# Patient Record
Sex: MALE | Race: BLACK OR AFRICAN AMERICAN | NOT HISPANIC OR LATINO | URBAN - METROPOLITAN AREA
[De-identification: names, ages, dates, MRNs, and addresses within clinical notes are randomized per-mention and may not be internally consistent; named-entity substitution may affect disease eponyms.]

---

## 2023-03-28 ENCOUNTER — INPATIENT (INPATIENT)
Facility: HOSPITAL | Age: 78
LOS: 9 days | Discharge: HOME | End: 2023-04-07
Attending: HOSPITALIST | Admitting: HOSPITALIST
Payer: MEDICAID

## 2023-03-28 VITALS
WEIGHT: 169.98 LBS | OXYGEN SATURATION: 94 % | TEMPERATURE: 98 F | RESPIRATION RATE: 16 BRPM | SYSTOLIC BLOOD PRESSURE: 170 MMHG | HEART RATE: 110 BPM | DIASTOLIC BLOOD PRESSURE: 118 MMHG

## 2023-03-28 DIAGNOSIS — N17.9 ACUTE KIDNEY FAILURE, UNSPECIFIED: ICD-10-CM

## 2023-03-28 DIAGNOSIS — R31.0 GROSS HEMATURIA: ICD-10-CM

## 2023-03-28 DIAGNOSIS — R77.8 OTHER SPECIFIED ABNORMALITIES OF PLASMA PROTEINS: ICD-10-CM

## 2023-03-28 DIAGNOSIS — R79.89 OTHER SPECIFIED ABNORMAL FINDINGS OF BLOOD CHEMISTRY: ICD-10-CM

## 2023-03-28 DIAGNOSIS — R33.9 RETENTION OF URINE, UNSPECIFIED: ICD-10-CM

## 2023-03-28 DIAGNOSIS — E87.70 FLUID OVERLOAD, UNSPECIFIED: ICD-10-CM

## 2023-03-28 DIAGNOSIS — I10 ESSENTIAL (PRIMARY) HYPERTENSION: ICD-10-CM

## 2023-03-28 LAB
BLD GP AB SCN SERPL QL: SIGNIFICANT CHANGE UP
CK MB BLD-MCNC: 2.7 % — SIGNIFICANT CHANGE UP (ref 0–3.5)
CK MB CFR SERPL CALC: 3.1 NG/ML — SIGNIFICANT CHANGE UP (ref 0.5–3.6)
CK SERPL-CCNC: 114 U/L — SIGNIFICANT CHANGE UP (ref 26–308)
TROPONIN I, HIGH SENSITIVITY RESULT: 181.6 NG/L — HIGH

## 2023-03-28 PROCEDURE — 93010 ELECTROCARDIOGRAM REPORT: CPT

## 2023-03-28 PROCEDURE — 99285 EMERGENCY DEPT VISIT HI MDM: CPT

## 2023-03-28 PROCEDURE — 99222 1ST HOSP IP/OBS MODERATE 55: CPT

## 2023-03-28 PROCEDURE — 99233 SBSQ HOSP IP/OBS HIGH 50: CPT

## 2023-03-28 PROCEDURE — 71045 X-RAY EXAM CHEST 1 VIEW: CPT | Mod: 26

## 2023-03-28 PROCEDURE — 74176 CT ABD & PELVIS W/O CONTRAST: CPT | Mod: 26

## 2023-03-28 RX ORDER — METOPROLOL TARTRATE 50 MG
50 TABLET ORAL DAILY
Refills: 0 | Status: DISCONTINUED | OUTPATIENT
Start: 2023-03-28 | End: 2023-04-07

## 2023-03-28 RX ORDER — ACETAMINOPHEN 500 MG
650 TABLET ORAL EVERY 6 HOURS
Refills: 0 | Status: DISCONTINUED | OUTPATIENT
Start: 2023-03-28 | End: 2023-04-07

## 2023-03-28 RX ORDER — AMLODIPINE BESYLATE 2.5 MG/1
5 TABLET ORAL DAILY
Refills: 0 | Status: DISCONTINUED | OUTPATIENT
Start: 2023-03-28 | End: 2023-04-05

## 2023-03-28 RX ORDER — METOPROLOL TARTRATE 50 MG
1 TABLET ORAL
Refills: 0 | DISCHARGE

## 2023-03-28 RX ORDER — TAMSULOSIN HYDROCHLORIDE 0.4 MG/1
0.4 CAPSULE ORAL AT BEDTIME
Refills: 0 | Status: DISCONTINUED | OUTPATIENT
Start: 2023-03-28 | End: 2023-04-07

## 2023-03-28 RX ORDER — FUROSEMIDE 40 MG
40 TABLET ORAL DAILY
Refills: 0 | Status: DISCONTINUED | OUTPATIENT
Start: 2023-03-29 | End: 2023-04-02

## 2023-03-28 RX ORDER — SENNA PLUS 8.6 MG/1
2 TABLET ORAL AT BEDTIME
Refills: 0 | Status: DISCONTINUED | OUTPATIENT
Start: 2023-03-28 | End: 2023-04-07

## 2023-03-28 RX ORDER — POLYETHYLENE GLYCOL 3350 17 G/17G
17 POWDER, FOR SOLUTION ORAL DAILY
Refills: 0 | Status: DISCONTINUED | OUTPATIENT
Start: 2023-03-28 | End: 2023-04-07

## 2023-03-28 RX ORDER — FUROSEMIDE 40 MG
40 TABLET ORAL ONCE
Refills: 0 | Status: COMPLETED | OUTPATIENT
Start: 2023-03-28 | End: 2023-03-28

## 2023-03-28 RX ORDER — ASPIRIN/CALCIUM CARB/MAGNESIUM 324 MG
324 TABLET ORAL ONCE
Refills: 0 | Status: COMPLETED | OUTPATIENT
Start: 2023-03-28 | End: 2023-03-28

## 2023-03-28 RX ORDER — LANOLIN ALCOHOL/MO/W.PET/CERES
3 CREAM (GRAM) TOPICAL AT BEDTIME
Refills: 0 | Status: DISCONTINUED | OUTPATIENT
Start: 2023-03-28 | End: 2023-04-07

## 2023-03-28 RX ADMIN — Medication 40 MILLIGRAM(S): at 17:45

## 2023-03-28 RX ADMIN — TAMSULOSIN HYDROCHLORIDE 0.4 MILLIGRAM(S): 0.4 CAPSULE ORAL at 23:22

## 2023-03-28 RX ADMIN — SENNA PLUS 2 TABLET(S): 8.6 TABLET ORAL at 23:22

## 2023-03-28 RX ADMIN — Medication 50 MILLIGRAM(S): at 17:51

## 2023-03-28 RX ADMIN — Medication 324 MILLIGRAM(S): at 17:51

## 2023-03-28 NOTE — PATIENT PROFILE ADULT - FUNCTIONAL ASSESSMENT - BASIC MOBILITY 6.
2-calculated by average/Not able to assess (calculate score using [facility] averaging method)

## 2023-03-28 NOTE — H&P ADULT - ASSESSMENT
77 years old male with h/o HTN present to ED with complain difficulty urination, increase lower extremity edema, SOB and unable to sleep at night. Patient reported urinary retention for one week, only very small amount of urine come out each time, associated with abdominal discomfort. He also noted increase lower extremity swelling for 1 wee. Reported SOB, PND at night as well. No chest pain. No h/o CAD. Denied seeing any blood in urine  Tachycardic in ED, slightly hypoxic, improved with NC. No leukocytosis, plt 143, K 4.3, Cr 1.4, AST/ALT 86/129, hsTnT 185.3, proBNP 9018, UA with large blood. Flu/COVID negative. CXR image reviewed, no focal consolidation. S/P Yan placement in ED with drainage of > 1.6L urine.     Admitted with gross hematuria, urinary retention, elevated troponin. fluid overload

## 2023-03-28 NOTE — H&P ADULT - NSHPPHYSICALEXAM_GEN_ALL_CORE
CONSTITUTIONAL: alert and cooperative, no acute distress  EYES: PERRL, no scleral icterus  ENT: Mucosa moist, tongue normal  NECK: Neck supple, trachea midline, non-tender  CARDIAC: Normal S1 and S2. Regular rate and rhythms. Pedal edema +++. Peripheral pulses intact  LUNGS: Equal air entry both lungs. No rales, rhonchi, wheezing. Normal respiratory effort.   ABDOMEN: Soft, nondistended, nontender. No guarding or rebound tenderness. No hepatomegaly or splenomegaly. Bowel sound normal. No hernia noted. Yan draining bloody urine  MUSCULOSKELETAL: Normocephalic, atraumatic. No significant deformity or joint abnormality  NEUROLOGICAL: No gross motor or sensory deficits  PSYCHIATRIC: A&O x 3, appropriate mood and affect CONSTITUTIONAL: alert and cooperative, no acute distress  EYES: left eye cataract,  ENT: Mucosa moist, tongue normal  NECK: Neck supple, trachea midline, non-tender  CARDIAC: Normal S1 and S2. Regular rate and rhythms. Pedal edema +++. Peripheral pulses intact  LUNGS: Equal air entry both lungs. No rales, rhonchi, wheezing. Appear to have increase respiratory effort.   ABDOMEN: Soft, nondistended, nontender. No guarding or rebound tenderness. No hepatomegaly or splenomegaly. Bowel sound normal. No hernia noted. Yan draining bloody urine  MUSCULOSKELETAL: Normocephalic, atraumatic. No significant deformity or joint abnormality  NEUROLOGICAL: No gross motor or sensory deficits  PSYCHIATRIC: A&O x 3, appropriate mood and affect

## 2023-03-28 NOTE — PATIENT PROFILE ADULT - FALL HARM RISK - HARM RISK INTERVENTIONS

## 2023-03-28 NOTE — H&P ADULT - PROBLEM SELECTOR PLAN 2
progressively worsening of urinary retention for 1 week, never happened before per patient   Yan with > 1.6L of gross hematuria  CT abd/pelvis  Start flomax 0.4mg hs  Urology consulted

## 2023-03-28 NOTE — CONSULT NOTE ADULT - NS ATTEND AMEND GEN_ALL_CORE FT
Pt draining well with light pink tinged urine via an 18 FR joseph  Uncircumcised and reduced  B/l hydroceles  Ext swelling  Cr 1.4  Urine dirty  F/u PSA that ED ordered: will likely be high due to retention, infection etc.  F/u Cx  F/u CT: patient on way down [address]

## 2023-03-28 NOTE — H&P ADULT - PROBLEM SELECTOR PLAN 1
present with urinary retention s/p Yan placement with > 1.6L of gross hematuria  Will get CT abd/pelvis to evaluate for prostate/bladder lesion  Monitor H/H, check type/Screen  Urology consulted

## 2023-03-28 NOTE — ED PROVIDER NOTE - CLINICAL SUMMARY MEDICAL DECISION MAKING FREE TEXT BOX
Urinary retention, clinically with likely pulm edema - no active resp distress, edema in b/l LE.  Mildly hypoxic but comfortable appearing.  Plan for labs, urine, joseph, admit.

## 2023-03-28 NOTE — H&P ADULT - NSHPADDITIONALINFOADULT_GEN_ALL_CORE
Unable to reconcile home med to be continue through " med reconciliation" due to issue with EMR  Trend LFT, check CK. If worsened LFT with normal CK, then will pursue further work up Unable to reconcile home med to be continue through " med reconciliation" due to issue with EMR

## 2023-03-28 NOTE — H&P ADULT - PROBLEM SELECTOR PLAN 5
Mild MARVIN, likely related to urinary retention  Currently on IV lasix for volume overload  Closely monitor renal function as risk of post obstruction diuresis and on IV diuresis for volume overloadl  Nephrology consulted

## 2023-03-28 NOTE — H&P ADULT - PROBLEM SELECTOR PLAN 3
hsTnT 185.3  No active chest pain  EKG dose not appear to have any acute ST-T changes suggestive of ACS  No h/o CAD  will give one dose of aspirin 325mg. Hold off standing order for now given gross hematuria and pending clinical status  Serial trop/Ck/CKMB  Telemetry monitoring, ECHO, lipid panel, A1c  on BB

## 2023-03-28 NOTE — ED PROVIDER NOTE - PHYSICAL EXAMINATION
General appearance: Nontoxic appearing, conversant, afebrile    Eyes: anicteric sclerae, ANSLEY, EOMI   HENT: Atraumatic; oropharynx clear, MMM and no ulcerations, no pharyngeal erythema or exudate   Neck: Trachea midline; Full range of motion, supple   Pulm: CTA bl, normal respiratory effort and no intercostal retractions, normal work of breathing   CV: RRR, No murmurs, rubs, or gallops   Abdomen: Soft, non-tender, non-distended; no guarding or rebound   Extremities: 3+ LE peripheral edema, no gross deformities, FROM x4   Skin: Dry, normal temperature, turgor and texture; no rash   Psych: Appropriate affect, cooperative

## 2023-03-28 NOTE — H&P ADULT - PROBLEM SELECTOR PLAN 4
present with SOB  proBNP 9018  Significant bilateral LE edema  Clinically very much volume overloaded  Received IV lasix 40mg in ED  Continue with IV lasix 40mg daily. Monitor renal function and post obstruction diuresis.   ECHO, monitor I/O, monitor serum electrolytes  LE doppler to evaluate for DVT

## 2023-03-28 NOTE — PATIENT PROFILE ADULT - FALL HARM RISK - FACTORS
LE Swelling./Other Infliximab Counseling:  I discussed with the patient the risks of infliximab including but not limited to myelosuppression, immunosuppression, autoimmune hepatitis, demyelinating diseases, lymphoma, and serious infections.  The patient understands that monitoring is required including a PPD at baseline and must alert us or the primary physician if symptoms of infection or other concerning signs are noted.

## 2023-03-28 NOTE — ED PROVIDER NOTE - OBJECTIVE STATEMENT
76yo male with pmh htn presenting with decreased urination, b/l LE edema, orthopnea.  Ongoing for past week.  No cp, fever.  When he tries to urinate, unable to fully void.

## 2023-03-28 NOTE — H&P ADULT - HISTORY OF PRESENT ILLNESS
77 years old male with h/o HTN present to ED with complain difficulty urination, increase lower extremity edema, SOB and unable to sleep at night. Patient reported urinary retention for one week, only very small amount of urine come out each time, associated with abdominal discomfort. He also noted increase lower extremity swelling for 1 wee. Reported SOB, PND at night as well. No chest pain. No h/o CAD. Denied seeing any blood in urine  Tachycardic in ED, slightly hypoxic, improved with NC. No leukocytosis, plt 143, K 4.3, Cr 1.4, AST/ALT 86/129, hsTnT 185.3, proBNP 9018, UA with large blood. Flu/COVID negative. CXR image reviewed, no focal consolidation. S/P Yan placement in ED with drainage of > 1.6L urine.     SH: no toxic habits  FH: father-HTN

## 2023-03-29 LAB
A1C WITH ESTIMATED AVERAGE GLUCOSE RESULT: 6.5 % — HIGH (ref 4–5.6)
ALBUMIN SERPL ELPH-MCNC: 2.6 G/DL — LOW (ref 3.3–5)
ALP SERPL-CCNC: 164 U/L — HIGH (ref 40–120)
ALT FLD-CCNC: 105 U/L — HIGH (ref 12–78)
ANION GAP SERPL CALC-SCNC: 6 MMOL/L — SIGNIFICANT CHANGE UP (ref 5–17)
AST SERPL-CCNC: 59 U/L — HIGH (ref 15–37)
BILIRUB SERPL-MCNC: 1.7 MG/DL — HIGH (ref 0.2–1.2)
BUN SERPL-MCNC: 23 MG/DL — SIGNIFICANT CHANGE UP (ref 7–23)
CALCIUM SERPL-MCNC: 8.4 MG/DL — LOW (ref 8.5–10.1)
CHLORIDE SERPL-SCNC: 104 MMOL/L — SIGNIFICANT CHANGE UP (ref 96–108)
CHOLEST SERPL-MCNC: 106 MG/DL — SIGNIFICANT CHANGE UP
CK MB BLD-MCNC: 4.3 % — HIGH (ref 0–3.5)
CK MB CFR SERPL CALC: 2.6 NG/ML — SIGNIFICANT CHANGE UP (ref 0.5–3.6)
CK SERPL-CCNC: 60 U/L — SIGNIFICANT CHANGE UP (ref 26–308)
CO2 SERPL-SCNC: 30 MMOL/L — SIGNIFICANT CHANGE UP (ref 22–31)
CREAT SERPL-MCNC: 1.28 MG/DL — SIGNIFICANT CHANGE UP (ref 0.5–1.3)
EGFR: 58 ML/MIN/1.73M2 — LOW
ESTIMATED AVERAGE GLUCOSE: 140 MG/DL — HIGH (ref 68–114)
GLUCOSE SERPL-MCNC: 97 MG/DL — SIGNIFICANT CHANGE UP (ref 70–99)
HCT VFR BLD CALC: 48.7 % — SIGNIFICANT CHANGE UP (ref 39–50)
HCV AB S/CO SERPL IA: 0.15 S/CO — SIGNIFICANT CHANGE UP (ref 0–0.99)
HCV AB SERPL-IMP: SIGNIFICANT CHANGE UP
HDLC SERPL-MCNC: 26 MG/DL — LOW
HGB BLD-MCNC: 15.1 G/DL — SIGNIFICANT CHANGE UP (ref 13–17)
LIPID PNL WITH DIRECT LDL SERPL: 61 MG/DL — SIGNIFICANT CHANGE UP
MAGNESIUM SERPL-MCNC: 1.6 MG/DL — SIGNIFICANT CHANGE UP (ref 1.6–2.6)
MCHC RBC-ENTMCNC: 25.2 PG — LOW (ref 27–34)
MCHC RBC-ENTMCNC: 31 G/DL — LOW (ref 32–36)
MCV RBC AUTO: 81.2 FL — SIGNIFICANT CHANGE UP (ref 80–100)
NON HDL CHOLESTEROL: 81 MG/DL — SIGNIFICANT CHANGE UP
NRBC # BLD: 0 /100 WBCS — SIGNIFICANT CHANGE UP (ref 0–0)
PHOSPHATE SERPL-MCNC: 3.2 MG/DL — SIGNIFICANT CHANGE UP (ref 2.5–4.5)
PLATELET # BLD AUTO: 160 K/UL — SIGNIFICANT CHANGE UP (ref 150–400)
POTASSIUM SERPL-MCNC: 3.8 MMOL/L — SIGNIFICANT CHANGE UP (ref 3.5–5.3)
POTASSIUM SERPL-SCNC: 3.8 MMOL/L — SIGNIFICANT CHANGE UP (ref 3.5–5.3)
PROT SERPL-MCNC: 6.8 GM/DL — SIGNIFICANT CHANGE UP (ref 6–8.3)
PSA FLD-MCNC: 90.8 NG/ML — HIGH (ref 0–4)
RBC # BLD: 6 M/UL — HIGH (ref 4.2–5.8)
RBC # FLD: 17 % — HIGH (ref 10.3–14.5)
SODIUM SERPL-SCNC: 140 MMOL/L — SIGNIFICANT CHANGE UP (ref 135–145)
TRIGL SERPL-MCNC: 97 MG/DL — SIGNIFICANT CHANGE UP
TROPONIN I, HIGH SENSITIVITY RESULT: 157.3 NG/L — HIGH
WBC # BLD: 9.95 K/UL — SIGNIFICANT CHANGE UP (ref 3.8–10.5)
WBC # FLD AUTO: 9.95 K/UL — SIGNIFICANT CHANGE UP (ref 3.8–10.5)

## 2023-03-29 PROCEDURE — 99232 SBSQ HOSP IP/OBS MODERATE 35: CPT

## 2023-03-29 PROCEDURE — 93970 EXTREMITY STUDY: CPT | Mod: 26

## 2023-03-29 PROCEDURE — 99233 SBSQ HOSP IP/OBS HIGH 50: CPT

## 2023-03-29 PROCEDURE — 93306 TTE W/DOPPLER COMPLETE: CPT | Mod: 26

## 2023-03-29 RX ORDER — ENOXAPARIN SODIUM 100 MG/ML
70 INJECTION SUBCUTANEOUS EVERY 12 HOURS
Refills: 0 | Status: DISCONTINUED | OUTPATIENT
Start: 2023-03-29 | End: 2023-04-03

## 2023-03-29 RX ADMIN — TAMSULOSIN HYDROCHLORIDE 0.4 MILLIGRAM(S): 0.4 CAPSULE ORAL at 22:11

## 2023-03-29 RX ADMIN — SENNA PLUS 2 TABLET(S): 8.6 TABLET ORAL at 22:11

## 2023-03-29 RX ADMIN — ENOXAPARIN SODIUM 70 MILLIGRAM(S): 100 INJECTION SUBCUTANEOUS at 17:22

## 2023-03-29 RX ADMIN — Medication 3 MILLIGRAM(S): at 22:10

## 2023-03-29 RX ADMIN — Medication 50 MILLIGRAM(S): at 05:48

## 2023-03-29 RX ADMIN — Medication 40 MILLIGRAM(S): at 05:48

## 2023-03-29 NOTE — PHYSICAL THERAPY INITIAL EVALUATION ADULT - PERTINENT HX OF CURRENT PROBLEM, REHAB EVAL
Troponin- 157.3, however as per Dr. Graves notes "#Elevated troponin.   hsTnT 185.3 -> 157  No active chest pain  EKG dose not appear to have any acute ST-T changes suggestive of ACS  No h/o CAD"    Hence PT eval was done at this time. [facility]  [id number]- Jodh was used No

## 2023-03-29 NOTE — PROGRESS NOTE ADULT - SUBJECTIVE AND OBJECTIVE BOX
Viji Graves M.D.    Patient is a 77y old  Male who presents with a chief complaint of urinary retention, gross hematuria, elevated troponin, MARVIN (28 Mar 2023 19:20)      SUBJECTIVE / OVERNIGHT EVENTS: DVT + for R peroneal DVT.     Patient denies chest pain, SOB, abd pain, N/V, fever, chills, dysuria or any other complaints. All remainder ROS negative.     MEDICATIONS  (STANDING):  amLODIPine   Tablet 5 milliGRAM(s) Oral daily  enoxaparin Injectable 70 milliGRAM(s) SubCutaneous every 12 hours  furosemide   Injectable 40 milliGRAM(s) IV Push daily  metoprolol succinate ER 50 milliGRAM(s) Oral daily  senna 2 Tablet(s) Oral at bedtime  tamsulosin 0.4 milliGRAM(s) Oral at bedtime    MEDICATIONS  (PRN):  acetaminophen     Tablet .. 650 milliGRAM(s) Oral every 6 hours PRN Mild Pain (1 - 3), Moderate Pain (4 - 6)  melatonin 3 milliGRAM(s) Oral at bedtime PRN Insomnia  polyethylene glycol 3350 17 Gram(s) Oral daily PRN Constipation      I&O's Summary    28 Mar 2023 07:01  -  29 Mar 2023 07:00  --------------------------------------------------------  IN: 0 mL / OUT: 1000 mL / NET: -1000 mL        PHYSICAL EXAM:  Vital Signs Last 24 Hrs  T(C): 37.3 (29 Mar 2023 04:45), Max: 37.3 (29 Mar 2023 04:45)  T(F): 99.2 (29 Mar 2023 04:45), Max: 99.2 (29 Mar 2023 04:45)  HR: 94 (29 Mar 2023 04:45) (91 - 94)  BP: 120/79 (29 Mar 2023 04:45) (114/47 - 154/93)  BP(mean): --  RR: 18 (29 Mar 2023 04:45) (17 - 19)  SpO2: 94% (29 Mar 2023 04:45) (94% - 97%)    Parameters below as of 29 Mar 2023 04:45  Patient On (Oxygen Delivery Method): nasal cannula      CONSTITUTIONAL: NAD, well-groomed  ENMT: Moist oral mucosa, no pharyngeal injection or exudates; normal dentition  RESPIRATORY: Normal respiratory effort; lungs are clear to auscultation bilaterally  CARDIOVASCULAR: Regular rate and rhythm, 2+ LE edema  ABDOMEN: Nontender to palpation, normoactive bowel sounds,  PSYCH: A+O x3; affect appropriate  NEUROLOGY: CN 2-12 are intact and symmetric; no gross sensory deficits;   SKIN: No rashes; no palpable lesions    LABS:                        15.1   9.95  )-----------( 160      ( 29 Mar 2023 06:13 )             48.7     -    140  |  104  |  23  ----------------------------<  97  3.8   |  30  |  1.28    Ca    8.4<L>      29 Mar 2023 06:13  Phos  3.2       Mg     1.6         TPro  6.8  /  Alb  2.6<L>  /  TBili  1.7<H>  /  DBili  x   /  AST  59<H>  /  ALT  105<H>  /  AlkPhos  164<H>      PT/INR - ( 28 Mar 2023 14:33 )   PT: 15.8 sec;   INR: 1.32 ratio         PTT - ( 28 Mar 2023 14:33 )  PTT:33.1 sec  CARDIAC MARKERS ( 29 Mar 2023 06:13 )  x     / x     / 60 U/L / x     / 2.6 ng/mL  CARDIAC MARKERS ( 28 Mar 2023 17:40 )  x     / x     / 114 U/L / x     / 3.1 ng/mL      Urinalysis Basic - ( 28 Mar 2023 14:33 )    Color: Red / Appearance: very cloudy / S.015 / pH: x  Gluc: x / Ketone: Negative  / Bili: Negative / Urobili: Negative mg/dL   Blood: x / Protein: 500 mg/dL / Nitrite: Negative   Leuk Esterase: Moderate / RBC: >50 /HPF / WBC 3-5   Sq Epi: x / Non Sq Epi: x / Bacteria: Few        CAPILLARY BLOOD GLUCOSE          RADIOLOGY & ADDITIONAL TESTS:  Results Reviewed:   Imaging Personally Reviewed:  Electrocardiogram Personally Reviewed:

## 2023-03-29 NOTE — PHYSICAL THERAPY INITIAL EVALUATION ADULT - ADDITIONAL COMMENTS
AS per pt thru , pt lives in a house with 7 steps to enter with b/l HR's going down, no other steps. He was independent in all functional mobility using no AD, PTA

## 2023-03-29 NOTE — PROGRESS NOTE ADULT - SUBJECTIVE AND OBJECTIVE BOX
[facility]  [place] [id number]    Patient seen and examined at bedside with Dr. Noel.  used throughout interview.  Patient reports he feels "okay".   Reports history of difficulty urinating, poor stream, reports increased frequency at night.  Joseph in place, draining dark yellow urine with occasional sedimentation, no clots.  Denies abdominal pain, nausea, vomiting CP, SOB.     MEDICATIONS  (STANDING):  amLODIPine   Tablet 5 milliGRAM(s) Oral daily  enoxaparin Injectable 70 milliGRAM(s) SubCutaneous every 12 hours  furosemide   Injectable 40 milliGRAM(s) IV Push daily  metoprolol succinate ER 50 milliGRAM(s) Oral daily  senna 2 Tablet(s) Oral at bedtime  tamsulosin 0.4 milliGRAM(s) Oral at bedtime    MEDICATIONS  (PRN):  acetaminophen     Tablet .. 650 milliGRAM(s) Oral every 6 hours PRN Mild Pain (1 - 3), Moderate Pain (4 - 6)  melatonin 3 milliGRAM(s) Oral at bedtime PRN Insomnia  polyethylene glycol 3350 17 Gram(s) Oral daily PRN Constipation      Vital Signs Last 24 Hrs  T(C): 37.2 (29 Mar 2023 16:18), Max: 37.3 (29 Mar 2023 04:45)  T(F): 99 (29 Mar 2023 16:18), Max: 99.2 (29 Mar 2023 04:45)  HR: 104 (29 Mar 2023 16:18) (91 - 104)  BP: 110/76 (29 Mar 2023 16:18) (110/76 - 154/93)  BP(mean): --  RR: 18 (29 Mar 2023 16:18) (17 - 19)  SpO2: 92% (29 Mar 2023 16:18) (92% - 97%)    Parameters below as of 29 Mar 2023 16:18  Patient On (Oxygen Delivery Method): nasal cannula      PHYSICAL EXAM:  General: NAD, resting comfortably in bed  Neuro:  Alert & oriented x 3  HEENT: NCAT, EOMI, conjunctiva clear  Lung:  respirations nonlabored, good inspiratory effort  Abdomen: soft, nontender, nondistended  : joseph draining dark yellow urine with occasional sedimentation. Bladder nontender, nondistended   Extremities: no pedal edema or calf tenderness noted     I&O's Detail    28 Mar 2023 07:01  -  29 Mar 2023 07:00  --------------------------------------------------------  IN:  Total IN: 0 mL    OUT:    Indwelling Catheter - Urethral (mL): 1000 mL  Total OUT: 1000 mL    Total NET: -1000 mL      29 Mar 2023 07:01  -  29 Mar 2023 18:11  --------------------------------------------------------  IN:  Total IN: 0 mL    OUT:    Indwelling Catheter - Urethral (mL): 1000 mL  Total OUT: 1000 mL    Total NET: -1000 mL      LABS:                        15.1   9.95  )-----------( 160      ( 29 Mar 2023 06:13 )             48.7         140  |  104  |  23  ----------------------------<  97  3.8   |  30  |  1.28    Ca    8.4<L>      29 Mar 2023 06:13  Phos  3.2       Mg     1.6         TPro  6.8  /  Alb  2.6<L>  /  TBili  1.7<H>  /  DBili  x   /  AST  59<H>  /  ALT  105<H>  /  AlkPhos  164<H>      PT/INR - ( 28 Mar 2023 14:33 )   PT: 15.8 sec;   INR: 1.32 ratio         PTT - ( 28 Mar 2023 14:33 )  PTT:33.1 sec  Urinalysis Basic - ( 28 Mar 2023 14:33 )    Color: Red / Appearance: very cloudy / S.015 / pH: x  Gluc: x / Ketone: Negative  / Bili: Negative / Urobili: Negative mg/dL   Blood: x / Protein: 500 mg/dL / Nitrite: Negative   Leuk Esterase: Moderate / RBC: >50 /HPF / WBC 3-5   Sq Epi: x / Non Sq Epi: x / Bacteria: Few

## 2023-03-29 NOTE — PROGRESS NOTE ADULT - ASSESSMENT
77 years old male with h/o HTN present to ED with complain difficulty urination, increase lower extremity edema, SOB and unable to sleep at night. Patient reported urinary retention for one week, only very small amount of urine come out each time, associated with abdominal discomfort. He also noted increase lower extremity swelling for 1 wee. Reported SOB, PND at night as well. No chest pain. No h/o CAD. Denied seeing any blood in urine  Tachycardic in ED, slightly hypoxic, improved with NC. No leukocytosis, plt 143, K 4.3, Cr 1.4, AST/ALT 86/129, hsTnT 185.3, proBNP 9018, UA with large blood. Flu/COVID negative. CXR image reviewed, no focal consolidation. S/P Yan placement in ED with drainage of > 1.6L urine.     Admitted with gross hematuria, urinary retention, elevated troponin. fluid overload      #Hematuria  #Urinary retention  present with urinary retention s/p Yan placement with > 1.6L of gross hematuria, now with just orange urine  CT abd/pelvis showed BPH and bladder wall thickening, no hydro  H&H stable  Urology consulted, ted recs  progressively worsening of urinary retention for 1 week, never happened before per patient   Yan with > 1.6L of gross hematuria  Start flomax 0.4mg hs      #Elevated troponin.   hsTnT 185.3 -> 157  No active chest pain  EKG dose not appear to have any acute ST-T changes suggestive of ACS  No h/o CAD  Hold off ASA  on BB.    #Fluid overload.   present with SOB  proBNP 9018  Significant bilateral LE edema  Clinically very much volume overloaded  Received IV lasix 40mg in ED  Continue with IV lasix 40mg daily. Monitor renal function and post obstruction diuresis.   ECHO, monitor I/O, monitor serum electrolytes    #R peroneal DVT  noted on DVT studies  denies h/o clotting disorder, no recent travel  Given possible concern for malignancy with hematuria, start Lovenox BID, and monitor Hg - transition to NOAC on dc    #MARVIN (acute kidney injury)  Mild MARVIN, likely related to urinary retention  Currently on IV lasix for volume overload  Closely monitor renal function as risk of post obstruction diuresis and on IV diuresis for volume overload  Nephrology consulted, ted recs    #Benign essential HTN  On amlodipine 5mg and metoprolol ER 50mg daily per daughter.    #Elevated LFTs  AST/ALT 86/129, total bili 2 - stable   daily CMP    DVT ppx: Lovenox  Dispo: pending urology workup, eventually TOV and TTE

## 2023-03-29 NOTE — PHYSICAL THERAPY INITIAL EVALUATION ADULT - TRANSFER TRAINING, PT EVAL
Pt will be able to perform sit to stand, stand pivot transfer using RW  independently in 2 to 3 weeks

## 2023-03-29 NOTE — PROGRESS NOTE ADULT - ASSESSMENT
78yo M with a h/o constipation, admitted with CHF exacerbation in fluid overload, elevated troponins, and hematuria. Joseph now draining dark yellow urine, without clots.     - maintain joseph  - recommend TOV when able, if patient retains may discharge with joseph and f/u in office  - follow up with Dr. Noel in office upon d/c  - f/u urine culture  - continue flomax   - continue care per primary medical team  - discussed and seen with Dr. Noel

## 2023-03-30 LAB
ALBUMIN SERPL ELPH-MCNC: 2.6 G/DL — LOW (ref 3.3–5)
ALP SERPL-CCNC: 159 U/L — HIGH (ref 40–120)
ALT FLD-CCNC: 80 U/L — HIGH (ref 12–78)
ANION GAP SERPL CALC-SCNC: 4 MMOL/L — LOW (ref 5–17)
AST SERPL-CCNC: 42 U/L — HIGH (ref 15–37)
BILIRUB SERPL-MCNC: 1.3 MG/DL — HIGH (ref 0.2–1.2)
BUN SERPL-MCNC: 30 MG/DL — HIGH (ref 7–23)
CALCIUM SERPL-MCNC: 8.8 MG/DL — SIGNIFICANT CHANGE UP (ref 8.5–10.1)
CHLORIDE SERPL-SCNC: 105 MMOL/L — SIGNIFICANT CHANGE UP (ref 96–108)
CO2 SERPL-SCNC: 30 MMOL/L — SIGNIFICANT CHANGE UP (ref 22–31)
CREAT SERPL-MCNC: 1.38 MG/DL — HIGH (ref 0.5–1.3)
EGFR: 53 ML/MIN/1.73M2 — LOW
GLUCOSE SERPL-MCNC: 111 MG/DL — HIGH (ref 70–99)
HCT VFR BLD CALC: 48.6 % — SIGNIFICANT CHANGE UP (ref 39–50)
HGB BLD-MCNC: 15 G/DL — SIGNIFICANT CHANGE UP (ref 13–17)
MCHC RBC-ENTMCNC: 25.3 PG — LOW (ref 27–34)
MCHC RBC-ENTMCNC: 30.9 G/DL — LOW (ref 32–36)
MCV RBC AUTO: 82.1 FL — SIGNIFICANT CHANGE UP (ref 80–100)
NRBC # BLD: 0 /100 WBCS — SIGNIFICANT CHANGE UP (ref 0–0)
PLATELET # BLD AUTO: 174 K/UL — SIGNIFICANT CHANGE UP (ref 150–400)
POTASSIUM SERPL-MCNC: 3.7 MMOL/L — SIGNIFICANT CHANGE UP (ref 3.5–5.3)
POTASSIUM SERPL-SCNC: 3.7 MMOL/L — SIGNIFICANT CHANGE UP (ref 3.5–5.3)
PROT SERPL-MCNC: 6.8 GM/DL — SIGNIFICANT CHANGE UP (ref 6–8.3)
RBC # BLD: 5.92 M/UL — HIGH (ref 4.2–5.8)
RBC # FLD: 15.9 % — HIGH (ref 10.3–14.5)
SODIUM SERPL-SCNC: 139 MMOL/L — SIGNIFICANT CHANGE UP (ref 135–145)
WBC # BLD: 7.97 K/UL — SIGNIFICANT CHANGE UP (ref 3.8–10.5)
WBC # FLD AUTO: 7.97 K/UL — SIGNIFICANT CHANGE UP (ref 3.8–10.5)

## 2023-03-30 PROCEDURE — 99223 1ST HOSP IP/OBS HIGH 75: CPT

## 2023-03-30 PROCEDURE — 99233 SBSQ HOSP IP/OBS HIGH 50: CPT

## 2023-03-30 RX ORDER — FINASTERIDE 5 MG/1
5 TABLET, FILM COATED ORAL DAILY
Refills: 0 | Status: DISCONTINUED | OUTPATIENT
Start: 2023-03-30 | End: 2023-04-07

## 2023-03-30 RX ORDER — ASPIRIN/CALCIUM CARB/MAGNESIUM 324 MG
81 TABLET ORAL DAILY
Refills: 0 | Status: DISCONTINUED | OUTPATIENT
Start: 2023-03-30 | End: 2023-04-07

## 2023-03-30 RX ORDER — REGADENOSON 0.08 MG/ML
0.4 INJECTION, SOLUTION INTRAVENOUS ONCE
Refills: 0 | Status: DISCONTINUED | OUTPATIENT
Start: 2023-03-30 | End: 2023-04-02

## 2023-03-30 RX ORDER — ACETYLCYSTEINE 200 MG/ML
1200 VIAL (ML) MISCELLANEOUS EVERY 12 HOURS
Refills: 0 | Status: COMPLETED | OUTPATIENT
Start: 2023-03-30 | End: 2023-03-31

## 2023-03-30 RX ADMIN — Medication 1200 MILLIGRAM(S): at 11:08

## 2023-03-30 RX ADMIN — Medication 81 MILLIGRAM(S): at 13:36

## 2023-03-30 RX ADMIN — Medication 40 MILLIGRAM(S): at 06:05

## 2023-03-30 RX ADMIN — SENNA PLUS 2 TABLET(S): 8.6 TABLET ORAL at 21:42

## 2023-03-30 RX ADMIN — TAMSULOSIN HYDROCHLORIDE 0.4 MILLIGRAM(S): 0.4 CAPSULE ORAL at 21:42

## 2023-03-30 RX ADMIN — ENOXAPARIN SODIUM 70 MILLIGRAM(S): 100 INJECTION SUBCUTANEOUS at 17:02

## 2023-03-30 RX ADMIN — Medication 50 MILLIGRAM(S): at 06:05

## 2023-03-30 RX ADMIN — ENOXAPARIN SODIUM 70 MILLIGRAM(S): 100 INJECTION SUBCUTANEOUS at 06:06

## 2023-03-30 RX ADMIN — FINASTERIDE 5 MILLIGRAM(S): 5 TABLET, FILM COATED ORAL at 15:59

## 2023-03-30 RX ADMIN — Medication 3 MILLIGRAM(S): at 21:44

## 2023-03-30 RX ADMIN — Medication 1200 MILLIGRAM(S): at 22:07

## 2023-03-30 RX ADMIN — AMLODIPINE BESYLATE 5 MILLIGRAM(S): 2.5 TABLET ORAL at 06:05

## 2023-03-30 NOTE — PROGRESS NOTE ADULT - SUBJECTIVE AND OBJECTIVE BOX
[facility]  [place] [id number]   Patient seen and examined at bedside with Dr. Busch.  Patient states he does not have history of prostate issues, does not recall having PSA test performed.  Report he was experiencing difficulty with urination and increased nightime frequency prior to admission.   States that he is feeling "well". Denies any complaints currently.  Dr. Busch performed ALEJO at bedside.    MEDICATIONS  (STANDING):  acetylcysteine  Oral Solution 1200 milliGRAM(s) Oral every 12 hours  amLODIPine   Tablet 5 milliGRAM(s) Oral daily  aspirin  chewable 81 milliGRAM(s) Oral daily  enoxaparin Injectable 70 milliGRAM(s) SubCutaneous every 12 hours  finasteride 5 milliGRAM(s) Oral daily  furosemide   Injectable 40 milliGRAM(s) IV Push daily  metoprolol succinate ER 50 milliGRAM(s) Oral daily  senna 2 Tablet(s) Oral at bedtime  tamsulosin 0.4 milliGRAM(s) Oral at bedtime    MEDICATIONS  (PRN):  acetaminophen     Tablet .. 650 milliGRAM(s) Oral every 6 hours PRN Mild Pain (1 - 3), Moderate Pain (4 - 6)  melatonin 3 milliGRAM(s) Oral at bedtime PRN Insomnia  polyethylene glycol 3350 17 Gram(s) Oral daily PRN Constipation      Vital Signs Last 24 Hrs  T(C): 36.9 (30 Mar 2023 10:51), Max: 37.2 (29 Mar 2023 16:18)  T(F): 98.4 (30 Mar 2023 10:51), Max: 99 (29 Mar 2023 16:18)  HR: 91 (30 Mar 2023 10:51) (91 - 104)  BP: 113/75 (30 Mar 2023 10:51) (101/63 - 113/75)  BP(mean): --  RR: 17 (30 Mar 2023 10:51) (17 - 18)  SpO2: 97% (30 Mar 2023 10:51) (90% - 97%)    Parameters below as of 30 Mar 2023 04:55  Patient On (Oxygen Delivery Method): nasal cannula      PHYSICAL EXAM:  General: NAD, resting comfortably in bed, nontoxic appearing  Neuro:  Alert & oriented x 3  HEENT: NCAT, EOMI, conjunctiva clear  Lung: respirations nonlabored, good inspiratory effort  Abdomen: soft, nontender to palpation  ; joseph draining yellow urine without visible sedimentation, bladder nondistended, nontender to palpation  Extremities: + edematous lower extremities bilaterally extending upward to knee.    I&O's Detail    29 Mar 2023 07:01  -  30 Mar 2023 07:00  --------------------------------------------------------  IN:    Oral Fluid: 360 mL  Total IN: 360 mL    OUT:    Indwelling Catheter - Urethral (mL): 1400 mL    Voided (mL): 300 mL  Total OUT: 1700 mL    Total NET: -1340 mL      LABS:                        15.0   7.97  )-----------( 174      ( 30 Mar 2023 07:00 )             48.6     03-30    139  |  105  |  30<H>  ----------------------------<  111<H>  3.7   |  30  |  1.38<H>    Ca    8.8      30 Mar 2023 07:00  Phos  3.2     03-29  Mg     1.6     03-29    TPro  6.8  /  Alb  2.6<L>  /  TBili  1.3<H>  /  DBili  x   /  AST  42<H>  /  ALT  80<H>  /  AlkPhos  159<H>  03-30    PT/INR - ( 28 Mar 2023 14:33 )   PT: 15.8 sec;   INR: 1.32 ratio         PTT - ( 28 Mar 2023 14:33 )  PTT:33.1 sec  Urinalysis Basic - ( 28 Mar 2023 14:33 )    Color: Red / Appearance: very cloudy / S.015 / pH: x  Gluc: x / Ketone: Negative  / Bili: Negative / Urobili: Negative mg/dL   Blood: x / Protein: 500 mg/dL / Nitrite: Negative   Leuk Esterase: Moderate / RBC: >50 /HPF / WBC 3-5   Sq Epi: x / Non Sq Epi: x / Bacteria: Few

## 2023-03-30 NOTE — CONSULT NOTE ADULT - SUBJECTIVE AND OBJECTIVE BOX
CARDIOLOGY CONSULT NOTE    Patient is a 77y Male with a known history of :  Gross hematuria [R31.0]    Urinary retention [R33.9]    Elevated troponin [R77.8]    Fluid overload [E87.70]    MARVIN (acute kidney injury) [N17.9]    Benign essential HTN [I10]    Elevated LFTs [R79.89]      HPI:  77 year old male with h/o HTN; presented to the ED with complaints of difficulty urination, increased lower extremity edema, SOB and unable to sleep at night. Patient reported urinary retention for one week, only very small amount of urine come out each time, associated with abdominal discomfort. He also noted increase lower extremity swelling for 1 week.   No chest pain. No h/o CAD.  S/P Yan placement in ED with drainage of > 1.6L urine.   CT abd/pelvis showed BPH and bladder wall thickening, no hydro  proBNP 9018, trop 180 -> 150;  significant bilateral LE edema  Echo with EF 40-45%, RV dysfunction and severe pulm HTN  EKG with no acute ST-T changes suggestive of ACS      REVIEW OF SYSTEMS:  CONSTITUTIONAL: No fever, weight loss, or fatigue  EYES: No eye pain, visual disturbances, or discharge  ENMT:  No difficulty hearing, tinnitus, vertigo; No sinus or throat pain  NECK: No pain or stiffness  BREASTS: No pain, masses, or nipple discharge  RESPIRATORY: No cough, wheezing, chills or hemoptysis; No shortness of breath  CARDIOVASCULAR: No chest pain, palpitations, dizziness, or leg swelling  GASTROINTESTINAL: No abdominal or epigastric pain. No nausea, vomiting, or hematemesis; No diarrhea or constipation. No melena or hematochezia.  GENITOURINARY: No dysuria, frequency, hematuria, or incontinence  NEUROLOGICAL: No headaches, memory loss, loss of strength, numbness, or tremors  SKIN: No itching, burning, rashes, or lesions   LYMPH NODES: No enlarged glands  ENDOCRINE: No heat or cold intolerance; No hair loss  MUSCULOSKELETAL: No joint pain or swelling; No muscle, back, or extremity pain  PSYCHIATRIC: No depression, anxiety, mood swings, or difficulty sleeping  HEME/LYMPH: No easy bruising, or bleeding gums  ALLERGY AND IMMUNOLOGIC: No hives or eczema    MEDICATIONS  (STANDING):  acetylcysteine  Oral Solution 1200 milliGRAM(s) Oral every 12 hours  amLODIPine   Tablet 5 milliGRAM(s) Oral daily  aspirin  chewable 81 milliGRAM(s) Oral daily  enoxaparin Injectable 70 milliGRAM(s) SubCutaneous every 12 hours  finasteride 5 milliGRAM(s) Oral daily  furosemide   Injectable 40 milliGRAM(s) IV Push daily  metoprolol succinate ER 50 milliGRAM(s) Oral daily  senna 2 Tablet(s) Oral at bedtime  tamsulosin 0.4 milliGRAM(s) Oral at bedtime    MEDICATIONS  (PRN):  acetaminophen     Tablet .. 650 milliGRAM(s) Oral every 6 hours PRN Mild Pain (1 - 3), Moderate Pain (4 - 6)  melatonin 3 milliGRAM(s) Oral at bedtime PRN Insomnia  polyethylene glycol 3350 17 Gram(s) Oral daily PRN Constipation      ALLERGIES: No Known Allergies      FAMILY HISTORY:      PHYSICAL EXAMINATION:  -----------------------------  T(C): 36.9 (03-30-23 @ 10:51), Max: 37.2 (03-29-23 @ 16:18)  HR: 91 (03-30-23 @ 10:51) (91 - 104)  BP: 113/75 (03-30-23 @ 10:51) (101/63 - 113/75)  RR: 17 (03-30-23 @ 10:51) (17 - 18)  SpO2: 97% (03-30-23 @ 10:51) (90% - 97%)    Constitutional: well developed, normal appearance, well groomed, well nourished, no deformities and no acute distress.   Eyes: the conjunctiva exhibited no abnormalities and the eyelids demonstrated no xanthelasmas.   HEENT: normal oral mucosa, no oral pallor and no oral cyanosis.   Neck: normal jugular venous A waves present, normal jugular venous V waves present and no jugular venous sandoval A waves.   Pulmonary: no respiratory distress, normal respiratory rhythm and effort, no accessory muscle use and lungs were clear to auscultation bilaterally.   Cardiovascular: heart rate and rhythm were normal, normal S1 and S2 and no murmur, gallop, rub, heave or thrill are present.   Abdomen: soft, non-tender, no hepato-splenomegaly and no abdominal mass palpated.   Musculoskeletal: the gait could not be assessed..   Extremities: no clubbing of the fingernails, no localized cyanosis, no petechial hemorrhages and no ischemic changes.   Skin: normal skin color and pigmentation, no rash, no venous stasis, no skin lesions, no skin ulcer and no xanthoma was observed.   Psychiatric: oriented to person, place, and time, the affect was normal, the mood was normal and not feeling anxious.     LABS:   --------  03-30    139  |  105  |  30<H>  ----------------------------<  111<H>  3.7   |  30  |  1.38<H>    Ca    8.8      30 Mar 2023 07:00  Phos  3.2     03-29  Mg     1.6     03-29    TPro  6.8  /  Alb  2.6<L>  /  TBili  1.3<H>  /  DBili  x   /  AST  42<H>  /  ALT  80<H>  /  AlkPhos  159<H>  03-30                         15.0   7.97  )-----------( 174      ( 30 Mar 2023 07:00 )             48.6           106 mg/dL, --, 26 mg/dL<L>, 97 mg/dL        RADIOLOGY:  -----------------    < from: TTE Echo Complete w/o Contrast w/ Doppler (03.29.23 @ 11:07) >  Summary:   1. Left ventricular ejection fraction, by visual estimation, is 40 to   45%.   2. Technically adequate study.   3. Moderately decreased global left ventricular systolic function.   4. Basal and mid inferior wall, basal and mid inferolateral wall, and   mid inferoseptal segment are abnormal as described above.   5. Normal left ventricular internal cavity size.   6. Right ventricular pressure overload.   7. Spectral Doppler shows impaired relaxation pattern of left   ventricular myocardial filling (Grade I diastolic dysfunction).   8. There is moderate concentric left ventricular hypertrophy.   9. Moderately enlarged right ventricle.  10. Moderately reduced RV systolic function.  11. Moderately enlarged right atrium.  12. Normal left atrial size.  13. Small pericardial effusion.  14. Mild mitral valve regurgitation.  15. Structurally normal mitral valve, with normal leaflet excursion.  16. Moderate tricuspid regurgitation.   17. Mild pulmonic valve regurgitation.  18. Estimated pulmonary artery systolic pressure is 76.3 mmHg assuming a   right atrial pressure of 10 mmHg, which is consistent with severe   pulmonary hypertension.    [id number] Jose Baltazar MD FACC, FASE, FACP  Electronically signed on 3/29/2023 at 5:46:53 PM    < end of copied text >  
HPI:  76yo M with a h/o constipation presents to ED with BLE swelling and abdominal pain. Patient is a poor historian but states his child brought him to the ED after complaining of pain for a few days. He states his pain radiates up from his legs "to his umbilical cord" and has been keeping him awake at night. Patient also states that he has not been able to void more than a few drops and is constipated. He is unable to confirm how long he has been experiencing issues with urination or how long it has been since his last BM. Joseph catheter was placed with about 1L of blood tinged urine in joseph bag. He denies fever, chills, N/V/D, CP, SOB.     PAST MEDICAL & SURGICAL HISTORY:  poor historian, unable to obtain    Review of Systems:  contained within HPI    MEDICATIONS  (STANDING):  amLODIPine   Tablet 5 milliGRAM(s) Oral daily  aspirin  chewable 324 milliGRAM(s) Oral once  furosemide   Injectable 40 milliGRAM(s) IV Push Once  metoprolol succinate ER 50 milliGRAM(s) Oral daily  tamsulosin 0.4 milliGRAM(s) Oral at bedtime    MEDICATIONS  (PRN):  acetaminophen     Tablet .. 650 milliGRAM(s) Oral every 6 hours PRN Mild Pain (1 - 3), Moderate Pain (4 - 6)  melatonin 3 milliGRAM(s) Oral at bedtime PRN Insomnia      Allergies    No Known Allergies    Intolerances    SOCIAL HISTORY          Smoking: Yes [ ]  No [X]   ______pk yrs          ETOH  Yes [ ]  No [X]  Social [ ]          DRUGS:  Yes [ ]  No [X]  if so what______________    FAMILY HISTORY:  Unknown    Vital Signs Last 24 Hrs  T(C): --  T(F): --  HR: --  BP: --  BP(mean): --  RR: --  SpO2: --    Physical Exam:  General:  Appears stated age, NAD  Eyes: KINGA  HENT: WNL, no JVD  Chest: clear breath sounds  Cardiovascular: tachycardic, S1S2  Abdomen: soft, non tender, distended. No rebound, no guarding  : mild suprapubic tenderness, non distended. Joseph catheter with over 1L of fruit punch colored urine.   Extremities: edematous BLE  Skin: No rash  Musculoskeletal: normal strength  Neuro/Psych: AAO x2      LABS:                        15.2   8.51  )-----------( 143      ( 28 Mar 2023 14:33 )             48.3         141  |  108  |  26<H>  ----------------------------<  85  4.3   |  31  |  1.40<H>    Ca    9.5      28 Mar 2023 14:33  Phos  3.0       Mg     1.9         TPro  7.3  /  Alb  2.9<L>  /  TBili  2.0<H>  /  DBili  x   /  AST  86<H>  /  ALT  129<H>  /  AlkPhos  172<H>      PT/INR - ( 28 Mar 2023 14:33 )   PT: 15.8 sec;   INR: 1.32 ratio         PTT - ( 28 Mar 2023 14:33 )  PTT:33.1 sec  Urinalysis Basic - ( 28 Mar 2023 14:33 )    Color: Red / Appearance: very cloudy / S.015 / pH: x  Gluc: x / Ketone: Negative  / Bili: Negative / Urobili: Negative mg/dL   Blood: x / Protein: 500 mg/dL / Nitrite: Negative   Leuk Esterase: Moderate / RBC: >50 /HPF / WBC 3-5   Sq Epi: x / Non Sq Epi: x / Bacteria: Few    RADIOLOGY & ADDITIONAL STUDIES:    A/P  76yo M with a h/o constipation presents to ED with BLE swelling and abdominal pain, found to have hematuria   H/H stable    - maintain joseph, monitor output  - f/u CT A/P for possible causes of hematuria  - f/u PSA  - continue care per primary team  - will d/w Dr. Hill
[facility], [facility]  NEPHROLOGY AND HYPERTENSION  [address]  [address]  [place] [address]  [phone]    MD FABIANA WHITT MD YELENA ROSENBERG, MD BINNY KOSHY, MD CHRISTOPHER CAPUTO, MD EDWARD BOVER, MD      Information from chart:  "Patient is a 77y old  Male who presents with a chief complaint of urinary retention, gross hematuria, elevated troponin, MARVIN (28 Mar 2023 16:33)    HPI:  77 years old male with h/o HTN present to ED with complain difficulty urination, increase lower extremity edema, SOB and unable to sleep at night. Patient reported urinary retention for one week, only very small amount of urine come out each time, associated with abdominal discomfort. He also noted increase lower extremity swelling for 1 wee. Reported SOB, PND at night as well. No chest pain. No h/o CAD. Denied seeing any blood in urine  Tachycardic in ED, slightly hypoxic, improved with NC. No leukocytosis, plt 143, K 4.3, Cr 1.4, AST/ALT 86/129, hsTnT 185.3, proBNP 9018, UA with large blood. Flu/COVID negative. CXR image reviewed, no focal consolidation. S/P Yan placement in ED with drainage of > 1.6L urine.     SH: no toxic habits  FH: father-HTN (28 Mar 2023 16:33)   "    PAST MEDICAL & SURGICAL HISTORY:    FAMILY HISTORY:    Allergies    No Known Allergies    Intolerances      Home Medications:  amLODIPine 5 mg oral tablet: 1 tab(s) orally once a day (28 Mar 2023 16:26)  metoprolol succinate 50 mg oral tablet, extended release: 1 tab(s) orally once a day (28 Mar 2023 16:26)    MEDICATIONS  (STANDING):  amLODIPine   Tablet 5 milliGRAM(s) Oral daily  metoprolol succinate ER 50 milliGRAM(s) Oral daily  senna 2 Tablet(s) Oral at bedtime  tamsulosin 0.4 milliGRAM(s) Oral at bedtime    MEDICATIONS  (PRN):  acetaminophen     Tablet .. 650 milliGRAM(s) Oral every 6 hours PRN Mild Pain (1 - 3), Moderate Pain (4 - 6)  melatonin 3 milliGRAM(s) Oral at bedtime PRN Insomnia  polyethylene glycol 3350 17 Gram(s) Oral daily PRN Constipation    Vital Signs Last 24 Hrs  T(C): 36.6 (28 Mar 2023 22:29), Max: 36.8 (28 Mar 2023 20:56)  T(F): 97.9 (28 Mar 2023 22:29), Max: 98.3 (28 Mar 2023 20:56)  HR: 92 (28 Mar 2023 22:29) (92 - 92)  BP: 154/93 (28 Mar 2023 22:29) (136/92 - 154/93)  BP(mean): --  RR: 17 (28 Mar 2023 22:29) (17 - 19)  SpO2: 97% (28 Mar 2023 22:29) (94% - 97%)    Parameters below as of 28 Mar 2023 22:29  Patient On (Oxygen Delivery Method): nasal cannula  O2 Flow (L/min): 2      Daily     Daily     CAPILLARY BLOOD GLUCOSE        PHYSICAL EXAM:      T(C): 36.6 (23 @ 22:29), Max: 36.8 (23 @ 20:56)  HR: 92 (23 @ 22:29) (92 - 92)  BP: 154/93 (23 @ 22:29) (136/92 - 154/93)  RR: 17 (23 @ 22:29) (17 - 19)  SpO2: 97% (23 @ 22:29) (94% - 97%)  Wt(kg): --  Lungs clear  Heart S1S2  Abd soft NT ND  Extremities:   tr edema                  141  |  108  |  26<H>  ----------------------------<  85  4.3   |  31  |  1.40<H>    Ca    9.5      28 Mar 2023 14:33  Phos  3.0       Mg     1.9         TPro  7.3  /  Alb  2.9<L>  /  TBili  2.0<H>  /  DBili  x   /  AST  86<H>  /  ALT  129<H>  /  AlkPhos  172<H>                            15.2   8.51  )-----------( 143      ( 28 Mar 2023 14:33 )             48.3     Creatinine Trend: 1.40<--  Urinalysis Basic - ( 28 Mar 2023 14:33 )    Color: Red / Appearance: very cloudy / S.015 / pH: x  Gluc: x / Ketone: Negative  / Bili: Negative / Urobili: Negative mg/dL   Blood: x / Protein: 500 mg/dL / Nitrite: Negative   Leuk Esterase: Moderate / RBC: >50 /HPF / WBC 3-5   Sq Epi: x / Non Sq Epi: x / Bacteria: Few        Trend Bun/Cr  23 @ 14:33  BUN/CR -  26<H> / 1.40<H>      Assessment   MARVIN post renal azotemia    Plan  Yan decompression  Urologic eval  Will follow     Ry Dawson MD

## 2023-03-30 NOTE — PROGRESS NOTE ADULT - ASSESSMENT
76yo M with a h/o constipation, admitted with CHF exacerbation in fluid overload, elevated troponins, urinary retention and hematuria. Joseph draining yellow urine, appears clearer than yesterday. Urine culture NGTD, PSA very elevated at 90. Per Dr. Busch, likely prostate cancer as prostate is hard to palpation. Per medicine note, plan for TOV today.     - TOV today, if patient retains may discharge with joseph and follow up in office  - f/u in office with urology to discuss scheduling prostate biopsy  - patient appears fluid overload, recommend adjusting lasix   -  trend creatinine   - continue flomax  - discussed and seen with Dr. Busch

## 2023-03-30 NOTE — CONSULT NOTE ADULT - REASON FOR ADMISSION
urinary retention, gross hematuria, elevated troponin, MARVIN
urinary retention, gross hematuria, elevated troponin, MARVIN

## 2023-03-30 NOTE — PROGRESS NOTE ADULT - ASSESSMENT
77 years old male with h/o HTN present to ED with complain difficulty urination, increase lower extremity edema, SOB and unable to sleep at night. Patient reported urinary retention for one week, only very small amount of urine come out each time, associated with abdominal discomfort. He also noted increase lower extremity swelling for 1 wee. Reported SOB, PND at night as well. No chest pain. No h/o CAD. Denied seeing any blood in urine. Tachycardic in ED, slightly hypoxic, improved with NC. No leukocytosis, plt 143, K 4.3, Cr 1.4, AST/ALT 86/129, hsTnT 185.3, proBNP 9018, UA with large blood. Flu/COVID negative. CXR image reviewed, no focal consolidation. S/P Yan placement in ED with drainage of > 1.6L urine. Admitted with gross hematuria, urinary retention, elevated troponin. fluid overload    #Hematuria  #Urinary retention  present with urinary retention s/p Yan placement with > 1.6L of gross hematuria, now with just orange urine  CT abd/pelvis showed BPH and bladder wall thickening, no hydro  H&H stable  Urology consulted, ted recs  progressively worsening of urinary retention for 1 week, never happened before per patient   Yan with > 1.6L of gross hematuria  Start Flomax 0.4mg hs, add Finasteride  plan for TOV 3/30  Will need outpatient urology followup    #CHF-combined type  proBNP 9018, significant bilateral LE edema  Echo with EF 40-45%, RV dysfunction and severe pulm HTN  Cards consulted, ted recs  CTA ordered to r/o PE  Already Lovenox as below     #Elevated troponin   hsTnT 185.3 -> 157  No active chest pain  EKG dose not appear to have any acute ST-T changes suggestive of ACS  ASA, BB    #R peroneal DVT  noted on DVT studies  denies h/o clotting disorder, no recent travel  Given possible concern for malignancy with hematuria, start Lovenox BID, and monitor Hg - transition to NOAC on dc  Will need outpatient age appropriate screenings   CTA as above    #MARVIN, likely related to urinary retention  Currently on IV lasix for volume overload  Closely monitor renal function as risk of post obstruction diuresis and on IV diuresis for volume overload  Nephrology consulted, ted recs    #Benign essential HTN  On amlodipine 5mg and metoprolol ER 50mg daily per daughter.    #Elevated LFTs  AST/ALT 86/129, total bili 2 - stable   daily CMP    DVT ppx: Lovenox  Dispo: pending cards eval, CTA   77 years old male with h/o HTN present to ED with complain difficulty urination, increase lower extremity edema, SOB and unable to sleep at night. Patient reported urinary retention for one week, only very small amount of urine come out each time, associated with abdominal discomfort. He also noted increase lower extremity swelling for 1 wee. Reported SOB, PND at night as well. No chest pain. No h/o CAD. Denied seeing any blood in urine. Tachycardic in ED, slightly hypoxic, improved with NC. No leukocytosis, plt 143, K 4.3, Cr 1.4, AST/ALT 86/129, hsTnT 185.3, proBNP 9018, UA with large blood. Flu/COVID negative. CXR image reviewed, no focal consolidation. S/P Yan placement in ED with drainage of > 1.6L urine. Admitted with gross hematuria, urinary retention, elevated troponin. fluid overload    #Hematuria  #Urinary retention  present with urinary retention s/p Yan placement with > 1.6L of gross hematuria, now with just orange urine  CT abd/pelvis showed BPH and bladder wall thickening, no hydro  H&H stable  Urology consulted, ted recs  progressively worsening of urinary retention for 1 week, never happened before per patient   Yan with > 1.6L of gross hematuria  Start Flomax 0.4mg hs, add Finasteride  plan for TOV 3/30  PSA 90, urology recs ted - need outpatient urology followup    #CHF-combined type  proBNP 9018, significant bilateral LE edema  Echo with EF 40-45%, RV dysfunction and severe pulm HTN  Cards consulted, ted recs  CTA ordered to r/o PE  Already Lovenox as below     #Elevated troponin   hsTnT 185.3 -> 157  No active chest pain  EKG dose not appear to have any acute ST-T changes suggestive of ACS  ASA, BB    #R peroneal DVT  noted on DVT studies  denies h/o clotting disorder, no recent travel  Given possible concern for malignancy with hematuria, start Lovenox BID, and monitor Hg - transition to NOAC on dc  Will need outpatient age appropriate screenings   CTA as above    #MARVIN, likely related to urinary retention  Currently on IV lasix for volume overload  Closely monitor renal function as risk of post obstruction diuresis and on IV diuresis for volume overload    #Benign essential HTN  On amlodipine 5mg and metoprolol ER 50mg daily per daughter.    #Elevated LFTs  AST/ALT 86/129, total bili 2 - stable   daily CMP    DVT ppx: Lovenox  Dispo: pending cards eval, CTA

## 2023-03-30 NOTE — CONSULT NOTE ADULT - ASSESSMENT
77 year old male with h/o HTN; presented to the ED with complaints of difficulty urination, increased lower extremity edema, SOB and unable to sleep at night. Patient reported urinary retention for one week, only very small amount of urine come out each time, associated with abdominal discomfort. He also noted increase lower extremity swelling for 1 week.   No chest pain. No h/o CAD.  S/P Yan placement in ED with drainage of > 1.6L urine.   CT abd/pelvis showed BPH and bladder wall thickening, no hydro  proBNP 9018, trop 180 -> 150;  significant bilateral LE edema  Echo with EF 40-45%, RV dysfunction and severe pulm HTN  EKG with no acute ST-T changes suggestive of ACS  creat 1.4    New cardiomyopathy; will need ischemic eval.  Would normally perform R/LHC, but with MARVIN and urinary retention, will start with stress test in AM.  If stress test normal, will optimize meds and f/u as outpt for cardiac MRI to r/o infiltrative disease.  Would also f/u with Dr. Anderson as outpt for pulm HTN  Cont asa and metoprolol  Add statin  Volume status improved... would decrease lasix to daily dosing with rising creat.  Monitor and replete lytes  77 year old male with h/o HTN; presented to the ED with complaints of difficulty urination, increased lower extremity edema, SOB and unable to sleep at night. Patient reported urinary retention for one week, only very small amount of urine come out each time, associated with abdominal discomfort. He also noted increase lower extremity swelling for 1 week.   No chest pain. No h/o CAD.  S/P Yan placement in ED with drainage of > 1.6L urine.   CT abd/pelvis showed BPH and bladder wall thickening, no hydro  proBNP 9018, trop 180 -> 150;  significant bilateral LE edema  Echo with EF 40-45%, RV dysfunction and severe pulm HTN  EKG with no acute ST-T changes suggestive of ACS  creat 1.4    New cardiomyopathy; will need ischemic eval.  Would normally perform R/LHC, but with MARVIN and urinary retention, will start with stress test in AM.  If stress test normal, will optimize meds and f/u as outpt for cardiac MRI to r/o infiltrative disease.  Would also f/u with Dr. Anderson as outpt for pulm HTN  Cont asa and metoprolol  Add statin  Volume status improved... would decrease lasix to daily dosing with rising creat.  Monitor and replete lytes   PSA 90... ?underlying prostate malignancy

## 2023-03-30 NOTE — PROGRESS NOTE ADULT - SUBJECTIVE AND OBJECTIVE BOX
Viji Graves M.D.    Patient is a 77y old  Male who presents with a chief complaint of urinary retention, gross hematuria, elevated troponin, MARVIN (29 Mar 2023 18:10)      SUBJECTIVE / OVERNIGHT EVENTS: no event overnight. Reports never had colonoscopy before.     Patient denies chest pain, abd pain, N/V, fever, chills, dysuria or any other complaints. All remainder ROS negative.     MEDICATIONS  (STANDING):  acetylcysteine  Oral Solution 1200 milliGRAM(s) Oral every 12 hours  amLODIPine   Tablet 5 milliGRAM(s) Oral daily  enoxaparin Injectable 70 milliGRAM(s) SubCutaneous every 12 hours  furosemide   Injectable 40 milliGRAM(s) IV Push daily  metoprolol succinate ER 50 milliGRAM(s) Oral daily  senna 2 Tablet(s) Oral at bedtime  tamsulosin 0.4 milliGRAM(s) Oral at bedtime    MEDICATIONS  (PRN):  acetaminophen     Tablet .. 650 milliGRAM(s) Oral every 6 hours PRN Mild Pain (1 - 3), Moderate Pain (4 - 6)  melatonin 3 milliGRAM(s) Oral at bedtime PRN Insomnia  polyethylene glycol 3350 17 Gram(s) Oral daily PRN Constipation      I&O's Summary    29 Mar 2023 07:01  -  30 Mar 2023 07:00  --------------------------------------------------------  IN: 360 mL / OUT: 1700 mL / NET: -1340 mL        PHYSICAL EXAM:  Vital Signs Last 24 Hrs  T(C): 36.9 (30 Mar 2023 10:51), Max: 37.2 (29 Mar 2023 16:18)  T(F): 98.4 (30 Mar 2023 10:51), Max: 99 (29 Mar 2023 16:18)  HR: 91 (30 Mar 2023 10:51) (91 - 104)  BP: 113/75 (30 Mar 2023 10:51) (101/63 - 113/75)  BP(mean): --  RR: 17 (30 Mar 2023 10:51) (17 - 18)  SpO2: 97% (30 Mar 2023 10:51) (90% - 97%)    Parameters below as of 30 Mar 2023 04:55  Patient On (Oxygen Delivery Method): nasal cannula    CONSTITUTIONAL: NAD, well-groomed  ENMT: Moist oral mucosa, no pharyngeal injection or exudates; normal dentition  RESPIRATORY: Normal respiratory effort; lungs are clear to auscultation bilaterally  CARDIOVASCULAR: Regular rate and rhythm, 2+ LE edema  ABDOMEN: Nontender to palpation, normoactive bowel sounds,  PSYCH: A+O x3; affect appropriate  NEUROLOGY: CN 2-12 are intact and symmetric; no gross sensory deficits;   SKIN: No rashes; no palpable lesions    LABS:                        15.0   7.97  )-----------( 174      ( 30 Mar 2023 07:00 )             48.6     03-30    139  |  105  |  30<H>  ----------------------------<  111<H>  3.7   |  30  |  1.38<H>    Ca    8.8      30 Mar 2023 07:00  Phos  3.2     03-29  Mg     1.6     29    TPro  6.8  /  Alb  2.6<L>  /  TBili  1.3<H>  /  DBili  x   /  AST  42<H>  /  ALT  80<H>  /  AlkPhos  159<H>  0330    PT/INR - ( 28 Mar 2023 14:33 )   PT: 15.8 sec;   INR: 1.32 ratio         PTT - ( 28 Mar 2023 14:33 )  PTT:33.1 sec  CARDIAC MARKERS ( 29 Mar 2023 06:13 )  x     / x     / 60 U/L / x     / 2.6 ng/mL  CARDIAC MARKERS ( 28 Mar 2023 17:40 )  x     / x     / 114 U/L / x     / 3.1 ng/mL      Urinalysis Basic - ( 28 Mar 2023 14:33 )    Color: Red / Appearance: very cloudy / S.015 / pH: x  Gluc: x / Ketone: Negative  / Bili: Negative / Urobili: Negative mg/dL   Blood: x / Protein: 500 mg/dL / Nitrite: Negative   Leuk Esterase: Moderate / RBC: >50 /HPF / WBC 3-5   Sq Epi: x / Non Sq Epi: x / Bacteria: Few        Culture - Urine (collected 28 Mar 2023 14:33)  Source: .Urine  Final Report (29 Mar 2023 15:13):    No growth      CAPILLARY BLOOD GLUCOSE          RADIOLOGY & ADDITIONAL TESTS:  Results Reviewed:   Imaging Personally Reviewed:  Electrocardiogram Personally Reviewed:

## 2023-03-31 LAB
ANION GAP SERPL CALC-SCNC: 7 MMOL/L — SIGNIFICANT CHANGE UP (ref 5–17)
BUN SERPL-MCNC: 26 MG/DL — HIGH (ref 7–23)
CALCIUM SERPL-MCNC: 8.2 MG/DL — LOW (ref 8.5–10.1)
CANCER AG19-9 SERPL-ACNC: <2 U/ML — SIGNIFICANT CHANGE UP
CEA SERPL-MCNC: 3.2 NG/ML — SIGNIFICANT CHANGE UP (ref 0–3.8)
CHLORIDE SERPL-SCNC: 103 MMOL/L — SIGNIFICANT CHANGE UP (ref 96–108)
CO2 SERPL-SCNC: 28 MMOL/L — SIGNIFICANT CHANGE UP (ref 22–31)
CREAT SERPL-MCNC: 1.24 MG/DL — SIGNIFICANT CHANGE UP (ref 0.5–1.3)
EGFR: 60 ML/MIN/1.73M2 — SIGNIFICANT CHANGE UP
GLUCOSE SERPL-MCNC: 102 MG/DL — HIGH (ref 70–99)
HCT VFR BLD CALC: 47.6 % — SIGNIFICANT CHANGE UP (ref 39–50)
HGB BLD-MCNC: 14.9 G/DL — SIGNIFICANT CHANGE UP (ref 13–17)
MAGNESIUM SERPL-MCNC: 1.8 MG/DL — SIGNIFICANT CHANGE UP (ref 1.6–2.6)
MCHC RBC-ENTMCNC: 25.7 PG — LOW (ref 27–34)
MCHC RBC-ENTMCNC: 31.3 G/DL — LOW (ref 32–36)
MCV RBC AUTO: 82.1 FL — SIGNIFICANT CHANGE UP (ref 80–100)
NRBC # BLD: 0 /100 WBCS — SIGNIFICANT CHANGE UP (ref 0–0)
PHOSPHATE SERPL-MCNC: 2.1 MG/DL — LOW (ref 2.5–4.5)
PLATELET # BLD AUTO: 203 K/UL — SIGNIFICANT CHANGE UP (ref 150–400)
POTASSIUM SERPL-MCNC: 3.2 MMOL/L — LOW (ref 3.5–5.3)
POTASSIUM SERPL-SCNC: 3.2 MMOL/L — LOW (ref 3.5–5.3)
RBC # BLD: 5.8 M/UL — SIGNIFICANT CHANGE UP (ref 4.2–5.8)
RBC # FLD: 16.6 % — HIGH (ref 10.3–14.5)
SODIUM SERPL-SCNC: 138 MMOL/L — SIGNIFICANT CHANGE UP (ref 135–145)
WBC # BLD: 7.19 K/UL — SIGNIFICANT CHANGE UP (ref 3.8–10.5)
WBC # FLD AUTO: 7.19 K/UL — SIGNIFICANT CHANGE UP (ref 3.8–10.5)

## 2023-03-31 PROCEDURE — 51702 INSERT TEMP BLADDER CATH: CPT

## 2023-03-31 PROCEDURE — 99233 SBSQ HOSP IP/OBS HIGH 50: CPT

## 2023-03-31 PROCEDURE — 99232 SBSQ HOSP IP/OBS MODERATE 35: CPT

## 2023-03-31 PROCEDURE — 99232 SBSQ HOSP IP/OBS MODERATE 35: CPT | Mod: 25

## 2023-03-31 PROCEDURE — 71275 CT ANGIOGRAPHY CHEST: CPT | Mod: 26

## 2023-03-31 RX ORDER — SODIUM,POTASSIUM PHOSPHATES 278-250MG
1 POWDER IN PACKET (EA) ORAL ONCE
Refills: 0 | Status: COMPLETED | OUTPATIENT
Start: 2023-03-31 | End: 2023-03-31

## 2023-03-31 RX ORDER — POTASSIUM CHLORIDE 20 MEQ
40 PACKET (EA) ORAL ONCE
Refills: 0 | Status: COMPLETED | OUTPATIENT
Start: 2023-03-31 | End: 2023-03-31

## 2023-03-31 RX ADMIN — SENNA PLUS 2 TABLET(S): 8.6 TABLET ORAL at 21:46

## 2023-03-31 RX ADMIN — Medication 1200 MILLIGRAM(S): at 06:07

## 2023-03-31 RX ADMIN — Medication 50 MILLIGRAM(S): at 06:00

## 2023-03-31 RX ADMIN — FINASTERIDE 5 MILLIGRAM(S): 5 TABLET, FILM COATED ORAL at 13:13

## 2023-03-31 RX ADMIN — AMLODIPINE BESYLATE 5 MILLIGRAM(S): 2.5 TABLET ORAL at 06:00

## 2023-03-31 RX ADMIN — Medication 81 MILLIGRAM(S): at 13:13

## 2023-03-31 RX ADMIN — Medication 1200 MILLIGRAM(S): at 17:11

## 2023-03-31 RX ADMIN — Medication 1 TABLET(S): at 13:13

## 2023-03-31 RX ADMIN — ENOXAPARIN SODIUM 70 MILLIGRAM(S): 100 INJECTION SUBCUTANEOUS at 17:11

## 2023-03-31 RX ADMIN — ENOXAPARIN SODIUM 70 MILLIGRAM(S): 100 INJECTION SUBCUTANEOUS at 06:00

## 2023-03-31 RX ADMIN — TAMSULOSIN HYDROCHLORIDE 0.4 MILLIGRAM(S): 0.4 CAPSULE ORAL at 21:46

## 2023-03-31 RX ADMIN — Medication 40 MILLIGRAM(S): at 05:59

## 2023-03-31 RX ADMIN — Medication 40 MILLIEQUIVALENT(S): at 10:27

## 2023-03-31 NOTE — PROGRESS NOTE ADULT - ASSESSMENT
77 year old male with h/o HTN; presented to the ED with complaints of difficulty urination, increased lower extremity edema, SOB and unable to sleep at night. Patient reported urinary retention for one week, only very small amount of urine come out each time, associated with abdominal discomfort. He also noted increase lower extremity swelling for 1 week.   No chest pain. No h/o CAD.  S/P Yan placement in ED with drainage of > 1.6L urine.   CT abd/pelvis showed BPH and bladder wall thickening, no hydro  proBNP 9018, trop 180 -> 150;  significant bilateral LE edema  Echo with EF 40-45%, RV dysfunction and severe pulm HTN  EKG with no acute ST-T changes suggestive of ACS  creat 1.4    New cardiomyopathy; will need ischemic eval.  Would normally perform R/LHC, but with MARVIN and urinary retention, will start with stress test in AM. Stress Test cancelled due to presence of PE on CTA.  will optimize meds and f/u as outpt for cardiac MRI to r/o infiltrative disease.  Would also f/u with Dr. Anderson as outpt for pulm HTN  Cont asa and metoprolol  Add statin  Volume status improved... would decrease lasix to daily dosing with rising creat.  Monitor and replete lytes   PSA 90... ?underlying prostate malignancy  Positive DVT in right LE and Positive PE 77 year old male with h/o HTN; presented to the ED with complaints of difficulty urination, increased lower extremity edema, SOB and unable to sleep at night. Patient reported urinary retention for one week, only very small amount of urine come out each time, associated with abdominal discomfort. He also noted increase lower extremity swelling for 1 week.   No chest pain. No h/o CAD.  S/P Yan placement in ED with drainage of > 1.6L urine.   CT abd/pelvis showed BPH and bladder wall thickening, no hydro  proBNP 9018, trop 180 -> 150;  significant bilateral LE edema  Echo with EF 40-45%, RV dysfunction and severe pulm HTN  EKG with no acute ST-T changes suggestive of ACS  creat 1.4    New cardiomyopathy; will need ischemic eval.  Would normally perform R/LHC, but with MARVIN and urinary retention, will start with stress test in AM. Stress Test cancelled due to presence of PE on CTA.  will optimize meds and f/u as outpt for cardiac MRI to r/o infiltrative disease.  Would also f/u with Dr. Anderson as outpt for pulm HTN  Cont asa and metoprolol  Add statin  Volume status improved... would decrease lasix to daily dosing with rising creat.  Monitor and replete lytes   PSA 90... ?underlying prostate malignancy  Positive DVT in right LE and Positive PE in multiple areas. Patient on full dose Lovenox.  Findings and plan discussed with Dr Baltazar. 77 year old male with h/o HTN; presented to the ED with complaints of difficulty urination, increased lower extremity edema, SOB and unable to sleep at night. Patient reported urinary retention for one week, only very small amount of urine come out each time, associated with abdominal discomfort. He also noted increase lower extremity swelling for 1 week.   No chest pain. No h/o CAD.  S/P Yan placement in ED with drainage of > 1.6L urine.   CT abd/pelvis showed BPH and bladder wall thickening, no hydro  proBNP 9018, trop 180 -> 150;  significant bilateral LE edema  Echo with EF 40-45%, RV dysfunction and severe pulm HTN  EKG with no acute ST-T changes suggestive of ACS  creat 1.4    Plan  New cardiomyopathy; will need ischemic eval, but not inpatient setting as new diagnosis of PE, DVT, pending NM bone  biopsy to r/o mets  Would normally perform R/LHC, but with MARVIN and urinary retention.  Stress Test cancelled due to presence of PE on CTA.  will optimize meds and f/u as outpt for cardiac MRI to r/o infiltrative disease.  Would also f/u with Dr. Anderson as outpt for pulm HTN  Cont asa and metoprolol  Add statin  Volume status improved... would decrease lasix to daily dosing with rising creat.  Monitor and replete lytes   PSA 90... ?underlying prostate malignancy, Urology following  Positive DVT in right LE and Positive PE in multiple areas. Patient on full dose Lovenox.  Findings and plan discussed with Dr Baltazar.

## 2023-03-31 NOTE — PROGRESS NOTE ADULT - ASSESSMENT
76 yo M with a h/o constipation, admitted with CHF exacerbation in fluid overload, elevated troponins, urinary retention and hematuria. Urine culture NGTD, PSA very elevated at 90. Per Dr. Busch, likely prostate cancer as prostate is hard to palpation.   TOV per medicine. Pt was unable to void and bladder scan was 496. 18F coude inserted.     - Continue joseph upon discharge  - f/u in office with urology to discuss scheduling prostate biopsy  - trend creatinine   - continue flomax  - suggest order bone scan to assess for metastatic disease     DW Dr. Hill

## 2023-03-31 NOTE — PROGRESS NOTE ADULT - SUBJECTIVE AND OBJECTIVE BOX
Patient is a 77y old  Male who presents with a chief complaint of urinary retention, gross hematuria, elevated troponin, MARVIN (31 Mar 2023 00:11)      PAST MEDICAL & SURGICAL HISTORY:    INTERVAL HISTORY: Patient scheduled for CTA chest r/o PE and NM pharm stress test.  	  MEDICATIONS:  MEDICATIONS  (STANDING):  acetylcysteine  Oral Solution 1200 milliGRAM(s) Oral every 12 hours  amLODIPine   Tablet 5 milliGRAM(s) Oral daily  aspirin  chewable 81 milliGRAM(s) Oral daily  enoxaparin Injectable 70 milliGRAM(s) SubCutaneous every 12 hours  finasteride 5 milliGRAM(s) Oral daily  furosemide   Injectable 40 milliGRAM(s) IV Push daily  metoprolol succinate ER 50 milliGRAM(s) Oral daily  potassium phosphate / sodium phosphate Tablet (K-PHOS No. 2) 1 Tablet(s) Oral once  regadenoson Injectable 0.4 milliGRAM(s) IV Push once  senna 2 Tablet(s) Oral at bedtime  tamsulosin 0.4 milliGRAM(s) Oral at bedtime    MEDICATIONS  (PRN):  acetaminophen     Tablet .. 650 milliGRAM(s) Oral every 6 hours PRN Mild Pain (1 - 3), Moderate Pain (4 - 6)  melatonin 3 milliGRAM(s) Oral at bedtime PRN Insomnia  polyethylene glycol 3350 17 Gram(s) Oral daily PRN Constipation      Vitals:  T(F): 98.9 (03-31-23 @ 04:50), Max: 99.4 (03-30-23 @ 16:25)  HR: 85 (03-31-23 @ 04:50) (85 - 96)  BP: 122/73 (03-31-23 @ 04:50) (105/68 - 122/73)  RR: 18 (03-31-23 @ 04:50) (18 - 18)  SpO2: 94% (03-31-23 @ 04:50) (94% - 97%)  Wt(kg): --    03-30 @ 07:01  -  03-31 @ 07:00  --------------------------------------------------------  IN:  Total IN: 0 mL    OUT:    Indwelling Catheter - Urethral (mL): 700 mL    Voided (mL): 800 mL  Total OUT: 1500 mL    Total NET: -1500 mL      03-31 @ 07:01  -  03-31 @ 12:05  --------------------------------------------------------  IN:  Total IN: 0 mL    OUT:    Indwelling Catheter - Urethral (mL): 2300 mL  Total OUT: 2300 mL    Total NET: -2300 mL    Weight (kg): 70.3 (03-28 @ 22:29)      PHYSICAL EXAM:  Neuro: Awake, responsive  CV: S1 S2 RRR  Lungs: CTABL  GI: Soft, BS +, ND, NT  Extremities:  LE edema    TELEMETRY: SR  	     RADIOLOGY:   < from: CT Angio Chest PE Protocol w/ IV Cont (03.31.23 @ 09:31) >  FINDINGS:    PULMONARY ANGIOGRAM: There is respiratory motion degradation limiting   evaluation of some of the segmental and subsegmental pulmonary artery   branches. There are linear and eccentric medullary arterial filling   defects bilaterally. Specifically, there is a thrombus in a left lower   lobe segmental pulmonary branch with resultant occlusion. There is an   eccentric filling defect in the left upper lobe artery extending into a   segmental branch. There is an eccentric filling defect in a right lower   lobe segmental artery with resultant occlusion. There is a right upper   lobe lobar eccentric thrombus extending into a segmental pulmonary   artery. Constellation of findings represent chronic pulmonary arterial   emboli.    LYMPH NODES: No lymphadenopathy.    HEART/VASCULATURE: Cardiomegaly. The right heart chambers are enlarged  with a flat ventricular septum. The main pulmonary artery is slightly   enlarged measuring about 3.4 cm. No pericardial effusion. There are   mediastinal collateral vessels sequela of the chronic coronary arterial   emboli. There are aortic calcifications.    AIRWAYS/LUNGS/PLEURA: Airways are patent. There is linear/subsegmental   atelectasis in the left lower lobe. There are trace bilateral pleural   effusions.    UPPER ABDOMEN: Unremarkable.    BONES/SOFT TISSUES: Spinal degenerative changes.There is subcutaneous   edema.    IMPRESSION:    Findings as described above represent chronic pulmonary arterial emboli.   Limited evaluation of some of the segmental and subsegmental pulmonary   arterial branches due to respiratory motion artifact.    Enlarged right heart chambers and flat ventricular septum suggest   pulmonary arterial hypertension.    Trace bilateral pleural effusions.    Findings discussed with ANABELA Hough  by Dr. Pepper on 3/31/2023   11:30 AM with read back confirmation.    < end of copied text >    < from: US Duplex Venous Lower Ext Complete, Bilateral (03.29.23 @ 09:00) >  FINDINGS:    RIGHT:  Normal compressibility of the RIGHT common femoral, femoral and popliteal   veins.  Doppler examination shows normal spontaneous and phasic flow.  There is occlusive DVT of the right peroneal vein. The posterior tibial   vein is unremarkable.    LEFT:  Normal compressibility of the LEFT common femoral, femoral and popliteal   veins.  Doppler examination shows normal spontaneous and phasic flow.  No LEFT calf vein thrombosis isdetected.    IMPRESSION:  DVT right peroneal vein (below the knee)      < end of copied text >        DIAGNOSTIC TESTING:    [x ] Echocardiogram:   < from: TTE Echo Complete w/o Contrast w/ Doppler (03.29.23 @ 11:07) >  Summary:   1. Left ventricular ejection fraction, by visual estimation, is 40 to   45%.   2. Technically adequate study.   3. Moderately decreased global left ventricular systolic function.   4. Basal and mid inferior wall, basal and mid inferolateral wall, and   mid inferoseptal segment are abnormal as described above.   5. Normal left ventricular internal cavity size.   6. Right ventricular pressure overload.   7. Spectral Doppler shows impaired relaxation pattern of left   ventricular myocardial filling (Grade I diastolic dysfunction).   8. There is moderate concentric left ventricular hypertrophy.   9. Moderately enlarged right ventricle.  10. Moderately reduced RV systolic function.  11. Moderately enlarged right atrium.  12. Normal left atrial size.  13. Small pericardial effusion.  14. Mild mitral valve regurgitation.  15. Structurally normal mitral valve, with normal leaflet excursion.  16. Moderate tricuspid regurgitation.  17. Mild pulmonic valve regurgitation.  18. Estimated pulmonary artery systolic pressure is 76.3 mmHg assuming a   right atrial pressure of 10 mmHg, which is consistent with severe   pulmonary hypertension.    < end of copied text >    [ ] Cardiac Catheterization:  [ ] Stress Test:      LABS:	 	    CARDIAC MARKERS:  Troponin I, High Sensitivity Result: 157.3 ng/L (03-29 @ 06:13)  Troponin I, High Sensitivity Result: 181.6 ng/L (03-28 @ 17:40)  Troponin I, High Sensitivity Result: 185.3 ng/L (03-28 @ 14:33)      31 Mar 2023 07:25    138    |  103    |  26     ----------------------------<  102    3.2     |  28     |  1.24   30 Mar 2023 07:00    139    |  105    |  30     ----------------------------<  111    3.7     |  30     |  1.38   29 Mar 2023 06:13    140    |  104    |  23     ----------------------------<  97     3.8     |  30     |  1.28     Ca    8.2        31 Mar 2023 07:25  Phos  2.1       31 Mar 2023 07:25  Mg     1.8       31 Mar 2023 07:25    TPro  6.8    /  Alb  2.6    /  TBili  1.3    /  DBili  x      /  AST  42     /  ALT  80     /  AlkPhos  159    30 Mar 2023 07:00                          14.9   7.19  )-----------( 203      ( 31 Mar 2023 07:25 )             47.6 ,                       15.0   7.97  )-----------( 174      ( 30 Mar 2023 07:00 )             48.6 ,                       15.1   9.95  )-----------( 160      ( 29 Mar 2023 06:13 )             48.7 ,                       15.2   8.51  )-----------( 143      ( 28 Mar 2023 14:33 )             48.3   proBNP:   Lipid Profile: 03-29 @ 06:13  HDL/Total Cholesterol: --  HDL Chol:              26 mg/dL  Serum Chol:            106 mg/dL  Direct LDL:            --  Triglycerides:         97 mg/dL    HgA1c:   TSH:       INR: 1.32 ratio (03-28 @ 14:33)

## 2023-03-31 NOTE — PROGRESS NOTE ADULT - SUBJECTIVE AND OBJECTIVE BOX
Patient is a 77y old  Male who presents with a chief complaint of urinary retention, gross hematuria, elevated troponin, MARVIN (31 Mar 2023 12:05)    M[place] Creole : [id number]     SUBJECTIVE / OVERNIGHT EVENTS: Patient seen and examined at bedside. states that he feels well denies any CP, SOB, abd pain and n/v.     ROS:  All other review of systems negative    Allergies    No Known Allergies    Intolerances        MEDICATIONS  (STANDING):  acetylcysteine  Oral Solution 1200 milliGRAM(s) Oral every 12 hours  amLODIPine   Tablet 5 milliGRAM(s) Oral daily  aspirin  chewable 81 milliGRAM(s) Oral daily  enoxaparin Injectable 70 milliGRAM(s) SubCutaneous every 12 hours  finasteride 5 milliGRAM(s) Oral daily  furosemide   Injectable 40 milliGRAM(s) IV Push daily  metoprolol succinate ER 50 milliGRAM(s) Oral daily  regadenoson Injectable 0.4 milliGRAM(s) IV Push once  senna 2 Tablet(s) Oral at bedtime  tamsulosin 0.4 milliGRAM(s) Oral at bedtime    MEDICATIONS  (PRN):  acetaminophen     Tablet .. 650 milliGRAM(s) Oral every 6 hours PRN Mild Pain (1 - 3), Moderate Pain (4 - 6)  melatonin 3 milliGRAM(s) Oral at bedtime PRN Insomnia  polyethylene glycol 3350 17 Gram(s) Oral daily PRN Constipation      Vital Signs Last 24 Hrs  T(C): 37.2 (31 Mar 2023 04:50), Max: 37.4 (30 Mar 2023 16:25)  T(F): 98.9 (31 Mar 2023 04:50), Max: 99.4 (30 Mar 2023 16:25)  HR: 85 (31 Mar 2023 04:50) (85 - 96)  BP: 122/73 (31 Mar 2023 04:50) (105/68 - 122/73)  BP(mean): --  RR: 18 (31 Mar 2023 04:50) (18 - 18)  SpO2: 94% (31 Mar 2023 04:50) (94% - 97%)    Parameters below as of 31 Mar 2023 04:50  Patient On (Oxygen Delivery Method): nasal cannula  O2 Flow (L/min): 2    CAPILLARY BLOOD GLUCOSE        I&O's Summary    30 Mar 2023 07:01  -  31 Mar 2023 07:00  --------------------------------------------------------  IN: 0 mL / OUT: 1500 mL / NET: -1500 mL    31 Mar 2023 07:01  -  31 Mar 2023 13:45  --------------------------------------------------------  IN: 0 mL / OUT: 2300 mL / NET: -2300 mL        PHYSICAL EXAM:  GENERAL: elderly + 2L NC  HEAD:  Atraumatic, Normocephalic  EYES: EOMI, PERRLA, conjunctiva and sclera clear  NECK: Supple, No JVD  CHEST/LUNG: Clear to auscultation bilaterally; No wheeze  HEART: Regular rate and rhythm; No murmurs, rubs, or gallops  ABDOMEN: Soft, Nontender, Nondistended; Bowel sounds present+ joseph with hematuria   EXTREMITIES:  2+ Peripheral Pulses, No clubbing, cyanosis, or edema  NEUROLOGY: AAOx3, non-focal      LABS:                        14.9   7.19  )-----------( 203      ( 31 Mar 2023 07:25 )             47.6     03-31    138  |  103  |  26<H>  ----------------------------<  102<H>  3.2<L>   |  28  |  1.24    Ca    8.2<L>      31 Mar 2023 07:25  Phos  2.1     03-31  Mg     1.8     03-31    TPro  6.8  /  Alb  2.6<L>  /  TBili  1.3<H>  /  DBili  x   /  AST  42<H>  /  ALT  80<H>  /  AlkPhos  159<H>  03-30              RADIOLOGY & ADDITIONAL TESTS:    Imaging Personally Reviewed:  CTPA  IMPRESSION:  Findings as described above represent chronic pulmonary arterial emboli.   Limited evaluation of some of the segmental and subsegmental pulmonary   arterial branches due to respiratory motion artifact.  Enlarged right heart chambers and flat ventricular septum suggest   pulmonary arterial hypertension.    Trace bilateral pleural effusions.    Consultant(s) Notes Reviewed:  Cardiology     Care Discussed with Consultants/Other Providers: Medicine ACP

## 2023-03-31 NOTE — PROGRESS NOTE ADULT - SUBJECTIVE AND OBJECTIVE BOX
UROLOGY PROGRESS HPI:  Notified by RN that bladder scan was 496 and patient has not voided. Pt seen and examined at bedside. Reports suprapubic discomfort. Pt denies chest pain, SOB, dizziness, fever, chills.    Vital Signs Last 24 Hrs  T(C): 37 (30 Mar 2023 23:25), Max: 37.4 (30 Mar 2023 16:25)  T(F): 98.6 (30 Mar 2023 23:25), Max: 99.4 (30 Mar 2023 16:25)  HR: 93 (30 Mar 2023 23:25) (91 - 96)  BP: 105/68 (30 Mar 2023 23:25) (105/68 - 113/75)  BP(mean): --  RR: 18 (30 Mar 2023 23:25) (17 - 18)  SpO2: 97% (30 Mar 2023 23:25) (90% - 97%)    Parameters below as of 30 Mar 2023 23:25  Patient On (Oxygen Delivery Method): nasal cannula    PHYSICAL EXAM:  CONSTITUTIONAL: NAD  HEENT:  Atraumatic, Normocephalic  RESPIRATORY: Clear to ausculation, bilaterally   CARDIOVASCULAR: RRR S1S2  GASTROINTESTINAL: non distended, +BS, soft, non tender, no guarding  : Mild suprapubic tenderness. 18F coude inserted easily using sterile technique. Pt tolerated well. Clear yellow urine noted to be flowing well, over 500cc. Suprapubic tenderness resolved.   MUSCULOSKELETAL: calf soft, non tender b/l    I&O's Detail    29 Mar 2023 07:01  -  30 Mar 2023 07:00  --------------------------------------------------------  IN:    Oral Fluid: 360 mL  Total IN: 360 mL    OUT:    Indwelling Catheter - Urethral (mL): 1400 mL    Voided (mL): 300 mL  Total OUT: 1700 mL    Total NET: -1340 mL      30 Mar 2023 07:01  -  31 Mar 2023 00:12  --------------------------------------------------------  IN:  Total IN: 0 mL    OUT:    Indwelling Catheter - Urethral (mL): 700 mL  Total OUT: 700 mL    Total NET: -700 mL    LABS:                        15.0   7.97  )-----------( 174      ( 30 Mar 2023 07:00 )             48.6     03-30    139  |  105  |  30<H>  ----------------------------<  111<H>  3.7   |  30  |  1.38<H>    Ca    8.8      30 Mar 2023 07:00  Phos  3.2     03-29  Mg     1.6     03-29    TPro  6.8  /  Alb  2.6<L>  /  TBili  1.3<H>  /  DBili  x   /  AST  42<H>  /  ALT  80<H>  /  AlkPhos  159<H>  03-30    Culture Results:   No growth (03-28 @ 14:33)      76 yo M with a h/o constipation, admitted with CHF exacerbation in fluid overload, elevated troponins, urinary retention and hematuria. Urine culture NGTD, PSA very elevated at 90. Per Dr. Busch, likely prostate cancer as prostate is hard to palpation.   TOV per medicine. Pt was unable to void and bladder scan was 496. 18F coude inserted.     - Continue joseph upon discharge  - f/u in office with urology to discuss scheduling prostate biopsy  - trend creatinine   - continue flomax   UROLOGY PROGRESS HPI:  Notified by RN that bladder scan was 496 and patient has not voided. Pt seen and examined at bedside. Reports suprapubic discomfort. Pt denies chest pain, SOB, dizziness, fever, chills.    Vital Signs Last 24 Hrs  T(C): 37 (30 Mar 2023 23:25), Max: 37.4 (30 Mar 2023 16:25)  T(F): 98.6 (30 Mar 2023 23:25), Max: 99.4 (30 Mar 2023 16:25)  HR: 93 (30 Mar 2023 23:25) (91 - 96)  BP: 105/68 (30 Mar 2023 23:25) (105/68 - 113/75)  BP(mean): --  RR: 18 (30 Mar 2023 23:25) (17 - 18)  SpO2: 97% (30 Mar 2023 23:25) (90% - 97%)    Parameters below as of 30 Mar 2023 23:25  Patient On (Oxygen Delivery Method): nasal cannula    PHYSICAL EXAM:  CONSTITUTIONAL: NAD  HEENT:  Atraumatic, Normocephalic  RESPIRATORY: Clear to ausculation, bilaterally   CARDIOVASCULAR: RRR S1S2  GASTROINTESTINAL: non distended, +BS, soft, non tender, no guarding  : Mild suprapubic tenderness. 18F coude inserted easily using sterile technique. Pt tolerated well. Clear yellow urine noted to be flowing well, over 500cc. Suprapubic tenderness resolved.   MUSCULOSKELETAL: calf soft, non tender b/l    I&O's Detail    29 Mar 2023 07:01  -  30 Mar 2023 07:00  --------------------------------------------------------  IN:    Oral Fluid: 360 mL  Total IN: 360 mL    OUT:    Indwelling Catheter - Urethral (mL): 1400 mL    Voided (mL): 300 mL  Total OUT: 1700 mL    Total NET: -1340 mL      30 Mar 2023 07:01  -  31 Mar 2023 00:12  --------------------------------------------------------  IN:  Total IN: 0 mL    OUT:    Indwelling Catheter - Urethral (mL): 700 mL  Total OUT: 700 mL    Total NET: -700 mL    LABS:                        15.0   7.97  )-----------( 174      ( 30 Mar 2023 07:00 )             48.6     03-30    139  |  105  |  30<H>  ----------------------------<  111<H>  3.7   |  30  |  1.38<H>    Ca    8.8      30 Mar 2023 07:00  Phos  3.2     03-29  Mg     1.6     03-29    TPro  6.8  /  Alb  2.6<L>  /  TBili  1.3<H>  /  DBili  x   /  AST  42<H>  /  ALT  80<H>  /  AlkPhos  159<H>  03-30    Culture Results:   No growth (03-28 @ 14:33)

## 2023-04-01 LAB
ANION GAP SERPL CALC-SCNC: 1 MMOL/L — LOW (ref 5–17)
BUN SERPL-MCNC: 25 MG/DL — HIGH (ref 7–23)
CALCIUM SERPL-MCNC: 8.3 MG/DL — LOW (ref 8.5–10.1)
CHLORIDE SERPL-SCNC: 104 MMOL/L — SIGNIFICANT CHANGE UP (ref 96–108)
CO2 SERPL-SCNC: 34 MMOL/L — HIGH (ref 22–31)
CREAT SERPL-MCNC: 1.17 MG/DL — SIGNIFICANT CHANGE UP (ref 0.5–1.3)
EGFR: 64 ML/MIN/1.73M2 — SIGNIFICANT CHANGE UP
GLUCOSE SERPL-MCNC: 97 MG/DL — SIGNIFICANT CHANGE UP (ref 70–99)
HCT VFR BLD CALC: 45.3 % — SIGNIFICANT CHANGE UP (ref 39–50)
HGB BLD-MCNC: 14.3 G/DL — SIGNIFICANT CHANGE UP (ref 13–17)
MCHC RBC-ENTMCNC: 25.5 PG — LOW (ref 27–34)
MCHC RBC-ENTMCNC: 31.6 G/DL — LOW (ref 32–36)
MCV RBC AUTO: 80.7 FL — SIGNIFICANT CHANGE UP (ref 80–100)
NRBC # BLD: 0 /100 WBCS — SIGNIFICANT CHANGE UP (ref 0–0)
PLATELET # BLD AUTO: 216 K/UL — SIGNIFICANT CHANGE UP (ref 150–400)
POTASSIUM SERPL-MCNC: 4 MMOL/L — SIGNIFICANT CHANGE UP (ref 3.5–5.3)
POTASSIUM SERPL-SCNC: 4 MMOL/L — SIGNIFICANT CHANGE UP (ref 3.5–5.3)
RBC # BLD: 5.61 M/UL — SIGNIFICANT CHANGE UP (ref 4.2–5.8)
RBC # FLD: 15.9 % — HIGH (ref 10.3–14.5)
SODIUM SERPL-SCNC: 139 MMOL/L — SIGNIFICANT CHANGE UP (ref 135–145)
WBC # BLD: 6.13 K/UL — SIGNIFICANT CHANGE UP (ref 3.8–10.5)
WBC # FLD AUTO: 6.13 K/UL — SIGNIFICANT CHANGE UP (ref 3.8–10.5)

## 2023-04-01 PROCEDURE — 99233 SBSQ HOSP IP/OBS HIGH 50: CPT

## 2023-04-01 RX ADMIN — SENNA PLUS 2 TABLET(S): 8.6 TABLET ORAL at 21:13

## 2023-04-01 RX ADMIN — TAMSULOSIN HYDROCHLORIDE 0.4 MILLIGRAM(S): 0.4 CAPSULE ORAL at 21:13

## 2023-04-01 RX ADMIN — Medication 81 MILLIGRAM(S): at 11:16

## 2023-04-01 RX ADMIN — Medication 3 MILLIGRAM(S): at 21:13

## 2023-04-01 RX ADMIN — ENOXAPARIN SODIUM 70 MILLIGRAM(S): 100 INJECTION SUBCUTANEOUS at 17:19

## 2023-04-01 RX ADMIN — FINASTERIDE 5 MILLIGRAM(S): 5 TABLET, FILM COATED ORAL at 11:16

## 2023-04-01 NOTE — PROGRESS NOTE ADULT - ASSESSMENT
77 years old male with h/o HTN present to ED with complain difficulty urination, increase lower extremity edema, SOB and unable to sleep at night. Patient reported urinary retention for one week, only very small amount of urine come out each time, associated with abdominal discomfort. He also noted increase lower extremity swelling for 1 wee. Reported SOB, PND at night as well. No chest pain. No h/o CAD. Denied seeing any blood in urine. Tachycardic in ED, slightly hypoxic, improved with NC. No leukocytosis, plt 143, K 4.3, Cr 1.4, AST/ALT 86/129, hsTnT 185.3, proBNP 9018, UA with large blood. Flu/COVID negative. CXR image reviewed, no focal consolidation. S/P Joseph placement in ED with drainage of > 1.6L urine. Admitted with gross hematuria, urinary retention, elevated troponin. fluid overload    #Hematuria  #Urinary retention  failed TOV, joseph replaced, gross hematuria  CT abd/pelvis showed BPH and bladder wall thickening, no hydro  H&H stable  Urology consulted, rec outpt f/u   c/w Flomax 0.4mg hs, and Finasteride  NM Bone (ordered) to r/o mets       #CHF-combined type  proBNP 9018, significant bilateral LE edema  Echo with EF 40-45%, RV dysfunction and severe pulm HTN  Cards consulted, ted recs: Stress test cancelled due to + CTPA for PEs    #Elevated troponin   hsTnT 185.3 -> 157  No active chest pain  EKG dose not appear to have any acute ST-T changes suggestive of ACS  ASA, BB    #R peroneal DVT now new PEs  noted on DVT studies  denies h/o clotting disorder, no recent travel  Given possible concern for malignancy with hematuria, start Lovenox BID, and monitor Hg - transition to NOAC on dc  Will need outpatient age appropriate screenings   CTA as above    #MARVIN, likely related to urinary retention (improving)  - IV lasix mg daily for volume overload  Closely monitor renal function as risk of post obstruction diuresis and on IV diuresis for volume overload    #Benign essential HTN  On amlodipine 5mg and metoprolol ER 50mg daily per daughter.    #Elevated LFTs  AST/ALT 86/129, total bili 2 - stable   daily CMP      Dispo: pending NM bone scan  GISELL: PT

## 2023-04-01 NOTE — PROGRESS NOTE ADULT - SUBJECTIVE AND OBJECTIVE BOX
HPI: 77 year old male with 18 fr coude placed and noted hematuria- on lovenox full dose for PE and DVT.     Vital Signs Last 24 Hrs  T(C): 37.1 (01 Apr 2023 11:43), Max: 37.1 (01 Apr 2023 11:43)  T(F): 98.7 (01 Apr 2023 11:43), Max: 98.7 (01 Apr 2023 11:43)  HR: 91 (01 Apr 2023 11:43) (91 - 96)  BP: 111/75 (01 Apr 2023 11:43) (105/74 - 111/75)  BP(mean): --  RR: 18 (01 Apr 2023 11:43) (18 - 19)  SpO2: 94% (01 Apr 2023 11:50) (89% - 95%)    Parameters below as of 01 Apr 2023 11:50  Patient On (Oxygen Delivery Method): nasal cannula  O2 Flow (L/min): 2      MEDICATIONS  (STANDING):  amLODIPine   Tablet 5 milliGRAM(s) Oral daily  aspirin  chewable 81 milliGRAM(s) Oral daily  enoxaparin Injectable 70 milliGRAM(s) SubCutaneous every 12 hours  finasteride 5 milliGRAM(s) Oral daily  furosemide   Injectable 40 milliGRAM(s) IV Push daily  metoprolol succinate ER 50 milliGRAM(s) Oral daily  regadenoson Injectable 0.4 milliGRAM(s) IV Push once  senna 2 Tablet(s) Oral at bedtime  tamsulosin 0.4 milliGRAM(s) Oral at bedtime    MEDICATIONS  (PRN):  acetaminophen     Tablet .. 650 milliGRAM(s) Oral every 6 hours PRN Mild Pain (1 - 3), Moderate Pain (4 - 6)  melatonin 3 milliGRAM(s) Oral at bedtime PRN Insomnia  polyethylene glycol 3350 17 Gram(s) Oral daily PRN Constipation      PHYSICAL EXAM:      Constitutional: awake and alert.  HEENT: PERRLA, EOMI,   Neck: Supple.  Respiratory: Breath sounds are clear bilaterally  Cardiovascular: S1 and S2, regular  Gastrointestinal: soft, nontender  : Yan in place with hematuria   Musculoskeletal: no joint swelling/tenderness, no abnormal movements  Skin: No rashes          LABS:                         14.3   6.13  )-----------( 216      ( 01 Apr 2023 05:53 )             45.3     04-01    139  |  104  |  25<H>  ----------------------------<  97  4.0   |  34<H>  |  1.17    Ca    8.3<L>      01 Apr 2023 05:53  Phos  2.1     03-31  Mg     1.8     03-31 03-29 Chol 106 LDL -- HDL 26<L> Trig 97      RADIOLOGY: < from: CT Angio Chest PE Protocol w/ IV Cont (03.31.23 @ 09:31) >  IMPRESSION:    Findings as described above represent chronic pulmonary arterial emboli.   Limited evaluation of some of the segmental and subsegmental pulmonary   arterial branches due to respiratory motion artifact.    Enlarged right heart chambers and flat ventricular septum suggest   pulmonary arterial hypertension.    Trace bilateral pleural effusions.    < end of copied text >

## 2023-04-01 NOTE — PROGRESS NOTE ADULT - SUBJECTIVE AND OBJECTIVE BOX
Patient is a 77y old  Male who presents with a chief complaint of urinary retention, gross hematuria, elevated troponin, MARVIN (31 Mar 2023 12:05)    [facility] Creole : [id number]     SUBJECTIVE / OVERNIGHT EVENTS:   Patient seen and examined bedside.   no overnight events       ROS:  All other review of systems negative    Allergies    No Known Allergies    Intolerances        MEDICATIONS  (STANDING):  acetylcysteine  Oral Solution 1200 milliGRAM(s) Oral every 12 hours  amLODIPine   Tablet 5 milliGRAM(s) Oral daily  aspirin  chewable 81 milliGRAM(s) Oral daily  enoxaparin Injectable 70 milliGRAM(s) SubCutaneous every 12 hours  finasteride 5 milliGRAM(s) Oral daily  furosemide   Injectable 40 milliGRAM(s) IV Push daily  metoprolol succinate ER 50 milliGRAM(s) Oral daily  regadenoson Injectable 0.4 milliGRAM(s) IV Push once  senna 2 Tablet(s) Oral at bedtime  tamsulosin 0.4 milliGRAM(s) Oral at bedtime    MEDICATIONS  (PRN):  acetaminophen     Tablet .. 650 milliGRAM(s) Oral every 6 hours PRN Mild Pain (1 - 3), Moderate Pain (4 - 6)  melatonin 3 milliGRAM(s) Oral at bedtime PRN Insomnia  polyethylene glycol 3350 17 Gram(s) Oral daily PRN Constipation          Vital Signs Last 24 Hrs  T(C): 37.1 (01 Apr 2023 16:43), Max: 37.1 (01 Apr 2023 11:43)  T(F): 98.8 (01 Apr 2023 16:43), Max: 98.8 (01 Apr 2023 16:43)  HR: 101 (01 Apr 2023 20:31) (91 - 101)  BP: 138/82 (01 Apr 2023 16:43) (105/75 - 138/82)  BP(mean): --  RR: 18 (01 Apr 2023 16:43) (18 - 18)  SpO2: 92% (01 Apr 2023 16:43) (89% - 95%)    Parameters below as of 01 Apr 2023 16:43  Patient On (Oxygen Delivery Method): nasal cannula  O2 Flow (L/min): 2      PHYSICAL EXAM:  GENERAL: elderly + 2L NC  HEAD:  Atraumatic, Normocephalic  EYES: EOMI, PERRLA, conjunctiva and sclera clear  NECK: Supple, No JVD  CHEST/LUNG: Clear to auscultation bilaterally; No wheeze b/l   HEART: Regular rate and rhythm; No murmurs, rubs, or gallops  ABDOMEN: Soft, Nontender, Nondistended; Bowel sounds present+ joseph with hematuria   EXTREMITIES:  2+ Peripheral Pulses b/l, No clubbing, cyanosis, calf tenderness or edema b/l   NEUROLOGY: AAOx3, non-focal      LABS:                                   14.3   6.13  )-----------( 216      ( 01 Apr 2023 05:53 )             45.3   04-01    139  |  104  |  25<H>  ----------------------------<  97  4.0   |  34<H>  |  1.17    Ca    8.3<L>      01 Apr 2023 05:53  Phos  2.1     03-31  Mg     1.8     03-31              RADIOLOGY & ADDITIONAL TESTS:    Imaging Personally Reviewed:  CTPA  IMPRESSION:  Findings as described above represent chronic pulmonary arterial emboli.   Limited evaluation of some of the segmental and subsegmental pulmonary   arterial branches due to respiratory motion artifact.  Enlarged right heart chambers and flat ventricular septum suggest   pulmonary arterial hypertension.    Trace bilateral pleural effusions.    Consultant(s) Notes Reveiwed [x ] Yes     Care Discussed with [x ] Consultants  [ x] Patient  [ ] Family  [x ] /   [x ] Other; RN

## 2023-04-01 NOTE — PROGRESS NOTE ADULT - ASSESSMENT
77 year old male with h/o HTN; presented to the ED with complaints of difficulty urination, increased lower extremity edema, SOB and unable to sleep at night. Patient reported urinary retention for one week, only very small amount of urine come out each time, associated with abdominal discomfort. He also noted increase lower extremity swelling for 1 week.   No chest pain. No h/o CAD.  S/P Yan placement in ED with drainage of > 1.6L urine.   CT abd/pelvis showed BPH and bladder wall thickening, no hydro  proBNP 9018, trop 180 -> 150;  significant bilateral LE edema  Echo with EF 40-45%, RV dysfunction and severe pulm HTN  EKG with no acute ST-T changes suggestive of ACS  creat 1.4    Plan  New cardiomyopathy; will need ischemic eval, but not inpatient setting as new diagnosis of PE, DVT, pending NM bone  biopsy to r/o mets  Would normally perform R/LHC, but with MARVIN and urinary retention.  Stress Test cancelled due to presence of PE on CTA.  will optimize meds and f/u as outpt for cardiac MRI to r/o infiltrative disease.  -check serum immunofixation and free light chains to eval for AL-amyloid  Would also f/u with Dr. 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 lasix daily dosing  Monitor and replete lytes   PSA 90... ?underlying prostate malignancy, Urology following  Positive DVT in right LE and Positive PE in multiple areas. Patient on full dose Lovenox.

## 2023-04-01 NOTE — PROGRESS NOTE ADULT - ASSESSMENT
77 year old male with CHF, PE, dvt on full dose lovenox with hematuria    continue joseph for now and monitor hematuria  elevated PSA   may need irrigation if clots develop.   ordered for bone scan  recommend ct a/p with iv contrast as patient with normal renal function  continue proscar and flomax   will follow  discussed with Dr. Hill

## 2023-04-01 NOTE — PROGRESS NOTE ADULT - SUBJECTIVE AND OBJECTIVE BOX
Cardiology Progress Note    Interval Events:  No significant events      MEDICATIONS:  amLODIPine   Tablet 5 milliGRAM(s) Oral daily  aspirin  chewable 81 milliGRAM(s) Oral daily  enoxaparin Injectable 70 milliGRAM(s) SubCutaneous every 12 hours  furosemide   Injectable 40 milliGRAM(s) IV Push daily  metoprolol succinate ER 50 milliGRAM(s) Oral daily  acetaminophen     Tablet .. 650 milliGRAM(s) Oral every 6 hours PRN  melatonin 3 milliGRAM(s) Oral at bedtime PRN  polyethylene glycol 3350 17 Gram(s) Oral daily PRN  senna 2 Tablet(s) Oral at bedtime  finasteride 5 milliGRAM(s) Oral daily  tamsulosin 0.4 milliGRAM(s) Oral at bedtime      PHYSICAL EXAM:  T(C): 37.1 (04-01-23 @ 11:43), Max: 37.1 (04-01-23 @ 11:43)  HR: 91 (04-01-23 @ 11:43) (91 - 97)  BP: 111/75 (04-01-23 @ 11:43) (105/74 - 111/75)  RR: 18 (04-01-23 @ 11:43) (18 - 19)  SpO2: 89% (04-01-23 @ 11:43) (89% - 96%)  Wt(kg): --  I&O's Summary    31 Mar 2023 07:01  -  01 Apr 2023 07:00  --------------------------------------------------------  IN: 0 mL / OUT: 3550 mL / NET: -3550 mL    Appearance: No acute distress  HEENT:   Normal oral mucosa, PERRL  Cardiovascular: Normal S1 S2, +elevated JVP, no murmurs, trace edema  Respiratory: crackles at the bases  Psychiatry: Mood & affect appropriate  Gastrointestinal:  soft nt nd	  Skin: No rashes, no ecchymoses, no cyanosis	  Neurologic: grossly non-focal  Extremities: Normal range of motion, no clubbing, cyanosis or edema  Vascular: Peripheral pulses palpable bilaterally    LABS:	 	  CBC Full  -  ( 01 Apr 2023 05:53 )  WBC Count : 6.13 K/uL  Hemoglobin : 14.3 g/dL  Hematocrit : 45.3 %  Platelet Count - Automated : 216 K/uL    04-01  139  |  104  |  25<H>  ----------------------------<  97  4.0   |  34<H>  |  1.17    03-31  138  |  103  |  26<H>  ----------------------------<  102<H>  3.2<L>   |  28  |  1.24    Ca    8.3<L>      01 Apr 2023 05:53  Ca    8.2<L>      31 Mar 2023 07:25  Phos  2.1     03-31  Mg     1.8     03-31    TELEMETRY: 	SR, PAC's, PVC's    ECG:  	  RADIOLOGY:  OTHER: 	    PREVIOUS DIAGNOSTIC TESTING:    [ ] Echocardiogram: < from: TTE Echo Complete w/o Contrast w/ Doppler (03.29.23 @ 11:07) >  Summary:   1. Left ventricular ejection fraction, by visual estimation, is 40 to   45%.   2. Technically adequate study.   3. Moderately decreased global left ventricular systolic function.   4. Basal and mid inferior wall, basal and mid inferolateral wall, and   mid inferoseptal segment are abnormal as described above.   5. Normal left ventricular internal cavity size.   6. Right ventricular pressure overload.   7. Spectral Doppler shows impaired relaxation pattern of left   ventricular myocardial filling (Grade I diastolic dysfunction).   8. There is moderate concentric left ventricular hypertrophy.   9. Moderately enlarged right ventricle.  10. Moderately reduced RV systolic function.  11. Moderately enlarged right atrium.  12. Normal left atrial size.  13. Small pericardial effusion.  14. Mild mitral valve regurgitation.  15. Structurally normal mitral valve, with normal leaflet excursion.  16. Moderate tricuspid regurgitation.  17. Mild pulmonic valve regurgitation.  18. Estimated pulmonary artery systolic pressure is 76.3 mmHg assuming a   right atrial pressure of 10 mmHg, which is consistent with severe   pulmonary hypertension.    < end of copied text >    [ ] Catheterization:  [ ] Stress Test:

## 2023-04-02 LAB
ANION GAP SERPL CALC-SCNC: 2 MMOL/L — LOW (ref 5–17)
BUN SERPL-MCNC: 27 MG/DL — HIGH (ref 7–23)
CALCIUM SERPL-MCNC: 8.4 MG/DL — LOW (ref 8.5–10.1)
CHLORIDE SERPL-SCNC: 102 MMOL/L — SIGNIFICANT CHANGE UP (ref 96–108)
CO2 SERPL-SCNC: 33 MMOL/L — HIGH (ref 22–31)
CREAT SERPL-MCNC: 1.25 MG/DL — SIGNIFICANT CHANGE UP (ref 0.5–1.3)
EGFR: 59 ML/MIN/1.73M2 — LOW
GLUCOSE SERPL-MCNC: 89 MG/DL — SIGNIFICANT CHANGE UP (ref 70–99)
MAGNESIUM SERPL-MCNC: 1.9 MG/DL — SIGNIFICANT CHANGE UP (ref 1.6–2.6)
PHOSPHATE SERPL-MCNC: 2.5 MG/DL — SIGNIFICANT CHANGE UP (ref 2.5–4.5)
POTASSIUM SERPL-MCNC: 3.7 MMOL/L — SIGNIFICANT CHANGE UP (ref 3.5–5.3)
POTASSIUM SERPL-SCNC: 3.7 MMOL/L — SIGNIFICANT CHANGE UP (ref 3.5–5.3)
SODIUM SERPL-SCNC: 137 MMOL/L — SIGNIFICANT CHANGE UP (ref 135–145)

## 2023-04-02 PROCEDURE — 99233 SBSQ HOSP IP/OBS HIGH 50: CPT

## 2023-04-02 PROCEDURE — 99232 SBSQ HOSP IP/OBS MODERATE 35: CPT

## 2023-04-02 RX ORDER — FUROSEMIDE 40 MG
40 TABLET ORAL DAILY
Refills: 0 | Status: DISCONTINUED | OUTPATIENT
Start: 2023-04-03 | End: 2023-04-07

## 2023-04-02 RX ADMIN — ENOXAPARIN SODIUM 70 MILLIGRAM(S): 100 INJECTION SUBCUTANEOUS at 17:30

## 2023-04-02 RX ADMIN — Medication 40 MILLIGRAM(S): at 06:25

## 2023-04-02 RX ADMIN — Medication 3 MILLIGRAM(S): at 21:33

## 2023-04-02 RX ADMIN — TAMSULOSIN HYDROCHLORIDE 0.4 MILLIGRAM(S): 0.4 CAPSULE ORAL at 21:33

## 2023-04-02 RX ADMIN — ENOXAPARIN SODIUM 70 MILLIGRAM(S): 100 INJECTION SUBCUTANEOUS at 06:23

## 2023-04-02 RX ADMIN — AMLODIPINE BESYLATE 5 MILLIGRAM(S): 2.5 TABLET ORAL at 06:25

## 2023-04-02 RX ADMIN — Medication 50 MILLIGRAM(S): at 06:25

## 2023-04-02 RX ADMIN — SENNA PLUS 2 TABLET(S): 8.6 TABLET ORAL at 21:33

## 2023-04-02 RX ADMIN — Medication 81 MILLIGRAM(S): at 11:53

## 2023-04-02 RX ADMIN — FINASTERIDE 5 MILLIGRAM(S): 5 TABLET, FILM COATED ORAL at 11:53

## 2023-04-02 NOTE — PROGRESS NOTE ADULT - SUBJECTIVE AND OBJECTIVE BOX
Patient is a 77y old  Male who presents with a chief complaint of urinary retention, gross hematuria, elevated troponin, MARVIN (31 Mar 2023 12:05)      SUBJECTIVE / OVERNIGHT EVENTS:   Patient seen and examined bedside.   no overnight events       ROS:  All other review of systems negative    Allergies    No Known Allergies    Intolerances        MEDICATIONS  (STANDING):  acetylcysteine  Oral Solution 1200 milliGRAM(s) Oral every 12 hours  amLODIPine   Tablet 5 milliGRAM(s) Oral daily  aspirin  chewable 81 milliGRAM(s) Oral daily  enoxaparin Injectable 70 milliGRAM(s) SubCutaneous every 12 hours  finasteride 5 milliGRAM(s) Oral daily  furosemide   Injectable 40 milliGRAM(s) IV Push daily  metoprolol succinate ER 50 milliGRAM(s) Oral daily  regadenoson Injectable 0.4 milliGRAM(s) IV Push once  senna 2 Tablet(s) Oral at bedtime  tamsulosin 0.4 milliGRAM(s) Oral at bedtime    MEDICATIONS  (PRN):  acetaminophen     Tablet .. 650 milliGRAM(s) Oral every 6 hours PRN Mild Pain (1 - 3), Moderate Pain (4 - 6)  melatonin 3 milliGRAM(s) Oral at bedtime PRN Insomnia  polyethylene glycol 3350 17 Gram(s) Oral daily PRN Constipation        Vital Signs Last 24 Hrs  T(C): 37.2 (02 Apr 2023 16:28), Max: 37.2 (02 Apr 2023 16:28)  T(F): 98.9 (02 Apr 2023 16:28), Max: 98.9 (02 Apr 2023 16:28)  HR: 95 (02 Apr 2023 19:00) (82 - 98)  BP: 118/82 (02 Apr 2023 16:28) (117/82 - 126/73)  BP(mean): --  RR: 18 (02 Apr 2023 16:28) (17 - 18)  SpO2: 93% (02 Apr 2023 16:28) (92% - 96%)    Parameters below as of 02 Apr 2023 16:28  Patient On (Oxygen Delivery Method): nasal cannula  O2 Flow (L/min): 2        PHYSICAL EXAM:  GENERAL: elderly , NAD, no increased WOB   HEAD:  Atraumatic, Normocephalic  EYES: EOMI, PERRLA, conjunctiva and sclera clear  NECK: Supple, No JVD  CHEST/LUNG: Clear to auscultation bilaterally; No wheeze b/l   HEART: Regular rate and rhythm; No murmurs, rubs, or gallops  ABDOMEN: Soft, Nontender, Nondistended; Bowel sounds present+ joseph with hematuria   EXTREMITIES:  2+ Peripheral Pulses b/l, No clubbing, cyanosis, calf tenderness or edema b/l   NEUROLOGY: AAOx3, non-focal      LABS:                                              14.3   6.13  )-----------( 216      ( 01 Apr 2023 05:53 )             45.3   04-02    137  |  102  |  27<H>  ----------------------------<  89  3.7   |  33<H>  |  1.25    Ca    8.4<L>      02 Apr 2023 06:45  Phos  2.5     04-02  Mg     1.9     04-02              RADIOLOGY & ADDITIONAL TESTS:    Imaging Personally Reviewed:  CTPA  IMPRESSION:  Findings as described above represent chronic pulmonary arterial emboli.   Limited evaluation of some of the segmental and subsegmental pulmonary   arterial branches due to respiratory motion artifact.  Enlarged right heart chambers and flat ventricular septum suggest   pulmonary arterial hypertension.    Trace bilateral pleural effusions.    Consultant(s) Notes Reviewed [x ] Yes     Care Discussed with [x ] Consultants  [ x] Patient  [ x] Family--daughter at bedside  [x ] /   [x ] Other; RN

## 2023-04-02 NOTE — PROGRESS NOTE ADULT - SUBJECTIVE AND OBJECTIVE BOX
Cardiology Progress Note    Interval Events:  No significant events      MEDICATIONS:  amLODIPine   Tablet 5 milliGRAM(s) Oral daily  aspirin  chewable 81 milliGRAM(s) Oral daily  enoxaparin Injectable 70 milliGRAM(s) SubCutaneous every 12 hours  furosemide   Injectable 40 milliGRAM(s) IV Push daily  metoprolol succinate ER 50 milliGRAM(s) Oral daily  acetaminophen     Tablet .. 650 milliGRAM(s) Oral every 6 hours PRN  melatonin 3 milliGRAM(s) Oral at bedtime PRN  polyethylene glycol 3350 17 Gram(s) Oral daily PRN  senna 2 Tablet(s) Oral at bedtime  finasteride 5 milliGRAM(s) Oral daily  tamsulosin 0.4 milliGRAM(s) Oral at bedtime      PHYSICAL EXAM:  T(C): 36.4 (04-02-23 @ 11:33), Max: 37.1 (04-01-23 @ 16:43)  HR: 97 (04-02-23 @ 11:33) (82 - 101)  BP: 123/82 (04-02-23 @ 11:33) (117/82 - 138/82)  RR: 17 (04-02-23 @ 11:33) (17 - 18)  SpO2: 95% (04-02-23 @ 11:33) (92% - 96%)  Wt(kg): --  I&O's Summary    01 Apr 2023 07:01  -  02 Apr 2023 07:00  --------------------------------------------------------  IN: 780 mL / OUT: 1500 mL / NET: -720 mL    Appearance: No acute distress  HEENT: mmm  Cardiovascular: Normal S1 S2, no murmurs, trace edema  Respiratory: clear  Psychiatry: Mood & affect appropriate  Gastrointestinal:  soft nt nd	  Skin: No rashes, no ecchymoses, no cyanosis	  Neurologic: grossly non-focal  Vascular: Peripheral pulses palpable bilaterally    LABS:	 	  CBC Full  -  ( 01 Apr 2023 05:53 )  WBC Count : 6.13 K/uL  Hemoglobin : 14.3 g/dL  Hematocrit : 45.3 %  Platelet Count - Automated : 216 K/uL  Mean Cell Volume : 80.7 fl  Mean Cell Hemoglobin : 25.5 pg  Mean Cell Hemoglobin Concentration : 31.6 g/dL    04-02    137  |  102  |  27<H>  ----------------------------<  89  3.7   |  33<H>  |  1.25  04-01    139  |  104  |  25<H>  ----------------------------<  97  4.0   |  34<H>  |  1.17    Ca    8.4<L>      02 Apr 2023 06:45  Ca    8.3<L>      01 Apr 2023 05:53  Phos  2.5     04-02  Mg     1.9     04-02      TELEMETRY: SR, CARROLL   ECG:  	  RADIOLOGY:  OTHER:

## 2023-04-02 NOTE — PROGRESS NOTE ADULT - SUBJECTIVE AND OBJECTIVE BOX
Patient seen and examined bedside resting comfortably.  No complaints offered.   Voiding without difficulty via joseph hematuria cleared   Denies nausea and vomiting. Tolerating diet.  Denies chest pain, dyspnea, cough.    T(F): 98.9 (04-02-23 @ 16:28), Max: 98.9 (04-02-23 @ 16:28)  HR: 98 (04-02-23 @ 16:28) (82 - 101)  BP: 118/82 (04-02-23 @ 16:28) (117/82 - 126/73)  RR: 18 (04-02-23 @ 16:28) (17 - 18)  SpO2: 93% (04-02-23 @ 16:28) (92% - 96%)  Wt(kg): --  CAPILLARY BLOOD GLUCOSE    HEENT: PERRLA, EOMI,   Neck: Supple.  Respiratory: Breath sounds are clear bilaterally  Cardiovascular: S1 and S2, regular  Gastrointestinal: soft, nontender  : Joseph in place with yellow urine    Musculoskeletal: no joint swelling/tenderness, no abnormal movements  Skin: No rashes        LABS:                        14.3   6.13  )-----------( 216      ( 01 Apr 2023 05:53 )             45.3   04-02    137  |  102  |  27<H>  ----------------------------<  89  3.7   |  33<H>  |  1.25    Ca    8.4<L>      02 Apr 2023 06:45  Phos  2.5     04-02  Mg     1.9     04-02      I&O's Detail    01 Apr 2023 07:01  -  02 Apr 2023 07:00  --------------------------------------------------------  IN:    Oral Fluid: 780 mL  Total IN: 780 mL    OUT:    Indwelling Catheter - Urethral (mL): 1500 mL  Total OUT: 1500 mL    Total NET: -720 mL      02 Apr 2023 07:01  -  02 Apr 2023 17:33  --------------------------------------------------------  IN:    Oral Fluid: 840 mL  Total IN: 840 mL    OUT:    Indwelling Catheter - Urethral (mL): 1800 mL  Total OUT: 1800 mL    Total NET: -960 mL          Impression: 77y Male admitted with CONSTIPATION CHF, PE, dvt on full dose lovenox with hematuria now resolved     continue joseph for now   elevated PSA   ordered for bone scan  recommend ct a/p with iv contrast as patient with normal renal function  continue proscar and flomax   will follow  seen on rounds discussed with Dr. Hill

## 2023-04-02 NOTE — PROGRESS NOTE ADULT - ASSESSMENT
77 years old male with h/o HTN present to ED with complain difficulty urination, increase lower extremity edema, SOB and unable to sleep at night. Patient reported urinary retention for one week, only very small amount of urine come out each time, associated with abdominal discomfort. He also noted increase lower extremity swelling for 1 wee. Reported SOB, PND at night as well. No chest pain. No h/o CAD. Denied seeing any blood in urine. Tachycardic in ED, slightly hypoxic, improved with NC. No leukocytosis, plt 143, K 4.3, Cr 1.4, AST/ALT 86/129, hsTnT 185.3, proBNP 9018, UA with large blood. Flu/COVID negative. CXR image reviewed, no focal consolidation. S/P Joseph placement in ED with drainage of > 1.6L urine. Admitted with gross hematuria, urinary retention, elevated troponin. fluid overload    #Hematuria  #Urinary retention  failed TOV, joseph replaced, gross hematuria  CT abd/pelvis showed BPH and bladder wall thickening, no hydro  H&H stable  Urology consulted, rec outpt f/u   c/w Flomax 0.4mg hs, and Finasteride  NM Bone (ordered) to r/o mets   follow CTAP with IV contrast       #CHF-combined type  proBNP 9018, significant bilateral LE edema  Echo with EF 40-45%, RV dysfunction and severe pulm HTN  Cards consulted, ted recs: Stress test cancelled due to + CTPA for PEs    #Elevated troponin   hsTnT 185.3 -> 157  No active chest pain  EKG dose not appear to have any acute ST-T changes suggestive of ACS  ASA, BB    #R peroneal DVT now new PEs  noted on DVT studies  denies h/o clotting disorder, no recent travel  Given possible concern for malignancy with hematuria, start Lovenox BID, and monitor Hg - transition to NOAC on dc  Will need outpatient age appropriate screenings   CTA as above    #MARVIN, likely related to urinary retention (improving)  - IV lasix mg daily for volume overload  Closely monitor renal function as risk of post obstruction diuresis and on IV diuresis for volume overload    #Benign essential HTN  On amlodipine 5mg and metoprolol ER 50mg daily per daughter.    #Elevated LFTs  AST/ALT 86/129, total bili 2 - stable   daily CMP      Dispo: pending NM bone scan  GISELL: PT

## 2023-04-02 NOTE — PROGRESS NOTE ADULT - ASSESSMENT
77 year old male with h/o HTN; presented to the ED with complaints of difficulty urination, increased lower extremity edema, SOB and unable to sleep at night. Patient reported urinary retention for one week, only very small amount of urine come out each time, associated with abdominal discomfort. He also noted increase lower extremity swelling for 1 week.   No chest pain. No h/o CAD.  S/P Yan placement in ED with drainage of > 1.6L urine.   CT abd/pelvis showed BPH and bladder wall thickening, no hydro  proBNP 9018, trop 180 -> 150;  significant bilateral LE edema  Echo with EF 40-45%, RV dysfunction and severe pulm HTN  EKG with no acute ST-T changes suggestive of ACS    -cont daily IV lasix for now  -check serum immunofixation and free light chains to eval for AL-amyloid  -GDMT optimization - cont asa, metoprolol. add statin. other medications when renal function will allow and pt not receiving IV contrast  -outpt ischemic eval when stable. outpt PYP scan vs. MRI  -ould also f/u with Dr. 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 ?underlying prostate malignancy, Urology following  Positive DVT in right LE and Positive PE in multiple areas. Patient on full dose Lovenox.

## 2023-04-03 LAB
INR BLD: 1.23 RATIO — HIGH (ref 0.88–1.16)
PROTHROM AB SERPL-ACNC: 14.8 SEC — HIGH (ref 10.5–13.4)

## 2023-04-03 PROCEDURE — 78306 BONE IMAGING WHOLE BODY: CPT | Mod: 26

## 2023-04-03 PROCEDURE — 99233 SBSQ HOSP IP/OBS HIGH 50: CPT

## 2023-04-03 PROCEDURE — 74177 CT ABD & PELVIS W/CONTRAST: CPT | Mod: 26

## 2023-04-03 PROCEDURE — 99232 SBSQ HOSP IP/OBS MODERATE 35: CPT

## 2023-04-03 RX ORDER — WARFARIN SODIUM 2.5 MG/1
5 TABLET ORAL ONCE
Refills: 0 | Status: COMPLETED | OUTPATIENT
Start: 2023-04-03 | End: 2023-04-03

## 2023-04-03 RX ORDER — PANTOPRAZOLE SODIUM 20 MG/1
40 TABLET, DELAYED RELEASE ORAL
Refills: 0 | Status: DISCONTINUED | OUTPATIENT
Start: 2023-04-03 | End: 2023-04-07

## 2023-04-03 RX ADMIN — FINASTERIDE 5 MILLIGRAM(S): 5 TABLET, FILM COATED ORAL at 13:11

## 2023-04-03 RX ADMIN — SENNA PLUS 2 TABLET(S): 8.6 TABLET ORAL at 22:12

## 2023-04-03 RX ADMIN — Medication 81 MILLIGRAM(S): at 13:12

## 2023-04-03 RX ADMIN — ENOXAPARIN SODIUM 70 MILLIGRAM(S): 100 INJECTION SUBCUTANEOUS at 17:38

## 2023-04-03 RX ADMIN — Medication 3 MILLIGRAM(S): at 22:13

## 2023-04-03 RX ADMIN — AMLODIPINE BESYLATE 5 MILLIGRAM(S): 2.5 TABLET ORAL at 19:40

## 2023-04-03 RX ADMIN — WARFARIN SODIUM 5 MILLIGRAM(S): 2.5 TABLET ORAL at 22:16

## 2023-04-03 RX ADMIN — ENOXAPARIN SODIUM 70 MILLIGRAM(S): 100 INJECTION SUBCUTANEOUS at 18:57

## 2023-04-03 RX ADMIN — Medication 50 MILLIGRAM(S): at 19:40

## 2023-04-03 RX ADMIN — TAMSULOSIN HYDROCHLORIDE 0.4 MILLIGRAM(S): 0.4 CAPSULE ORAL at 22:12

## 2023-04-03 RX ADMIN — Medication 40 MILLIGRAM(S): at 19:40

## 2023-04-03 NOTE — PROGRESS NOTE ADULT - SUBJECTIVE AND OBJECTIVE BOX
Patient is a 77y old  Male who presents with a chief complaint of urinary retention, gross hematuria, elevated troponin, MARVIN (31 Mar 2023 12:05)      SUBJECTIVE / OVERNIGHT EVENTS:   Patient seen and examined bedside.   no overnight events       ROS:  All other review of systems negative    Allergies    No Known Allergies    Intolerances        MEDICATIONS  (STANDING):  acetylcysteine  Oral Solution 1200 milliGRAM(s) Oral every 12 hours  amLODIPine   Tablet 5 milliGRAM(s) Oral daily  aspirin  chewable 81 milliGRAM(s) Oral daily  enoxaparin Injectable 70 milliGRAM(s) SubCutaneous every 12 hours  finasteride 5 milliGRAM(s) Oral daily  furosemide   Injectable 40 milliGRAM(s) IV Push daily  metoprolol succinate ER 50 milliGRAM(s) Oral daily  regadenoson Injectable 0.4 milliGRAM(s) IV Push once  senna 2 Tablet(s) Oral at bedtime  tamsulosin 0.4 milliGRAM(s) Oral at bedtime    MEDICATIONS  (PRN):  acetaminophen     Tablet .. 650 milliGRAM(s) Oral every 6 hours PRN Mild Pain (1 - 3), Moderate Pain (4 - 6)  melatonin 3 milliGRAM(s) Oral at bedtime PRN Insomnia  polyethylene glycol 3350 17 Gram(s) Oral daily PRN Constipation        Vital Signs Last 24 Hrs  T(C): 36.7 (03 Apr 2023 16:28), Max: 36.7 (03 Apr 2023 16:28)  T(F): 98 (03 Apr 2023 16:28), Max: 98 (03 Apr 2023 16:28)  HR: 90 (03 Apr 2023 16:28) (79 - 106)  BP: 116/78 (03 Apr 2023 16:28) (112/77 - 137/93)  BP(mean): --  RR: 18 (03 Apr 2023 16:28) (18 - 18)  SpO2: 92% (03 Apr 2023 16:28) (90% - 94%)    Parameters below as of 03 Apr 2023 16:28  Patient On (Oxygen Delivery Method): nasal cannula  O2 Flow (L/min): 2          PHYSICAL EXAM:  GENERAL: elderly , NAD, no increased WOB   HEAD:  Atraumatic, Normocephalic  EYES: EOMI, PERRLA, conjunctiva and sclera clear  NECK: Supple, No JVD  CHEST/LUNG: Clear to auscultation bilaterally; No wheeze b/l   HEART: Regular rate and rhythm; No murmurs, rubs, or gallops  ABDOMEN: Soft, Nontender, Nondistended; Bowel sounds present+ joseph with hematuria   EXTREMITIES:  2+ Peripheral Pulses b/l, No clubbing, cyanosis, calf tenderness or edema b/l   NEUROLOGY: AAOx3, non-focal      LABS:                        04-02    137  |  102  |  27<H>  ----------------------------<  89  3.7   |  33<H>  |  1.25    Ca    8.4<L>      02 Apr 2023 06:45  Phos  2.5     04-02  Mg     1.9     04-02              RADIOLOGY & ADDITIONAL TESTS:    Imaging Personally Reviewed:  CTPA  IMPRESSION:  Findings as described above represent chronic pulmonary arterial emboli.   Limited evaluation of some of the segmental and subsegmental pulmonary   arterial branches due to respiratory motion artifact.  Enlarged right heart chambers and flat ventricular septum suggest   pulmonary arterial hypertension.    Trace bilateral pleural effusions.    Consultant(s) Notes Reviewed [x ] Yes     Care Discussed with [x ] Consultants  [ x] Patient  [ x] Family--daughter at bedside  [x ] /   [x ] Other; RN

## 2023-04-03 NOTE — PROGRESS NOTE ADULT - ASSESSMENT
77 year old male with h/o HTN; presented to the ED with complaints of difficulty urination, increased lower extremity edema, SOB and unable to sleep at night. Patient reported urinary retention for one week, only very small amount of urine come out each time, associated with abdominal discomfort. He also noted increase lower extremity swelling for 1 week.   No chest pain. No h/o CAD.  S/P Yan placement in ED with drainage of > 1.6L urine.   CT abd/pelvis showed BPH and bladder wall thickening, no hydro  proBNP 9018, trop 180 -> 150;  significant bilateral LE edema  Echo with EF 40-45%, RV dysfunction and severe pulm HTN  EKG with no acute ST-T changes   CTA chest + for PE  + DVT    -cont daily po lasix for now  -GDMT optimization - cont asa, metoprolol, add statin when LFTs improve, other medications when renal function will allow and pt not receiving IV contrast, s/p CT A/P with IV contrast today, monitor renal function closely   -outpt ischemic eval when stable  -should also f/u with Dr. 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  77 year old male with h/o HTN; presented to the ED with complaints of difficulty urination, hematuria, increased lower extremity edema, SOB and unable to sleep at night. Patient reported urinary retention for one week, only very small amount of urine come out each time, associated with abdominal discomfort. He also noted increase lower extremity swelling for 1 week.   No chest pain. No h/o CAD.  S/P Yan placement in ED with drainage of > 1.6L urine.   CT abd/pelvis showed BPH and bladder wall thickening, no hydro  proBNP 9018, trop 180 -> 150;  significant bilateral LE edema  Echo with EF 40-45%, RV dysfunction and severe pulm HTN  EKG with no acute ST-T changes   CTA chest + for PE  + DVT    -cont daily po lasix for now  -GDMT optimization - cont asa, metoprolol, add statin when LFTs improve, other medications when renal function will allow and pt not receiving IV contrast, s/p CT A/P with IV contrast today, monitor renal function closely   -outpt ischemic eval when stable  -should also f/u with Dr. 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 Patient reported urinary retention for one week, only very small amount of urine come out each time, associated with abdominal discomfort. He also noted increase lower extremity swelling for 1 week.   No chest pain. No h/o CAD.  S/P Yan placement in ED with drainage of > 1.6L urine.   CT abd/pelvis showed BPH and bladder wall thickening, no hydro  proBNP 9018, trop 180 -> 150;  significant bilateral LE edema  Echo with EF 40-45%, RV dysfunction and severe pulm HTN  EKG with no acute ST-T changes   CTA chest + for PE  + DVT    -cont daily po lasix for now  -GDMT optimization - cont asa, metoprolol, add statin when LFTs improve, other medications when renal function will allow and pt not receiving IV contrast, s/p CT A/P with IV contrast today, monitor renal function closely   -should also f/u with Dr. 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

## 2023-04-03 NOTE — PROGRESS NOTE ADULT - ASSESSMENT
Impression: 77y Male admitted with constipation, CHF, PE, dvt on full dose lovenox with hematuria- now resolved     -continue joseph for now   -awaiting bone scan  -awaiting ct a/p with iv contrast   -continue proscar and flomax   will discuss with attending, full recs to follow Impression: 77y Male admitted with constipation, CHF, PE, dvt on full dose lovenox with hematuria- now resolved     -continue joseph for now   -f/u bone scan official read  -f/u  ct a/p with iv contrast official read  -continue proscar and flomax   -will likely need prostate MRI and biopsy as outpatient  discussed with urology attending, NJ Noel MD

## 2023-04-03 NOTE — PROGRESS NOTE ADULT - SUBJECTIVE AND OBJECTIVE BOX
UROLOGY PROGRESS NOTE:     Subjective: Patient seen and examined at bedside. No complaints this am      Objective:  Vital signs  T(F): , Max: 98.9 (04-02-23 @ 16:28)  HR: 79 (04-03-23 @ 06:34)  BP: 112/77 (04-03-23 @ 06:34)  SpO2: 93% (04-03-23 @ 05:12)  Wt(kg): --    Output     I&O's Detail    02 Apr 2023 07:01  -  03 Apr 2023 07:00  --------------------------------------------------------  IN:    Oral Fluid: 1547 mL  Total IN: 1547 mL    OUT:    Indwelling Catheter - Urethral (mL): 2800 mL  Total OUT: 2800 mL    Total NET: -1253 mL          Physical Exam:  Gen: no acute distress  Back: no CVAT b/l  Abd: soft nt nd  : joseph in place, urine clear yellow    Labs:    04-02    137  |  102  |  27<H>  ----------------------------<  89  3.7   |  33<H>  |  1.25    Ca    8.4<L>      02 Apr 2023 06:45  Phos  2.5     04-02  Mg     1.9     04-02      Urine Cx: 3/28: no growth

## 2023-04-03 NOTE — PROGRESS NOTE ADULT - SUBJECTIVE AND OBJECTIVE BOX
Patient is a 77y old  Male who presents with a chief complaint of urinary retention, LE edema, sob    PAST MEDICAL & SURGICAL HISTORY:    HTN    INTERVAL HISTORY: In no acute distress   	  MEDICATIONS:  MEDICATIONS  (STANDING):  amLODIPine   Tablet 5 milliGRAM(s) Oral daily  aspirin  chewable 81 milliGRAM(s) Oral daily  enoxaparin Injectable 70 milliGRAM(s) SubCutaneous every 12 hours  finasteride 5 milliGRAM(s) Oral daily  furosemide    Tablet 40 milliGRAM(s) Oral daily  metoprolol succinate ER 50 milliGRAM(s) Oral daily  senna 2 Tablet(s) Oral at bedtime  tamsulosin 0.4 milliGRAM(s) Oral at bedtime    MEDICATIONS  (PRN):  acetaminophen     Tablet .. 650 milliGRAM(s) Oral every 6 hours PRN Mild Pain (1 - 3), Moderate Pain (4 - 6)  melatonin 3 milliGRAM(s) Oral at bedtime PRN Insomnia  polyethylene glycol 3350 17 Gram(s) Oral daily PRN Constipation    Vitals:  T(F): 97.3 (04-03-23 @ 12:56), Max: 98.9 (04-02-23 @ 16:28)  HR: 103 (04-03-23 @ 12:56) (79 - 106)  BP: 113/72 (04-03-23 @ 12:56) (112/77 - 137/93)  RR: 18 (04-03-23 @ 12:56) (18 - 18)  SpO2: 90% (04-03-23 @ 12:56) (90% - 94%)    04-02 @ 07:01  -  04-03 @ 07:00  --------------------------------------------------------  IN:    Oral Fluid: 1547 mL  Total IN: 1547 mL    OUT:    Indwelling Catheter - Urethral (mL): 2800 mL  Total OUT: 2800 mL    Total NET: -1253 mL    04-03 @ 07:01  -  04-03 @ 15:11  --------------------------------------------------------  IN:  Total IN: 0 mL    OUT:    Indwelling Catheter - Urethral (mL): 1250 mL  Total OUT: 1250 mL    Total NET: -1250 mL    PHYSICAL EXAM:  Neuro: Awake, responsive  CV: S1 S2 RRR + SM  Lungs: CTABL  GI: Soft, BS +, ND, NT  Extremities: No edema    TELEMETRY: sinus     RADIOLOGY: < from: CT Angio Chest PE Protocol w/ IV Cont (03.31.23 @ 09:31) >    PULMONARY ANGIOGRAM: There is respiratory motion degradation limiting   evaluation of some of the segmental and subsegmental pulmonary artery   branches. There are linear and eccentric medullary arterial filling   defects bilaterally. Specifically, there is a thrombus in a left lower   lobe segmental pulmonary branch with resultant occlusion. There is an   eccentric filling defect in the left upper lobe artery extending into a   segmental branch. There is an eccentric filling defect in a right lower   lobe segmental artery with resultant occlusion. There is a right upper   lobe lobar eccentric thrombus extending into a segmental pulmonary   artery. Constellation of findings represent chronic pulmonary arterial   emboli.    LYMPH NODES: No lymphadenopathy.    HEART/VASCULATURE: Cardiomegaly. The right heart chambers are enlarged  with a flat ventricular septum. The main pulmonary artery is slightly   enlarged measuring about 3.4 cm. No pericardial effusion. There are   mediastinal collateral vessels sequela of the chronic coronary arterial   emboli. There are aortic calcifications.    AIRWAYS/LUNGS/PLEURA: Airways are patent. There is linear/subsegmental   atelectasis in the left lower lobe. There are trace bilateral pleural   effusions.    UPPER ABDOMEN: Unremarkable.    BONES/SOFT TISSUES: Spinal degenerative changes. There is subcutaneous   edema.    IMPRESSION:    Findings as described above represent chronic pulmonary arterial emboli.   Limited evaluation of some of the segmental and subsegmental pulmonary   arterial branches due to respiratory motion artifact.    Enlarged right heart chambers and flat ventricular septum suggest   pulmonary arterial hypertension.    Trace bilateral pleural effusions.    < end of copied text >  < from: US Duplex Venous Lower Ext Complete, Bilateral (03.29.23 @ 09:00) >  Normal compressibility of the RIGHT common femoral, femoral and popliteal   veins.  Doppler examination shows normal spontaneous and phasic flow.  There is occlusive DVT of the right peroneal vein. The posterior tibial   vein is unremarkable.    LEFT:  Normal compressibility of the LEFT common femoral, femoral and popliteal   veins.  Doppler examination shows normal spontaneous and phasic flow.  No LEFT calf vein thrombosis isdetected.    IMPRESSION:  DVT right peroneal vein (below the knee)    < end of copied text >  < from: CT Abdomen and Pelvis No Cont (03.28.23 @ 18:31) >  No hydronephrosis or renal calculus.    Thickened bladder wall.    < end of copied text >    DIAGNOSTIC TESTING:    [x ] Echocardiogram: < from: TTE Echo Complete w/o Contrast w/ Doppler (03.29.23 @ 11:07) >  Left Ventricle: The left ventricular internal cavity size is normal.  Global LV systolic function was moderately decreased. Left ventricular   ejection fraction, by visual estimation, is 40 to 45%. The   interventricular septum is flattened in systole, consistent with right   ventricular pressure overload. Spectral Doppler shows impaired relaxation   pattern of left ventricular myocardial filling (Grade I diastolic   dysfunction).      LV Wall Scoring:  The basal and mid inferior wall, basal and mid inferolateral wall, and mid  inferoseptal segment are hypokinetic.    Right Ventricle: The right ventricular size is moderately enlarged. RV   systolic function is moderately reduced.  Left Atrium: Normal left atrial size.  Right Atrium: Moderately enlarged right atrium.  Pericardium: A small pericardial effusion is present. The pericardial   effusion is posterior to the left ventricle.  Mitral Valve: Structurally normal mitral valve, with normal leaflet   excursion. Mitral leaflet mobility is normal. Mild mitral valve   regurgitation is seen.  Tricuspid Valve: Structurally normal tricuspid valve, with normal leaflet   excursion. The tricuspid valve is normal in structure. Moderate tricuspid   regurgitation is visualized. Estimated pulmonary artery systolic pressure   is 76.3 mmHg assuming a right atrial pressure of 10 mmHg, which is   consistent with severe pulmonary hypertension.  Aortic Valve: The aortic valve is trileaflet. Trivial aortic valve   regurgitation is seen.  Pulmonic Valve: The pulmonic valve was not well visualized. Mild pulmonic   valve regurgitation.  Aorta: Aortic root measured at Sinus of Valsalva is normal.  Venous: The inferior vena cava was normal sized, with respiratory size   variation greater than 50%.  In comparison to the previous echocardiogram(s): There are no prior   studies on this patient for comparison purposes.      Summary:   1. Left ventricular ejection fraction, by visual estimation, is 40 to   45%.   2. Technically adequate study.   3. Moderately decreased global left ventricular systolic function.   4. Basal and mid inferior wall, basal and mid inferolateral wall, and   mid inferoseptal segment are abnormal as described above.   5. Normal left ventricular internal cavity size.   6. Right ventricular pressure overload.   7. Spectral Doppler shows impaired relaxation pattern of left   ventricular myocardial filling (Grade I diastolic dysfunction).   8. There is moderate concentric left ventricular hypertrophy.   9. Moderately enlarged right ventricle.  10. Moderately reduced RV systolic function.  11. Moderately enlarged right atrium.  12. Normal left atrial size.  13. Small pericardial effusion.  14. Mild mitral valve regurgitation.  15. Structurally normal mitral valve, with normal leaflet excursion.  16. Moderate tricuspid regurgitation.  17. Mild pulmonic valve regurgitation.  18. Estimated pulmonary artery systolic pressure is 76.3 mmHg assuming a   right atrial pressure of 10 mmHg, which is consistent with severe   pulmonary hypertension.    < end of copied text >    LABS:	 	    02 Apr 2023 06:45    137    |  102    |  27     ----------------------------<  89     3.7     |  33     |  1.25   01 Apr 2023 05:53    139    |  104    |  25     ----------------------------<  97     4.0     |  34     |  1.17     Ca    8.4        02 Apr 2023 06:45  Phos  2.5       02 Apr 2023 06:45  Mg     1.9       02 Apr 2023 06:45                        14.3   6.13  )-----------( 216      ( 01 Apr 2023 05:53 )             45.3                  Patient is a 77y old  Male who presents with a chief complaint of urinary retention, LE edema, sob    PAST MEDICAL & SURGICAL HISTORY:    HTN    INTERVAL HISTORY: In no acute distress   	  MEDICATIONS:  MEDICATIONS  (STANDING):  amLODIPine   Tablet 5 milliGRAM(s) Oral daily  aspirin  chewable 81 milliGRAM(s) Oral daily  enoxaparin Injectable 70 milliGRAM(s) SubCutaneous every 12 hours  finasteride 5 milliGRAM(s) Oral daily  furosemide    Tablet 40 milliGRAM(s) Oral daily  metoprolol succinate ER 50 milliGRAM(s) Oral daily  senna 2 Tablet(s) Oral at bedtime  tamsulosin 0.4 milliGRAM(s) Oral at bedtime    MEDICATIONS  (PRN):  acetaminophen     Tablet .. 650 milliGRAM(s) Oral every 6 hours PRN Mild Pain (1 - 3), Moderate Pain (4 - 6)  melatonin 3 milliGRAM(s) Oral at bedtime PRN Insomnia  polyethylene glycol 3350 17 Gram(s) Oral daily PRN Constipation    Vitals:  T(F): 97.3 (04-03-23 @ 12:56), Max: 98.9 (04-02-23 @ 16:28)  HR: 103 (04-03-23 @ 12:56) (79 - 106)  BP: 113/72 (04-03-23 @ 12:56) (112/77 - 137/93)  RR: 18 (04-03-23 @ 12:56) (18 - 18)  SpO2: 90% (04-03-23 @ 12:56) (90% - 94%)    04-02 @ 07:01  -  04-03 @ 07:00  --------------------------------------------------------  IN:    Oral Fluid: 1547 mL  Total IN: 1547 mL    OUT:    Indwelling Catheter - Urethral (mL): 2800 mL  Total OUT: 2800 mL    Total NET: -1253 mL    04-03 @ 07:01  -  04-03 @ 15:11  --------------------------------------------------------  IN:  Total IN: 0 mL    OUT:    Indwelling Catheter - Urethral (mL): 1250 mL  Total OUT: 1250 mL    Total NET: -1250 mL    PHYSICAL EXAM:  Neuro: Awake, responsive  CV: S1 S2 RRR + SM  Lungs: diminished to bases   GI: Soft, BS +, ND, NT  Extremities: + LE edema    TELEMETRY: sinus     RADIOLOGY: < from: CT Angio Chest PE Protocol w/ IV Cont (03.31.23 @ 09:31) >    PULMONARY ANGIOGRAM: There is respiratory motion degradation limiting   evaluation of some of the segmental and subsegmental pulmonary artery   branches. There are linear and eccentric medullary arterial filling   defects bilaterally. Specifically, there is a thrombus in a left lower   lobe segmental pulmonary branch with resultant occlusion. There is an   eccentric filling defect in the left upper lobe artery extending into a   segmental branch. There is an eccentric filling defect in a right lower   lobe segmental artery with resultant occlusion. There is a right upper   lobe lobar eccentric thrombus extending into a segmental pulmonary   artery. Constellation of findings represent chronic pulmonary arterial   emboli.    LYMPH NODES: No lymphadenopathy.    HEART/VASCULATURE: Cardiomegaly. The right heart chambers are enlarged  with a flat ventricular septum. The main pulmonary artery is slightly   enlarged measuring about 3.4 cm. No pericardial effusion. There are   mediastinal collateral vessels sequela of the chronic coronary arterial   emboli. There are aortic calcifications.    AIRWAYS/LUNGS/PLEURA: Airways are patent. There is linear/subsegmental   atelectasis in the left lower lobe. There are trace bilateral pleural   effusions.    UPPER ABDOMEN: Unremarkable.    BONES/SOFT TISSUES: Spinal degenerative changes. There is subcutaneous   edema.    IMPRESSION:    Findings as described above represent chronic pulmonary arterial emboli.   Limited evaluation of some of the segmental and subsegmental pulmonary   arterial branches due to respiratory motion artifact.    Enlarged right heart chambers and flat ventricular septum suggest   pulmonary arterial hypertension.    Trace bilateral pleural effusions.    < end of copied text >  < from: US Duplex Venous Lower Ext Complete, Bilateral (03.29.23 @ 09:00) >  Normal compressibility of the RIGHT common femoral, femoral and popliteal   veins.  Doppler examination shows normal spontaneous and phasic flow.  There is occlusive DVT of the right peroneal vein. The posterior tibial   vein is unremarkable.    LEFT:  Normal compressibility of the LEFT common femoral, femoral and popliteal   veins.  Doppler examination shows normal spontaneous and phasic flow.  No LEFT calf vein thrombosis isdetected.    IMPRESSION:  DVT right peroneal vein (below the knee)    < end of copied text >  < from: CT Abdomen and Pelvis No Cont (03.28.23 @ 18:31) >  No hydronephrosis or renal calculus.    Thickened bladder wall.    < end of copied text >    DIAGNOSTIC TESTING:    [x ] Echocardiogram: < from: TTE Echo Complete w/o Contrast w/ Doppler (03.29.23 @ 11:07) >  Left Ventricle: The left ventricular internal cavity size is normal.  Global LV systolic function was moderately decreased. Left ventricular   ejection fraction, by visual estimation, is 40 to 45%. The   interventricular septum is flattened in systole, consistent with right   ventricular pressure overload. Spectral Doppler shows impaired relaxation   pattern of left ventricular myocardial filling (Grade I diastolic   dysfunction).      LV Wall Scoring:  The basal and mid inferior wall, basal and mid inferolateral wall, and mid  inferoseptal segment are hypokinetic.    Right Ventricle: The right ventricular size is moderately enlarged. RV   systolic function is moderately reduced.  Left Atrium: Normal left atrial size.  Right Atrium: Moderately enlarged right atrium.  Pericardium: A small pericardial effusion is present. The pericardial   effusion is posterior to the left ventricle.  Mitral Valve: Structurally normal mitral valve, with normal leaflet   excursion. Mitral leaflet mobility is normal. Mild mitral valve   regurgitation is seen.  Tricuspid Valve: Structurally normal tricuspid valve, with normal leaflet   excursion. The tricuspid valve is normal in structure. Moderate tricuspid   regurgitation is visualized. Estimated pulmonary artery systolic pressure   is 76.3 mmHg assuming a right atrial pressure of 10 mmHg, which is   consistent with severe pulmonary hypertension.  Aortic Valve: The aortic valve is trileaflet. Trivial aortic valve   regurgitation is seen.  Pulmonic Valve: The pulmonic valve was not well visualized. Mild pulmonic   valve regurgitation.  Aorta: Aortic root measured at Sinus of Valsalva is normal.  Venous: The inferior vena cava was normal sized, with respiratory size   variation greater than 50%.  In comparison to the previous echocardiogram(s): There are no prior   studies on this patient for comparison purposes.      Summary:   1. Left ventricular ejection fraction, by visual estimation, is 40 to   45%.   2. Technically adequate study.   3. Moderately decreased global left ventricular systolic function.   4. Basal and mid inferior wall, basal and mid inferolateral wall, and   mid inferoseptal segment are abnormal as described above.   5. Normal left ventricular internal cavity size.   6. Right ventricular pressure overload.   7. Spectral Doppler shows impaired relaxation pattern of left   ventricular myocardial filling (Grade I diastolic dysfunction).   8. There is moderate concentric left ventricular hypertrophy.   9. Moderately enlarged right ventricle.  10. Moderately reduced RV systolic function.  11. Moderately enlarged right atrium.  12. Normal left atrial size.  13. Small pericardial effusion.  14. Mild mitral valve regurgitation.  15. Structurally normal mitral valve, with normal leaflet excursion.  16. Moderate tricuspid regurgitation.  17. Mild pulmonic valve regurgitation.  18. Estimated pulmonary artery systolic pressure is 76.3 mmHg assuming a   right atrial pressure of 10 mmHg, which is consistent with severe   pulmonary hypertension.    < end of copied text >    LABS:	 	    02 Apr 2023 06:45    137    |  102    |  27     ----------------------------<  89     3.7     |  33     |  1.25   01 Apr 2023 05:53    139    |  104    |  25     ----------------------------<  97     4.0     |  34     |  1.17     Ca    8.4        02 Apr 2023 06:45  Phos  2.5       02 Apr 2023 06:45  Mg     1.9       02 Apr 2023 06:45                        14.3   6.13  )-----------( 216      ( 01 Apr 2023 05:53 )             45.3

## 2023-04-04 LAB
ANION GAP SERPL CALC-SCNC: 4 MMOL/L — LOW (ref 5–17)
BUN SERPL-MCNC: 27 MG/DL — HIGH (ref 7–23)
CALCIUM SERPL-MCNC: 8.3 MG/DL — LOW (ref 8.5–10.1)
CHLORIDE SERPL-SCNC: 103 MMOL/L — SIGNIFICANT CHANGE UP (ref 96–108)
CO2 SERPL-SCNC: 30 MMOL/L — SIGNIFICANT CHANGE UP (ref 22–31)
CREAT SERPL-MCNC: 1.35 MG/DL — HIGH (ref 0.5–1.3)
EGFR: 54 ML/MIN/1.73M2 — LOW
GLUCOSE SERPL-MCNC: 109 MG/DL — HIGH (ref 70–99)
HCT VFR BLD CALC: 44.2 % — SIGNIFICANT CHANGE UP (ref 39–50)
HGB BLD-MCNC: 13.8 G/DL — SIGNIFICANT CHANGE UP (ref 13–17)
INR BLD: 1.21 RATIO — HIGH (ref 0.88–1.16)
MCHC RBC-ENTMCNC: 25.2 PG — LOW (ref 27–34)
MCHC RBC-ENTMCNC: 31.2 G/DL — LOW (ref 32–36)
MCV RBC AUTO: 80.8 FL — SIGNIFICANT CHANGE UP (ref 80–100)
NRBC # BLD: 0 /100 WBCS — SIGNIFICANT CHANGE UP (ref 0–0)
PLATELET # BLD AUTO: 233 K/UL — SIGNIFICANT CHANGE UP (ref 150–400)
POTASSIUM SERPL-MCNC: 3.9 MMOL/L — SIGNIFICANT CHANGE UP (ref 3.5–5.3)
POTASSIUM SERPL-SCNC: 3.9 MMOL/L — SIGNIFICANT CHANGE UP (ref 3.5–5.3)
PROTHROM AB SERPL-ACNC: 14.6 SEC — HIGH (ref 10.5–13.4)
RBC # BLD: 5.47 M/UL — SIGNIFICANT CHANGE UP (ref 4.2–5.8)
RBC # FLD: 16.1 % — HIGH (ref 10.3–14.5)
SODIUM SERPL-SCNC: 137 MMOL/L — SIGNIFICANT CHANGE UP (ref 135–145)
WBC # BLD: 6.4 K/UL — SIGNIFICANT CHANGE UP (ref 3.8–10.5)
WBC # FLD AUTO: 6.4 K/UL — SIGNIFICANT CHANGE UP (ref 3.8–10.5)

## 2023-04-04 PROCEDURE — 99233 SBSQ HOSP IP/OBS HIGH 50: CPT

## 2023-04-04 PROCEDURE — 99232 SBSQ HOSP IP/OBS MODERATE 35: CPT

## 2023-04-04 RX ORDER — SODIUM CHLORIDE 9 MG/ML
1000 INJECTION, SOLUTION INTRAVENOUS
Refills: 0 | Status: DISCONTINUED | OUTPATIENT
Start: 2023-04-04 | End: 2023-04-06

## 2023-04-04 RX ORDER — ATORVASTATIN CALCIUM 80 MG/1
40 TABLET, FILM COATED ORAL AT BEDTIME
Refills: 0 | Status: DISCONTINUED | OUTPATIENT
Start: 2023-04-04 | End: 2023-04-07

## 2023-04-04 RX ORDER — ENOXAPARIN SODIUM 100 MG/ML
70 INJECTION SUBCUTANEOUS EVERY 12 HOURS
Refills: 0 | Status: DISCONTINUED | OUTPATIENT
Start: 2023-04-04 | End: 2023-04-07

## 2023-04-04 RX ORDER — WARFARIN SODIUM 2.5 MG/1
10 TABLET ORAL ONCE
Refills: 0 | Status: COMPLETED | OUTPATIENT
Start: 2023-04-04 | End: 2023-04-04

## 2023-04-04 RX ADMIN — Medication 3 MILLIGRAM(S): at 21:45

## 2023-04-04 RX ADMIN — Medication 50 MILLIGRAM(S): at 05:29

## 2023-04-04 RX ADMIN — WARFARIN SODIUM 10 MILLIGRAM(S): 2.5 TABLET ORAL at 21:45

## 2023-04-04 RX ADMIN — TAMSULOSIN HYDROCHLORIDE 0.4 MILLIGRAM(S): 0.4 CAPSULE ORAL at 21:45

## 2023-04-04 RX ADMIN — Medication 40 MILLIGRAM(S): at 05:29

## 2023-04-04 RX ADMIN — FINASTERIDE 5 MILLIGRAM(S): 5 TABLET, FILM COATED ORAL at 11:58

## 2023-04-04 RX ADMIN — SENNA PLUS 2 TABLET(S): 8.6 TABLET ORAL at 21:45

## 2023-04-04 RX ADMIN — AMLODIPINE BESYLATE 5 MILLIGRAM(S): 2.5 TABLET ORAL at 05:03

## 2023-04-04 RX ADMIN — SODIUM CHLORIDE 75 MILLILITER(S): 9 INJECTION, SOLUTION INTRAVENOUS at 17:43

## 2023-04-04 RX ADMIN — ENOXAPARIN SODIUM 70 MILLIGRAM(S): 100 INJECTION SUBCUTANEOUS at 17:42

## 2023-04-04 RX ADMIN — ATORVASTATIN CALCIUM 40 MILLIGRAM(S): 80 TABLET, FILM COATED ORAL at 21:45

## 2023-04-04 RX ADMIN — Medication 81 MILLIGRAM(S): at 11:57

## 2023-04-04 RX ADMIN — PANTOPRAZOLE SODIUM 40 MILLIGRAM(S): 20 TABLET, DELAYED RELEASE ORAL at 06:22

## 2023-04-04 NOTE — PROGRESS NOTE ADULT - ASSESSMENT
77 year old male with h/o HTN; presented to the ED with complaints of difficulty urination, hematuria, increased lower extremity edema, SOB and unable to sleep at night. Patient reported urinary retention for one week, only very small amount of urine come out each time, associated with abdominal discomfort. He also noted increase lower extremity swelling for 1 week.   No chest pain. No h/o CAD.  S/P Yan placement in ED with drainage of > 1.6L urine.   CT abd/pelvis showed BPH and bladder wall thickening, no hydro  proBNP 9018, trop 180 -> 150;  significant bilateral LE edema  Echo with EF 40-45%, RV dysfunction and severe pulm HTN  EKG with no acute ST-T changes   CTA chest + for PE  + DVT    -cont daily po lasix for now, monitor renal function and electrolytes   -GDMT optimization - cont asa, metoprolol, add statin when LFTs improve, other medications when renal function will allow and pt not receiving IV contrast, s/p CT A/P with IV contrast 4/3/23, monitor renal function closely   -Patient on full dose Lovenox for VT/PE management, currently started on coumadin  -PSA 90 ?underlying prostate malignancy, Urology following, No mets on bone scan 4/3/23; no lymphadenopathy on CT 4/3/23.  -Currently no respiratory distress. Followup in cardiology clinic at [facility] for management of systolic LV and right sided heart failure.  Would benefit from L/RHC for evaluation of cardiomyopathy.  -should also f/u with Dr. Anderson as outpt for pulm HTN management

## 2023-04-04 NOTE — DIETITIAN INITIAL EVALUATION ADULT - PERTINENT MEDS FT
MEDICATIONS  (STANDING):  amLODIPine   Tablet 5 milliGRAM(s) Oral daily  aspirin  chewable 81 milliGRAM(s) Oral daily  finasteride 5 milliGRAM(s) Oral daily  furosemide    Tablet 40 milliGRAM(s) Oral daily  metoprolol succinate ER 50 milliGRAM(s) Oral daily  pantoprazole    Tablet 40 milliGRAM(s) Oral before breakfast  senna 2 Tablet(s) Oral at bedtime  tamsulosin 0.4 milliGRAM(s) Oral at bedtime    MEDICATIONS  (PRN):  acetaminophen     Tablet .. 650 milliGRAM(s) Oral every 6 hours PRN Mild Pain (1 - 3), Moderate Pain (4 - 6)  melatonin 3 milliGRAM(s) Oral at bedtime PRN Insomnia  polyethylene glycol 3350 17 Gram(s) Oral daily PRN Constipation

## 2023-04-04 NOTE — PROGRESS NOTE ADULT - SUBJECTIVE AND OBJECTIVE BOX
Patient is a 77y old  Male who presents with a chief complaint of urinary retention, LE edema, sob    PAST MEDICAL & SURGICAL HISTORY:  HTN (hypertension)    INTERVAL HISTORY: no acute distress  	  MEDICATIONS:  MEDICATIONS  (STANDING):  amLODIPine   Tablet 5 milliGRAM(s) Oral daily  aspirin  chewable 81 milliGRAM(s) Oral daily  finasteride 5 milliGRAM(s) Oral daily  furosemide    Tablet 40 milliGRAM(s) Oral daily  metoprolol succinate ER 50 milliGRAM(s) Oral daily  pantoprazole    Tablet 40 milliGRAM(s) Oral before breakfast  senna 2 Tablet(s) Oral at bedtime  tamsulosin 0.4 milliGRAM(s) Oral at bedtime    MEDICATIONS  (PRN):  acetaminophen     Tablet .. 650 milliGRAM(s) Oral every 6 hours PRN Mild Pain (1 - 3), Moderate Pain (4 - 6)  melatonin 3 milliGRAM(s) Oral at bedtime PRN Insomnia  polyethylene glycol 3350 17 Gram(s) Oral daily PRN Constipation    Vitals:  T(F): 98.2 (04-04-23 @ 11:49), Max: 98.6 (04-03-23 @ 23:50)  HR: 109 (04-04-23 @ 12:07) (85 - 109)  BP: 116/83 (04-04-23 @ 12:07) (110/77 - 116/83)  RR: 17 (04-04-23 @ 11:49) (17 - 18)  SpO2: 91% (04-04-23 @ 12:07) (91% - 96%)    04-03 @ 07:01  -  04-04 @ 07:00  --------------------------------------------------------  IN:    Oral Fluid: 537 mL  Total IN: 537 mL    OUT:    Indwelling Catheter - Urethral (mL): 1950 mL    Voided (mL): 900 mL  Total OUT: 2850 mL    Total NET: -2313 mL    04-04 @ 07:01  -  04-04 @ 15:41  --------------------------------------------------------  IN:  Total IN: 0 mL    OUT:    Indwelling Catheter - Urethral (mL): 1000 mL  Total OUT: 1000 mL    Total NET: -1000 mL    PHYSICAL EXAM:  Neuro: Awake, responsive  CV: S1 S2 RRR + SM  Lungs: diminished to bases  GI: Soft, BS +, ND, NT  Extremities: + LE edema    TELEMETRY: sinus, short runs of NSVTs    RADIOLOGY: < from: CT Abdomen and Pelvis w/ IV Cont (04.03.23 @ 15:15) >  No pathologically enlarged lymph nodes in the abdomen or pelvis.    Distended stomach with wall thickening and narrowing versus   underdistention involving the distal stomach and asymmetric thickening of   gastric folds in the proximal stomach; correlation is recommended for   obstructive symptoms or gastritis; an endoscopy is recommended for   further evaluation.    < end of copied text >    < from: CT Angio Chest PE Protocol w/ IV Cont (03.31.23 @ 09:31) >    Findings as described above represent chronic pulmonary arterial emboli.   Limited evaluation of some of the segmental and subsegmental pulmonary   arterial branches due to respiratory motion artifact.    Enlarged right heart chambers and flat ventricular septum suggest   pulmonary arterial hypertension.    Trace bilateral pleural effusions.    < end of copied text >    DIAGNOSTIC TESTING:    [x ] Echocardiogram:   < from: TTE Echo Complete w/o Contrast w/ Doppler (03.29.23 @ 11:07) >  PHYSICIAN INTERPRETATION:  Left Ventricle: The left ventricular internal cavity size is normal.  Global LV systolic function was moderately decreased. Left ventricular   ejection fraction, by visual estimation, is 40 to 45%. The   interventricular septum is flattened in systole, consistent with right   ventricular pressure overload. Spectral Doppler shows impaired relaxation   pattern of left ventricular myocardial filling (Grade I diastolic   dysfunction).    LV Wall Scoring:  The basal and mid inferior wall, basal and mid inferolateral wall, and mid  inferoseptal segment are hypokinetic.    Right Ventricle: The right ventricular size is moderately enlarged. RV   systolic function is moderately reduced.  Left Atrium: Normal left atrial size.  Right Atrium: Moderately enlarged right atrium.  Pericardium: A small pericardial effusion is present. The pericardial   effusion is posterior to the left ventricle.  Mitral Valve: Structurally normal mitral valve, with normal leaflet   excursion. Mitral leaflet mobility is normal. Mild mitral valve   regurgitation is seen.  Tricuspid Valve: Structurally normal tricuspid valve, with normal leaflet   excursion. The tricuspid valve is normal in structure. Moderate tricuspid   regurgitation is visualized. Estimated pulmonary artery systolic pressure   is 76.3 mmHg assuming a right atrial pressure of 10 mmHg, which is   consistent with severe pulmonary hypertension.  Aortic Valve: The aortic valve is trileaflet. Trivial aortic valve   regurgitation is seen.  Pulmonic Valve: The pulmonic valve was not well visualized. Mild pulmonic   valve regurgitation.  Aorta: Aortic root measured at Sinus of Valsalva is normal.  Venous: The inferior vena cava was normal sized, with respiratory size   variation greater than 50%.  In comparison to the previous echocardiogram(s): There are no prior   studies on this patient for comparison purposes.      Summary:   1. Left ventricular ejection fraction, by visual estimation, is 40 to   45%.   2. Technically adequate study.   3. Moderately decreased global left ventricular systolic function.   4. Basal and mid inferior wall, basal and mid inferolateral wall, and   mid inferoseptal segment are abnormal as described above.   5. Normal left ventricular internal cavity size.   6. Right ventricular pressure overload.   7. Spectral Doppler shows impaired relaxation pattern of left   ventricular myocardial filling (Grade I diastolic dysfunction).   8. There is moderate concentric left ventricular hypertrophy.   9. Moderately enlarged right ventricle.  10. Moderately reduced RV systolic function.  11. Moderately enlarged right atrium.  12. Normal left atrial size.  13. Small pericardial effusion.  14. Mild mitral valve regurgitation.  15. Structurally normal mitral valve, with normal leaflet excursion.  16. Moderate tricuspid regurgitation.  17. Mild pulmonic valve regurgitation.  18. Estimated pulmonary artery systolic pressure is 76.3 mmHg assuming a   right atrial pressure of 10 mmHg, which is consistent with severe   pulmonary hypertension.    < end of copied text >    LABS:	 	    04 Apr 2023 05:51    137    |  103    |  27     ----------------------------<  109    3.9     |  30     |  1.35   02 Apr 2023 06:45    137    |  102    |  27     ----------------------------<  89     3.7     |  33     |  1.25     Ca    8.3        04 Apr 2023 05:51                        13.8   6.40  )-----------( 233      ( 04 Apr 2023 05:51 )             44.2     PT/PTT- ( 04 Apr 2023 05:51 )   PT: 14.6 sec;  PTT: x         PT/PTT- ( 03 Apr 2023 22:05 )   PT: 14.8 sec;  PTT: x        INR: 1.21 ratio (04-04 @ 05:51)  INR: 1.23 ratio (04-03 @ 22:05)

## 2023-04-04 NOTE — DIETITIAN INITIAL EVALUATION ADULT - ORAL INTAKE PTA/DIET HISTORY
Pt reports good appetite and PO intake PTA. States his daughter does shopping and cooking PTA and pt consumes 3 meals/day.

## 2023-04-04 NOTE — DIETITIAN INITIAL EVALUATION ADULT - OTHER INFO
Pt Bahamian-Creole speaking; language line  Lynn, ID #[id number], assisted.   Admitted with urinary retention, fluid overload, MARVIN, hematuria, CHF, and concern for malignancy.  Pt preoccupied with his lack of sleep and provided brief, limited history.  Denies difficulty chewing/swallowing. Reports unsure of UBW as he has not weighed himself in a long time; states he has lost weight however as he had some swelling in his legs that has gone down- pt underwent diuresis during LOS.   HbA1c noted to be 6.5% indicating diabetes, however unable to discuss with pt due to his preoccupation with sleep- pt noted to be ordered for melatonin. No history of T2DM in chart.  Pt made aware RD remains available.

## 2023-04-04 NOTE — DIETITIAN INITIAL EVALUATION ADULT - PROBLEM SELECTOR PLAN 7
AST/ALT 86/129, total bili 2  Trend LFT, check CK  If worsened LFT with normal CK, then will pursue further work up  CT abd/pelvis- pending

## 2023-04-04 NOTE — PROGRESS NOTE ADULT - SUBJECTIVE AND OBJECTIVE BOX
Patient is a 77y old  Male who presents with a chief complaint of urinary retention, gross hematuria, elevated troponin, MARVIN (31 Mar 2023 12:05)      SUBJECTIVE / OVERNIGHT EVENTS:   Patient seen and examined bedside.   no overnight events       ROS:  All other review of systems negative    Allergies    No Known Allergies    Intolerances        MEDICATIONS  (STANDING):  acetylcysteine  Oral Solution 1200 milliGRAM(s) Oral every 12 hours  amLODIPine   Tablet 5 milliGRAM(s) Oral daily  aspirin  chewable 81 milliGRAM(s) Oral daily  enoxaparin Injectable 70 milliGRAM(s) SubCutaneous every 12 hours  finasteride 5 milliGRAM(s) Oral daily  furosemide   Injectable 40 milliGRAM(s) IV Push daily  metoprolol succinate ER 50 milliGRAM(s) Oral daily  regadenoson Injectable 0.4 milliGRAM(s) IV Push once  senna 2 Tablet(s) Oral at bedtime  tamsulosin 0.4 milliGRAM(s) Oral at bedtime    MEDICATIONS  (PRN):  acetaminophen     Tablet .. 650 milliGRAM(s) Oral every 6 hours PRN Mild Pain (1 - 3), Moderate Pain (4 - 6)  melatonin 3 milliGRAM(s) Oral at bedtime PRN Insomnia  polyethylene glycol 3350 17 Gram(s) Oral daily PRN Constipation        Vital Signs Last 24 Hrs  T(C): 37.1 (04 Apr 2023 16:02), Max: 37.1 (04 Apr 2023 16:02)  T(F): 98.8 (04 Apr 2023 16:02), Max: 98.8 (04 Apr 2023 16:02)  HR: 108 (04 Apr 2023 16:02) (85 - 109)  BP: 115/79 (04 Apr 2023 16:02) (110/77 - 116/83)  BP(mean): --  RR: 18 (04 Apr 2023 16:02) (17 - 18)  SpO2: 95% (04 Apr 2023 16:02) (91% - 96%)    Parameters below as of 04 Apr 2023 16:02  Patient On (Oxygen Delivery Method): nasal cannula  O2 Flow (L/min): 2        PHYSICAL EXAM:  GENERAL: elderly , NAD, no increased WOB   HEAD:  Atraumatic, Normocephalic  EYES: EOMI, PERRLA, conjunctiva and sclera clear  NECK: Supple, No JVD  CHEST/LUNG: Clear to auscultation bilaterally; No wheeze b/l   HEART: Regular rate and rhythm; No murmurs, rubs, or gallops  ABDOMEN: Soft, Nontender, Nondistended; Bowel sounds present+ joseph yellow urine in bag   EXTREMITIES:  2+ Peripheral Pulses b/l, No clubbing, cyanosis, calf tenderness or edema b/l   NEUROLOGY: AAOx3, non-focal      LABS:                                              13.8   6.40  )-----------( 233      ( 04 Apr 2023 05:51 )             44.2   04-04    137  |  103  |  27<H>  ----------------------------<  109<H>  3.9   |  30  |  1.35<H>    Ca    8.3<L>      04 Apr 2023 05:51        RADIOLOGY & ADDITIONAL TESTS:    Imaging Personally Reviewed:  CTPA  IMPRESSION:  Findings as described above represent chronic pulmonary arterial emboli.   Limited evaluation of some of the segmental and subsegmental pulmonary   arterial branches due to respiratory motion artifact.  Enlarged right heart chambers and flat ventricular septum suggest   pulmonary arterial hypertension.    Trace bilateral pleural effusions.    Consultant(s) Notes Reviewed [x ] Yes     Care Discussed with [x ] Consultants  [ x] Patient  [ x] Family--daughter   [x ] /   [x ] Other; RN  Care plan and all findings were discussed in detail with patient and family.  All questions and concerns addressed

## 2023-04-04 NOTE — DIETITIAN INITIAL EVALUATION ADULT - PERTINENT LABORATORY DATA
04-04    137  |  103  |  27<H>  ----------------------------<  109<H>  3.9   |  30  |  1.35<H>    Ca    8.3<L>      04 Apr 2023 05:51    A1C with Estimated Average Glucose Result: 6.5 % (03-29-23 @ 06:13)

## 2023-04-04 NOTE — DIETITIAN NUTRITION RISK NOTIFICATION - TREATMENT: THE FOLLOWING DIET HAS BEEN RECOMMENDED
Diet, Regular:   Low Sodium  Supplement Feeding Modality:  Oral  Glucerna Shake Cans or Servings Per Day:  1       Frequency:  Two Times a day (04-04-23 @ 13:58) [Pending Verification By Attending]  Diet, DASH/TLC:   Sodium & Cholesterol Restricted (03-28-23 @ 16:22) [Active]

## 2023-04-04 NOTE — PROGRESS NOTE ADULT - SUBJECTIVE AND OBJECTIVE BOX
SUBJECTIVE:  Patient seen and examined at bedside.  No overnight events.  Patient offers no complaints at this time. Feels well. Urine output 1950cc/24 hrs, joseph clear.  Patient speaks Lao-Creole. [employer] service utilized-  ID# [id number]    VITALS  Vital Signs Last 24 Hrs  T(C): 36.9 (04 Apr 2023 04:35), Max: 37 (03 Apr 2023 23:50)  T(F): 98.5 (04 Apr 2023 04:35), Max: 98.6 (03 Apr 2023 23:50)  HR: 85 (04 Apr 2023 04:35) (85 - 103)  BP: 116/77 (04 Apr 2023 04:35) (113/72 - 116/78)  RR: 18 (04 Apr 2023 04:35) (18 - 18)  SpO2: 93% (04 Apr 2023 04:35) (90% - 93%)    Parameters below as of 04 Apr 2023 04:35  Patient On (Oxygen Delivery Method): nasal cannula  O2 Flow (L/min): 2    PHYSICAL EXAM  GENERAL:  Well-nourished, well-developed male lying comfortably in bed in [facility].  HEENT:  NC/AT. Sclera white. Mucous membranes moist.  CARDIO:  Regular rate and rhythm.  RESPIRATORY:  Nonlabored breathing, no accessory muscle use.  ABDOMEN:  Soft, nondistended, nontender. No rebound tenderness or guarding.  : Joseph in place with 1100cc clear yellow urine in joseph bag.  SKIN:  No jaundice, pallor, or cyanosis  NEURO:  Alert; answers questions appropriately.    INTAKE & OUTPUT  I&O's Summary    03 Apr 2023 07:01  -  04 Apr 2023 07:00  --------------------------------------------------------  IN: 537 mL / OUT: 2850 mL / NET: -2313 mL    I&O's Detail    03 Apr 2023 07:01  -  04 Apr 2023 07:00  --------------------------------------------------------  IN:    Oral Fluid: 537 mL  Total IN: 537 mL    OUT:    Indwelling Catheter - Urethral (mL): 1950 mL    Voided (mL): 900 mL  Total OUT: 2850 mL    Total NET: -2313 mL    MEDICATIONS  MEDICATIONS  (STANDING):  amLODIPine   Tablet 5 milliGRAM(s) Oral daily  aspirin  chewable 81 milliGRAM(s) Oral daily  finasteride 5 milliGRAM(s) Oral daily  furosemide    Tablet 40 milliGRAM(s) Oral daily  metoprolol succinate ER 50 milliGRAM(s) Oral daily  pantoprazole    Tablet 40 milliGRAM(s) Oral before breakfast  senna 2 Tablet(s) Oral at bedtime  tamsulosin 0.4 milliGRAM(s) Oral at bedtime    MEDICATIONS  (PRN):  acetaminophen     Tablet .. 650 milliGRAM(s) Oral every 6 hours PRN Mild Pain (1 - 3), Moderate Pain (4 - 6)  melatonin 3 milliGRAM(s) Oral at bedtime PRN Insomnia  polyethylene glycol 3350 17 Gram(s) Oral daily PRN Constipation    LABS:                        13.8   6.40  )-----------( 233      ( 04 Apr 2023 05:51 )             44.2     04-04    137  |  103  |  27<H>  ----------------------------<  109<H>  3.9   |  30  |  1.35<H>    Ca    8.3<L>      04 Apr 2023 05:51    PT/INR - ( 04 Apr 2023 05:51 )   PT: 14.6 sec;   INR: 1.21 ratio      RADIOLOGY & ADDITIONAL STUDIES:  < from: NM Bone Imaging Total (04.03.23 @ 15:00) >  IMPRESSION: Bone scan demonstrates:  No scan evidence of osseous metastasis.  Mild heterogeneity in the spine likely degenerative.    < from: CT Abdomen and Pelvis w/ IV Cont (04.03.23 @ 15:15) >  IMPRESSION:  No pathologically enlarged lymph nodes in the abdomen or pelvis.    ASSESSMENT & PLAN  77 year old male admitted with constipation, CHF, PE, Right peroneal DVT and new PEs on coumadin. Uro consulted for hematuria- now resolved. H/H stable.  Bump in Cr noted- likely due to contrast.  No mets on bone scan 4/3/23; no lymphadenopathy on CT 4/3/23. SUBJECTIVE:  Patient seen and examined at bedside.  No overnight events.  Patient offers no complaints at this time. Feels well. Urine output 1950cc/24 hrs, joseph clear.  Patient speaks Finnish-Creole. [employer] service utilized-  ID# [id number]    VITALS  Vital Signs Last 24 Hrs  T(C): 36.9 (04 Apr 2023 04:35), Max: 37 (03 Apr 2023 23:50)  T(F): 98.5 (04 Apr 2023 04:35), Max: 98.6 (03 Apr 2023 23:50)  HR: 85 (04 Apr 2023 04:35) (85 - 103)  BP: 116/77 (04 Apr 2023 04:35) (113/72 - 116/78)  RR: 18 (04 Apr 2023 04:35) (18 - 18)  SpO2: 93% (04 Apr 2023 04:35) (90% - 93%)    Parameters below as of 04 Apr 2023 04:35  Patient On (Oxygen Delivery Method): nasal cannula  O2 Flow (L/min): 2    PHYSICAL EXAM  GENERAL:  Well-nourished, well-developed male lying comfortably in bed in [facility].  HEENT:  NC/AT. Sclera white. Mucous membranes moist.  CARDIO:  Regular rate and rhythm.  RESPIRATORY:  Nonlabored breathing, no accessory muscle use.  ABDOMEN:  Soft, nondistended, nontender. No rebound tenderness or guarding.  : Joseph in place with 1100cc clear yellow urine in joseph bag.  SKIN:  No jaundice, pallor, or cyanosis  NEURO:  Alert; answers questions appropriately.    INTAKE & OUTPUT  I&O's Summary    03 Apr 2023 07:01  -  04 Apr 2023 07:00  --------------------------------------------------------  IN: 537 mL / OUT: 2850 mL / NET: -2313 mL    I&O's Detail    03 Apr 2023 07:01  -  04 Apr 2023 07:00  --------------------------------------------------------  IN:    Oral Fluid: 537 mL  Total IN: 537 mL    OUT:    Indwelling Catheter - Urethral (mL): 1950 mL    Voided (mL): 900 mL  Total OUT: 2850 mL    Total NET: -2313 mL    MEDICATIONS  MEDICATIONS  (STANDING):  amLODIPine   Tablet 5 milliGRAM(s) Oral daily  aspirin  chewable 81 milliGRAM(s) Oral daily  finasteride 5 milliGRAM(s) Oral daily  furosemide    Tablet 40 milliGRAM(s) Oral daily  metoprolol succinate ER 50 milliGRAM(s) Oral daily  pantoprazole    Tablet 40 milliGRAM(s) Oral before breakfast  senna 2 Tablet(s) Oral at bedtime  tamsulosin 0.4 milliGRAM(s) Oral at bedtime    MEDICATIONS  (PRN):  acetaminophen     Tablet .. 650 milliGRAM(s) Oral every 6 hours PRN Mild Pain (1 - 3), Moderate Pain (4 - 6)  melatonin 3 milliGRAM(s) Oral at bedtime PRN Insomnia  polyethylene glycol 3350 17 Gram(s) Oral daily PRN Constipation    LABS:                        13.8   6.40  )-----------( 233      ( 04 Apr 2023 05:51 )             44.2     04-04    137  |  103  |  27<H>  ----------------------------<  109<H>  3.9   |  30  |  1.35<H>    Ca    8.3<L>      04 Apr 2023 05:51    PT/INR - ( 04 Apr 2023 05:51 )   PT: 14.6 sec;   INR: 1.21 ratio      RADIOLOGY & ADDITIONAL STUDIES:  < from: NM Bone Imaging Total (04.03.23 @ 15:00) >  IMPRESSION: Bone scan demonstrates:  No scan evidence of osseous metastasis.  Mild heterogeneity in the spine likely degenerative.    < from: CT Abdomen and Pelvis w/ IV Cont (04.03.23 @ 15:15) >  IMPRESSION:  No pathologically enlarged lymph nodes in the abdomen or pelvis.    ASSESSMENT & PLAN  77 year old male admitted with constipation, CHF, PE, Right peroneal DVT and new PEs on coumadin. Uro consulted for hematuria- now resolved. H/H stable.  Bump in Cr noted- likely due to contrast.  No mets on bone scan 4/3/23; no lymphadenopathy on CT 4/3/23.    - May undergo void trial at midnight tonight in absence of gross hematuria, with 3 post-void residual checks.  - Discussed with Dr. Hill.

## 2023-04-04 NOTE — PROGRESS NOTE ADULT - ASSESSMENT
77 year old male with h/o HTN; presented to the ED with complaints of difficulty urination, hematuria, increased lower extremity edema, SOB and unable to sleep at night. Patient reported urinary retention for one week, only very small amount of urine come out each time, associated with abdominal discomfort. He also noted increase lower extremity swelling for 1 week.   No chest pain. No h/o CAD.  S/P Joseph placement in ED with drainage of > 1.6L urine.   CT abd/pelvis showed BPH and bladder wall thickening, no hydro  proBNP 9018, trop 180 -> 150;  significant bilateral LE edema  Echo with EF 40-45%, RV dysfunction and severe pulm HTN  EKG with no acute ST-T changes   CTA chest + for PE  + DVT    Assessment and Plan:   #Hematuria, resolved   #Urinary retention, BPH , possibly prostate cancer with elevated PSA   failed TOV, joseph replaced, gross hematuria  CT abd/pelvis showed BPH and bladder wall thickening, no hydro  H&H stable  Urology consulted, rec outpt f/u   c/w Flomax 0.4mg hs, and Finasteride  No mets on bone scan 4/3/23; no lymphadenopathy on CT 4/3/23.  Bump in Cr noted- likely due to contrast. --gentle IVF   per urology,  May undergo void trial at midnight tonight in absence of gross hematuria, with 3 post-void residual checks.  will need outpatient urology follow-up       #CHF-combined type, fluid overload POA, now clinically euvolemic   proBNP 9018, significant bilateral LE edema  Echo with EF 40-45%, RV dysfunction and severe pulm HTN  Cards consulted, ted recs: Stress test cancelled due to + CTPA for PEs  will need outpatient stress test when able, follow-up at [facility] cardiology clinic   cont daily po lasix for now,.  -GDMT optimization - cont asa, metoprolol, add statin when LFTs improve,    #Elevated troponin   No active chest pain/ SOB   EKG dose not appear to have any acute ST-T changes suggestive of ACS  ASA, BB  cardiology following     #+ below knee DVT and PE   patient doesn't have insurance    bridging to coumadin, monitor INR   continue with lovenox SQ while bridging       #MARVIN, likely related to urinary retention (improving)   4/4 bumped slightly d/t contrast, will give gentle IVF   repeat BMP in AM     #Benign essential HTN  On amlodipine 5mg and metoprolol ER 50mg daily     #Elevated LFTs, improved      Pulmonary HTN --should  f/u with Dr. Anderson as outpt for pulm HTN management      Dispo: patient is undocumented, he has an outside clinic where he follows. once he is bridged to coumadin can be d/c home with RW

## 2023-04-04 NOTE — DIETITIAN INITIAL EVALUATION ADULT - ENERGY INTAKE
% of most documented meals as per flow sheets Pt reports fair appetite during LOS as he feels weak and tired. Amenable to receiving Glucerna x 2/day (provides 440 kcal, 20 g protein) to optimize PO intake. Preferences taken and relayed to diet office.   % of most documented meals as per flow sheets; observed >75% lunch consumed at time of visit.

## 2023-04-05 LAB
ALBUMIN SERPL ELPH-MCNC: 2.5 G/DL — LOW (ref 3.3–5)
ALP SERPL-CCNC: 133 U/L — HIGH (ref 40–120)
ALT FLD-CCNC: 53 U/L — SIGNIFICANT CHANGE UP (ref 12–78)
ANION GAP SERPL CALC-SCNC: 5 MMOL/L — SIGNIFICANT CHANGE UP (ref 5–17)
AST SERPL-CCNC: 37 U/L — SIGNIFICANT CHANGE UP (ref 15–37)
BILIRUB DIRECT SERPL-MCNC: 0.3 MG/DL — SIGNIFICANT CHANGE UP (ref 0–0.3)
BILIRUB INDIRECT FLD-MCNC: 0.4 MG/DL — SIGNIFICANT CHANGE UP (ref 0.2–1)
BILIRUB SERPL-MCNC: 0.7 MG/DL — SIGNIFICANT CHANGE UP (ref 0.2–1.2)
BUN SERPL-MCNC: 24 MG/DL — HIGH (ref 7–23)
CALCIUM SERPL-MCNC: 8.3 MG/DL — LOW (ref 8.5–10.1)
CHLORIDE SERPL-SCNC: 102 MMOL/L — SIGNIFICANT CHANGE UP (ref 96–108)
CO2 SERPL-SCNC: 28 MMOL/L — SIGNIFICANT CHANGE UP (ref 22–31)
CREAT SERPL-MCNC: 1.22 MG/DL — SIGNIFICANT CHANGE UP (ref 0.5–1.3)
EGFR: 61 ML/MIN/1.73M2 — SIGNIFICANT CHANGE UP
GLUCOSE SERPL-MCNC: 94 MG/DL — SIGNIFICANT CHANGE UP (ref 70–99)
HCT VFR BLD CALC: 43.6 % — SIGNIFICANT CHANGE UP (ref 39–50)
HGB BLD-MCNC: 13.8 G/DL — SIGNIFICANT CHANGE UP (ref 13–17)
INR BLD: 1.25 RATIO — HIGH (ref 0.88–1.16)
MAGNESIUM SERPL-MCNC: 2 MG/DL — SIGNIFICANT CHANGE UP (ref 1.6–2.6)
MCHC RBC-ENTMCNC: 25.3 PG — LOW (ref 27–34)
MCHC RBC-ENTMCNC: 31.7 G/DL — LOW (ref 32–36)
MCV RBC AUTO: 80 FL — SIGNIFICANT CHANGE UP (ref 80–100)
NRBC # BLD: 0 /100 WBCS — SIGNIFICANT CHANGE UP (ref 0–0)
PHOSPHATE SERPL-MCNC: 2.7 MG/DL — SIGNIFICANT CHANGE UP (ref 2.5–4.5)
PLATELET # BLD AUTO: 233 K/UL — SIGNIFICANT CHANGE UP (ref 150–400)
POTASSIUM SERPL-MCNC: 3.4 MMOL/L — LOW (ref 3.5–5.3)
POTASSIUM SERPL-SCNC: 3.4 MMOL/L — LOW (ref 3.5–5.3)
PROT SERPL-MCNC: 6.8 GM/DL — SIGNIFICANT CHANGE UP (ref 6–8.3)
PROTHROM AB SERPL-ACNC: 15 SEC — HIGH (ref 10.5–13.4)
RBC # BLD: 5.45 M/UL — SIGNIFICANT CHANGE UP (ref 4.2–5.8)
RBC # FLD: 16.1 % — HIGH (ref 10.3–14.5)
SODIUM SERPL-SCNC: 135 MMOL/L — SIGNIFICANT CHANGE UP (ref 135–145)
WBC # BLD: 6.02 K/UL — SIGNIFICANT CHANGE UP (ref 3.8–10.5)
WBC # FLD AUTO: 6.02 K/UL — SIGNIFICANT CHANGE UP (ref 3.8–10.5)

## 2023-04-05 PROCEDURE — 99233 SBSQ HOSP IP/OBS HIGH 50: CPT

## 2023-04-05 PROCEDURE — 99232 SBSQ HOSP IP/OBS MODERATE 35: CPT

## 2023-04-05 RX ORDER — WARFARIN SODIUM 2.5 MG/1
7.5 TABLET ORAL AT BEDTIME
Refills: 0 | Status: COMPLETED | OUTPATIENT
Start: 2023-04-05 | End: 2023-04-05

## 2023-04-05 RX ORDER — POTASSIUM CHLORIDE 20 MEQ
40 PACKET (EA) ORAL ONCE
Refills: 0 | Status: COMPLETED | OUTPATIENT
Start: 2023-04-05 | End: 2023-04-05

## 2023-04-05 RX ORDER — LOSARTAN POTASSIUM 100 MG/1
25 TABLET, FILM COATED ORAL DAILY
Refills: 0 | Status: DISCONTINUED | OUTPATIENT
Start: 2023-04-06 | End: 2023-04-07

## 2023-04-05 RX ADMIN — Medication 3 MILLIGRAM(S): at 21:41

## 2023-04-05 RX ADMIN — ENOXAPARIN SODIUM 70 MILLIGRAM(S): 100 INJECTION SUBCUTANEOUS at 05:37

## 2023-04-05 RX ADMIN — ENOXAPARIN SODIUM 70 MILLIGRAM(S): 100 INJECTION SUBCUTANEOUS at 17:44

## 2023-04-05 RX ADMIN — SENNA PLUS 2 TABLET(S): 8.6 TABLET ORAL at 21:40

## 2023-04-05 RX ADMIN — AMLODIPINE BESYLATE 5 MILLIGRAM(S): 2.5 TABLET ORAL at 05:37

## 2023-04-05 RX ADMIN — PANTOPRAZOLE SODIUM 40 MILLIGRAM(S): 20 TABLET, DELAYED RELEASE ORAL at 06:11

## 2023-04-05 RX ADMIN — Medication 81 MILLIGRAM(S): at 11:42

## 2023-04-05 RX ADMIN — ATORVASTATIN CALCIUM 40 MILLIGRAM(S): 80 TABLET, FILM COATED ORAL at 21:40

## 2023-04-05 RX ADMIN — FINASTERIDE 5 MILLIGRAM(S): 5 TABLET, FILM COATED ORAL at 11:41

## 2023-04-05 RX ADMIN — Medication 40 MILLIEQUIVALENT(S): at 17:31

## 2023-04-05 RX ADMIN — Medication 50 MILLIGRAM(S): at 05:36

## 2023-04-05 RX ADMIN — TAMSULOSIN HYDROCHLORIDE 0.4 MILLIGRAM(S): 0.4 CAPSULE ORAL at 21:40

## 2023-04-05 RX ADMIN — Medication 40 MILLIGRAM(S): at 05:37

## 2023-04-05 RX ADMIN — WARFARIN SODIUM 7.5 MILLIGRAM(S): 2.5 TABLET ORAL at 22:24

## 2023-04-05 NOTE — PROGRESS NOTE ADULT - ASSESSMENT
77 year old male with h/o HTN; presented to the ED with complaints of difficulty urination, hematuria, increased lower extremity edema, SOB and unable to sleep at night. Patient reported urinary retention for one week, only very small amount of urine come out each time, associated with abdominal discomfort. He also noted increase lower extremity swelling for 1 week.   No chest pain. No h/o CAD.  S/P Joseph placement in ED with drainage of > 1.6L urine.   CT abd/pelvis showed BPH and bladder wall thickening, no hydro  proBNP 9018, trop 180 -> 150;  significant bilateral LE edema  Echo with EF 40-45%, RV dysfunction and severe pulm HTN  EKG with no acute ST-T changes   CTA chest + for PE  + DVT    Assessment and Plan:   #Hematuria, resolved   #Urinary retention, BPH , possibly prostate cancer with elevated PSA   failed TOV, joseph replaced, gross hematuria  CT abd/pelvis showed BPH and bladder wall thickening, no hydro  H&H stable  Urology consulted, rec outpt f/u   c/w Flomax 0.4mg hs, and Finasteride  No mets on bone scan 4/3/23; no lymphadenopathy on CT 4/3/23.  Bump in Cr noted- likely due to contrast. --gentle IVF   per urology,  May undergo void trial at midnight tonight in absence of gross hematuria, with 3 post-void residual checks.  will need outpatient urology follow-up       #CHF-combined type, fluid overload POA, now clinically euvolemic   proBNP 9018, significant bilateral LE edema  Echo with EF 40-45%, RV dysfunction and severe pulm HTN  Cards consulted, ted recs: Stress test cancelled due to + CTPA for PEs  will need outpatient stress test when able, follow-up at [facility] cardiology clinic   cont daily po lasix for now,.  -GDMT optimization - cont asa, metoprolol, add statin when LFTs improve,    #Elevated troponin   No active chest pain/ SOB   EKG dose not appear to have any acute ST-T changes suggestive of ACS  ASA, BB  cardiology following     #+ below knee DVT and PE   patient doesn't have insurance    bridging to coumadin, monitor INR   continue with lovenox SQ while bridging       #MARVIN, likely related to urinary retention (improving)   4/4 bumped slightly d/t contrast, will give gentle IVF   repeat BMP in AM     #Benign essential HTN  On amlodipine 5mg and metoprolol ER 50mg daily     #Elevated LFTs, improved      Pulmonary HTN --should  f/u with Dr. Anderson as outpt for pulm HTN management      Dispo: patient is undocumented, he has an outside clinic where he follows. once he is bridged to coumadin can be d/c home with RW

## 2023-04-05 NOTE — PROGRESS NOTE ADULT - SUBJECTIVE AND OBJECTIVE BOX
Patient seen and examined bedside resting comfortably.  Complains of mild pain to lower abdomen. Joseph replaced with noteable hematuria.   Denies nausea and vomiting. Tolerating diet.  Denies chest pain, dyspnea, cough.    T(F): 98 (04-05-23 @ 11:18), Max: 98.9 (04-04-23 @ 23:40)  HR: 93 (04-05-23 @ 11:18) (92 - 108)  BP: 111/75 (04-05-23 @ 11:18) (110/76 - 115/79)  RR: 18 (04-05-23 @ 11:18) (18 - 18)  SpO2: 92% (04-05-23 @ 11:18) (92% - 95%)  Wt(kg): --  CAPILLARY BLOOD GLUCOSE    MEDICATIONS  (STANDING):  amLODIPine   Tablet 5 milliGRAM(s) Oral daily  aspirin  chewable 81 milliGRAM(s) Oral daily  atorvastatin 40 milliGRAM(s) Oral at bedtime  enoxaparin Injectable 70 milliGRAM(s) SubCutaneous every 12 hours  finasteride 5 milliGRAM(s) Oral daily  furosemide    Tablet 40 milliGRAM(s) Oral daily  metoprolol succinate ER 50 milliGRAM(s) Oral daily  pantoprazole    Tablet 40 milliGRAM(s) Oral before breakfast  senna 2 Tablet(s) Oral at bedtime  sodium chloride 0.45%. 1000 milliLiter(s) (75 mL/Hr) IV Continuous <Continuous>  tamsulosin 0.4 milliGRAM(s) Oral at bedtime    MEDICATIONS  (PRN):  acetaminophen     Tablet .. 650 milliGRAM(s) Oral every 6 hours PRN Mild Pain (1 - 3), Moderate Pain (4 - 6)  melatonin 3 milliGRAM(s) Oral at bedtime PRN Insomnia  polyethylene glycol 3350 17 Gram(s) Oral daily PRN Constipation        PHYSICAL EXAM:  General: NAD, alert and awake  HEENT: NCAT, EOMI, conjunctiva clear  Chest: nonlabored respirations, good inspiratory effort  Abdomen: soft, NTND.   Extremities: no pedal edema or calf tenderness noted   :  joseph in place draining with hematuria     LABS:                        13.8   6.02  )-----------( 233      ( 05 Apr 2023 06:12 )             43.6   04-05    135  |  102  |  24<H>  ----------------------------<  94  3.4<L>   |  28  |  1.22    Ca    8.3<L>      05 Apr 2023 06:12  Phos  2.7     04-05  Mg     2.0     04-05    TPro  6.8  /  Alb  2.5<L>  /  TBili  0.7  /  DBili  0.3  /  AST  37  /  ALT  53  /  AlkPhos  133<H>  04-05  PT/INR - ( 05 Apr 2023 06:12 )   PT: 15.0 sec;   INR: 1.25 ratio           I&O's Detail    04 Apr 2023 07:01  -  05 Apr 2023 07:00  --------------------------------------------------------  IN:    Oral Fluid: 1255 mL    sodium chloride 0.45%: 637 mL  Total IN: 1892 mL    OUT:    Indwelling Catheter - Urethral (mL): 1800 mL  Total OUT: 1800 mL    Total NET: 92 mL      05 Apr 2023 07:01  -  05 Apr 2023 12:55  --------------------------------------------------------  IN:    Oral Fluid: 540 mL  Total IN: 540 mL    OUT:  Total OUT: 0 mL    Total NET: 540 mL    < from: CT Abdomen and Pelvis w/ IV Cont (04.03.23 @ 15:15) >    IMPRESSION:    No pathologically enlarged lymph nodes in the abdomen or pelvis.    Distended stomach with wall thickening and narrowing versus   underdistention involving the distal stomach and asymmetric thickening of   gastric folds in the proximal stomach; correlation is recommended for   obstructive symptoms or gastritis; an endoscopy is recommended for   further evaluation.    < end of copied text >      < from: NM Bone Imaging Total (04.03.23 @ 15:00) >  IMPRESSION: Bone scan demonstrates:    No scan evidence of osseous metastasis.    Mild heterogeneity in the spine likely degenerative.    --- End of Report ---    < end of copied text >      Impression: 77y Male admitted with CONSTIPATION URINARY RETENTION/ ACUTE KIDNEY INJURY, HTN (hypertension)    patient with high PVR showing retention- joseph replaced and hematuria noted today  continue proscar and flomax  on lovenox 70q12 for dvt and PE  bone scan negative   will discuss further with urology attending and may need prostate bx as outpatient.

## 2023-04-05 NOTE — PROGRESS NOTE ADULT - ASSESSMENT
77 year old male with h/o HTN; presented to the ED with complaints of difficulty urination, hematuria, increased lower extremity edema, SOB and unable to sleep at night. Patient reported urinary retention for one week, only very small amount of urine come out each time, associated with abdominal discomfort. He also noted increase lower extremity swelling for 1 week.   No chest pain. No h/o CAD.  S/P Yan placement in ED with drainage of > 1.6L urine.   CT abd/pelvis showed BPH and bladder wall thickening, no hydro  proBNP 9018, trop 180 -> 150;  significant bilateral LE edema  Echo with EF 40-45%, RV dysfunction and severe pulm HTN  EKG with no acute ST-T changes   CTA chest + for PE  + DVT    -cont daily po lasix for now, monitor renal function and electrolytes   -GDMT optimization - cont asa, metoprolol, statin, other medications when renal function will allow and pt not receiving IV contrast, s/p CT A/P with IV contrast 4/3/23, monitor renal function closely   -Patient on full dose Lovenox for VT/PE management, now bridging to  coumadin  -PSA 90 ?underlying prostate malignancy, Urology following, for Bx as outpatient, No mets on bone scan 4/3/23; no lymphadenopathy on CT 4/3/23.  -Currently no respiratory distress. Followup in cardiology clinic at [facility] for management of systolic LV and right sided heart failure.  Would benefit from L/RHC for evaluation of cardiomyopathy. outpt PYP scan vs. MRI for amyloid  -should also f/u with Dr. Anderson as outpt for pulm HTN management

## 2023-04-05 NOTE — PROGRESS NOTE ADULT - NS ATTEND OPT1 GEN_ALL_CORE

## 2023-04-05 NOTE — PROGRESS NOTE ADULT - NS ATTEND AMEND GEN_ALL_CORE FT
77-year-old with urinary retention and gross hematuria along with significantly elevated PSA and concern of prostate adeno CA also with troponin elevation.  EF 40 to 45% on echo with severe pulmonary hypertension, small pericardial effusion, noted with moderate concentric LVH.  CT chest reveals evidence of chronic pulmonary arterial emboli and lower extremity Doppler positive for DVT.  Patient was scheduled for pharmacologic nuclear stress test today but, after further review, pt is felt to be unfit for stress testing and deferred at the present time.  Troponin release likely on the basis of underlying medical conditions and RV enlargement/pressure overload in the setting of chronic PE.    Continue amlodipine & metoprolol for blood pressure management with Lasix 40 mg IV daily for edema reduction.  He is on tamsulosin and finasteride for BPH and urinary retention.  He is on full dosing anticoagulation with Lovenox for DVT/PE management.  Continue aspirin as well for cardiac risk reduction.
per above  urinbe vlearing  failed tov    f/u as outpt for rtn eval as well as for elevated PSA of 90
Needs Abd/Pelvic imaging  bone scan  monitor urine color  h/h and Cr fine
Reviewed imaging and do not see evidence of Lymphadenopathy, hydronephrosis or any other lesions on CT A/P with iv contrast.  Bone scan negative as well.  Pt undergoing VT  F/u to assess PVR's     If pt retains, re insert joseph and out pt evaluation with me as an out pt
per above    has ho LUTS-never saw urologist    alpha blockers started    tov when ambulating    f/u in office whether d/cwith or w/o joseph    f/u MRI for psa of 90-?may be related to rtn and joseph  likely needs 3T MRI prostate after d/c
77-year-old with urinary retention and gross hematuria along with significantly elevated PSA and concern of prostate adeno CA also with troponin elevation.  EF 40 to 45% on echo with severe pulmonary hypertension, small pericardial effusion, noted with moderate concentric LVH.  CT chest reveals evidence of chronic pulmonary arterial emboli and lower extremity Doppler positive for DVT.  Patient was scheduled for pharmacologic nuclear stress test today but, after further review, pt is felt to be unfit for stress testing and deferred at the present time.  Troponin release likely on the basis of underlying medical conditions and RV enlargement/pressure overload in the setting of chronic PE.    Continue amlodipine & metoprolol for blood pressure management with Lasix 40 mg IV daily for edema reduction.  He is on tamsulosin and finasteride for BPH and urinary retention.  He is on full dosing anticoagulation with Lovenox for DVT/PE management.  Continue aspirin as well for cardiac risk reduction.
77-year-old with urinary retention and gross hematuria along with significantly elevated PSA and concern of prostate adeno CA also with troponin elevation.  EF 40 to 45% on echo with severe pulmonary hypertension, small pericardial effusion, noted with moderate concentric LVH.  CT chest reveals evidence of chronic pulmonary arterial emboli and lower extremity Doppler positive for DVT.  Patient was scheduled for pharmacologic nuclear stress test today but, after further review, pt is felt to be unfit for stress testing and deferred at the present time.  Troponin release likely on the basis of underlying medical conditions and RV enlargement/pressure overload in the setting of chronic PE.    Continue amlodipine & metoprolol for blood pressure management with Lasix 40 mg IV daily for edema reduction.  He is on tamsulosin and finasteride for BPH and urinary retention.  He is on full dosing anticoagulation with Lovenox for DVT/PE management.  Continue aspirin as well for cardiac risk reduction.
***Pt needs imagin.CT A/P w/out and with iv contrast to r/o mets, lymphadenopathy, locally advanced disease etc./ only has chest imaging; IF Cr permits  2. Needs bone scan    If any lesion, possible perc bx   otherwise, out pt bx BEFORE starting any meds    3/28 urine cx neg  Cr 1.24    Keep Flomax and Finasteride
I agree with the statements above  Patient with PSA 90, no previous diagnosis of PCA. On ALEJO prostate is hard, lower extremity edema, on Lasix. Creat 1.38. On CT scan , possible bone mets.  Needs to perform biopsy to have an official pathology report before starting casodex?
Multiple medical issues.  Currently no respiratory distress. Discharge planning from CV perspective.  Followup in cardiology clinic at [facility] for management of systolic LV and right sided heart failure.  Would benefit from L/RHC for evaluation of cardiomyopathy.  Also should be referred to pulmonary hypertension clinic with Dr. Anderson for management.  Still issue of possible prostate CA being resolved by urology, may need biopsy.

## 2023-04-05 NOTE — PROGRESS NOTE ADULT - NUTRITIONAL ASSESSMENT
This patient has been assessed with a concern for Malnutrition and has been determined to have a diagnosis/diagnoses of Moderate protein-calorie malnutrition.    This patient is being managed with:   Diet Regular-  Low Sodium  Supplement Feeding Modality:  Oral  Glucerna Shake Cans or Servings Per Day:  1       Frequency:  Two Times a day  Entered: Apr 4 2023  1:58PM  
This patient has been assessed with a concern for Malnutrition and has been determined to have a diagnosis/diagnoses of Moderate protein-calorie malnutrition.    This patient is being managed with:   Diet Regular-  Low Sodium  Supplement Feeding Modality:  Oral  Glucerna Shake Cans or Servings Per Day:  1       Frequency:  Two Times a day  Entered: Apr 4 2023  1:58PM  
This patient has been assessed with a concern for Malnutrition and has been determined to have a diagnosis/diagnoses of Moderate protein-calorie malnutrition.    This patient is being managed with:   Diet DASH/TLC-  Sodium & Cholesterol Restricted  Entered: Mar 28 2023  4:21PM    The following pending diet order is being considered for treatment of Moderate protein-calorie malnutrition:  Diet Regular-  Low Sodium  Supplement Feeding Modality:  Oral  Glucerna Shake Cans or Servings Per Day:  1       Frequency:  Two Times a day  Entered: Apr 4 2023  1:58PM  
This patient has been assessed with a concern for Malnutrition and has been determined to have a diagnosis/diagnoses of Moderate protein-calorie malnutrition.    This patient is being managed with:   Diet Regular-  Low Sodium  Supplement Feeding Modality:  Oral  Glucerna Shake Cans or Servings Per Day:  1       Frequency:  Two Times a day  Entered: Apr 4 2023  1:58PM

## 2023-04-05 NOTE — PROGRESS NOTE ADULT - SUBJECTIVE AND OBJECTIVE BOX
Patient is a 77y old  Male who presents with a chief complaint of urinary retention, gross hematuria, elevated troponin, MARVIN (05 Apr 2023 15:47)      PAST MEDICAL & SURGICAL HISTORY:  HTN (hypertension)        INTERVAL HISTORY:  	  MEDICATIONS:  MEDICATIONS  (STANDING):  amLODIPine   Tablet 5 milliGRAM(s) Oral daily  aspirin  chewable 81 milliGRAM(s) Oral daily  atorvastatin 40 milliGRAM(s) Oral at bedtime  enoxaparin Injectable 70 milliGRAM(s) SubCutaneous every 12 hours  finasteride 5 milliGRAM(s) Oral daily  furosemide    Tablet 40 milliGRAM(s) Oral daily  metoprolol succinate ER 50 milliGRAM(s) Oral daily  pantoprazole    Tablet 40 milliGRAM(s) Oral before breakfast  potassium chloride   Powder 40 milliEquivalent(s) Oral once  senna 2 Tablet(s) Oral at bedtime  sodium chloride 0.45%. 1000 milliLiter(s) (75 mL/Hr) IV Continuous <Continuous>  tamsulosin 0.4 milliGRAM(s) Oral at bedtime  warfarin 7.5 milliGRAM(s) Oral at bedtime    MEDICATIONS  (PRN):  acetaminophen     Tablet .. 650 milliGRAM(s) Oral every 6 hours PRN Mild Pain (1 - 3), Moderate Pain (4 - 6)  melatonin 3 milliGRAM(s) Oral at bedtime PRN Insomnia  polyethylene glycol 3350 17 Gram(s) Oral daily PRN Constipation      Vitals:  T(F): 98 (04-05-23 @ 11:18), Max: 98.9 (04-04-23 @ 23:40)  HR: 93 (04-05-23 @ 11:18) (92 - 108)  BP: 111/75 (04-05-23 @ 11:18) (110/76 - 115/79)  RR: 18 (04-05-23 @ 11:18) (18 - 18)  SpO2: 92% (04-05-23 @ 11:18) (92% - 95%)  Wt(kg): --    04-04 @ 07:01  -  04-05 @ 07:00  --------------------------------------------------------  IN:    Oral Fluid: 1255 mL    sodium chloride 0.45%: 637 mL  Total IN: 1892 mL    OUT:    Indwelling Catheter - Urethral (mL): 1800 mL  Total OUT: 1800 mL    Total NET: 92 mL      04-05 @ 07:01  -  04-05 @ 15:51  --------------------------------------------------------  IN:    Oral Fluid: 780 mL  Total IN: 780 mL    OUT:    Indwelling Catheter - Urethral (mL): 800 mL  Total OUT: 800 mL    Total NET: -20 mL    PHYSICAL EXAM:  Neuro: Awake, responsive  CV: S1 S2 RRR  Lungs: CTABL  GI: Soft, BS +, ND, NT  Extremities: No edema    TELEMETRY:     RADIOLOGY:     DIAGNOSTIC TESTING:    [ ] Echocardiogram:  [ ] Cardiac Catheterization:  [ ] Stress Test:      LABS:	 	    05 Apr 2023 06:12    135    |  102    |  24     ----------------------------<  94     3.4     |  28     |  1.22   04 Apr 2023 05:51    137    |  103    |  27     ----------------------------<  109    3.9     |  30     |  1.35     Ca    8.3        05 Apr 2023 06:12  Phos  2.7       05 Apr 2023 06:12  Mg     2.0       05 Apr 2023 06:12    TPro  6.8    /  Alb  2.5    /  TBili  0.7    /  DBili  0.3    /  AST  37     /  ALT  53     /  AlkPhos  133    05 Apr 2023 06:12                        13.8   6.02  )-----------( 233      ( 05 Apr 2023 06:12 )             43.6 ,                       13.8   6.40  )-----------( 233      ( 04 Apr 2023 05:51 )             44.2     PT/PTT- ( 05 Apr 2023 06:12 )   PT: 15.0 sec;  PTT: x        INR: 1.25 ratio (04-05 @ 06:12)  INR: 1.21 ratio (04-04 @ 05:51)  INR: 1.23 ratio (04-03 @ 22:05)           Patient is a 77y old  Male who presents with a chief complaint of urinary retention, gross hematuria, elevated troponin, MARVIN (05 Apr 2023 15:47)    PAST MEDICAL & SURGICAL HISTORY:  HTN (hypertension)    INTERVAL HISTORY: in no acute distress   	  MEDICATIONS:  MEDICATIONS  (STANDING):  amLODIPine   Tablet 5 milliGRAM(s) Oral daily  aspirin  chewable 81 milliGRAM(s) Oral daily  atorvastatin 40 milliGRAM(s) Oral at bedtime  enoxaparin Injectable 70 milliGRAM(s) SubCutaneous every 12 hours  finasteride 5 milliGRAM(s) Oral daily  furosemide    Tablet 40 milliGRAM(s) Oral daily  metoprolol succinate ER 50 milliGRAM(s) Oral daily  pantoprazole    Tablet 40 milliGRAM(s) Oral before breakfast  potassium chloride   Powder 40 milliEquivalent(s) Oral once  senna 2 Tablet(s) Oral at bedtime  sodium chloride 0.45%. 1000 milliLiter(s) (75 mL/Hr) IV Continuous <Continuous>  tamsulosin 0.4 milliGRAM(s) Oral at bedtime  warfarin 7.5 milliGRAM(s) Oral at bedtime    MEDICATIONS  (PRN):  acetaminophen     Tablet .. 650 milliGRAM(s) Oral every 6 hours PRN Mild Pain (1 - 3), Moderate Pain (4 - 6)  melatonin 3 milliGRAM(s) Oral at bedtime PRN Insomnia  polyethylene glycol 3350 17 Gram(s) Oral daily PRN Constipation    Vitals:  T(F): 98 (04-05-23 @ 11:18), Max: 98.9 (04-04-23 @ 23:40)  HR: 93 (04-05-23 @ 11:18) (92 - 108)  BP: 111/75 (04-05-23 @ 11:18) (110/76 - 115/79)  RR: 18 (04-05-23 @ 11:18) (18 - 18)  SpO2: 92% (04-05-23 @ 11:18) (92% - 95%)    04-04 @ 07:01  -  04-05 @ 07:00  --------------------------------------------------------  IN:    Oral Fluid: 1255 mL    sodium chloride 0.45%: 637 mL  Total IN: 1892 mL    OUT:    Indwelling Catheter - Urethral (mL): 1800 mL  Total OUT: 1800 mL    Total NET: 92 mL    04-05 @ 07:01  -  04-05 @ 15:51  --------------------------------------------------------  IN:    Oral Fluid: 780 mL  Total IN: 780 mL    OUT:    Indwelling Catheter - Urethral (mL): 800 mL  Total OUT: 800 mL    Total NET: -20 mL    PHYSICAL EXAM:  Neuro: Awake, responsive  CV: S1 S2 RRR  Lungs: diminished to bases   GI: Soft, BS +, ND, NT  Extremities: + LE edema    TELEMETRY: sinus    RADIOLOGY: < from: CT Angio Chest PE Protocol w/ IV Cont (03.31.23 @ 09:31) >  PULMONARY ANGIOGRAM: There is respiratory motion degradation limiting   evaluation of some of the segmental and subsegmental pulmonary artery   branches. There are linear and eccentric medullary arterial filling   defects bilaterally. Specifically, there is a thrombus in a left lower   lobe segmental pulmonary branch with resultant occlusion. There is an   eccentric filling defect in the left upper lobe artery extending into a   segmental branch. There is an eccentric filling defect in a right lower   lobe segmental artery with resultant occlusion. There is a right upper   lobe lobar eccentric thrombus extending into a segmental pulmonary   artery. Constellation of findings represent chronic pulmonary arterial   emboli.    LYMPH NODES: No lymphadenopathy.    HEART/VASCULATURE: Cardiomegaly. The right heart chambers are enlarged  with a flat ventricular septum. The main pulmonary artery is slightly   enlarged measuring about 3.4 cm. No pericardial effusion. There are   mediastinal collateral vessels sequela of the chronic coronary arterial   emboli. There are aortic calcifications.    AIRWAYS/LUNGS/PLEURA: Airways are patent. There is linear/subsegmental   atelectasis in the left lower lobe. There are trace bilateral pleural   effusions.    UPPER ABDOMEN: Unremarkable.    BONES/SOFT TISSUES: Spinal degenerative changes.There is subcutaneous   edema.    IMPRESSION:    Findings as described above represent chronic pulmonary arterial emboli.   Limited evaluation of some of the segmental and subsegmental pulmonary   arterial branches due to respiratory motion artifact.    Enlarged right heart chambers and flat ventricular septum suggest   pulmonary arterial hypertension.    Trace bilateral pleural effusions.    < end of copied text >    DIAGNOSTIC TESTING:    [x ] Echocardiogram: < from: TTE Echo Complete w/o Contrast w/ Doppler (03.29.23 @ 11:07) >  Left Ventricle: The left ventricular internal cavity size is normal.  Global LV systolic function was moderately decreased. Left ventricular   ejection fraction, by visual estimation, is 40 to 45%. The   interventricular septum is flattened in systole, consistent with right   ventricular pressure overload. Spectral Doppler shows impaired relaxation   pattern of left ventricular myocardial filling (Grade I diastolic   dysfunction).      LV Wall Scoring:  The basal and mid inferior wall, basal and mid inferolateral wall, and mid  inferoseptal segment are hypokinetic.    Right Ventricle: The right ventricular size is moderately enlarged. RV   systolic function is moderately reduced.  Left Atrium: Normal left atrial size.  Right Atrium: Moderately enlarged right atrium.  Pericardium: A small pericardial effusion is present. The pericardial   effusion is posterior to the left ventricle.  Mitral Valve: Structurally normal mitral valve, with normal leaflet   excursion. Mitral leaflet mobility is normal. Mild mitral valve   regurgitation is seen.  Tricuspid Valve: Structurally normal tricuspid valve, with normal leaflet   excursion. The tricuspid valve is normal in structure. Moderate tricuspid   regurgitation is visualized. Estimated pulmonary artery systolic pressure   is 76.3 mmHg assuming a right atrial pressure of 10 mmHg, which is   consistent with severe pulmonary hypertension.  Aortic Valve: The aortic valve is trileaflet. Trivial aortic valve   regurgitation is seen.  Pulmonic Valve: The pulmonic valve was not well visualized. Mild pulmonic   valve regurgitation.  Aorta: Aortic root measured at Sinus of Valsalva is normal.  Venous: The inferior vena cava was normal sized, with respiratory size   variation greater than 50%.  In comparison to the previous echocardiogram(s): There are no prior   studies on this patient for comparison purposes.      Summary:   1. Left ventricular ejection fraction, by visual estimation, is 40 to   45%.   2. Technically adequate study.   3. Moderately decreased global left ventricular systolic function.   4. Basal and mid inferior wall, basal and mid inferolateral wall, and   mid inferoseptal segment are abnormal as described above.   5. Normal left ventricular internal cavity size.   6. Right ventricular pressure overload.   7. Spectral Doppler shows impaired relaxation pattern of left   ventricular myocardial filling (Grade I diastolic dysfunction).   8. There is moderate concentric left ventricular hypertrophy.   9. Moderately enlarged right ventricle.  10. Moderately reduced RV systolic function.  11. Moderately enlarged right atrium.  12. Normal left atrial size.  13. Small pericardial effusion.  14. Mild mitral valve regurgitation.  15. Structurally normal mitral valve, with normal leaflet excursion.  16. Moderate tricuspid regurgitation.  17. Mild pulmonic valve regurgitation.  18. Estimated pulmonary artery systolic pressure is 76.3 mmHg assuming a   right atrial pressure of 10 mmHg, which is consistent with severe   pulmonary hypertension.    < end of copied text >    LABS:	 	    05 Apr 2023 06:12    135    |  102    |  24     ----------------------------<  94     3.4     |  28     |  1.22   04 Apr 2023 05:51    137    |  103    |  27     ----------------------------<  109    3.9     |  30     |  1.35     Ca    8.3        05 Apr 2023 06:12  Phos  2.7       05 Apr 2023 06:12  Mg     2.0       05 Apr 2023 06:12    TPro  6.8    /  Alb  2.5    /  TBili  0.7    /  DBili  0.3    /  AST  37     /  ALT  53     /  AlkPhos  133    05 Apr 2023 06:12                        13.8   6.02  )-----------( 233      ( 05 Apr 2023 06:12 )             43.6 ,                       13.8   6.40  )-----------( 233      ( 04 Apr 2023 05:51 )             44.2     PT/PTT- ( 05 Apr 2023 06:12 )   PT: 15.0 sec;  PTT: x        INR: 1.25 ratio (04-05 @ 06:12)  INR: 1.21 ratio (04-04 @ 05:51)  INR: 1.23 ratio (04-03 @ 22:05)

## 2023-04-05 NOTE — PROGRESS NOTE ADULT - SUBJECTIVE AND OBJECTIVE BOX
SUBJECTIVE / OVERNIGHT EVENTS:   Patient seen and examined bedside.   no overnight events       ROS:  All other review of systems negative      Vital Signs Last 24 Hrs  Vital Signs Last 24 Hrs  T(C): 36.7 (05 Apr 2023 11:18), Max: 37.2 (04 Apr 2023 23:40)  T(F): 98 (05 Apr 2023 11:18), Max: 98.9 (04 Apr 2023 23:40)  HR: 93 (05 Apr 2023 11:18) (92 - 108)  BP: 111/75 (05 Apr 2023 11:18) (110/76 - 115/79)  BP(mean): --  RR: 18 (05 Apr 2023 11:18) (18 - 18)  SpO2: 92% (05 Apr 2023 11:18) (92% - 95%)    Parameters below as of 05 Apr 2023 11:18  Patient On (Oxygen Delivery Method): room air        PHYSICAL EXAM:  GENERAL: elderly , NAD, no increased WOB   HEAD:  Atraumatic, Normocephalic  EYES: EOMI, PERRLA, conjunctiva and sclera clear  NECK: Supple, No JVD  CHEST/LUNG: Clear to auscultation bilaterally; No wheeze b/l   HEART: Regular rate and rhythm; No murmurs, rubs, or gallops  ABDOMEN: Soft, Nontender, Nondistended; Bowel sounds present+ joseph yellow urine in bag   EXTREMITIES:  2+ Peripheral Pulses b/l, No clubbing, cyanosis, calf tenderness or edema b/l   NEUROLOGY: AAOx3, non-focal      LABS:                                Labs:                          13.8   6.02  )-----------( 233      ( 05 Apr 2023 06:12 )             43.6     04-05    135  |  102  |  24<H>  ----------------------------<  94  3.4<L>   |  28  |  1.22    Ca    8.3<L>      05 Apr 2023 06:12  Phos  2.7     04-05  Mg     2.0     04-05    TPro  6.8  /  Alb  2.5<L>  /  TBili  0.7  /  DBili  0.3  /  AST  37  /  ALT  53  /  AlkPhos  133<H>  04-05    LIVER FUNCTIONS - ( 05 Apr 2023 06:12 )  Alb: 2.5 g/dL / Pro: 6.8 gm/dL / ALK PHOS: 133 U/L / ALT: 53 U/L / AST: 37 U/L / GGT: x           PT/INR - ( 05 Apr 2023 06:12 )   PT: 15.0 sec;   INR: 1.25 ratio               Active Medications  MEDICATIONS  (STANDING):  amLODIPine   Tablet 5 milliGRAM(s) Oral daily  aspirin  chewable 81 milliGRAM(s) Oral daily  atorvastatin 40 milliGRAM(s) Oral at bedtime  enoxaparin Injectable 70 milliGRAM(s) SubCutaneous every 12 hours  finasteride 5 milliGRAM(s) Oral daily  furosemide    Tablet 40 milliGRAM(s) Oral daily  metoprolol succinate ER 50 milliGRAM(s) Oral daily  pantoprazole    Tablet 40 milliGRAM(s) Oral before breakfast  potassium chloride   Powder 40 milliEquivalent(s) Oral once  senna 2 Tablet(s) Oral at bedtime  sodium chloride 0.45%. 1000 milliLiter(s) (75 mL/Hr) IV Continuous <Continuous>  tamsulosin 0.4 milliGRAM(s) Oral at bedtime  warfarin 7.5 milliGRAM(s) Oral at bedtime    MEDICATIONS  (PRN):  acetaminophen     Tablet .. 650 milliGRAM(s) Oral every 6 hours PRN Mild Pain (1 - 3), Moderate Pain (4 - 6)  melatonin 3 milliGRAM(s) Oral at bedtime PRN Insomnia  polyethylene glycol 3350 17 Gram(s) Oral daily PRN Constipation

## 2023-04-05 NOTE — CHART NOTE - NSCHARTNOTEFT_GEN_A_CORE
Patient manually irrigated to verify no clots, urine is clear and christophe- increase hydration.   no clots  can dc home with joseph and follow up with Dr Hill as outpatient.  seen with Dr. Noel on rounds

## 2023-04-06 LAB
INR BLD: 2.09 RATIO — HIGH (ref 0.88–1.16)
KAPPA LC SER QL IFE: 4.11 MG/DL — HIGH (ref 0.33–1.94)
KAPPA/LAMBDA FREE LIGHT CHAIN RATIO, SERUM: 1.62 RATIO — SIGNIFICANT CHANGE UP (ref 0.26–1.65)
LAMBDA LC SER QL IFE: 2.54 MG/DL — SIGNIFICANT CHANGE UP (ref 0.57–2.63)
PROTHROM AB SERPL-ACNC: 25 SEC — HIGH (ref 10.5–13.4)

## 2023-04-06 PROCEDURE — 99232 SBSQ HOSP IP/OBS MODERATE 35: CPT

## 2023-04-06 RX ORDER — ASPIRIN/CALCIUM CARB/MAGNESIUM 324 MG
1 TABLET ORAL
Qty: 30 | Refills: 0
Start: 2023-04-06 | End: 2023-05-05

## 2023-04-06 RX ORDER — ATORVASTATIN CALCIUM 80 MG/1
1 TABLET, FILM COATED ORAL
Qty: 30 | Refills: 0
Start: 2023-04-06 | End: 2023-05-05

## 2023-04-06 RX ORDER — AMLODIPINE BESYLATE 2.5 MG/1
1 TABLET ORAL
Refills: 0 | DISCHARGE

## 2023-04-06 RX ORDER — WARFARIN SODIUM 2.5 MG/1
7.5 TABLET ORAL AT BEDTIME
Refills: 0 | Status: DISCONTINUED | OUTPATIENT
Start: 2023-04-06 | End: 2023-04-06

## 2023-04-06 RX ORDER — FINASTERIDE 5 MG/1
1 TABLET, FILM COATED ORAL
Qty: 90 | Refills: 0
Start: 2023-04-06 | End: 2023-07-04

## 2023-04-06 RX ORDER — LOSARTAN POTASSIUM 100 MG/1
1 TABLET, FILM COATED ORAL
Qty: 0 | Refills: 0 | DISCHARGE
Start: 2023-04-06

## 2023-04-06 RX ORDER — PANTOPRAZOLE SODIUM 20 MG/1
1 TABLET, DELAYED RELEASE ORAL
Qty: 90 | Refills: 0
Start: 2023-04-06 | End: 2023-07-04

## 2023-04-06 RX ORDER — WARFARIN SODIUM 2.5 MG/1
1 TABLET ORAL
Qty: 0 | Refills: 0 | DISCHARGE
Start: 2023-04-06

## 2023-04-06 RX ORDER — METOPROLOL TARTRATE 50 MG
1 TABLET ORAL
Qty: 30 | Refills: 0
Start: 2023-04-06 | End: 2023-05-05

## 2023-04-06 RX ORDER — LOSARTAN POTASSIUM 100 MG/1
1 TABLET, FILM COATED ORAL
Qty: 30 | Refills: 0
Start: 2023-04-06 | End: 2023-05-05

## 2023-04-06 RX ORDER — WARFARIN SODIUM 2.5 MG/1
7 TABLET ORAL AT BEDTIME
Refills: 0 | Status: COMPLETED | OUTPATIENT
Start: 2023-04-06 | End: 2023-04-06

## 2023-04-06 RX ORDER — METOPROLOL TARTRATE 50 MG
1 TABLET ORAL
Refills: 0 | DISCHARGE

## 2023-04-06 RX ORDER — FUROSEMIDE 40 MG
1 TABLET ORAL
Qty: 30 | Refills: 0
Start: 2023-04-06 | End: 2023-05-05

## 2023-04-06 RX ORDER — TAMSULOSIN HYDROCHLORIDE 0.4 MG/1
1 CAPSULE ORAL
Qty: 90 | Refills: 0
Start: 2023-04-06 | End: 2023-07-04

## 2023-04-06 RX ADMIN — ENOXAPARIN SODIUM 70 MILLIGRAM(S): 100 INJECTION SUBCUTANEOUS at 17:20

## 2023-04-06 RX ADMIN — TAMSULOSIN HYDROCHLORIDE 0.4 MILLIGRAM(S): 0.4 CAPSULE ORAL at 22:42

## 2023-04-06 RX ADMIN — Medication 50 MILLIGRAM(S): at 05:21

## 2023-04-06 RX ADMIN — PANTOPRAZOLE SODIUM 40 MILLIGRAM(S): 20 TABLET, DELAYED RELEASE ORAL at 05:22

## 2023-04-06 RX ADMIN — ENOXAPARIN SODIUM 70 MILLIGRAM(S): 100 INJECTION SUBCUTANEOUS at 05:23

## 2023-04-06 RX ADMIN — Medication 81 MILLIGRAM(S): at 11:36

## 2023-04-06 RX ADMIN — LOSARTAN POTASSIUM 25 MILLIGRAM(S): 100 TABLET, FILM COATED ORAL at 05:22

## 2023-04-06 RX ADMIN — FINASTERIDE 5 MILLIGRAM(S): 5 TABLET, FILM COATED ORAL at 11:36

## 2023-04-06 RX ADMIN — WARFARIN SODIUM 7 MILLIGRAM(S): 2.5 TABLET ORAL at 22:43

## 2023-04-06 RX ADMIN — ATORVASTATIN CALCIUM 40 MILLIGRAM(S): 80 TABLET, FILM COATED ORAL at 22:43

## 2023-04-06 RX ADMIN — SENNA PLUS 2 TABLET(S): 8.6 TABLET ORAL at 22:42

## 2023-04-06 RX ADMIN — Medication 40 MILLIGRAM(S): at 05:24

## 2023-04-06 NOTE — DISCHARGE NOTE PROVIDER - NSDCMRMEDTOKEN_GEN_ALL_CORE_FT
amLODIPine 5 mg oral tablet: 1 tab(s) orally once a day  metoprolol succinate 50 mg oral tablet, extended release: 1 tab(s) orally once a day

## 2023-04-06 NOTE — DISCHARGE NOTE NURSING/CASE MANAGEMENT/SOCIAL WORK - NSDCPEFALRISK_GEN_ALL_CORE
For information on Fall & Injury Prevention, visit: [email or web address] OR  [email or web address] OR  https://www.cdc.gov/steadi/patient.html

## 2023-04-06 NOTE — DISCHARGE NOTE PROVIDER - NSDCCPCAREPLAN_GEN_ALL_CORE_FT
PRINCIPAL DISCHARGE DIAGNOSIS  Diagnosis: Urinary retention  Assessment and Plan of Treatment: #Hematuria, resolved   #Urinary retention, BPH , possibly prostate cancer with elevated PSA   failed TOV, joseph replaced, gross hematuria  CT abd/pelvis showed BPH and bladder wall thickening, no hydro  H&H stable  Urology consulted, rec outpt f/u   c/w Flomax 0.4mg hs, and Finasteride  No mets on bone scan 4/3/23; no lymphadenopathy on CT 4/3/23.  Bump in Cr noted- likely due to contrast. --gentle IVF   per urology,  May undergo void trial at midnight tonight in absence of gross hematuria, with 3 post-void residual checks.  will need outpatient urology follow-up   #CHF-combined type, fluid overload POA, now clinically euvolemic   proBNP 9018, significant bilateral LE edema  Echo with EF 40-45%, RV dysfunction and severe pulm HTN  Cards consulted, ted recs: Stress test cancelled due to + CTPA for PEs  will need outpatient stress test when able, follow-up at [facility] cardiology clinic   cont daily po lasix for now,.  -GDMT optimization - cont asa, metoprolol, add statin when LFTs improve,  #Elevated troponin   No active chest pain/ SOB   EKG dose not appear to have any acute ST-T changes suggestive of ACS  ASA, BB  cardiology following   #+ below knee DVT and PE   patient doesn't have insurance    bridging to coumadin, monitor INR   continue with lovenox SQ while bridging   #MARVIN, likely related to urinary retention (improving)   4/4 bumped slightly d/t contrast, will give gentle IVF   repeat BMP in AM   #Benign essential HTN  On amlodipine 5mg and metoprolol ER 50mg daily   #Elevated LFTs, improved      Pulmonary HTN --should  f/u with Dr. Anderson as outpt for pulm HTN management    Dispo: patient is undocumented, he has an outside clinic where he follows. once he is bridged to coumadin can be d/c home with RW         SECONDARY DISCHARGE DIAGNOSES  Diagnosis: MARVIN (acute kidney injury)  Assessment and Plan of Treatment:

## 2023-04-06 NOTE — DISCHARGE NOTE NURSING/CASE MANAGEMENT/SOCIAL WORK - PATIENT PORTAL LINK FT
You can access the FollowMyHealth Patient Portal offered by [facility] by registering at the following website: [email or web address]. By joining [facility]’s FollowMyHealth portal, you will also be able to view your health information using other applications (apps) compatible with our system.

## 2023-04-07 VITALS
HEART RATE: 77 BPM | DIASTOLIC BLOOD PRESSURE: 97 MMHG | SYSTOLIC BLOOD PRESSURE: 150 MMHG | OXYGEN SATURATION: 96 % | RESPIRATION RATE: 19 BRPM | TEMPERATURE: 99 F

## 2023-04-07 LAB
INR BLD: 3.06 RATIO — HIGH (ref 0.88–1.16)
PROTHROM AB SERPL-ACNC: 37.1 SEC — HIGH (ref 10.5–13.4)

## 2023-04-07 PROCEDURE — 99238 HOSP IP/OBS DSCHRG MGMT 30/<: CPT

## 2023-04-07 RX ORDER — WARFARIN SODIUM 2.5 MG/1
1 TABLET ORAL
Qty: 30 | Refills: 0
Start: 2023-04-07 | End: 2023-05-06

## 2023-04-07 RX ADMIN — Medication 50 MILLIGRAM(S): at 05:28

## 2023-04-07 RX ADMIN — LOSARTAN POTASSIUM 25 MILLIGRAM(S): 100 TABLET, FILM COATED ORAL at 05:28

## 2023-04-07 RX ADMIN — Medication 40 MILLIGRAM(S): at 05:28

## 2023-04-07 RX ADMIN — FINASTERIDE 5 MILLIGRAM(S): 5 TABLET, FILM COATED ORAL at 12:49

## 2023-04-07 RX ADMIN — ENOXAPARIN SODIUM 70 MILLIGRAM(S): 100 INJECTION SUBCUTANEOUS at 05:29

## 2023-04-07 RX ADMIN — Medication 81 MILLIGRAM(S): at 12:49

## 2023-04-07 RX ADMIN — PANTOPRAZOLE SODIUM 40 MILLIGRAM(S): 20 TABLET, DELAYED RELEASE ORAL at 05:28

## 2023-04-07 NOTE — PROGRESS NOTE ADULT - ASSESSMENT
77 year old male with h/o HTN; presented to the ED with complaints of difficulty urination, hematuria, increased lower extremity edema, SOB and unable to sleep at night. Patient reported urinary retention for one week, only very small amount of urine come out each time, associated with abdominal discomfort. He also noted increase lower extremity swelling for 1 week.   No chest pain. No h/o CAD.  S/P Yan placement in ED with drainage of > 1.6L urine.   CT abd/pelvis showed BPH and bladder wall thickening, no hydro  proBNP 9018, trop 180 -> 150;  significant bilateral LE edema  Echo with EF 40-45%, RV dysfunction and severe pulm HTN  EKG with no acute ST-T changes   CTA chest + for PE  + DVT    -cont daily po lasix for now, monitor renal function and electrolytes   -GDMT optimization - cont asa, metoprolol, statin, other medications when renal function will allow and pt not receiving IV contrast, s/p CT A/P with IV contrast 4/3/23, monitor renal function closely   -Patient on full dose Lovenox for VT/PE management, now bridging to  coumadin  -PSA 90 ?underlying prostate malignancy, Urology following, for Bx as outpatient, No mets on bone scan 4/3/23; no lymphadenopathy on CT 4/3/23.  -Currently no respiratory distress. Followup in cardiology clinic at [facility] for management of systolic LV and right sided heart failure.  Would benefit from L/RHC for evaluation of cardiomyopathy. outpt PYP scan vs. MRI for amyloid  -should also f/u with Dr. Anderson as outpt for pulm HTN management 1 week after discharge.

## 2023-04-07 NOTE — PROGRESS NOTE ADULT - SUBJECTIVE AND OBJECTIVE BOX
Patient is a 77y old  Male who presents with a chief complaint of urinary retention, gross hematuria, elevated troponin, MARVIN (06 Apr 2023 15:05)      PAST MEDICAL & SURGICAL HISTORY:  HTN (hypertension)    INTERVAL HISTORY: Patient in bed. No c/o Chest pain or SOB. Scheduled for DC today.  	  MEDICATIONS:  MEDICATIONS  (STANDING):  aspirin  chewable 81 milliGRAM(s) Oral daily  atorvastatin 40 milliGRAM(s) Oral at bedtime  enoxaparin Injectable 70 milliGRAM(s) SubCutaneous every 12 hours  finasteride 5 milliGRAM(s) Oral daily  furosemide    Tablet 40 milliGRAM(s) Oral daily  losartan 25 milliGRAM(s) Oral daily  metoprolol succinate ER 50 milliGRAM(s) Oral daily  pantoprazole    Tablet 40 milliGRAM(s) Oral before breakfast  senna 2 Tablet(s) Oral at bedtime  tamsulosin 0.4 milliGRAM(s) Oral at bedtime    MEDICATIONS  (PRN):  acetaminophen     Tablet .. 650 milliGRAM(s) Oral every 6 hours PRN Mild Pain (1 - 3), Moderate Pain (4 - 6)  melatonin 3 milliGRAM(s) Oral at bedtime PRN Insomnia  polyethylene glycol 3350 17 Gram(s) Oral daily PRN Constipation      Vitals:  T(F): 98.8 (04-07-23 @ 15:19), Max: 98.8 (04-07-23 @ 15:19)  HR: 77 (04-07-23 @ 15:19) (77 - 106)  BP: 150/97 (04-07-23 @ 15:19) (110/72 - 150/97)  RR: 19 (04-07-23 @ 15:19) (18 - 19)  SpO2: 96% (04-07-23 @ 15:19) (91% - 96%)  Wt(kg): --    04-06 @ 07:01  -  04-07 @ 07:00  --------------------------------------------------------  IN:    Oral Fluid: 660 mL  Total IN: 660 mL    OUT:    Indwelling Catheter - Urethral (mL): 1400 mL  Total OUT: 1400 mL    Total NET: -740 mL      04-07 @ 07:01  -  04-07 @ 16:23  --------------------------------------------------------  IN:    Oral Fluid: 560 mL  Total IN: 560 mL    OUT:  Total OUT: 0 mL    Total NET: 560 mL      PHYSICAL EXAM:  Neuro: Awake, responsive  CV: S1 S2 RRR  Lungs: CTABL  GI: Soft, BS +, ND, NT  Extremities: No edema       RADIOLOGY:       DIAGNOSTIC TESTING:    [x ] Echocardiogram:   < from: TTE Echo Complete w/o Contrast w/ Doppler (03.29.23 @ 11:07) >  Summary:   1. Left ventricular ejection fraction, by visual estimation, is 40 to   45%.   2. Technically adequate study.   3. Moderately decreased global left ventricular systolic function.   4. Basal and mid inferior wall, basal and mid inferolateral wall, and   mid inferoseptal segment are abnormal as described above.   5. Normal left ventricular internal cavity size.   6. Right ventricular pressure overload.   7. Spectral Doppler shows impaired relaxation pattern of left   ventricular myocardial filling (Grade I diastolic dysfunction).   8. There is moderate concentric left ventricular hypertrophy.   9. Moderately enlarged right ventricle.  10. Moderately reduced RV systolic function.  11. Moderately enlarged right atrium.  12. Normal left atrial size.  13. Small pericardial effusion.  14. Mild mitral valve regurgitation.  15. Structurally normal mitral valve, with normal leaflet excursion.  16. Moderate tricuspid regurgitation.  17. Mild pulmonic valve regurgitation.  18. Estimated pulmonary artery systolic pressure is 76.3 mmHg assuming a   right atrial pressure of 10 mmHg, which is consistent with severe   pulmonary hypertension.      < end of copied text >    [ ] Cardiac Catheterization:  [ ] Stress Test:      LABS:	 	    CARDIAC MARKERS:    05 Apr 2023 06:12    135    |  102    |  24     ----------------------------<  94     3.4     |  28     |  1.22                               13.8   6.02  )-----------( 233      ( 05 Apr 2023 06:12 )             43.6   proBNP:   Lipid Profile:   HgA1c:   TSH:     PT/PTT- ( 07 Apr 2023 06:11 )   PT: 37.1 sec;  PTT: x             INR: 3.06 ratio (04-07 @ 06:11)  INR: 2.09 ratio (04-06 @ 05:35)  INR: 1.25 ratio (04-05 @ 06:12)

## 2023-04-07 NOTE — PROGRESS NOTE ADULT - REASON FOR ADMISSION
urinary retention, gross hematuria, elevated troponin, MARVIN

## 2023-04-12 LAB — INTERPRETATION SERPL IFE-IMP: SIGNIFICANT CHANGE UP

## 2023-04-17 DIAGNOSIS — Z20.822 CONTACT WITH AND (SUSPECTED) EXPOSURE TO COVID-19: ICD-10-CM

## 2023-04-17 DIAGNOSIS — C61 MALIGNANT NEOPLASM OF PROSTATE: ICD-10-CM

## 2023-04-17 DIAGNOSIS — I42.9 CARDIOMYOPATHY, UNSPECIFIED: ICD-10-CM

## 2023-04-17 DIAGNOSIS — N40.1 BENIGN PROSTATIC HYPERPLASIA WITH LOWER URINARY TRACT SYMPTOMS: ICD-10-CM

## 2023-04-17 DIAGNOSIS — I31.39 OTHER PERICARDIAL EFFUSION (NONINFLAMMATORY): ICD-10-CM

## 2023-04-17 DIAGNOSIS — R33.8 OTHER RETENTION OF URINE: ICD-10-CM

## 2023-04-17 DIAGNOSIS — R31.0 GROSS HEMATURIA: ICD-10-CM

## 2023-04-17 DIAGNOSIS — I27.20 PULMONARY HYPERTENSION, UNSPECIFIED: ICD-10-CM

## 2023-04-17 DIAGNOSIS — N43.3 HYDROCELE, UNSPECIFIED: ICD-10-CM

## 2023-04-17 DIAGNOSIS — I11.0 HYPERTENSIVE HEART DISEASE WITH HEART FAILURE: ICD-10-CM

## 2023-04-17 DIAGNOSIS — N17.9 ACUTE KIDNEY FAILURE, UNSPECIFIED: ICD-10-CM

## 2023-04-17 DIAGNOSIS — I50.43 ACUTE ON CHRONIC COMBINED SYSTOLIC (CONGESTIVE) AND DIASTOLIC (CONGESTIVE) HEART FAILURE: ICD-10-CM

## 2023-04-17 DIAGNOSIS — I82.451 ACUTE EMBOLISM AND THROMBOSIS OF RIGHT PERONEAL VEIN: ICD-10-CM

## 2023-04-17 DIAGNOSIS — I27.82 CHRONIC PULMONARY EMBOLISM: ICD-10-CM

## 2023-04-17 DIAGNOSIS — E44.0 MODERATE PROTEIN-CALORIE MALNUTRITION: ICD-10-CM

## 2023-04-20 ENCOUNTER — EMERGENCY (EMERGENCY)
Facility: HOSPITAL | Age: 78
LOS: 0 days | Discharge: HOME | End: 2023-04-20
Attending: STUDENT IN AN ORGANIZED HEALTH CARE EDUCATION/TRAINING PROGRAM
Payer: MEDICAID

## 2023-04-20 VITALS
TEMPERATURE: 98 F | RESPIRATION RATE: 17 BRPM | SYSTOLIC BLOOD PRESSURE: 125 MMHG | OXYGEN SATURATION: 95 % | HEART RATE: 97 BPM | DIASTOLIC BLOOD PRESSURE: 83 MMHG

## 2023-04-20 VITALS
DIASTOLIC BLOOD PRESSURE: 97 MMHG | WEIGHT: 149.91 LBS | HEART RATE: 102 BPM | OXYGEN SATURATION: 95 % | HEIGHT: 66 IN | SYSTOLIC BLOOD PRESSURE: 138 MMHG | RESPIRATION RATE: 16 BRPM | TEMPERATURE: 98 F

## 2023-04-20 DIAGNOSIS — I50.9 HEART FAILURE, UNSPECIFIED: ICD-10-CM

## 2023-04-20 DIAGNOSIS — Z86.711 PERSONAL HISTORY OF PULMONARY EMBOLISM: ICD-10-CM

## 2023-04-20 DIAGNOSIS — K40.90 UNILATERAL INGUINAL HERNIA, WITHOUT OBSTRUCTION OR GANGRENE, NOT SPECIFIED AS RECURRENT: ICD-10-CM

## 2023-04-20 DIAGNOSIS — R10.9 UNSPECIFIED ABDOMINAL PAIN: ICD-10-CM

## 2023-04-20 DIAGNOSIS — Z87.438 PERSONAL HISTORY OF OTHER DISEASES OF MALE GENITAL ORGANS: ICD-10-CM

## 2023-04-20 DIAGNOSIS — R79.1 ABNORMAL COAGULATION PROFILE: ICD-10-CM

## 2023-04-20 DIAGNOSIS — I11.0 HYPERTENSIVE HEART DISEASE WITH HEART FAILURE: ICD-10-CM

## 2023-04-20 DIAGNOSIS — Z85.46 PERSONAL HISTORY OF MALIGNANT NEOPLASM OF PROSTATE: ICD-10-CM

## 2023-04-20 DIAGNOSIS — K59.09 OTHER CONSTIPATION: ICD-10-CM

## 2023-04-20 DIAGNOSIS — Z79.82 LONG TERM (CURRENT) USE OF ASPIRIN: ICD-10-CM

## 2023-04-20 DIAGNOSIS — Z79.01 LONG TERM (CURRENT) USE OF ANTICOAGULANTS: ICD-10-CM

## 2023-04-20 PROBLEM — I10 ESSENTIAL (PRIMARY) HYPERTENSION: Chronic | Status: ACTIVE | Noted: 2023-04-04

## 2023-04-20 LAB
ALBUMIN SERPL ELPH-MCNC: 3 G/DL — LOW (ref 3.3–5)
ALP SERPL-CCNC: 139 U/L — HIGH (ref 40–120)
ALT FLD-CCNC: 43 U/L — SIGNIFICANT CHANGE UP (ref 12–78)
ANION GAP SERPL CALC-SCNC: 5 MMOL/L — SIGNIFICANT CHANGE UP (ref 5–17)
APTT BLD: 81.9 SEC — HIGH (ref 27.5–35.5)
AST SERPL-CCNC: 35 U/L — SIGNIFICANT CHANGE UP (ref 15–37)
BASOPHILS # BLD AUTO: 0.05 K/UL — SIGNIFICANT CHANGE UP (ref 0–0.2)
BASOPHILS NFR BLD AUTO: 0.8 % — SIGNIFICANT CHANGE UP (ref 0–2)
BILIRUB SERPL-MCNC: 0.9 MG/DL — SIGNIFICANT CHANGE UP (ref 0.2–1.2)
BUN SERPL-MCNC: 19 MG/DL — SIGNIFICANT CHANGE UP (ref 7–23)
CALCIUM SERPL-MCNC: 8.8 MG/DL — SIGNIFICANT CHANGE UP (ref 8.5–10.1)
CHLORIDE SERPL-SCNC: 106 MMOL/L — SIGNIFICANT CHANGE UP (ref 96–108)
CO2 SERPL-SCNC: 30 MMOL/L — SIGNIFICANT CHANGE UP (ref 22–31)
CREAT SERPL-MCNC: 1.13 MG/DL — SIGNIFICANT CHANGE UP (ref 0.5–1.3)
EGFR: 67 ML/MIN/1.73M2 — SIGNIFICANT CHANGE UP
EOSINOPHIL # BLD AUTO: 0.19 K/UL — SIGNIFICANT CHANGE UP (ref 0–0.5)
EOSINOPHIL NFR BLD AUTO: 2.9 % — SIGNIFICANT CHANGE UP (ref 0–6)
GLUCOSE SERPL-MCNC: 93 MG/DL — SIGNIFICANT CHANGE UP (ref 70–99)
HCT VFR BLD CALC: 44.9 % — SIGNIFICANT CHANGE UP (ref 39–50)
HGB BLD-MCNC: 14.1 G/DL — SIGNIFICANT CHANGE UP (ref 13–17)
IMM GRANULOCYTES NFR BLD AUTO: 0.3 % — SIGNIFICANT CHANGE UP (ref 0–0.9)
INR BLD: 10.61 RATIO — CRITICAL HIGH (ref 0.88–1.16)
LYMPHOCYTES # BLD AUTO: 1.24 K/UL — SIGNIFICANT CHANGE UP (ref 1–3.3)
LYMPHOCYTES # BLD AUTO: 18.7 % — SIGNIFICANT CHANGE UP (ref 13–44)
MCHC RBC-ENTMCNC: 25.2 PG — LOW (ref 27–34)
MCHC RBC-ENTMCNC: 31.4 G/DL — LOW (ref 32–36)
MCV RBC AUTO: 80.3 FL — SIGNIFICANT CHANGE UP (ref 80–100)
MONOCYTES # BLD AUTO: 0.74 K/UL — SIGNIFICANT CHANGE UP (ref 0–0.9)
MONOCYTES NFR BLD AUTO: 11.1 % — SIGNIFICANT CHANGE UP (ref 2–14)
NEUTROPHILS # BLD AUTO: 4.4 K/UL — SIGNIFICANT CHANGE UP (ref 1.8–7.4)
NEUTROPHILS NFR BLD AUTO: 66.2 % — SIGNIFICANT CHANGE UP (ref 43–77)
NRBC # BLD: 0 /100 WBCS — SIGNIFICANT CHANGE UP (ref 0–0)
PLATELET # BLD AUTO: 151 K/UL — SIGNIFICANT CHANGE UP (ref 150–400)
POTASSIUM SERPL-MCNC: 3.6 MMOL/L — SIGNIFICANT CHANGE UP (ref 3.5–5.3)
POTASSIUM SERPL-SCNC: 3.6 MMOL/L — SIGNIFICANT CHANGE UP (ref 3.5–5.3)
PROT SERPL-MCNC: 7.4 GM/DL — SIGNIFICANT CHANGE UP (ref 6–8.3)
PROTHROM AB SERPL-ACNC: 129.3 SEC — HIGH (ref 10.5–13.4)
RBC # BLD: 5.59 M/UL — SIGNIFICANT CHANGE UP (ref 4.2–5.8)
RBC # FLD: 15.9 % — HIGH (ref 10.3–14.5)
SODIUM SERPL-SCNC: 141 MMOL/L — SIGNIFICANT CHANGE UP (ref 135–145)
WBC # BLD: 6.64 K/UL — SIGNIFICANT CHANGE UP (ref 3.8–10.5)
WBC # FLD AUTO: 6.64 K/UL — SIGNIFICANT CHANGE UP (ref 3.8–10.5)

## 2023-04-20 PROCEDURE — 99285 EMERGENCY DEPT VISIT HI MDM: CPT

## 2023-04-20 RX ORDER — PHYTONADIONE (VIT K1) 5 MG
2.5 TABLET ORAL ONCE
Refills: 0 | Status: COMPLETED | OUTPATIENT
Start: 2023-04-20 | End: 2023-04-20

## 2023-04-20 RX ADMIN — Medication 2.5 MILLIGRAM(S): at 15:16

## 2023-04-20 NOTE — ED PROVIDER NOTE - PHYSICAL EXAMINATION
General appearance: Nontoxic appearing, conversant, afebrile    Eyes: anicteric sclerae, ANSLEY, EOMI   HENT: Atraumatic; oropharynx clear, MMM and no ulcerations, no pharyngeal erythema or exudate   Neck: Trachea midline; Full range of motion, supple   Pulm: normal respiratory effort and no intercostal retractions, normal work of breathing   Abdomen: Soft, non-tender, non-distended; no guarding or rebound, L inguinal hernia soft, reducible, minimally ttp, no overlying skin changes   Extremities: No peripheral edema, no gross deformities, FROM x4   Skin: Dry, normal temperature, turgor and texture; no rash   Psych: Appropriate affect, cooperative

## 2023-04-20 NOTE — ED PROVIDER NOTE - TEMPLATE, MLM
General Erythromycin Counseling:  I discussed with the patient the risks of erythromycin including but not limited to GI upset, allergic reaction, drug rash, diarrhea, increase in liver enzymes, and yeast infections.

## 2023-04-20 NOTE — ED ADULT TRIAGE NOTE - CHIEF COMPLAINT QUOTE
as per , the hernia is hurting x two days. b/l leg swelling, states the swelling has been since I was here last month. + joseph catheter ;prior to arrival

## 2023-04-20 NOTE — ED PROVIDER NOTE - PROGRESS NOTE DETAILS
INR elevated.  No bleeding.  Discussed with daughter Makenzie results.  She was unsure when next INR check would be but goes to clinic [phone], called and spoke with Dr. Ribeiro there regarding results and plan here.  Able to follow up for repeat check and explained given vit K oral and plan to hold warfarin with dc.  Called Makenzie back to confirm plan and update that clinic will be expecting call for follow up appt/ inr check.  Agrees with plan.  Patient denies complaints now, tolerating po.  Reviewed return precautions with daughter and will dc.

## 2023-04-20 NOTE — ED PROVIDER NOTE - PATIENT PORTAL LINK FT
You can access the FollowMyHealth Patient Portal offered by [facility] by registering at the following website: [email or web address]. By joining [facility]’s FollowMyHealth portal, you will also be able to view your health information using other applications (apps) compatible with our system.

## 2023-04-20 NOTE — ED PROVIDER NOTE - OBJECTIVE STATEMENT
76yo male with pmh bph, prostate ca, chf, pulm htn, pe on warfarin, presenting as call back.  Initially triaged as abdominal pain because patient was unsure why he was here.  Endorsing discomfort over L inguinal region with known hernia which is unchanged for years and reducible.  No N/V, + chronic constipation but having bowel movements.  Also states has difficulty falling asleep.  After speaking with daughter, doctor had called her at work to say patient's INR was elevated to 8s so she had called ems and they picked him up.  No falls or trauma.  No bleeding endorsed at this time.  Garrett Hyde [id number]

## 2023-04-20 NOTE — ED PROVIDER NOTE - CLINICAL SUMMARY MEDICAL DECISION MAKING FREE TEXT BOX
P/w elevated INR outpatient, no bleeding or trauma.  Will repeat here.  Remainder of complaints chronic - patient was unsure why he was called back and after clarification with daughter, patient complaints are not acute.  Reassess after results.

## 2023-04-20 NOTE — ED ADULT NURSE NOTE - OBJECTIVE STATEMENT
Pt sent to er for abnormal lab result ( High INR LEVEL). pt also c/o b/l leg pain and L pelvic pain. h/o dementia.  evelyn [id number]

## 2023-05-06 ENCOUNTER — INPATIENT (INPATIENT)
Facility: HOSPITAL | Age: 78
LOS: 19 days | Discharge: HOME HEALTH | End: 2023-05-26
Attending: GENERAL ACUTE CARE HOSPITAL | Admitting: GENERAL ACUTE CARE HOSPITAL
Payer: MEDICAID

## 2023-05-06 VITALS
DIASTOLIC BLOOD PRESSURE: 100 MMHG | HEIGHT: 66 IN | TEMPERATURE: 98 F | WEIGHT: 315 LBS | RESPIRATION RATE: 18 BRPM | OXYGEN SATURATION: 98 % | HEART RATE: 110 BPM | SYSTOLIC BLOOD PRESSURE: 152 MMHG

## 2023-05-06 DIAGNOSIS — I26.99 OTHER PULMONARY EMBOLISM WITHOUT ACUTE COR PULMONALE: ICD-10-CM

## 2023-05-06 DIAGNOSIS — I80.10 PHLEBITIS AND THROMBOPHLEBITIS OF UNSPECIFIED FEMORAL VEIN: ICD-10-CM

## 2023-05-06 DIAGNOSIS — T45.511A POISONING BY ANTICOAGULANTS, ACCIDENTAL (UNINTENTIONAL), INITIAL ENCOUNTER: ICD-10-CM

## 2023-05-06 LAB
ALBUMIN SERPL ELPH-MCNC: 2.7 G/DL — LOW (ref 3.3–5)
ALP SERPL-CCNC: 107 U/L — SIGNIFICANT CHANGE UP (ref 40–120)
ALT FLD-CCNC: 25 U/L — SIGNIFICANT CHANGE UP (ref 12–78)
ANION GAP SERPL CALC-SCNC: -1 MMOL/L — LOW (ref 5–17)
ANISOCYTOSIS BLD QL: SLIGHT — SIGNIFICANT CHANGE UP
APTT BLD: 111.9 SEC — HIGH (ref 27.5–35.5)
AST SERPL-CCNC: 59 U/L — HIGH (ref 15–37)
BASOPHILS # BLD AUTO: 0 K/UL — SIGNIFICANT CHANGE UP (ref 0–0.2)
BASOPHILS # BLD AUTO: 0.06 K/UL — SIGNIFICANT CHANGE UP (ref 0–0.2)
BASOPHILS NFR BLD AUTO: 0 % — SIGNIFICANT CHANGE UP (ref 0–2)
BASOPHILS NFR BLD AUTO: 0.9 % — SIGNIFICANT CHANGE UP (ref 0–2)
BILIRUB SERPL-MCNC: 0.5 MG/DL — SIGNIFICANT CHANGE UP (ref 0.2–1.2)
BUN SERPL-MCNC: 20 MG/DL — SIGNIFICANT CHANGE UP (ref 7–23)
CALCIUM SERPL-MCNC: 8.5 MG/DL — SIGNIFICANT CHANGE UP (ref 8.5–10.1)
CHLORIDE SERPL-SCNC: 111 MMOL/L — HIGH (ref 96–108)
CK MB BLD-MCNC: 1.5 % — SIGNIFICANT CHANGE UP (ref 0–3.5)
CK MB CFR SERPL CALC: 3.8 NG/ML — HIGH (ref 0.5–3.6)
CK SERPL-CCNC: 259 U/L — SIGNIFICANT CHANGE UP (ref 26–308)
CO2 SERPL-SCNC: 28 MMOL/L — SIGNIFICANT CHANGE UP (ref 22–31)
CREAT SERPL-MCNC: 1.16 MG/DL — SIGNIFICANT CHANGE UP (ref 0.5–1.3)
EGFR: 65 ML/MIN/1.73M2 — SIGNIFICANT CHANGE UP
ELLIPTOCYTES BLD QL SMEAR: SLIGHT — SIGNIFICANT CHANGE UP
EOSINOPHIL # BLD AUTO: 0.21 K/UL — SIGNIFICANT CHANGE UP (ref 0–0.5)
EOSINOPHIL # BLD AUTO: 0.26 K/UL — SIGNIFICANT CHANGE UP (ref 0–0.5)
EOSINOPHIL NFR BLD AUTO: 3.3 % — SIGNIFICANT CHANGE UP (ref 0–6)
EOSINOPHIL NFR BLD AUTO: 4 % — SIGNIFICANT CHANGE UP (ref 0–6)
GLUCOSE SERPL-MCNC: 121 MG/DL — HIGH (ref 70–99)
HCT VFR BLD CALC: 36 % — LOW (ref 39–50)
HCT VFR BLD CALC: 38.7 % — LOW (ref 39–50)
HGB BLD-MCNC: 11.2 G/DL — LOW (ref 13–17)
HGB BLD-MCNC: 11.9 G/DL — LOW (ref 13–17)
IMM GRANULOCYTES NFR BLD AUTO: 0.2 % — SIGNIFICANT CHANGE UP (ref 0–0.9)
INR BLD: 12.29 RATIO — CRITICAL HIGH (ref 0.88–1.16)
INR BLD: 4.42 RATIO — HIGH (ref 0.88–1.16)
LYMPHOCYTES # BLD AUTO: 0.99 K/UL — LOW (ref 1–3.3)
LYMPHOCYTES # BLD AUTO: 1.09 K/UL — SIGNIFICANT CHANGE UP (ref 1–3.3)
LYMPHOCYTES # BLD AUTO: 15.6 % — SIGNIFICANT CHANGE UP (ref 13–44)
LYMPHOCYTES # BLD AUTO: 17 % — SIGNIFICANT CHANGE UP (ref 13–44)
MANUAL SMEAR VERIFICATION: SIGNIFICANT CHANGE UP
MCHC RBC-ENTMCNC: 24.7 PG — LOW (ref 27–34)
MCHC RBC-ENTMCNC: 24.9 PG — LOW (ref 27–34)
MCHC RBC-ENTMCNC: 30.7 G/DL — LOW (ref 32–36)
MCHC RBC-ENTMCNC: 31.1 G/DL — LOW (ref 32–36)
MCV RBC AUTO: 79.3 FL — LOW (ref 80–100)
MCV RBC AUTO: 81 FL — SIGNIFICANT CHANGE UP (ref 80–100)
MONOCYTES # BLD AUTO: 0.7 K/UL — SIGNIFICANT CHANGE UP (ref 0–0.9)
MONOCYTES # BLD AUTO: 0.76 K/UL — SIGNIFICANT CHANGE UP (ref 0–0.9)
MONOCYTES NFR BLD AUTO: 11 % — SIGNIFICANT CHANGE UP (ref 2–14)
MONOCYTES NFR BLD AUTO: 12 % — SIGNIFICANT CHANGE UP (ref 2–14)
NEUTROPHILS # BLD AUTO: 4.28 K/UL — SIGNIFICANT CHANGE UP (ref 1.8–7.4)
NEUTROPHILS # BLD AUTO: 4.31 K/UL — SIGNIFICANT CHANGE UP (ref 1.8–7.4)
NEUTROPHILS NFR BLD AUTO: 67 % — SIGNIFICANT CHANGE UP (ref 43–77)
NEUTROPHILS NFR BLD AUTO: 68 % — SIGNIFICANT CHANGE UP (ref 43–77)
NRBC # BLD: 0 /100 WBCS — SIGNIFICANT CHANGE UP (ref 0–0)
NRBC # BLD: 0 /100 — SIGNIFICANT CHANGE UP (ref 0–0)
NRBC # BLD: SIGNIFICANT CHANGE UP /100 WBCS (ref 0–0)
NT-PROBNP SERPL-SCNC: 4115 PG/ML — HIGH (ref 0–450)
OVALOCYTES BLD QL SMEAR: SLIGHT — SIGNIFICANT CHANGE UP
PLAT MORPH BLD: NORMAL — SIGNIFICANT CHANGE UP
PLATELET # BLD AUTO: 144 K/UL — LOW (ref 150–400)
PLATELET # BLD AUTO: 165 K/UL — SIGNIFICANT CHANGE UP (ref 150–400)
PLATELET CLUMP BLD QL SMEAR: SLIGHT
POIKILOCYTOSIS BLD QL AUTO: SLIGHT — SIGNIFICANT CHANGE UP
POTASSIUM SERPL-MCNC: 5.2 MMOL/L — SIGNIFICANT CHANGE UP (ref 3.5–5.3)
POTASSIUM SERPL-SCNC: 5.2 MMOL/L — SIGNIFICANT CHANGE UP (ref 3.5–5.3)
PROT SERPL-MCNC: 7.2 GM/DL — SIGNIFICANT CHANGE UP (ref 6–8.3)
PROTHROM AB SERPL-ACNC: 151.3 SEC — HIGH (ref 10.5–13.4)
PROTHROM AB SERPL-ACNC: 53.8 SEC — HIGH (ref 10.5–13.4)
RBC # BLD: 4.54 M/UL — SIGNIFICANT CHANGE UP (ref 4.2–5.8)
RBC # BLD: 4.78 M/UL — SIGNIFICANT CHANGE UP (ref 4.2–5.8)
RBC # FLD: 16 % — HIGH (ref 10.3–14.5)
RBC # FLD: 16.2 % — HIGH (ref 10.3–14.5)
RBC BLD AUTO: ABNORMAL
SODIUM SERPL-SCNC: 138 MMOL/L — SIGNIFICANT CHANGE UP (ref 135–145)
TROPONIN I, HIGH SENSITIVITY RESULT: 61.7 NG/L — SIGNIFICANT CHANGE UP
VARIANT LYMPHS # BLD: 1 % — SIGNIFICANT CHANGE UP (ref 0–6)
WBC # BLD: 6.34 K/UL — SIGNIFICANT CHANGE UP (ref 3.8–10.5)
WBC # BLD: 6.39 K/UL — SIGNIFICANT CHANGE UP (ref 3.8–10.5)
WBC # FLD AUTO: 6.34 K/UL — SIGNIFICANT CHANGE UP (ref 3.8–10.5)
WBC # FLD AUTO: 6.39 K/UL — SIGNIFICANT CHANGE UP (ref 3.8–10.5)

## 2023-05-06 PROCEDURE — 74176 CT ABD & PELVIS W/O CONTRAST: CPT | Mod: 26,MA

## 2023-05-06 PROCEDURE — 71045 X-RAY EXAM CHEST 1 VIEW: CPT | Mod: 26

## 2023-05-06 PROCEDURE — 99223 1ST HOSP IP/OBS HIGH 75: CPT

## 2023-05-06 PROCEDURE — 93970 EXTREMITY STUDY: CPT | Mod: 26

## 2023-05-06 PROCEDURE — 99222 1ST HOSP IP/OBS MODERATE 55: CPT

## 2023-05-06 PROCEDURE — 99285 EMERGENCY DEPT VISIT HI MDM: CPT

## 2023-05-06 PROCEDURE — 93010 ELECTROCARDIOGRAM REPORT: CPT

## 2023-05-06 RX ORDER — FUROSEMIDE 40 MG
40 TABLET ORAL DAILY
Refills: 0 | Status: DISCONTINUED | OUTPATIENT
Start: 2023-05-06 | End: 2023-05-12

## 2023-05-06 RX ORDER — METOPROLOL TARTRATE 50 MG
50 TABLET ORAL DAILY
Refills: 0 | Status: DISCONTINUED | OUTPATIENT
Start: 2023-05-06 | End: 2023-05-06

## 2023-05-06 RX ORDER — ASPIRIN/CALCIUM CARB/MAGNESIUM 324 MG
325 TABLET ORAL ONCE
Refills: 0 | Status: COMPLETED | OUTPATIENT
Start: 2023-05-06 | End: 2023-05-06

## 2023-05-06 RX ORDER — METOPROLOL TARTRATE 50 MG
50 TABLET ORAL DAILY
Refills: 0 | Status: DISCONTINUED | OUTPATIENT
Start: 2023-05-06 | End: 2023-05-09

## 2023-05-06 RX ORDER — TAMSULOSIN HYDROCHLORIDE 0.4 MG/1
0.4 CAPSULE ORAL AT BEDTIME
Refills: 0 | Status: DISCONTINUED | OUTPATIENT
Start: 2023-05-06 | End: 2023-05-26

## 2023-05-06 RX ORDER — FINASTERIDE 5 MG/1
5 TABLET, FILM COATED ORAL DAILY
Refills: 0 | Status: DISCONTINUED | OUTPATIENT
Start: 2023-05-06 | End: 2023-05-26

## 2023-05-06 RX ORDER — ATORVASTATIN CALCIUM 80 MG/1
40 TABLET, FILM COATED ORAL AT BEDTIME
Refills: 0 | Status: DISCONTINUED | OUTPATIENT
Start: 2023-05-06 | End: 2023-05-26

## 2023-05-06 RX ORDER — PANTOPRAZOLE SODIUM 20 MG/1
40 TABLET, DELAYED RELEASE ORAL
Refills: 0 | Status: DISCONTINUED | OUTPATIENT
Start: 2023-05-06 | End: 2023-05-26

## 2023-05-06 RX ORDER — FUROSEMIDE 40 MG
40 TABLET ORAL ONCE
Refills: 0 | Status: COMPLETED | OUTPATIENT
Start: 2023-05-06 | End: 2023-05-06

## 2023-05-06 RX ORDER — PHYTONADIONE (VIT K1) 5 MG
10 TABLET ORAL ONCE
Refills: 0 | Status: COMPLETED | OUTPATIENT
Start: 2023-05-06 | End: 2023-05-06

## 2023-05-06 RX ADMIN — Medication 40 MILLIGRAM(S): at 06:13

## 2023-05-06 RX ADMIN — Medication 325 MILLIGRAM(S): at 07:41

## 2023-05-06 RX ADMIN — Medication 102 MILLIGRAM(S): at 07:14

## 2023-05-06 RX ADMIN — Medication 50 MILLIGRAM(S): at 14:11

## 2023-05-06 RX ADMIN — TAMSULOSIN HYDROCHLORIDE 0.4 MILLIGRAM(S): 0.4 CAPSULE ORAL at 23:10

## 2023-05-06 RX ADMIN — Medication 40 MILLIGRAM(S): at 12:13

## 2023-05-06 RX ADMIN — FINASTERIDE 5 MILLIGRAM(S): 5 TABLET, FILM COATED ORAL at 14:10

## 2023-05-06 RX ADMIN — ATORVASTATIN CALCIUM 40 MILLIGRAM(S): 80 TABLET, FILM COATED ORAL at 23:10

## 2023-05-06 NOTE — H&P ADULT - ASSESSMENT
77 year old male        with h/o HTN, CHF, BPH ,  prorate   cancer          h/o  recent  PE/ DVT on coumadin        pt  is  a  poor  historian        presents today c/o three days od bilateral leg edema, sob and hematuria,      pt was seen in the ER on 3/38/23,    has   a joseph  with gross  hematuria   seen in e r/  currently denies cp/sob./abd  pain / fevers       *  sob/  pedal  edema,  from acute on c/c  systolic  chf         on iv lasix         echo         card eval    *  h/o  recent  PE./   DVT. in 3/2023           was  on coumadin          here  with supra therapeutic   inr         vit K  and  FFP,   follow     INR   in 2  hours   *  has  gross  hematuria /  BPH           urology  PA, , aware  /  pt well known to uro service  here    *  CT a/p.  ? strangulation/  hernia . note  per  imaging           surg  PA. / VENITA Perez aware       has  no  acute  abd  pain. n/ vomiting   *   h/o  Ca  prostate          last PSA  was  90.  in 3/2023            known to  urology here    *  echo  3/2023,            ef  40,  diastolic  dysfunction,  hypokinesis noted, RV  dysfunction,  small pericardial effusion. mod  TR.  severe  pHTN            pulm eval dr feliciano           prognosis  guarded,  given  severe  pHTN,   RV dysfunction,  acute chf          my scheduled  shift ends  at 4  pm     rad< from: TTE Echo Complete w/o Contrast w/ Doppler (03.29.23 @ 11:07) >  Summary:   1. Left ventricular ejection fraction, by visual estimation, is 40 to   45%.   2. Technically adequate study.   3. Moderately decreased global left ventricular systolic function.   4. Basal and mid inferior wall, basal and mid inferolateral wall, and   mid inferoseptal segment are abnormal as described above.   5. Normal left ventricular internal cavity size.   6. Right ventricular pressure overload.   7. Spectral Doppler shows impaired relaxation pattern of left   ventricular myocardial filling (Grade I diastolic dysfunction).   8. There is moderate concentric left ventricular hypertrophy.   9. Moderately enlarged right ventricle.  10. Moderately reduced RV systolic function.  11. Moderately enlarged right atrium.  12. Normal left atrial size.  13. Small pericardial effusion.  14. Mild mitral valve regurgitation.  15. Structurally normal mitral valve, with normal leaflet excursion.  16. Moderate tricuspid regurgitation.  17. Mild pulmonic valve regurgitation.  18. Estimated pulmonary artery systolic pressure is 76.3 mmHg assuming a   right atrial pressure of 10 mmHg, which is consistent with severe   pulmonary hypertension.  [id number] Jose Baltazar MD FACC, FASE, FACP  Electronically signed on 3/29/2023 at 5:46:53 PM  *** Final ***  < end of copied text >       rad< from: CT Abdomen and Pelvis No Cont (05.06.23 @ 05:45) >  IMPRESSION:  Increased intraluminal density within the bladder is suspicious for   hemorrhage. Underlying mass cannot be excluded. Further evaluation with   cystoscopy can be obtained.  Joseph catheter in place. Intraluminal bladder air, likely iatrogenic.  Enlarged prostate gland.  Moderate left inguinal hernia containing mesenteric fat, nonobstructed   segment of sigmoid colon and small amount of ascites. Given ascites   within the left inguinal hernia, an element of strangulation cannot be   excluded. Recommend clinicalcorrelation.  Anasarca.  Cardiomegaly.  Small bilateral pleural effusions.  --- End of Report ---               77 year old male        with h/o HTN, CHF, BPH ,  prorate   cancer          h/o  recent  PE/ DVT on coumadin         pt  is  a  poor  historian/  unable to state his  meds        presents today c/o three days od bilateral leg edema, sob and hematuria,      pt was seen in the ER on 3/38/23,    has   a joseph  with gross  hematuria    seen in e r/  currently denies cp/sob./abd  pain / fevers       *  sob/  pedal  edema,  from acute on c/c  systolic  chf         on iv lasix         echo         card eval    *  h/o  recent  PE./   DVT. in 3/2023         has   c/c  pedal  edema/ extremities  are  warm          was  on coumadin          here  with supra therapeutic   inr          vit K  and  FFP,   follow     INR   in 2  hours          discussed  with vascular dr pfeiffer   *  has  gross  hematuria /  BPH           urology  PA, , aware  /  pt well known to uro service  here    *  CT a/p.  ? strangulation/  hernia . note  per  imaging            surg  PA. / VENITA Perez aware           has  no  acute  abd  pain. n/ vomiting   *   h/o  Ca  prostate          last PSA  was  90.  in 3/2023            known to  urology here    *  echo  3/2023,            ef  40,  diastolic  dysfunction,  hypokinesis noted, RV  dysfunction,  small pericardial effusion. mod  TR.  severe  pHTN            pulm eval dr feliciano           prognosis  guarded,  given  severe  pHTN,   RV dysfunction,  acute chf                   my scheduled  shift ends  at 4  pm     rad< from: TTE Echo Complete w/o Contrast w/ Doppler (03.29.23 @ 11:07) >  Summary:   1. Left ventricular ejection fraction, by visual estimation, is 40 to   45%.   2. Technically adequate study.   3. Moderately decreased global left ventricular systolic function.   4. Basal and mid inferior wall, basal and mid inferolateral wall, and   mid inferoseptal segment are abnormal as described above.   5. Normal left ventricular internal cavity size.   6. Right ventricular pressure overload.   7. Spectral Doppler shows impaired relaxation pattern of left   ventricular myocardial filling (Grade I diastolic dysfunction).   8. There is moderate concentric left ventricular hypertrophy.   9. Moderately enlarged right ventricle.  10. Moderately reduced RV systolic function.  11. Moderately enlarged right atrium.  12. Normal left atrial size.  13. Small pericardial effusion.  14. Mild mitral valve regurgitation.  15. Structurally normal mitral valve, with normal leaflet excursion.  16. Moderate tricuspid regurgitation.  17. Mild pulmonic valve regurgitation.  18. Estimated pulmonary artery systolic pressure is 76.3 mmHg assuming a   right atrial pressure of 10 mmHg, which is consistent with severe   pulmonary hypertension.  [id number] Jose Baltazar MD FAC, FASE, FACP  Electronically signed on 3/29/2023 at 5:46:53 PM  *** Final ***  < end of copied text >       rad< from: CT Abdomen and Pelvis No Cont (05.06.23 @ 05:45) >  IMPRESSION:  Increased intraluminal density within the bladder is suspicious for   hemorrhage. Underlying mass cannot be excluded. Further evaluation with   cystoscopy can be obtained.  Joseph catheter in place. Intraluminal bladder air, likely iatrogenic.  Enlarged prostate gland.  Moderate left inguinal hernia containing mesenteric fat, nonobstructed   segment of sigmoid colon and small amount of ascites. Given ascites   within the left inguinal hernia, an element of strangulation cannot be   excluded. Recommend clinicalcorrelation.  Anasarca.  Cardiomegaly.  Small bilateral pleural effusions.  --- End of Report ---               77 year old male          with h/o HTN,  CHF, BPH , prorate   cancer          h/o  recent  PE/ DVT on coumadin/      pt  is  a  poor  historian/  unable to state his  meds          c/o  three days od bilateral leg edema, sob and hematuria,        pt was seen in the ER on 3/38/23,   has   a  joseph  with gross  hematuria       seen in e r,   currently denies cp/sob./abd  pain / fevers       *    sob/  pedal  edema,  from acute on c/c  systolic  chf            on iv lasix            echo          card eval .  dr german   *   h/o  recent  PE./   DVT. in 3/2023            has   c/c  pedal  edema/ extremities  are  warm           was  on coumadin.  on  er  arrival, pt  noted to have  supra therapeutic   inr           vit K  and  FFP  ordered,    follow     INR   in 2  hours           discussed  with vascular dr pfeiffer   *  has  gross  hematuria /  BPH           urology  PA, , aware  /  pt well known to uro service  here            defer CBI,  to urology   *  CT a/p.  ? strangulation/  hernia . note  per  imaging             surg  PA. / VENITA Perez aware            has  no   abd  pain. nausea./ vomiting   *    h/o  Ca  prostate            last PSA  was  90,   in 3/2023              known to  urology   service, here    *  echo  3/2023,             with   ef  40,  diastolic  dysfunction,  hypokinesis noted, RV  dysfunction,  small pericardial effusion. mod  TR.  severe   pHTN            pulm eval dr feliciano            echo ordered            prognosis  guarded,  given  severe  pHTN,   RV dysfunction,  acute chf               my scheduled  shift ends  at 4  pm     rad< from: TTE Echo Complete w/o Contrast w/ Doppler (03.29.23 @ 11:07) >  Summary:   1. Left ventricular ejection fraction, by visual estimation, is 40 to   45%.   2. Technically adequate study.   3. Moderately decreased global left ventricular systolic function.   4. Basal and mid inferior wall, basal and mid inferolateral wall, and   mid inferoseptal segment are abnormal as described above.   5. Normal left ventricular internal cavity size.   6. Right ventricular pressure overload.   7. Spectral Doppler shows impaired relaxation pattern of left   ventricular myocardial filling (Grade I diastolic dysfunction).   8. There is moderate concentric left ventricular hypertrophy.   9. Moderately enlarged right ventricle.  10. Moderately reduced RV systolic function.  11. Moderately enlarged right atrium.  12. Normal left atrial size.  13. Small pericardial effusion.  14. Mild mitral valve regurgitation.  15. Structurally normal mitral valve, with normal leaflet excursion.  16. Moderate tricuspid regurgitation.  17. Mild pulmonic valve regurgitation.  18. Estimated pulmonary artery systolic pressure is 76.3 mmHg assuming a   right atrial pressure of 10 mmHg, which is consistent with severe   pulmonary hypertension.  [id number] Jose Baltazar MD FACC, FASE, FACP  Electronically signed on 3/29/2023 at 5:46:53 PM  *** Final ***  < end of copied text >       rad< from: CT Abdomen and Pelvis No Cont (05.06.23 @ 05:45) >  IMPRESSION:  Increased intraluminal density within the bladder is suspicious for   hemorrhage. Underlying mass cannot be excluded. Further evaluation with   cystoscopy can be obtained.  Joseph catheter in place. Intraluminal bladder air, likely iatrogenic.  Enlarged prostate gland.  Moderate left inguinal hernia containing mesenteric fat, nonobstructed   segment of sigmoid colon and small amount of ascites. Given ascites   within the left inguinal hernia, an element of strangulation cannot be   excluded. Recommend clinicalcorrelation.  Anasarca.  Cardiomegaly.  Small bilateral pleural effusions.  --- End of Report ---               77 year old male            h/o HTN,  CHF, BPH , prorate   cancer           h/o  recent  PE/   dvt   and was   d/c on 4/7/23,  on coumadin            also  with  on going hematuria/ joseph, noted  from last visit /   pt  is  a  poor  historian/  unable to state his  meds            pt was seen   by  ER on  4/2023  and   d/c              pt has  never f/p  on outside . with a  dr ,  to check  his inr,   thus  far,   per family            now, returns  ,with   c/o   bilateral leg edema, sob and hematuria,   and, pt  currently  denies cp/sob./abd  pain / fevers       *    sob/  pedal  edema,  from acute on c/c  systolic  chf            on iv lasix            echo          card zach .  dr german   *   h/o  recent  PE./   DVT. in 3/2023            has   c/c  pedal  edema/ extremities  are  warm           was  on coumadin.  on  er  arrival, pt  noted to have  supra therapeutic   inr           vit K  and  FFP  ordered,    follow     INR   in 2  hours           discussed  with vascular dr pfeiffer   *    has   c/c   gross  hematuria /  BPH             urology  PA, , aware    and,   pt  is   well known to uro service  here            defer CBI,  to urology   *  CT a/p.  ? strangulation/  hernia . note  per  imaging             surg  AYO Perez aware            has  no   abd  pain. nausea./ vomiting   *    h/o  Ca  prostate            last PSA  was  90,   in 3/2023              known to  urology   service, here    *   E cho  3/2023,    ef  40,  diastolic  dysfunction,  hypokinesis noted, RV  dysfunction,  small pericardial effusion. mod  TR.  severe   pHTN             pulm eval dr feliciano/  card  to f/p / seen by card  on last visit             f/p  echo ordered            prognosis  guarded,  given  severe  pHTN,   RV dysfunction,  acute chf              spoke  with daughter,  stated  that  pt  has  an upcoming appt with his  dr/ she does  not remember  the  dr's name               And,   also  stated  , that  pt has  never   seen a  dr  to  check his INR,  since   being  d/c                 my scheduled  shift ends  at 4  pm     rad< from: TTE Echo Complete w/o Contrast w/ Doppler (03.29.23 @ 11:07) >  Summary:   1. Left ventricular ejection fraction, by visual estimation, is 40 to   45%.   2. Technically adequate study.   3. Moderately decreased global left ventricular systolic function.   4. Basal and mid inferior wall, basal and mid inferolateral wall, and   mid inferoseptal segment are abnormal as described above.   5. Normal left ventricular internal cavity size.   6. Right ventricular pressure overload.   7. Spectral Doppler shows impaired relaxation pattern of left   ventricular myocardial filling (Grade I diastolic dysfunction).   8. There is moderate concentric left ventricular hypertrophy.   9. Moderately enlarged right ventricle.  10. Moderately reduced RV systolic function.  11. Moderately enlarged right atrium.  12. Normal left atrial size.  13. Small pericardial effusion.  14. Mild mitral valve regurgitation.  15. Structurally normal mitral valve, with normal leaflet excursion.  16. Moderate tricuspid regurgitation.  17. Mild pulmonic valve regurgitation.  18. Estimated pulmonary artery systolic pressure is 76.3 mmHg assuming a   right atrial pressure of 10 mmHg, which is consistent with severe   pulmonary hypertension.  [id number] Jose Baltazar MD FACC, FASE, FACP  Electronically signed on 3/29/2023 at 5:46:53 PM  *** Final ***  < end of copied text >       rad< from: CT Abdomen and Pelvis No Cont (05.06.23 @ 05:45) >  IMPRESSION:  Increased intraluminal density within the bladder is suspicious for   hemorrhage. Underlying mass cannot be excluded. Further evaluation with   cystoscopy can be obtained.  Joseph catheter in place. Intraluminal bladder air, likely iatrogenic.  Enlarged prostate gland.  Moderate left inguinal hernia containing mesenteric fat, nonobstructed   segment of sigmoid colon and small amount of ascites. Given ascites   within the left inguinal hernia, an element of strangulation cannot be   excluded. Recommend clinicalcorrelation.  Anasarca.  Cardiomegaly.  Small bilateral pleural effusions.  --- End of Report ---

## 2023-05-06 NOTE — H&P ADULT - HISTORY OF PRESENT ILLNESS
77 year old male        with h/o HTN, CHF, BPH ,  prorate   cancer          h/o  recent  PE/ DVT on coumadin       pt  is  a  poor  historian        presents today c/o three days od bilateral leg edema, sob and hematuria,      pt was seen in the ER on 3/38/23,    has   a joseph  with gross  hematuria   seen in e r/  currently denies cp/sob./abd  pain / fevers  77 year old male        with h/o HTN, CHF, BPH ,  prostate    cancer          h/o  recent  PE/ DVT . in april 2023,  and  was   d/c on coumadin          pt  is  a  poor  historian        presents to  er  with  c/o  bilateral leg edema, sob and   on going,  c/c hematuria,      pt was seen in the ER on 3/38/23,      pt  was   seen in  er, has  a joseph  with gross  hematuria      currently denies cp/sob./abd  pain / fevers

## 2023-05-06 NOTE — PATIENT PROFILE ADULT - FALL HARM RISK - HARM RISK INTERVENTIONS

## 2023-05-06 NOTE — ED PROVIDER NOTE - CLINICAL SUMMARY MEDICAL DECISION MAKING FREE TEXT BOX
Patient presents with signs and symptoms consistent with an acute exacerbation of chronic CHF. Differential diagnosis: alternate cardiopulmonary causes (i.e. ischemia, PE, pneumothorax, pneumonia), other causes of dyspnea (i.e. asthma/reactive airway disease, COPD, flash pulmonary edema, cardiac dysrhythmia); but these are less likely given the history, presentation, and physical exam.  PLAN:  - CBC, CMP, Mg/Phos/Ca, Lactate, Troponin, Pro-BNP, COVID-19 rule out, EKG, CXR  - IV diuresis with furosemide, Electrolyte repletion PRN, Supplemental oxygen PRN, BIPAP if worsening respiratory status.  - Will give oxygen, would like to avoid utilizing NIPPV or intubation due to tneous preload status. Will need admission for acute management of acute decompensated heart failure.  -cardiology consult, Requires admission for IV diuretics and medical optimization.  -urology consult for hematuria

## 2023-05-06 NOTE — CONSULT NOTE ADULT - ASSESSMENT
77M w HTN, HFrEF, severe p-HTN, BPH, ?hx of chronic DVT/PE on chronic coumadin, out pt records of elevated PSA (90), Left inguinal hernia, DVT right peroneal vein (below the knee), now p/w LE edema. pt also found to have gross hematuria. INR on admission was 12.     1. supratherapeutic INR 12  -improved to 4.4 s/p 10 of vitamin K  -cont to monitor INR  -would do stat brain imaging with any mental status changes   -cont to hold coumadin  -trend cbc, would consider bleeding w/u any acute drop including but not limited to stool guiac, exam and/or ct a/p to eval for RP bleed   -appreciate urology recs  -if pt does in fact require chronic a/c, given this episode as well as the fact that pt very unclear on med regimen at home, would consider IVC filter.    2. SOB  -pt endorsing chronic symptoms, states this is unchanged from his baseline  -pt comfortable appearing  -suspect symptoms may slightly improve with resolution of acute bleeding  -cont tele monitoring    3. biV failure with severe pulm htn and ADHF  -pt with significant b/l LE edema  would cont IV lasix for now  -replete lytes ads requires, however pt with known RV dysfunction slightly pre-load dependent  -will cont to monitor day to day volume status     will follow with you

## 2023-05-06 NOTE — CONSULT NOTE ADULT - NS ATTEND AMEND GEN_ALL_CORE FT
I have seen and examined the patient and agree with the above. Patient had right peroneal vein DVT and ?Chronic PE and now with INR of 12, incarcerated inguinal hernia, Hematuria and leg edema.  Adv-- Repeat the venous duplex, correct the Coagulopathy and Hold AC
Patient seen and examined  Labs and imaging reviewed  Easily reducible left inguinal hernia, without skin changes, or leukocytosis.   Low clinical suspicion for any strangulation  Patient with multiple medical comorbidities, ?prostate cancer  No general surgery intervention required, would hold off on hernia repair until further workup with ?prostate cancer completed  Continue medical management  Can follow up with us as outpatient regarding hernia.

## 2023-05-06 NOTE — ED ADULT TRIAGE NOTE - CHIEF COMPLAINT QUOTE
PMH of prostate cancer, (on warfarin)  C/o hematuria since yesterday afternoon. Denies pain. Noted b/l leg swelling. O2 sat was 91% on RA, placed on 2L nc by ems.

## 2023-05-06 NOTE — H&P ADULT - NSHPPHYSICALEXAM_GEN_ALL_CORE
T(C): 36.8 (05-06-23 @ 03:46), Max: 37 (05-06-23 @ 01:37)  HR: 98 (05-06-23 @ 08:13) (98 - 110)  BP: 130/79 (05-06-23 @ 08:13) (130/79 - 152/100)  RR: 17 (05-06-23 @ 08:13) (17 - 20)  SpO2: 91% (05-06-23 @ 08:13) (91% - 98%)  GENERAL: NAD, lying in bed comfortably  HEAD:  Atraumatic, normocephalic  EYES: EOMI, PERRLA, conjunctiva and sclera clear  NECK: Supple, trachea midline, no JVD  HEART: Regular rate and rhythm, no murmurs, rubs, or gallops  LUNGS: Unlabored respirations.  Clear to auscultation bilaterally, no crackles, wheezing, or rhonchi  ABDOMEN: Soft, nontender, nondistended, +BS  EXTREMITIES: 2+ peripheral pulses bilaterally. No clubbing, cyanosis,     b/l  4  +.  edema  NERVOUS SYSTEM:  A&Ox3, moving all extremities, no focal deficits   SKIN: No rashes or lesions   joseph, gross  hematuria T(C): 36.8 (05-06-23 @ 03:46), Max: 37 (05-06-23 @ 01:37)  HR: 98 (05-06-23 @ 08:13) (98 - 110)  BP: 130/79 (05-06-23 @ 08:13) (130/79 - 152/100)  RR: 17 (05-06-23 @ 08:13) (17 - 20)  SpO2: 91% (05-06-23 @ 08:13) (91% - 98%)  GENERAL: NAD, lying in bed comfortably  HEAD:  Atraumatic, normocephalic  EYES: EOMI, PERRLA, conjunctiva and sclera clear  NECK: Supple, trachea midline, no JVD  HEART: Regular rate and rhythm, no murmurs, rubs, or gallops  LUNGS: Unlabored respirations.  Clear to auscultation bilaterally, no crackles, wheezing, or rhonchi  ABDOMEN: Soft, nontender, nondistended, +BS  EXTREMITIES: 2+ peripheral pulses bilaterally. No clubbing, cyanosis,     b/l  4  +.  edema/  appears  c/c / per pt  NERVOUS SYSTEM:  A&Ox3, moving all extremities, no focal deficits     peripheral pulses, difficult to  examine with gross  edeam/ vascular called  SKIN: No rashes or lesions   joseph, gross  hematuria

## 2023-05-06 NOTE — CONSULT NOTE ADULT - SUBJECTIVE AND OBJECTIVE BOX
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 pt also found to have gross hematuria. INR on admission was 12. pt not aware of which meds he takes or is he takes them appropriately. per chart pt was recently discharged a month ago with joseph for UR and was to be scheduled as outpatiet for further w/u of elevated PSA. pt states he has intermittent SOB with activity and that this is chronic and unchanged from his baseline over the past few years. hgb 11.2. inr now down to 4.4 s/p 10mg of Vit K.  HR 120s SR. pt grossly overloaded on exam.       Allergies    No Known Allergies    Intolerances    	    MEDICATIONS:  furosemide   Injectable 40 milliGRAM(s) IV Push daily  metoprolol succinate ER 50 milliGRAM(s) Oral daily          pantoprazole    Tablet 40 milliGRAM(s) Oral before breakfast    atorvastatin 40 milliGRAM(s) Oral at bedtime  finasteride 5 milliGRAM(s) Oral daily    tamsulosin 0.4 milliGRAM(s) Oral at bedtime      PAST MEDICAL & SURGICAL HISTORY:  HTN (hypertension)      Prostate cancer      hx of eye surgery          FAMILY HISTORY:  denies cardiac hx, hx of dvt/pe    SOCIAL HISTORY:    denies tob, etoh, drugs      REVIEW OF SYSTEMS:  See HPI. Otherwise, 10 point ROS done and otherwise negative.    PHYSICAL EXAM:  T(C): 36.4 (05-06-23 @ 11:03), Max: 37 (05-06-23 @ 01:37)  HR: 106 (05-06-23 @ 11:03) (98 - 110)  BP: 119/77 (05-06-23 @ 11:03) (119/77 - 152/100)  RR: 18 (05-06-23 @ 11:03) (17 - 20)  SpO2: 98% (05-06-23 @ 11:03) (91% - 98%)  Wt(kg): --  I&O's Summary    06 May 2023 07:01  -  06 May 2023 13:58  --------------------------------------------------------  IN: 0 mL / OUT: 2500 mL / NET: -2500 mL        Appearance: Normal	  HEENT:   Normal oral mucosa, PERRL, EOMI	  Lymphatic: No lymphadenopathy  Cardiovascular: Normal S1 S2, No JVD, No murmurs, 3+ pitting b/l LE edema  Respiratory: coarse bs b/l  Psychiatry: A & O x 3, Mood & affect appropriate  Gastrointestinal:  Soft, Non-tender, + BS	  Skin: No rashes, No ecchymoses, No cyanosis	  Neurologic: Non-focal  Extremities: Normal range of motion, No clubbing, cyanosis       LABS:	 	    CBC Full  -  ( 06 May 2023 07:19 )  WBC Count : 6.39 K/uL  Hemoglobin : 11.2 g/dL  Hematocrit : 36.0 %  Platelet Count - Automated : 144 K/uL  Mean Cell Volume : 79.3 fl  Mean Cell Hemoglobin : 24.7 pg  Mean Cell Hemoglobin Concentration : 31.1 g/dL  Auto Neutrophil # : 4.28 K/uL  Auto Lymphocyte # : 1.09 K/uL  Auto Monocyte # : 0.70 K/uL  Auto Eosinophil # : 0.26 K/uL  Auto Basophil # : 0.00 K/uL  Auto Neutrophil % : 67.0 %  Auto Lymphocyte % : 17.0 %  Auto Monocyte % : 11.0 %  Auto Eosinophil % : 4.0 %  Auto Basophil % : 0.0 %    05-06    138  |  111<H>  |  20  ----------------------------<  121<H>  5.2   |  28  |  1.16    Ca    8.5      06 May 2023 02:15    TPro  7.2  /  Alb  2.7<L>  /  TBili  0.5  /  DBili  x   /  AST  59<H>  /  ALT  25  /  AlkPhos  107  05-06      proBNP:   Lipid Profile:   HgA1c:   TSH:       CARDIAC MARKERS:            TELEMETRY: 	    ECG:  	  RADIOLOGY:  OTHER: 	    PREVIOUS DIAGNOSTIC TESTING:    [ ] Echocardiogram:< from: TTE Echo Complete w/o Contrast w/ Doppler (03.29.23 @ 11:07) >  Summary:   1. Left ventricular ejection fraction, by visual estimation, is 40 to   45%.   2. Technically adequate study.   3. Moderately decreased global left ventricular systolic function.   4. Basal and mid inferior wall, basal and mid inferolateral wall, and   mid inferoseptal segment are abnormal as described above.   5. Normal left ventricular internal cavity size.   6. Right ventricular pressure overload.   7. Spectral Doppler shows impaired relaxation pattern of left   ventricular myocardial filling (Grade I diastolic dysfunction).   8. There is moderate concentric left ventricular hypertrophy.   9. Moderately enlarged right ventricle.  10. Moderately reduced RV systolic function.  11. Moderately enlarged right atrium.  12. Normal left atrial size.  13. Small pericardial effusion.  14. Mild mitral valve regurgitation.  15. Structurally normal mitral valve, with normal leaflet excursion.  16. Moderate tricuspid regurgitation.  17. Mild pulmonic valve regurgitation.  18. Estimated pulmonary artery systolic pressure is 76.3 mmHg assuming a   right atrial pressure of 10 mmHg, which is consistent with severe   pulmonary hypertension.        < end of copied text >    [ ]  Catheterization:  [ ] Stress Test:  	  	  ASSESSMENT/PLAN: 	    Jorje Cancino MD    
  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 Patient was recently discharged a month ago with joseph for UR and was to be scheduled as outpatiet for further w/u of elevated PSA. Currently has a joseph with gross  hematuria. Patient also stated he has a left inguinal hernia present. He denies pain from hernia, fever, chills. Stated he has intermittent SOB with activity.       PMH/PSH:PAST MEDICAL & SURGICAL HISTORY:  HTN (hypertension)      Prostate cancer    No significant past surgical history    Allergies:  No Known Allergies      Medications:  atorvastatin 40 milliGRAM(s) Oral at bedtime  finasteride 5 milliGRAM(s) Oral daily  furosemide   Injectable 40 milliGRAM(s) IV Push daily  metoprolol succinate ER 50 milliGRAM(s) Oral daily  pantoprazole    Tablet 40 milliGRAM(s) Oral before breakfast  tamsulosin 0.4 milliGRAM(s) Oral at bedtime      Review of Systems:    Negative exept for HPI    Relevant Family History:   FAMILY HISTORY:      Relevant Social History: Alcohol ( -) , Tobacco ( -) , Illicit drugs (- )     Physical Exam:    Vital Signs:  Vital Signs Last 24 Hrs  T(C): 36.8 (06 May 2023 03:46), Max: 37 (06 May 2023 01:37)  T(F): 98.2 (06 May 2023 03:46), Max: 98.6 (06 May 2023 01:37)  HR: 98 (06 May 2023 08:13) (98 - 110)  BP: 130/79 (06 May 2023 08:13) (130/79 - 152/100)  BP(mean): --  RR: 17 (06 May 2023 08:13) (17 - 20)  SpO2: 91% (06 May 2023 08:13) (91% - 98%)    Parameters below as of 06 May 2023 08:13  Patient On (Oxygen Delivery Method): nasal cannula  O2 Flow (L/min): 4    Daily Height in cm: 167.64 (06 May 2023 01:02)    Daily     General:  Appears stated age, well-groomed, well-nourished, no distress  HEENT:  NC/AT,  conjunctivae clear and pink, no thyromegaly, nodules, adenopathy, no JVD, anicteric sclera  Chest:  Full & symmetric excursion, no increased effort, breath sounds clear  Cardiovascular:  Regular rhythm, S1, S2, no murmur/rub/S3/S4, no abdominal bruit, no edema  Abdomen:  Soft, non tender, non distended, normoactive bowel sounds, Reducible left inguinal hernia  Extremities:  no cyanosis, Warm, + bilateral lower extremity edema. palpable DP/PD  Skin:  No rash/erythema/ecchymoses/petechiae/wounds/abscess/warm/dry  Neuro/Psych:  Alert, oriented, no asterixis, no tremor, no encephalopathy  : No CVA or SP tenderness, adequate meatus, bladder nonpalpable, 16 Armenian joseph catheter with + gross hematuria    Laboratory:                          11.2   6.39  )-----------( 144      ( 06 May 2023 07:19 )             36.0     05-06    138  |  111<H>  |  20  ----------------------------<  121<H>  5.2   |  28  |  1.16    Ca    8.5      06 May 2023 02:15    TPro  7.2  /  Alb  2.7<L>  /  TBili  0.5  /  DBili  x   /  AST  59<H>  /  ALT  25  /  AlkPhos  107  05-06    LIVER FUNCTIONS - ( 06 May 2023 02:15 )  Alb: 2.7 g/dL / Pro: 7.2 gm/dL / ALK PHOS: 107 U/L / ALT: 25 U/L / AST: 59 U/L / GGT: x           PT/INR - ( 06 May 2023 02:15 )   PT: 151.3 sec;   INR: 12.29 ratio         PTT - ( 06 May 2023 02:15 )  PTT:111.9 sec        Intake and Output    05-06-23 @ 07:01  -  05-06-23 @ 11:11  --------------------------------------------------------  IN: 0 mL / OUT: 2500 mL / NET: -2500 mL        Imaging:    < from: CT Abdomen and Pelvis No Cont (05.06.23 @ 05:45) >    ACC: [id number] EXAM:  CT ABDOMEN AND PELVIS   ORDERED BY: JOHNCHRIS FLORES     PROCEDURE DATE:  05/06/2023          INTERPRETATION:  CLINICAL INFORMATION:  hematuira    COMPARISON: CT abdomen and pelvis 4/3/2023..    CONTRAST/COMPLICATIONS:  IV Contrast:NONE  Oral Contrast: NONE  Complications: None reported at time of study completion      PROCEDURE:  Axial CT images were acquired through the abdomen and pelvis without oral   or intravenous contrast. Coronal and sagittal reformatted images were   generated.    FINDINGS: Examination is limited without oral and intravenous contrast.    LOWER CHEST: Cardiomegaly. Small bilateral pleural effusions. Coronary   artery calcifications.  LIVER: Hepatic prominence.  BILE DUCTS: Normal caliber.  GALLBLADDER: Contracted with nonspecific gallbladder wall   thickening/edema.  SPLEEN: Within normal limits.  PANCREAS: Within normal limits.  ADRENALS: Within normal limits.  KIDNEYS/URETERS: Asymmetrically smaller right kidney. No hydronephrosis.   Question tiny 1 to 2 mm nonobstructing lower pole left renal stone, 4:71.   Small left renal cyst. Probable complex small upper pole right renal   demonstrating partial increased density.    BLADDER: Joseph catheter. Air in the urinary bladder, likely due to   instrumentation. Patchy increased density of intraluminal bladder   contents likely hemorrhage, and underlying mass cannot be excluded.  REPRODUCTIVE ORGANS: Enlarged prostate gland abuts the bladder base.    BOWEL: Evaluation of bowel is limited without oral contrast, however   there is no bowel obstruction. Normal appendix without appendicitis.   Small bowel loops are not dilated. The stomach is distended with debris   and air.  PERITONEUM: No free air or significant ascites. Calcifications in the   right lower quadrant again demonstrated.  VESSELS:  Limited without intravenous contrast, however the abdominal   aorta demonstrates calcific atherosclerosis and a normal caliber and   course.  RETROPERITONEUM: No lymphadenopathy.  ABDOMINAL WALL: Moderate left inguinal hernia containing mesenteric fat,   nonobstructed segment of sigmoid colon and small amount of ascites.   Anasarca.Bilateral scrotal hydroceles.  BONES: Degenerative changes of the spine.      IMPRESSION:    Increased intraluminal density within the bladder is suspicious for   hemorrhage. Underlying mass cannot be excluded. Further evaluation with   cystoscopy can be obtained.    Joseph catheter in place. Intraluminal bladder air, likely iatrogenic.    Enlarged prostate gland.    Moderate left inguinal hernia containing mesenteric fat, nonobstructed   segment of sigmoid colon and small amount of ascites. Given ascites   within the left inguinal hernia, an element of strangulation cannot be   excluded. Recommend clinicalcorrelation.    Anasarca.    Cardiomegaly.    Small bilateral pleural effusions.      < from: CT Angio Chest PE Protocol w/ IV Cont (03.31.23 @ 09:31) >    ACC: [id number] EXAM:  CT ANGIO CHEST PULM ART WAWIC   ORDERED BY: JOSE ANNE     PROCEDURE DATE:  03/31/2023          INTERPRETATION:  CLINICAL INFORMATION: Right ventricular dysfunction and   pulmonary hypertension on CT, evaluate for PE    COMPARISON:  CT abdomen and pelvis 3/28/2023  Chest radiograph 3/28/2023    CONTRAST/COMPLICATIONS:  IV Contrast: Omnipaque 350  70 cc administered   30 cc discarded  Oral Contrast: NONE  Complications: None reported at time of study completion    PROCEDURE:  CT Angiogram of the chest was obtained with intravenous contrast. Three   dimensional maximum intensity projection (MIP) images were generated.    FINDINGS:    PULMONARY ANGIOGRAM: There is respiratory motion degradation limiting   evaluation of some of the segmental and subsegmental pulmonary artery   branches. There are linear and eccentric medullary arterial filling   defects bilaterally. Specifically, there is a thrombus in a left lower   lobe segmental pulmonary branch with resultant occlusion. There is an   eccentric filling defect in the left upper lobe artery extending into a   segmental branch. There is an eccentric filling defect in a right lower   lobe segmental artery with resultant occlusion. There is a right upper   lobe lobar eccentric thrombus extending into a segmental pulmonary   artery. Constellation of findings represent chronic pulmonary arterial   emboli.    LYMPH NODES: No lymphadenopathy.    HEART/VASCULATURE: Cardiomegaly. The right heart chambers are enlarged  with a flat ventricular septum. The main pulmonary artery is slightly   enlarged measuring about 3.4 cm. No pericardial effusion. There are   mediastinal collateral vessels sequela of the chronic coronary arterial   emboli. There are aortic calcifications.    AIRWAYS/LUNGS/PLEURA: Airways are patent. There is linear/subsegmental   atelectasis in the left lower lobe. There are trace bilateral pleural   effusions.    UPPER ABDOMEN: Unremarkable.    BONES/SOFT TISSUES: Spinal degenerative changes.There is subcutaneous   edema.    IMPRESSION:    Findings as described above represent chronic pulmonary arterial emboli.   Limited evaluation of some of the segmental and subsegmental pulmonary   arterial branches due to respiratory motion artifact.    Enlarged right heart chambers and flat ventricular septum suggest   pulmonary arterial hypertension.    Trace bilateral pleural effusions.    Findings discussed with ANABELA Hough  by Dr. Pepper on 3/31/2023   11:30 AM with read back confirmation.      < from: US Duplex Venous Lower Ext Complete, Bilateral (03.29.23 @ 09:00) >    ACC: [id number] EXAM:  US DPLX LWR EXT VEINS COMPL BI   ORDERED BY: JULIO SIMPSON     *** ADDENDUM # 1 ***    Addendum: Findings discussed with PETR Mccartney on 03/29/2023 at 9:45 AM  with   read back.    --- End of Report ---    *** END OF ADDENDUM # 1 ***      PROCEDURE DATE:  03/29/2023          INTERPRETATION:  CLINICAL INFORMATION: 77-year-old man with lower   extremity edema    COMPARISON: CT scan 03/28/2023    TECHNIQUE: Duplex sonography of the BILATERAL LOWER extremity veins with   color and spectral Doppler, with and without compression.    FINDINGS:    RIGHT:  Normal compressibility of the RIGHT common femoral, femoral and popliteal   veins.  Doppler examination shows normal spontaneous and phasic flow.  There is occlusive DVT of the right peroneal vein. The posterior tibial   vein is unremarkable.    LEFT:  Normal compressibility of the LEFT common femoral, femoral and popliteal   veins.  Doppler examination shows normal spontaneous and phasic flow.  No LEFT calf vein thrombosis isdetected.    IMPRESSION:  DVT right peroneal vein (below the knee)    
 Patient is a 77y old  Male who presents with a chief complaint of sob/ hematuria (06 May 2023 13:58)    HPI:  77 year old male with HTN, Diastolic CHF, Severe pHTN,  BPH , Prostate  cancer ( elevated PSA ), Recent bone scan with evidence of osseous metastasis,  Urinary retention with indwelling Yan,  Recent  PE/ DVT in April 2023,( was discharged  on coumadin ) and  Left inguinal hernia,   Presented  to ED  with  c/o  bilateral leg edema, sob and on going,  c/c hematuria. Sob is more exertional than at rest.  No fever, chills, cough or sputum production.  In ED with INR of more than 12, Yan with gross  hematuria, also with ventral hernia.  Yan upsized in ED, ventral hernia reduced by surgery. To get FFP to correct INR      PAST MEDICAL & SURGICAL HISTORY:  HTN (hypertension)    Prostate cancer    No significant past surgical history    SOCIAL HISTORY: BMI (kg/m2): 26.5 (05-06 @ 11:03)    Allergies  No Known Allergies    MEDICATIONS  (STANDING):  atorvastatin 40 milliGRAM(s) Oral at bedtime  finasteride 5 milliGRAM(s) Oral daily  furosemide   Injectable 40 milliGRAM(s) IV Push daily  metoprolol succinate ER 50 milliGRAM(s) Oral daily  pantoprazole    Tablet 40 milliGRAM(s) Oral before breakfast  tamsulosin 0.4 milliGRAM(s) Oral at bedtime    Vital Signs Last 24 Hrs  T(C): 36.6 (06 May 2023 16:45), Max: 37 (06 May 2023 01:37)  T(F): 97.9 (06 May 2023 16:45), Max: 98.6 (06 May 2023 01:37)  HR: 96 (06 May 2023 16:45) (96 - 110)  BP: 121/80 (06 May 2023 16:45) (119/77 - 152/100)  BP(mean): --  RR: 17 (06 May 2023 16:45) (17 - 20)  SpO2: 96% (06 May 2023 16:45) (91% - 98%)    Parameters below as of 06 May 2023 16:45  Patient On (Oxygen Delivery Method): nasal cannula  O2 Flow (L/min): 3    PHYSICAL EXAM:  GEN:         Awake, responsive and comfortable.  HEENT:    Normal.    RESP:       no wheezing.  CVS:        Regular rate and rhythm.   ABD:         Soft, non-tender, non-distended;   :          Yan with hematuria  SKIN:        Warm and dry.  EXTR:        edema  CNS:        Responsive  PSYCH:     cooperative, no anxiety or depression    LABS:                        11.2   6.39  )-----------( 144      ( 06 May 2023 07:19 )             36.0     05-06    138  |  111<H>  |  20  ----------------------------<  121<H>  5.2   |  28  |  1.16    Ca    8.5      06 May 2023 02:15    TPro  7.2  /  Alb  2.7<L>  /  TBili  0.5  /  DBili  x   /  AST  59<H>  /  ALT  25  /  AlkPhos  107  05-06    PT/INR - ( 06 May 2023 11:05 )   PT: 53.8 sec;   INR: 4.42 ratio       PTT - ( 06 May 2023 02:15 )  PTT:111.9 sec    EKG:  Sinus    RADIOLOGY & ADDITIONAL STUDIES:  < from: US Duplex Venous Lower Ext Complete, Bilateral (05.06.23 @ 13:00) >  ACC: [id number] EXAM:  US DPLX LWR EXT VEINS COMPL BI   ORDERED BY: HEATHER HAN     PROCEDURE DATE:  05/06/2023      INTERPRETATION:  CLINICAL HISTORY: Evaluate for DVT, pulmonary embolism    COMPARISON: None.    TECHNIQUE: Grayscale color and Doppler examination of the bilateral lower   extremity deep venous systems.    FINDINGS:    Sonographic evaluation of the bilateral common femoral veins, superficial   femoral veins and popliteal veins shows normal flow, compressibility,   respiratory variation and augmentation.  No calf vein thrombosis.    IMPRESSION:    No evidence for acute DVT in the bilateral lower extremity deep venous   systems.    SOO BARNETT MD; Attending Radiologist  This document has been electronically signed. May  6 2023  1:30PM  < from: CT Abdomen and Pelvis No Cont (05.06.23 @ 05:45) >  ACC: [id number] EXAM:  CT ABDOMEN AND PELVIS   ORDERED BY: JOHN FLORES     PROCEDURE DATE:  05/06/2023      INTERPRETATION:  CLINICAL INFORMATION:  hematuira    COMPARISON: CT abdomen and pelvis 4/3/2023..    CONTRAST/COMPLICATIONS:  IV Contrast:NONE  Oral Contrast: NONE  Complications: None reported at time of study completion    PROCEDURE:  Axial CT images were acquired through the abdomen and pelvis without oral   or intravenous contrast. Coronal and sagittal reformatted images were   generated.    FINDINGS: Examination is limited without oral and intravenous contrast.    LOWER CHEST: Cardiomegaly. Small bilateral pleural effusions. Coronary   artery calcifications.  LIVER: Hepatic prominence.  BILE DUCTS: Normal caliber.  GALLBLADDER: Contracted with nonspecific gallbladder wall   thickening/edema.  SPLEEN: Within normal limits.  PANCREAS: Within normal limits.  ADRENALS: Within normal limits.  KIDNEYS/URETERS: Asymmetrically smaller right kidney. No hydronephrosis.   Question tiny 1 to 2 mm nonobstructing lower pole left renal stone, 4:71.   Small left renal cyst. Probable complex small upper pole right renal   demonstrating partial increased density.    BLADDER: Yan catheter. Air in the urinary bladder, likely due to   instrumentation. Patchy increased density of intraluminal bladder   contents likely hemorrhage, and underlying mass cannot be excluded.  REPRODUCTIVE ORGANS: Enlarged prostate gland abuts the bladder base.    BOWEL: Evaluation of bowel is limited without oral contrast, however   there is no bowel obstruction. Normal appendix without appendicitis.   Small bowel loops are not dilated. The stomach is distended with debris   and air.  PERITONEUM: No free air or significant ascites. Calcifications in the   right lower quadrant again demonstrated.  VESSELS:  Limited without intravenous contrast, however the abdominal   aorta demonstrates calcific atherosclerosis and a normal caliber and   course.  RETROPERITONEUM: No lymphadenopathy.  ABDOMINAL WALL: Moderate left inguinal hernia containing mesenteric fat,   nonobstructed segment of sigmoid colon and small amount of ascites.   Anasarca.Bilateral scrotal hydroceles.  BONES: Degenerative changes of the spine.    IMPRESSION:    Increased intraluminal density within the bladder is suspicious for   hemorrhage. Underlying mass cannot be excluded. Further evaluation with   cystoscopy can be obtained.    Yan catheter in place. Intraluminal bladder air, likely iatrogenic.    Enlarged prostate gland.    Moderate left inguinal hernia containing mesenteric fat, nonobstructed   segment of sigmoid colon and small amount of ascites. Given ascites   within the left inguinal hernia, an element of strangulation cannot be   excluded. Recommend clinicalcorrelation.    Anasarca.    Cardiomegaly.    Small bilateral pleural effusions.    ANYI CURRY MD; Attending Radiologist  This document has been electronically signed. May  6 2023  6:18AM  < from: Xray Chest 1 View- PORTABLE-Urgent (Xray Chest 1 View- PORTABLE-Urgent .) (05.06.23 @ 04:33) >  ACC: [id number] EXAM:  XR CHEST PORTABLE URGENT 1V   ORDERED BY: JOHN FLORES     PROCEDURE DATE:  05/06/2023      INTERPRETATION:  INDICATIONS: 77-year-old male with shortness of breath   and bilateral leg swelling.    Prior examination for comparison: 3/28/2020    Technique: AP view of chest    Findings:    The lungs are clear. There are no pleural effusions. The   cardiomediastinal silhouette is normal. Bones are grossly normal.    IMPRESSION: No evidence of acute cardiopulmonary disease.    LAINE TREVINO MD; Attending Interventional Radiologist  This document has been electronically signed. May  6 2023 10:54AM    ASSESSMENT AND PLAN:  ·	SOB.  ·	Fluid retention.  ·	Diastolic dysfunction.  ·	Pulmonary HTN by hx.  ·	Hematuria.  ·	Warfarin coagulopathy.  ·	Prostate  CA  ·	BPH.  ·	Urinary retention, with Yan.  ·	VTE.  ·	Inguinal hernia S/P reduction.    SPO2 96% on nasal O2.  INR being corrected with  FFP, follow INR  Left Inguinal hernia reduced.  Diuretics as needed, being seen by Cardiology.
  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 Patient was recently discharged a month ago with joseph for UR and was to be scheduled as outpatiet for further w/u of elevated PSA. Currently has a joseph with gross  hematuria. Patient also stated he has a left inguinal hernia present. He denies pain from hernia, fever, chills. Stated he has intermittent SOB with activity.       PMH/PSH:PAST MEDICAL & SURGICAL HISTORY:  HTN (hypertension)      Prostate cancer    No significant past surgical history    Allergies:  No Known Allergies      Medications:  atorvastatin 40 milliGRAM(s) Oral at bedtime  finasteride 5 milliGRAM(s) Oral daily  furosemide   Injectable 40 milliGRAM(s) IV Push daily  metoprolol succinate ER 50 milliGRAM(s) Oral daily  pantoprazole    Tablet 40 milliGRAM(s) Oral before breakfast  tamsulosin 0.4 milliGRAM(s) Oral at bedtime      Review of Systems:    Negative exept for HPI    Relevant Family History:   FAMILY HISTORY:      Relevant Social History: Alcohol ( -) , Tobacco ( -) , Illicit drugs (- )     Physical Exam:    Vital Signs:  Vital Signs Last 24 Hrs  T(C): 36.8 (06 May 2023 03:46), Max: 37 (06 May 2023 01:37)  T(F): 98.2 (06 May 2023 03:46), Max: 98.6 (06 May 2023 01:37)  HR: 98 (06 May 2023 08:13) (98 - 110)  BP: 130/79 (06 May 2023 08:13) (130/79 - 152/100)  BP(mean): --  RR: 17 (06 May 2023 08:13) (17 - 20)  SpO2: 91% (06 May 2023 08:13) (91% - 98%)    Parameters below as of 06 May 2023 08:13  Patient On (Oxygen Delivery Method): nasal cannula  O2 Flow (L/min): 4    Daily Height in cm: 167.64 (06 May 2023 01:02)    Daily     General:  Appears stated age, well-groomed, well-nourished, no distress  HEENT:  NC/AT,  conjunctivae clear and pink, no thyromegaly, nodules, adenopathy, no JVD, anicteric sclera  Chest:  Full & symmetric excursion, no increased effort, breath sounds clear  Cardiovascular:  Regular rhythm, S1, S2, no murmur/rub/S3/S4, no abdominal bruit, no edema  Abdomen:  Soft, non tender, non distended, normoactive bowel sounds, Reducible left inguinal hernia  Extremities:  no cyanosis, Warm, + bilateral lower extremity edema. palpable DP/PD  Skin:  No rash/erythema/ecchymoses/petechiae/wounds/abscess/warm/dry  Neuro/Psych:  Alert, oriented, no asterixis, no tremor, no encephalopathy  : No CVA or SP tenderness, adequate meatus, bladder nonpalpable, 16 [place] joseph catheter with + gross hematuria    Laboratory:                          11.2   6.39  )-----------( 144      ( 06 May 2023 07:19 )             36.0     05-06    138  |  111<H>  |  20  ----------------------------<  121<H>  5.2   |  28  |  1.16    Ca    8.5      06 May 2023 02:15    TPro  7.2  /  Alb  2.7<L>  /  TBili  0.5  /  DBili  x   /  AST  59<H>  /  ALT  25  /  AlkPhos  107  05-06    LIVER FUNCTIONS - ( 06 May 2023 02:15 )  Alb: 2.7 g/dL / Pro: 7.2 gm/dL / ALK PHOS: 107 U/L / ALT: 25 U/L / AST: 59 U/L / GGT: x           PT/INR - ( 06 May 2023 02:15 )   PT: 151.3 sec;   INR: 12.29 ratio         PTT - ( 06 May 2023 02:15 )  PTT:111.9 sec        Intake and Output    05-06-23 @ 07:01  -  05-06-23 @ 11:11  --------------------------------------------------------  IN: 0 mL / OUT: 2500 mL / NET: -2500 mL        Imaging:    < from: CT Abdomen and Pelvis No Cont (05.06.23 @ 05:45) >    ACC: [id number] EXAM:  CT ABDOMEN AND PELVIS   ORDERED BY: JOHNCHRIS FLORES     PROCEDURE DATE:  05/06/2023          INTERPRETATION:  CLINICAL INFORMATION:  hematuira    COMPARISON: CT abdomen and pelvis 4/3/2023..    CONTRAST/COMPLICATIONS:  IV Contrast:NONE  Oral Contrast: NONE  Complications: None reported at time of study completion      PROCEDURE:  Axial CT images were acquired through the abdomen and pelvis without oral   or intravenous contrast. Coronal and sagittal reformatted images were   generated.    FINDINGS: Examination is limited without oral and intravenous contrast.    LOWER CHEST: Cardiomegaly. Small bilateral pleural effusions. Coronary   artery calcifications.  LIVER: Hepatic prominence.  BILE DUCTS: Normal caliber.  GALLBLADDER: Contracted with nonspecific gallbladder wall   thickening/edema.  SPLEEN: Within normal limits.  PANCREAS: Within normal limits.  ADRENALS: Within normal limits.  KIDNEYS/URETERS: Asymmetrically smaller right kidney. No hydronephrosis.   Question tiny 1 to 2 mm nonobstructing lower pole left renal stone, 4:71.   Small left renal cyst. Probable complex small upper pole right renal   demonstrating partial increased density.    BLADDER: Joseph catheter. Air in the urinary bladder, likely due to   instrumentation. Patchy increased density of intraluminal bladder   contents likely hemorrhage, and underlying mass cannot be excluded.  REPRODUCTIVE ORGANS: Enlarged prostate gland abuts the bladder base.    BOWEL: Evaluation of bowel is limited without oral contrast, however   there is no bowel obstruction. Normal appendix without appendicitis.   Small bowel loops are not dilated. The stomach is distended with debris   and air.  PERITONEUM: No free air or significant ascites. Calcifications in the   right lower quadrant again demonstrated.  VESSELS:  Limited without intravenous contrast, however the abdominal   aorta demonstrates calcific atherosclerosis and a normal caliber and   course.  RETROPERITONEUM: No lymphadenopathy.  ABDOMINAL WALL: Moderate left inguinal hernia containing mesenteric fat,   nonobstructed segment of sigmoid colon and small amount of ascites.   Anasarca.Bilateral scrotal hydroceles.  BONES: Degenerative changes of the spine.      IMPRESSION:    Increased intraluminal density within the bladder is suspicious for   hemorrhage. Underlying mass cannot be excluded. Further evaluation with   cystoscopy can be obtained.    Joseph catheter in place. Intraluminal bladder air, likely iatrogenic.    Enlarged prostate gland.    Moderate left inguinal hernia containing mesenteric fat, nonobstructed   segment of sigmoid colon and small amount of ascites. Given ascites   within the left inguinal hernia, an element of strangulation cannot be   excluded. Recommend clinicalcorrelation.    Anasarca.    Cardiomegaly.    Small bilateral pleural effusions.      < from: CT Angio Chest PE Protocol w/ IV Cont (03.31.23 @ 09:31) >    ACC: [id number] EXAM:  CT ANGIO CHEST PULM ART WAWIC   ORDERED BY: JOSE ANNE     PROCEDURE DATE:  03/31/2023          INTERPRETATION:  CLINICAL INFORMATION: Right ventricular dysfunction and   pulmonary hypertension on CT, evaluate for PE    COMPARISON:  CT abdomen and pelvis 3/28/2023  Chest radiograph 3/28/2023    CONTRAST/COMPLICATIONS:  IV Contrast: Omnipaque 350  70 cc administered   30 cc discarded  Oral Contrast: NONE  Complications: None reported at time of study completion    PROCEDURE:  CT Angiogram of the chest was obtained with intravenous contrast. Three   dimensional maximum intensity projection (MIP) images were generated.    FINDINGS:    PULMONARY ANGIOGRAM: There is respiratory motion degradation limiting   evaluation of some of the segmental and subsegmental pulmonary artery   branches. There are linear and eccentric medullary arterial filling   defects bilaterally. Specifically, there is a thrombus in a left lower   lobe segmental pulmonary branch with resultant occlusion. There is an   eccentric filling defect in the left upper lobe artery extending into a   segmental branch. There is an eccentric filling defect in a right lower   lobe segmental artery with resultant occlusion. There is a right upper   lobe lobar eccentric thrombus extending into a segmental pulmonary   artery. Constellation of findings represent chronic pulmonary arterial   emboli.    LYMPH NODES: No lymphadenopathy.    HEART/VASCULATURE: Cardiomegaly. The right heart chambers are enlarged  with a flat ventricular septum. The main pulmonary artery is slightly   enlarged measuring about 3.4 cm. No pericardial effusion. There are   mediastinal collateral vessels sequela of the chronic coronary arterial   emboli. There are aortic calcifications.    AIRWAYS/LUNGS/PLEURA: Airways are patent. There is linear/subsegmental   atelectasis in the left lower lobe. There are trace bilateral pleural   effusions.    UPPER ABDOMEN: Unremarkable.    BONES/SOFT TISSUES: Spinal degenerative changes.There is subcutaneous   edema.    IMPRESSION:    Findings as described above represent chronic pulmonary arterial emboli.   Limited evaluation of some of the segmental and subsegmental pulmonary   arterial branches due to respiratory motion artifact.    Enlarged right heart chambers and flat ventricular septum suggest   pulmonary arterial hypertension.    Trace bilateral pleural effusions.    Findings discussed with ANABELA Hough  by Dr. Pepper on 3/31/2023   11:30 AM with read back confirmation.      < from: US Duplex Venous Lower Ext Complete, Bilateral (03.29.23 @ 09:00) >    ACC: [id number] EXAM:  US DPLX LWR EXT VEINS COMPL BI   ORDERED BY: JULIO SIMPSON     *** ADDENDUM # 1 ***    Addendum: Findings discussed with PETR Mccartney on 03/29/2023 at 9:45 AM  with   read back.    --- End of Report ---    *** END OF ADDENDUM # 1 ***      PROCEDURE DATE:  03/29/2023          INTERPRETATION:  CLINICAL INFORMATION: 77-year-old man with lower   extremity edema    COMPARISON: CT scan 03/28/2023    TECHNIQUE: Duplex sonography of the BILATERAL LOWER extremity veins with   color and spectral Doppler, with and without compression.    FINDINGS:    RIGHT:  Normal compressibility of the RIGHT common femoral, femoral and popliteal   veins.  Doppler examination shows normal spontaneous and phasic flow.  There is occlusive DVT of the right peroneal vein. The posterior tibial   vein is unremarkable.    LEFT:  Normal compressibility of the LEFT common femoral, femoral and popliteal   veins.  Doppler examination shows normal spontaneous and phasic flow.  No LEFT calf vein thrombosis isdetected.    IMPRESSION:  DVT right peroneal vein (below the knee)      < end of copied text >    
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 Patient was recently discharged a month ago with joseph for UR and was to be scheduled as outpatiet for further w/u of elevated PSA. Currently has a joseph with gross  hematuria. Patient also stated he has a left inguinal hernia present. He denies pain from hernia, fever, chills. Stated he has intermittent SOB with activity.       PMH/PSH:PAST MEDICAL & SURGICAL HISTORY:  HTN (hypertension)    Prostate cancer    No significant past surgical history    Allergies:  No Known Allergies      Medications:  atorvastatin 40 milliGRAM(s) Oral at bedtime  finasteride 5 milliGRAM(s) Oral daily  furosemide   Injectable 40 milliGRAM(s) IV Push daily  metoprolol succinate ER 50 milliGRAM(s) Oral daily  pantoprazole    Tablet 40 milliGRAM(s) Oral before breakfast  tamsulosin 0.4 milliGRAM(s) Oral at bedtime      Review of Systems:    Negative exept for HPI    Relevant Family History:   FAMILY HISTORY:      Relevant Social History: Alcohol ( -) , Tobacco ( -) , Illicit drugs (- )     Physical Exam:    Vital Signs:  Vital Signs Last 24 Hrs  T(C): 36.8 (06 May 2023 03:46), Max: 37 (06 May 2023 01:37)  T(F): 98.2 (06 May 2023 03:46), Max: 98.6 (06 May 2023 01:37)  HR: 98 (06 May 2023 08:13) (98 - 110)  BP: 130/79 (06 May 2023 08:13) (130/79 - 152/100)  BP(mean): --  RR: 17 (06 May 2023 08:13) (17 - 20)  SpO2: 91% (06 May 2023 08:13) (91% - 98%)    Parameters below as of 06 May 2023 08:13  Patient On (Oxygen Delivery Method): nasal cannula  O2 Flow (L/min): 4    Daily Height in cm: 167.64 (06 May 2023 01:02)    Daily     General:  Appears stated age, well-groomed, well-nourished, no distress  HEENT:  NC/AT,  conjunctivae clear and pink, no thyromegaly, nodules, adenopathy, no JVD, anicteric sclera  Chest:  Full & symmetric excursion, no increased effort, breath sounds clear  Cardiovascular:  Regular rhythm, S1, S2, no murmur/rub/S3/S4, no abdominal bruit, no edema  Abdomen:  Soft, non tender, non distended, normoactive bowel sounds, Reducible left inguinal hernia  Extremities:  no cyanosis, Warm, + bilateral lower extremity edema. palpable DP/PD  Skin:  No rash/erythema/ecchymoses/petechiae/wounds/abscess/warm/dry  Neuro/Psych:  Alert, oriented, no asterixis, no tremor, no encephalopathy  : No CVA or SP tenderness, adequate meatus, bladder nonpalpable, 16 [place] joseph catheter with + gross hematuria    Laboratory:                          11.2   6.39  )-----------( 144      ( 06 May 2023 07:19 )             36.0     05-06    138  |  111<H>  |  20  ----------------------------<  121<H>  5.2   |  28  |  1.16    Ca    8.5      06 May 2023 02:15    TPro  7.2  /  Alb  2.7<L>  /  TBili  0.5  /  DBili  x   /  AST  59<H>  /  ALT  25  /  AlkPhos  107  05-06    LIVER FUNCTIONS - ( 06 May 2023 02:15 )  Alb: 2.7 g/dL / Pro: 7.2 gm/dL / ALK PHOS: 107 U/L / ALT: 25 U/L / AST: 59 U/L / GGT: x           PT/INR - ( 06 May 2023 02:15 )   PT: 151.3 sec;   INR: 12.29 ratio         PTT - ( 06 May 2023 02:15 )  PTT:111.9 sec        Intake and Output    05-06-23 @ 07:01  -  05-06-23 @ 11:11  --------------------------------------------------------  IN: 0 mL / OUT: 2500 mL / NET: -2500 mL        Imaging:    < from: CT Abdomen and Pelvis No Cont (05.06.23 @ 05:45) >    ACC: [id number] EXAM:  CT ABDOMEN AND PELVIS   ORDERED BY: JOHNCHRIS FLORES     PROCEDURE DATE:  05/06/2023          INTERPRETATION:  CLINICAL INFORMATION:  hematuira    COMPARISON: CT abdomen and pelvis 4/3/2023..    CONTRAST/COMPLICATIONS:  IV Contrast:NONE  Oral Contrast: NONE  Complications: None reported at time of study completion      PROCEDURE:  Axial CT images were acquired through the abdomen and pelvis without oral   or intravenous contrast. Coronal and sagittal reformatted images were   generated.    FINDINGS: Examination is limited without oral and intravenous contrast.    LOWER CHEST: Cardiomegaly. Small bilateral pleural effusions. Coronary   artery calcifications.  LIVER: Hepatic prominence.  BILE DUCTS: Normal caliber.  GALLBLADDER: Contracted with nonspecific gallbladder wall   thickening/edema.  SPLEEN: Within normal limits.  PANCREAS: Within normal limits.  ADRENALS: Within normal limits.  KIDNEYS/URETERS: Asymmetrically smaller right kidney. No hydronephrosis.   Question tiny 1 to 2 mm nonobstructing lower pole left renal stone, 4:71.   Small left renal cyst. Probable complex small upper pole right renal   demonstrating partial increased density.    BLADDER: Joseph catheter. Air in the urinary bladder, likely due to   instrumentation. Patchy increased density of intraluminal bladder   contents likely hemorrhage, and underlying mass cannot be excluded.  REPRODUCTIVE ORGANS: Enlarged prostate gland abuts the bladder base.    BOWEL: Evaluation of bowel is limited without oral contrast, however   there is no bowel obstruction. Normal appendix without appendicitis.   Small bowel loops are not dilated. The stomach is distended with debris   and air.  PERITONEUM: No free air or significant ascites. Calcifications in the   right lower quadrant again demonstrated.  VESSELS:  Limited without intravenous contrast, however the abdominal   aorta demonstrates calcific atherosclerosis and a normal caliber and   course.  RETROPERITONEUM: No lymphadenopathy.  ABDOMINAL WALL: Moderate left inguinal hernia containing mesenteric fat,   nonobstructed segment of sigmoid colon and small amount of ascites.   Anasarca.Bilateral scrotal hydroceles.  BONES: Degenerative changes of the spine.      IMPRESSION:    Increased intraluminal density within the bladder is suspicious for   hemorrhage. Underlying mass cannot be excluded. Further evaluation with   cystoscopy can be obtained.    Joseph catheter in place. Intraluminal bladder air, likely iatrogenic.    Enlarged prostate gland.    Moderate left inguinal hernia containing mesenteric fat, nonobstructed   segment of sigmoid colon and small amount of ascites. Given ascites   within the left inguinal hernia, an element of strangulation cannot be   excluded. Recommend clinicalcorrelation.    Anasarca.    Cardiomegaly.    Small bilateral pleural effusions.      < from: CT Angio Chest PE Protocol w/ IV Cont (03.31.23 @ 09:31) >    ACC: [id number] EXAM:  CT ANGIO CHEST PULM ART WAWIC   ORDERED BY: JOSE ANNE     PROCEDURE DATE:  03/31/2023          INTERPRETATION:  CLINICAL INFORMATION: Right ventricular dysfunction and   pulmonary hypertension on CT, evaluate for PE    COMPARISON:  CT abdomen and pelvis 3/28/2023  Chest radiograph 3/28/2023    CONTRAST/COMPLICATIONS:  IV Contrast: Omnipaque 350  70 cc administered   30 cc discarded  Oral Contrast: NONE  Complications: None reported at time of study completion    PROCEDURE:  CT Angiogram of the chest was obtained with intravenous contrast. Three   dimensional maximum intensity projection (MIP) images were generated.    FINDINGS:    PULMONARY ANGIOGRAM: There is respiratory motion degradation limiting   evaluation of some of the segmental and subsegmental pulmonary artery   branches. There are linear and eccentric medullary arterial filling   defects bilaterally. Specifically, there is a thrombus in a left lower   lobe segmental pulmonary branch with resultant occlusion. There is an   eccentric filling defect in the left upper lobe artery extending into a   segmental branch. There is an eccentric filling defect in a right lower   lobe segmental artery with resultant occlusion. There is a right upper   lobe lobar eccentric thrombus extending into a segmental pulmonary   artery. Constellation of findings represent chronic pulmonary arterial   emboli.    LYMPH NODES: No lymphadenopathy.    HEART/VASCULATURE: Cardiomegaly. The right heart chambers are enlarged  with a flat ventricular septum. The main pulmonary artery is slightly   enlarged measuring about 3.4 cm. No pericardial effusion. There are   mediastinal collateral vessels sequela of the chronic coronary arterial   emboli. There are aortic calcifications.    AIRWAYS/LUNGS/PLEURA: Airways are patent. There is linear/subsegmental   atelectasis in the left lower lobe. There are trace bilateral pleural   effusions.    UPPER ABDOMEN: Unremarkable.    BONES/SOFT TISSUES: Spinal degenerative changes.There is subcutaneous   edema.    IMPRESSION:    Findings as described above represent chronic pulmonary arterial emboli.   Limited evaluation of some of the segmental and subsegmental pulmonary   arterial branches due to respiratory motion artifact.    Enlarged right heart chambers and flat ventricular septum suggest   pulmonary arterial hypertension.    Trace bilateral pleural effusions.    Findings discussed with ANABELA Hough  by Dr. Pepper on 3/31/2023   11:30 AM with read back confirmation.      < from: US Duplex Venous Lower Ext Complete, Bilateral (03.29.23 @ 09:00) >    ACC: [id number] EXAM:  US DPLX LWR EXT VEINS COMPL BI   ORDERED BY: JULIO SIMPSON     *** ADDENDUM # 1 ***    Addendum: Findings discussed with PETR Mccartney on 03/29/2023 at 9:45 AM  with   read back.    --- End of Report ---    *** END OF ADDENDUM # 1 ***      PROCEDURE DATE:  03/29/2023          INTERPRETATION:  CLINICAL INFORMATION: 77-year-old man with lower   extremity edema    COMPARISON: CT scan 03/28/2023    TECHNIQUE: Duplex sonography of the BILATERAL LOWER extremity veins with   color and spectral Doppler, with and without compression.    FINDINGS:    RIGHT:  Normal compressibility of the RIGHT common femoral, femoral and popliteal   veins.  Doppler examination shows normal spontaneous and phasic flow.  There is occlusive DVT of the right peroneal vein. The posterior tibial   vein is unremarkable.    LEFT:  Normal compressibility of the LEFT common femoral, femoral and popliteal   veins.  Doppler examination shows normal spontaneous and phasic flow.  No LEFT calf vein thrombosis isdetected.    IMPRESSION:  DVT right peroneal vein (below the knee)

## 2023-05-06 NOTE — ED PROVIDER NOTE - OBJECTIVE STATEMENT
77 e 77 year old male with h/o HTN, CHF, BPH and protrate cancer presents today c/o three days od bilateral leg edema, sob and hematuria, pt was seen in the ER on 3/38/23, currently not connected to anything, pt found to by hypoxic on arrivat to 91%, placed on 3 L nasal cannula 77 year old male with h/o HTN, CHF, BPH and prostate cancer presents today c/o three days od bilateral leg edema, sob and hematuria, pt was seen in the ER on 3/38/23, currently on RA, pt found to by hypoxic on arrivat to 91%, placed on 3 L nasal cannula

## 2023-05-06 NOTE — H&P ADULT - NSHPLABSRESULTS_GEN_ALL_CORE
LABS:                        11.2   6.39  )-----------( 144      ( 06 May 2023 07:19 )             36.0     05-06    138  |  111<H>  |  20  ----------------------------<  121<H>  5.2   |  28  |  1.16    Ca    8.5      06 May 2023 02:15    TPro  7.2  /  Alb  2.7<L>  /  TBili  0.5  /  DBili  x   /  AST  59<H>  /  ALT  25  /  AlkPhos  107  05-06    PT/INR - ( 06 May 2023 02:15 )   PT: 151.3 sec;   INR: 12.29 ratio         PTT - ( 06 May 2023 02:15 )  PTT:111.9 sec  CARDIAC MARKERS ( 06 May 2023 02:15 )  x     / x     / 259 U/L / x     / 3.8 ng/mL

## 2023-05-06 NOTE — ED ADULT NURSE REASSESSMENT NOTE - NS ED NURSE REASSESS COMMENT FT1
joseph cath flushed, connected to bag, 200 mL of dark blood and clots joseph cath flushed, connected to bag, 200 mL of dark blood and clots drained.

## 2023-05-06 NOTE — ED ADULT NURSE NOTE - OBJECTIVE STATEMENT
Pt alert and oriented x4, BIBA c/o hematuria. Yan catheter in place not connected to anything, B/L lower extremity edema and redness noted. Abdomen distended and tender upon palpation. Pt alert and oriented x4, BIBA c/o hematuria x2 days. Yan catheter placed here, not connected to anything, B/L lower extremity edema and redness noted. Abdomen distended and tender upon palpation. PMH of prostate Ca, HTN, Diabetes. Denies chest pain, SOB , Dizziness, weakness, fevers, chills. Pt alert and oriented x4, BIBA c/o hematuria x2 days. Coming in with Yan catheter that was placed here 3/28, not connected to anything at this time, dried blood around the site. B/L lower extremity edema and redness noted. Abdomen distended and tender upon palpation. SOB upon arrival sating 91% on room air, improved on 3L NC. PMH of prostate Ca, HTN, Dementia. Denies chest pain, SOB , Dizziness, weakness, fevers, chills. Placed on cardiac monitor.

## 2023-05-06 NOTE — CONSULT NOTE ADULT - ASSESSMENT
[id number] Polly Landaverde.    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  Had Bone scan 4/3/23 : evidence of osseous metastasis.  Upsize joseph catheter to [address] [place] [address], Will likely need CBI  Correct INR   Repeat PSA, continue finasteride and flomax  Discussed with Dr Shore

## 2023-05-06 NOTE — CONSULT NOTE ADULT - ASSESSMENT
[id number] Saima, Romanian creole.    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  + Edema to WICHO lower ext, warm + PD/DP pulses    - repeat [place] lower ext doppler  - Correct INR  Discussed with Dr Luu

## 2023-05-06 NOTE — CONSULT NOTE ADULT - ASSESSMENT
[id number] Polly Landaverde.    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  No emergent surgical intervention warranted at this time   [id number] Polly Landaverde.    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  No emergent surgical intervention warranted at this time   [id number] Polly Landaverde.    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

## 2023-05-07 LAB
ANION GAP SERPL CALC-SCNC: 3 MMOL/L — LOW (ref 5–17)
BUN SERPL-MCNC: 19 MG/DL — SIGNIFICANT CHANGE UP (ref 7–23)
CALCIUM SERPL-MCNC: 8.2 MG/DL — LOW (ref 8.5–10.1)
CHLORIDE SERPL-SCNC: 104 MMOL/L — SIGNIFICANT CHANGE UP (ref 96–108)
CO2 SERPL-SCNC: 31 MMOL/L — SIGNIFICANT CHANGE UP (ref 22–31)
CREAT SERPL-MCNC: 1.09 MG/DL — SIGNIFICANT CHANGE UP (ref 0.5–1.3)
EGFR: 70 ML/MIN/1.73M2 — SIGNIFICANT CHANGE UP
GLUCOSE SERPL-MCNC: 126 MG/DL — HIGH (ref 70–99)
HCT VFR BLD CALC: 35.1 % — LOW (ref 39–50)
HGB BLD-MCNC: 10.8 G/DL — LOW (ref 13–17)
INR BLD: 1.71 RATIO — HIGH (ref 0.88–1.16)
MCHC RBC-ENTMCNC: 24.7 PG — LOW (ref 27–34)
MCHC RBC-ENTMCNC: 30.8 G/DL — LOW (ref 32–36)
MCV RBC AUTO: 80.1 FL — SIGNIFICANT CHANGE UP (ref 80–100)
NRBC # BLD: 0 /100 WBCS — SIGNIFICANT CHANGE UP (ref 0–0)
PLATELET # BLD AUTO: 138 K/UL — LOW (ref 150–400)
POTASSIUM SERPL-MCNC: 3.1 MMOL/L — LOW (ref 3.5–5.3)
POTASSIUM SERPL-SCNC: 3.1 MMOL/L — LOW (ref 3.5–5.3)
PROTHROM AB SERPL-ACNC: 20.6 SEC — HIGH (ref 10.5–13.4)
PSA FLD-MCNC: 59.3 NG/ML — HIGH (ref 0–4)
RBC # BLD: 4.38 M/UL — SIGNIFICANT CHANGE UP (ref 4.2–5.8)
RBC # FLD: 15.9 % — HIGH (ref 10.3–14.5)
SODIUM SERPL-SCNC: 138 MMOL/L — SIGNIFICANT CHANGE UP (ref 135–145)
WBC # BLD: 7.96 K/UL — SIGNIFICANT CHANGE UP (ref 3.8–10.5)
WBC # FLD AUTO: 7.96 K/UL — SIGNIFICANT CHANGE UP (ref 3.8–10.5)

## 2023-05-07 PROCEDURE — 99233 SBSQ HOSP IP/OBS HIGH 50: CPT

## 2023-05-07 PROCEDURE — 70450 CT HEAD/BRAIN W/O DYE: CPT | Mod: 26

## 2023-05-07 PROCEDURE — 93306 TTE W/DOPPLER COMPLETE: CPT | Mod: 26

## 2023-05-07 PROCEDURE — 93970 EXTREMITY STUDY: CPT | Mod: 26

## 2023-05-07 RX ORDER — LANOLIN ALCOHOL/MO/W.PET/CERES
3 CREAM (GRAM) TOPICAL ONCE
Refills: 0 | Status: COMPLETED | OUTPATIENT
Start: 2023-05-07 | End: 2023-05-07

## 2023-05-07 RX ORDER — POTASSIUM CHLORIDE 20 MEQ
40 PACKET (EA) ORAL EVERY 8 HOURS
Refills: 0 | Status: COMPLETED | OUTPATIENT
Start: 2023-05-07 | End: 2023-05-07

## 2023-05-07 RX ADMIN — PANTOPRAZOLE SODIUM 40 MILLIGRAM(S): 20 TABLET, DELAYED RELEASE ORAL at 06:10

## 2023-05-07 RX ADMIN — Medication 40 MILLIEQUIVALENT(S): at 22:25

## 2023-05-07 RX ADMIN — TAMSULOSIN HYDROCHLORIDE 0.4 MILLIGRAM(S): 0.4 CAPSULE ORAL at 22:25

## 2023-05-07 RX ADMIN — FINASTERIDE 5 MILLIGRAM(S): 5 TABLET, FILM COATED ORAL at 11:42

## 2023-05-07 RX ADMIN — ATORVASTATIN CALCIUM 40 MILLIGRAM(S): 80 TABLET, FILM COATED ORAL at 22:24

## 2023-05-07 RX ADMIN — Medication 40 MILLIGRAM(S): at 06:09

## 2023-05-07 RX ADMIN — Medication 40 MILLIEQUIVALENT(S): at 13:06

## 2023-05-07 NOTE — PROGRESS NOTE ADULT - SUBJECTIVE AND OBJECTIVE BOX
24H hour events:   pt resting  no complaints except pain at joseph site  no acute events overnight  MEDICATIONS:  furosemide   Injectable 40 milliGRAM(s) IV Push daily  metoprolol succinate ER 50 milliGRAM(s) Oral daily          pantoprazole    Tablet 40 milliGRAM(s) Oral before breakfast    atorvastatin 40 milliGRAM(s) Oral at bedtime  finasteride 5 milliGRAM(s) Oral daily    potassium chloride   Powder 40 milliEquivalent(s) Oral every 8 hours  tamsulosin 0.4 milliGRAM(s) Oral at bedtime          PHYSICAL EXAM:  T(C): 37.1 (05-07-23 @ 05:08), Max: 37.1 (05-07-23 @ 00:16)  HR: 100 (05-07-23 @ 05:08) (96 - 113)  BP: 101/67 (05-07-23 @ 05:08) (101/67 - 121/80)  RR: 18 (05-07-23 @ 05:08) (17 - 18)  SpO2: 94% (05-07-23 @ 05:08) (93% - 98%)  Wt(kg): --  I&O's Summary    06 May 2023 07:01  -  07 May 2023 07:00  --------------------------------------------------------  IN: 360 mL / OUT: 3200 mL / NET: -2840 mL        Appearance: Normal	  HEENT:   Normal oral mucosa, PERRL, EOMI	  Lymphatic: No lymphadenopathy  Cardiovascular: Normal S1 S2, No JVD, No murmurs, No edema  Respiratory: Lungs clear to auscultation	  Psychiatry: A & O x 3, Mood & affect appropriate  Gastrointestinal:  Soft, Non-tender, + BS	  Skin: No rashes, No ecchymoses, No cyanosis	  Neurologic: Non-focal  Extremities: Normal range of motion, No clubbing, cyanosis       LABS:	 	    CBC Full  -  ( 07 May 2023 06:00 )  WBC Count : 7.96 K/uL  Hemoglobin : 10.8 g/dL  Hematocrit : 35.1 %  Platelet Count - Automated : 138 K/uL  Mean Cell Volume : 80.1 fl  Mean Cell Hemoglobin : 24.7 pg  Mean Cell Hemoglobin Concentration : 30.8 g/dL  Auto Neutrophil # : x  Auto Lymphocyte # : x  Auto Monocyte # : x  Auto Eosinophil # : x  Auto Basophil # : x  Auto Neutrophil % : x  Auto Lymphocyte % : x  Auto Monocyte % : x  Auto Eosinophil % : x  Auto Basophil % : x    05-07    138  |  104  |  19  ----------------------------<  126<H>  3.1<L>   |  31  |  1.09  05-06    138  |  111<H>  |  20  ----------------------------<  121<H>  5.2   |  28  |  1.16    Ca    8.2<L>      07 May 2023 06:00  Ca    8.5      06 May 2023 02:15    TPro  7.2  /  Alb  2.7<L>  /  TBili  0.5  /  DBili  x   /  AST  59<H>  /  ALT  25  /  AlkPhos  107  05-06      proBNP:   Lipid Profile:   HgA1c:   TSH:       CARDIAC MARKERS:            TELEMETRY: 	    ECG:  	  RADIOLOGY:  OTHER: 	    PREVIOUS DIAGNOSTIC TESTING:    [ ] Echocardiogram:  [ ]  Catheterization:  [ ] Stress Test:  	  	  ASSESSMENT/PLAN:

## 2023-05-07 NOTE — PROGRESS NOTE ADULT - SUBJECTIVE AND OBJECTIVE BOX
INTERVAL HPI:   77 year old male with HTN, Diastolic CHF, Severe pHTN,  BPH , Prostate  cancer ( elevated PSA ), Recent bone scan with evidence of osseous metastasis,  Urinary retention with indwelling Yan,  Recent  PE/ DVT in April 2023,( was discharged  on coumadin ) and  Left inguinal hernia,   Presented  to ED  with  c/o  bilateral leg edema, sob and on going,  c/c hematuria. Sob is more exertional than at rest.  No fever, chills, cough or sputum production.  In ED with INR of more than 12, Yan with gross  hematuria, also with ventral hernia.  Yan upsized in ED, ventral hernia reduced by surgery. To get FFP to correct INR     OVERNIGHT EVENTS:  Awake, responsive, no distress.    Vital Signs Last 24 Hrs  T(C): 36.6 (07 May 2023 15:35), Max: 37.1 (07 May 2023 00:16)  T(F): 97.9 (07 May 2023 15:35), Max: 98.7 (07 May 2023 00:16)  HR: 103 (07 May 2023 15:35) (96 - 113)  BP: 113/75 (07 May 2023 15:35) (101/67 - 121/80)  BP(mean): --  RR: 18 (07 May 2023 15:35) (17 - 18)  SpO2: 96% (07 May 2023 15:35) (93% - 96%)    Parameters below as of 07 May 2023 15:35  Patient On (Oxygen Delivery Method): nasal cannula    PHYSICAL EXAM:  GEN:         Awake, responsive and comfortable.  HEENT:    Normal.    RESP:         no distress.  CVS:           Regular rate and rhythm.     MEDICATIONS  (STANDING):  atorvastatin 40 milliGRAM(s) Oral at bedtime  finasteride 5 milliGRAM(s) Oral daily  furosemide   Injectable 40 milliGRAM(s) IV Push daily  metoprolol succinate ER 50 milliGRAM(s) Oral daily  pantoprazole    Tablet 40 milliGRAM(s) Oral before breakfast  potassium chloride   Powder 40 milliEquivalent(s) Oral every 8 hours  tamsulosin 0.4 milliGRAM(s) Oral at bedtime    LABS:                        10.8   7.96  )-----------( 138      ( 07 May 2023 06:00 )             35.1     05-07    138  |  104  |  19  ----------------------------<  126<H>  3.1<L>   |  31  |  1.09    Ca    8.2<L>      07 May 2023 06:00    TPro  7.2  /  Alb  2.7<L>  /  TBili  0.5  /  DBili  x   /  AST  59<H>  /  ALT  25  /  AlkPhos  107  05-06    PT/INR - ( 07 May 2023 06:00 )   PT: 20.6 sec;   INR: 1.71 ratio      PTT - ( 06 May 2023 02:15 )  PTT:111.9 sec    ASSESSMENT AND PLAN:  SOB.  Fluid retention.  Diastolic dysfunction.  Pulmonary HTN by hx.  Hematuria.  Warfarin coagulopathy.  Prostate  CA  BPH.  Urinary retention, with Yan.  VTE.  Inguinal hernia S/P reduction.    SPO2 96% on nasal O2.  INR being corrected with  FFP, follow INR  Left Inguinal hernia reduced.  Diuretics as needed, being seen by Cardiology.    05/07/23: Awake, responsive and comfortable. SPO2 96% .  INR down to 1.71  LE duplex, no DVT.

## 2023-05-07 NOTE — PROGRESS NOTE ADULT - SUBJECTIVE AND OBJECTIVE BOX
Patient seen and examined bedside resting comfortably.  Denies hematuria and dysuria. Denies nausea and vomiting. Tolerating diet.  Denies chest pain, dyspnea, cough.    T(F): 98.7 (05-07-23 @ 05:08), Max: 98.7 (05-07-23 @ 00:16)  HR: 100 (05-07-23 @ 05:08) (96 - 113)  BP: 101/67 (05-07-23 @ 05:08) (101/67 - 121/80)  RR: 18 (05-07-23 @ 05:08) (17 - 18)  SpO2: 94% (05-07-23 @ 05:08) (93% - 98%)  Wt(kg): --  CAPILLARY BLOOD GLUCOSE          PHYSICAL EXAM:    General: NAD, alert and awake  HEENT: NCAT, EOMI, conjunctiva clear  Chest: nonlabored respirations, CTA b/l.  Abdomen: soft, NT/ND.   Extremities: Calf soft, nontender b/l.   : No suprapubic tenderness or bladder distention.    Yan draining clear yellow urine.    LABS:                        10.8   7.96  )-----------( 138      ( 07 May 2023 06:00 )             35.1   05-07    138  |  104  |  19  ----------------------------<  126<H>  3.1<L>   |  31  |  1.09    Ca    8.2<L>      07 May 2023 06:00    TPro  7.2  /  Alb  2.7<L>  /  TBili  0.5  /  DBili  x   /  AST  59<H>  /  ALT  25  /  AlkPhos  107  05-06  PT/INR - ( 07 May 2023 06:00 )   PT: 20.6 sec;   INR: 1.71 ratio         PTT - ( 06 May 2023 02:15 )  PTT:111.9 sec  I&O's Detail    06 May 2023 07:01  -  07 May 2023 07:00  --------------------------------------------------------  IN:    IV PiggyBack: 360 mL  Total IN: 360 mL    OUT:    Indwelling Catheter - Urethral (mL): 3200 mL  Total OUT: 3200 mL    Total NET: -2840 mL          Impression: 77y Male with h/o HTN, CHF, BPH , elevated PSA (90), Left inguinal hernia, DVT right peroneal vein (below the knee), 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 Yan draining clear yellow urine.  Had Bone scan 4/3/23 : No evidence of osseous metastasis.  - Continue Yan.  - Repeat PSA. If persistently elevated will need outpatient prostate biopsy.  - continue finasteride and flomax.

## 2023-05-07 NOTE — PROGRESS NOTE ADULT - SUBJECTIVE AND OBJECTIVE BOX
Patient seen and examined  feels well  no symptoms  on diuretics  inr now 1.7  Review of Systems:  General:denies fever chills, headache, weakness  HEENT: denies blurry vision,diffculty swallowing, difficulty hearing, tinnitus  Cardiovascular: denies chest pain  ,palpitations  Pulmonary:denies shortness of breath, cough, wheezing, hemoptysis  Gastrointestinal: denies abdominal pain, constipation, diarrhea,nausea , vomiting, hematochezia  : denies hematuria, dysuria, or incontinence  Neurological: denies weakness, numbness , tingling, dizziness, tremors  MSK: denies muscle pain, difficulty ambulating, swelling, back pain  skin: denies skin rash, itching, burning, or  skin lesions  Psychiatrical: denies mood disturbances, anxiety, feeling depressed, depression , or difficulty sleeping    Objective:  Vitals  T(C): 37.1 (05-07-23 @ 05:08), Max: 37.1 (05-07-23 @ 00:16)  HR: 100 (05-07-23 @ 05:08) (96 - 113)  BP: 101/67 (05-07-23 @ 05:08) (101/67 - 121/80)  RR: 18 (05-07-23 @ 05:08) (17 - 18)  SpO2: 94% (05-07-23 @ 05:08) (93% - 98%)    Physical Exam:  General: comfortable, no acute distress  HEENT: Atraumatic, no LAD, trachea midline, PERRLA  Cardiovascular: normal s1s2, no murmurs, gallops or fricition rubs  Pulmonary: clear to ausculation Bilaterally, no wheezing , rhonchi  Gastrointestinal: soft non tender non distended, no masses felt, no organomegally    Muscloskeletal: no lower extremity edema, intact bilateral lower extremity pulses  Neurological: CN II-12 intact. No focal weakness  Psychiatrical: normal mood, cooperative  SKIN: no rash, lesions or ulcers    Labs:                          10.8   7.96  )-----------( 138      ( 07 May 2023 06:00 )             35.1     05-07    138  |  104  |  19  ----------------------------<  126<H>  3.1<L>   |  31  |  1.09    Ca    8.2<L>      07 May 2023 06:00    TPro  7.2  /  Alb  2.7<L>  /  TBili  0.5  /  DBili  x   /  AST  59<H>  /  ALT  25  /  AlkPhos  107  05-06    LIVER FUNCTIONS - ( 06 May 2023 02:15 )  Alb: 2.7 g/dL / Pro: 7.2 gm/dL / ALK PHOS: 107 U/L / ALT: 25 U/L / AST: 59 U/L / GGT: x           PT/INR - ( 07 May 2023 06:00 )   PT: 20.6 sec;   INR: 1.71 ratio         PTT - ( 06 May 2023 02:15 )  PTT:111.9 sec      Active Medications  MEDICATIONS  (STANDING):  atorvastatin 40 milliGRAM(s) Oral at bedtime  finasteride 5 milliGRAM(s) Oral daily  furosemide   Injectable 40 milliGRAM(s) IV Push daily  metoprolol succinate ER 50 milliGRAM(s) Oral daily  pantoprazole    Tablet 40 milliGRAM(s) Oral before breakfast  potassium chloride   Powder 40 milliEquivalent(s) Oral every 8 hours  tamsulosin 0.4 milliGRAM(s) Oral at bedtime    MEDICATIONS  (PRN):

## 2023-05-07 NOTE — PROGRESS NOTE ADULT - ASSESSMENT
77 year old male with h/o HTN,  CHF, BPH , prostate   cancer h/o  recent  PE/   dvt   and was   d/c on 4/7/23,  on coumadin also  with  on going hematuria/ joseph, noted  from last visit, now, returns  ,with   c/o   bilateral leg edema, sob and hematuria. Patient admiitted for acute hypoxemic respiratory failure secondary to Congestive Heart Failure , gross hematuria due to supertherapeutic INR      Acute Hypoxemic respiratoy failure secondary to acute on chronic systolic CHF  Cardiology consulted / Pulm consulted  on IV lasix  still overloaded  patient has POOR followup. needed outpatient followup with Dr. Anderson and [facility]. Didnot followup  Echo  3/2023,    ef  40,  diastolic  dysfunction and severe Pulm HTN  plan  lasix  tele    Supratherapeutic INR with h/o  recent  PE./   DVT. in 3/2023   Vascular Consulted: Corrected INR now to 1.7  followup dopplers negative for DVT  plan:  Vascular followup: ?IVC filter              gross  hematuria  with hx of  BPH / Prostate Ca likely due to supratherapeutic INR  Urology consulted  joseph in place  for CBI    Strangulation of  hernia . note  per  imaging   Gen Surgery consulted; plan for eventual surgery      Dvt ppx: scds for now      Dispo  home: unable to go to facility or LTC:According to daughter patient ISIDORO  upcoming appt with his  dr/ she does  not remember  the  dr's name    And,   also  stated  , that  pt has  never   seen a  dr  to  check his INR,  since   being  d/c                      77 year old male with h/o HTN,  CHF, BPH , prostate   cancer h/o  recent  PE/   dvt   and was   d/c on 4/7/23,  on coumadin also  with  on going hematuria/ joseph, noted  from last visit, now, returns  ,with   c/o   bilateral leg edema, sob and hematuria. Patient admiitted for acute hypoxemic respiratory failure secondary to Congestive Heart Failure , gross hematuria due to supertherapeutic INR      Acute Hypoxemic respiratoy failure secondary to acute on chronic systolic CHF  Cardiology consulted / Pulm consulted  on IV lasix  still overloaded  patient has POOR followup. needed outpatient followup with Dr. Anderson and [facility]. Didnot followup  Echo  3/2023,    ef  40,  diastolic  dysfunction and severe Pulm HTN  plan  lasix  tele    Supratherapeutic INR with h/o  recent  PE./   DVT. in 3/2023   Vascular Consulted: Corrected INR now to 1.7  followup dopplers negative for DVT  plan:  Vascular followup: ?IVC filter              gross  hematuria  with hx of  BPH / Prostate Ca likely due to supratherapeutic INR  Urology consulted  joseph in place  no CBI needed: joseph draining clear    Strangulation of  hernia . note  per  imaging   Gen Surgery consulted;NO plans for surgery     Dvt ppx: scds for now      Dispo  home: unable to go to facility or LTC:According to daughter patient HAS  upcoming appt with his  dr/ she does  not remember  the  dr's name    And,   also  stated  , that  pt has  never   seen a  dr  to  check his INR,  since   being  d/c              Patient is going to be a very difficult disposition to treat on discharge.will need CM and likely patient experience guidance on outpatient followup

## 2023-05-07 NOTE — PROGRESS NOTE ADULT - ASSESSMENT
77M w HTN, HFrEF, severe p-HTN, BPH, ?hx of chronic DVT/PE on chronic coumadin, out pt records of elevated PSA (90), Left inguinal hernia, DVT right peroneal vein (below the knee), now p/w LE edema. pt also found to have gross hematuria. INR on admission was 12.     1. supratherapeutic INR 12  -s/p 10 of vitamin K, improved to 4.4, now down to 1.7  -cont to monitor INR  -would do stat brain imaging with any mental status changes   -would reach out to pmd, cloud pt in fact require long term a/c. if not would consider not restarting  -trend cbc, would consider bleeding w/u any acute drop including but not limited to stool guiac, exam and/or ct a/p to eval for RP bleed   -appreciate urology recs  -if pt does in fact require chronic a/c, given this episode as well as the fact that pt very unclear on med regimen at home, would consider IVC filter.    2. SOB  -pt endorsing chronic symptoms, states this is unchanged from his baseline  -pt comfortable appearing  -suspect symptoms may slightly improve with resolution of acute bleeding  -cont tele monitoring    3. biV failure with severe pulm htn and ADHF  -pt with significant b/l LE edema  would cont IV lasix for now  -replete lytes as required, however pt with known RV dysfunction slightly pre-load dependent  -will cont to monitor day to day volume status     will follow with you

## 2023-05-08 LAB
ANION GAP SERPL CALC-SCNC: 4 MMOL/L — LOW (ref 5–17)
BASOPHILS # BLD AUTO: 0.04 K/UL — SIGNIFICANT CHANGE UP (ref 0–0.2)
BASOPHILS NFR BLD AUTO: 0.5 % — SIGNIFICANT CHANGE UP (ref 0–2)
BUN SERPL-MCNC: 20 MG/DL — SIGNIFICANT CHANGE UP (ref 7–23)
CALCIUM SERPL-MCNC: 8.6 MG/DL — SIGNIFICANT CHANGE UP (ref 8.5–10.1)
CHLORIDE SERPL-SCNC: 103 MMOL/L — SIGNIFICANT CHANGE UP (ref 96–108)
CO2 SERPL-SCNC: 30 MMOL/L — SIGNIFICANT CHANGE UP (ref 22–31)
CREAT SERPL-MCNC: 1.09 MG/DL — SIGNIFICANT CHANGE UP (ref 0.5–1.3)
EGFR: 70 ML/MIN/1.73M2 — SIGNIFICANT CHANGE UP
EOSINOPHIL # BLD AUTO: 0.21 K/UL — SIGNIFICANT CHANGE UP (ref 0–0.5)
EOSINOPHIL NFR BLD AUTO: 2.8 % — SIGNIFICANT CHANGE UP (ref 0–6)
GLUCOSE SERPL-MCNC: 114 MG/DL — HIGH (ref 70–99)
HCT VFR BLD CALC: 35.7 % — LOW (ref 39–50)
HGB BLD-MCNC: 11 G/DL — LOW (ref 13–17)
IMM GRANULOCYTES NFR BLD AUTO: 0.4 % — SIGNIFICANT CHANGE UP (ref 0–0.9)
INR BLD: 1.88 RATIO — HIGH (ref 0.88–1.16)
LYMPHOCYTES # BLD AUTO: 1.38 K/UL — SIGNIFICANT CHANGE UP (ref 1–3.3)
LYMPHOCYTES # BLD AUTO: 18.4 % — SIGNIFICANT CHANGE UP (ref 13–44)
MAGNESIUM SERPL-MCNC: 2.1 MG/DL — SIGNIFICANT CHANGE UP (ref 1.6–2.6)
MCHC RBC-ENTMCNC: 24.7 PG — LOW (ref 27–34)
MCHC RBC-ENTMCNC: 30.8 G/DL — LOW (ref 32–36)
MCV RBC AUTO: 80 FL — SIGNIFICANT CHANGE UP (ref 80–100)
MONOCYTES # BLD AUTO: 1.11 K/UL — HIGH (ref 0–0.9)
MONOCYTES NFR BLD AUTO: 14.8 % — HIGH (ref 2–14)
NEUTROPHILS # BLD AUTO: 4.74 K/UL — SIGNIFICANT CHANGE UP (ref 1.8–7.4)
NEUTROPHILS NFR BLD AUTO: 63.1 % — SIGNIFICANT CHANGE UP (ref 43–77)
NRBC # BLD: 0 /100 WBCS — SIGNIFICANT CHANGE UP (ref 0–0)
PHOSPHATE SERPL-MCNC: 2.3 MG/DL — LOW (ref 2.5–4.5)
PLATELET # BLD AUTO: 158 K/UL — SIGNIFICANT CHANGE UP (ref 150–400)
POTASSIUM SERPL-MCNC: 3.5 MMOL/L — SIGNIFICANT CHANGE UP (ref 3.5–5.3)
POTASSIUM SERPL-SCNC: 3.5 MMOL/L — SIGNIFICANT CHANGE UP (ref 3.5–5.3)
PROTHROM AB SERPL-ACNC: 22.7 SEC — HIGH (ref 10.5–13.4)
RBC # BLD: 4.46 M/UL — SIGNIFICANT CHANGE UP (ref 4.2–5.8)
RBC # FLD: 16 % — HIGH (ref 10.3–14.5)
SODIUM SERPL-SCNC: 137 MMOL/L — SIGNIFICANT CHANGE UP (ref 135–145)
WBC # BLD: 7.61 K/UL — SIGNIFICANT CHANGE UP (ref 3.8–10.5)
WBC # FLD AUTO: 7.61 K/UL — SIGNIFICANT CHANGE UP (ref 3.8–10.5)

## 2023-05-08 PROCEDURE — 99233 SBSQ HOSP IP/OBS HIGH 50: CPT

## 2023-05-08 PROCEDURE — 71275 CT ANGIOGRAPHY CHEST: CPT | Mod: 26

## 2023-05-08 RX ORDER — ENOXAPARIN SODIUM 100 MG/ML
40 INJECTION SUBCUTANEOUS EVERY 24 HOURS
Refills: 0 | Status: DISCONTINUED | OUTPATIENT
Start: 2023-05-08 | End: 2023-05-11

## 2023-05-08 RX ORDER — LANOLIN ALCOHOL/MO/W.PET/CERES
3 CREAM (GRAM) TOPICAL ONCE
Refills: 0 | Status: COMPLETED | OUTPATIENT
Start: 2023-05-08 | End: 2023-05-08

## 2023-05-08 RX ADMIN — ATORVASTATIN CALCIUM 40 MILLIGRAM(S): 80 TABLET, FILM COATED ORAL at 23:11

## 2023-05-08 RX ADMIN — FINASTERIDE 5 MILLIGRAM(S): 5 TABLET, FILM COATED ORAL at 11:59

## 2023-05-08 RX ADMIN — ENOXAPARIN SODIUM 40 MILLIGRAM(S): 100 INJECTION SUBCUTANEOUS at 17:30

## 2023-05-08 RX ADMIN — Medication 3 MILLIGRAM(S): at 00:23

## 2023-05-08 RX ADMIN — Medication 3 MILLIGRAM(S): at 23:11

## 2023-05-08 RX ADMIN — Medication 50 MILLIGRAM(S): at 11:59

## 2023-05-08 RX ADMIN — Medication 5 MILLIGRAM(S): at 23:11

## 2023-05-08 RX ADMIN — Medication 40 MILLIGRAM(S): at 06:12

## 2023-05-08 RX ADMIN — TAMSULOSIN HYDROCHLORIDE 0.4 MILLIGRAM(S): 0.4 CAPSULE ORAL at 23:11

## 2023-05-08 RX ADMIN — PANTOPRAZOLE SODIUM 40 MILLIGRAM(S): 20 TABLET, DELAYED RELEASE ORAL at 06:12

## 2023-05-08 NOTE — PROGRESS NOTE ADULT - ASSESSMENT
77 year old male PMHx HTN, diastolic CHF, DVT, left inguinal hernia, BPH, prostate, admitted with hematuria. Remains off anticoagulation, INR improving 12-> 1.7, U/S bilateral LE  on5/7/23 does not reveal DVT.    - Per Dr. Luu, no need for IVC filter because no DVT in major veins of legs present  - appreciate cardiology, pulmonology, urology recommendations  - continue care per primary medical team  - discussed and seen with Dr. Luu

## 2023-05-08 NOTE — PROGRESS NOTE ADULT - ASSESSMENT
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 Yan draining clear yellow urine.  Had Bone scan 4/3/23 : No evidence of osseous metastasis.    repeat PSA Total: 59.30    ALEJO : no nodularities or tenderness elicited, Hematuria resolved, likely due to high INR - will need eventual Cysto to r/o any bladder pathology  - Continue Yan, continue finasteride and Flomax, will do TOV in am  MRI with and without contrast to evaluate prostate   Discussed with Dr Shore

## 2023-05-08 NOTE — PROGRESS NOTE ADULT - ASSESSMENT
77 year old male with h/o HTN,  CHF, BPH , prostate   cancer h/o  recent  PE/   dvt   and was   d/c on 4/7/23,  on coumadin also  with  on going hematuria/ joseph, noted  from last visit, now, returns  ,with   c/o   bilateral leg edema, sob and hematuria. Patient admiitted for acute hypoxemic respiratory failure secondary to Congestive Heart Failure , gross hematuria due to supertherapeutic INR      Acute Hypoxemic respiratoy failure secondary to acute on chronic systolic CHF  Cardiology consulted / Pulm consulted  on IV lasix  still overloaded  patient has POOR followup. needed outpatient followup with Dr. Anderson and [facility]. Didnot followup  Echo  3/2023,    ef  40,  diastolic  dysfunction and severe Pulm HTN  plan  lasix  tele    Supratherapeutic INR with h/o  recent  PE./   DVT. in 3/2023   Vascular Consulted: Corrected INR now to 1.7  followup dopplers negative for DVT  plan:  Vascular followup: ?IVC filter              gross  hematuria  with hx of  BPH / Prostate Ca likely due to supratherapeutic INR  Urology consulted  joseph in place  no CBI needed: joseph draining clear    Strangulation of  hernia . note  per  imaging   Gen Surgery consulted;NO plans for surgery     Dvt ppx: scds for now      Dispo  home: unable to go to facility or LTC:According to daughter patient HAS  upcoming appt with his  dr/ she does  not remember  the  dr's name    And,   also  stated  , that  pt has  never   seen a  dr  to  check his INR,  since   being  d/c              Patient is going to be a very difficult disposition to treat on discharge.will need CM and likely patient experience guidance on outpatient followup

## 2023-05-08 NOTE — DIETITIAN INITIAL EVALUATION ADULT - OTHER INFO
Spoke to pt via video  #[id number] - Marcela Pascual. Obtained limited information from pt; kept deviating from nutrition topics to medical topics.  Pt lives c daughter who does shopping & cooking; pt eats what she serves him.  He eats 3 meals/day; no diet restrictions followed. Pt was here at [facility] several weeks ago for similar issues; outpatient f/u was recommended but pt has not yet seen outpatient MDs; pt c elevated PSA lab; concern for prostate Ca among other issues. Denies any N/V/D; pt reports he does not have regular BMs; advised him let let RN/MD know if he needs stool softener.

## 2023-05-08 NOTE — PROGRESS NOTE ADULT - SUBJECTIVE AND OBJECTIVE BOX
patient seen and examined  comfortable  joseph changed by Uro  vitals stable   INR stable  Ct head negative  CT Angio Chest with chronic PE  duplex negative for dopplers  on 02  Review of Systems:  General:denies fever chills, headache, weakness  HEENT: denies blurry vision,diffculty swallowing, difficulty hearing, tinnitus  Cardiovascular: denies chest pain  ,palpitations  Pulmonary:denies shortness of breath, cough, wheezing, hemoptysis  Gastrointestinal: denies abdominal pain, constipation, diarrhea,nausea , vomiting, hematochezia  : denies hematuria, dysuria, or incontinence  Neurological: denies weakness, numbness , tingling, dizziness, tremors  MSK: denies muscle pain, difficulty ambulating, swelling, back pain  skin: denies skin rash, itching, burning, or  skin lesions  Psychiatrical: denies mood disturbances, anxierty, feeling depressed, depression , or difficulty sleeping    Objective:  Vitals  T(C): 37.1 (05-08-23 @ 10:53), Max: 37.1 (05-08-23 @ 05:17)  HR: 109 (05-08-23 @ 11:57) (103 - 111)  BP: 110/76 (05-08-23 @ 11:57) (110/68 - 122/71)  RR: 18 (05-08-23 @ 10:53) (18 - 18)  SpO2: 94% (05-08-23 @ 10:53) (94% - 96%)    Physical Exam:  General: comfortable, no acute distress  HEENT: Atraumatic, no LAD, trachea midline, PERRLA  Cardiovascular: normal s1s2, no murmurs, gallops or fricition rubs  Pulmonary: clear to ausculation Bilaterally, no wheezing , rhonchi  Gastrointestinal: soft non tender non distended, no masses felt, no organomegally  Muscloskeletal:  +lower extremity edema, intact bilateral lower extremity pulses  Neurological: CN II-12 intact. No focal weakness  Psychiatrical: normal mood, cooperative  SKIN: no rash, lesions or ulcers    Labs:                          11.0   7.61  )-----------( 158      ( 08 May 2023 07:05 )             35.7     05-08    137  |  103  |  20  ----------------------------<  114<H>  3.5   |  30  |  1.09    Ca    8.6      08 May 2023 07:05  Phos  2.3     05-08  Mg     2.1     05-08        PT/INR - ( 08 May 2023 07:05 )   PT: 22.7 sec;   INR: 1.88 ratio               Active Medications  MEDICATIONS  (STANDING):  atorvastatin 40 milliGRAM(s) Oral at bedtime  finasteride 5 milliGRAM(s) Oral daily  furosemide   Injectable 40 milliGRAM(s) IV Push daily  metoprolol succinate ER 50 milliGRAM(s) Oral daily  pantoprazole    Tablet 40 milliGRAM(s) Oral before breakfast  tamsulosin 0.4 milliGRAM(s) Oral at bedtime    MEDICATIONS  (PRN):

## 2023-05-08 NOTE — DIETITIAN INITIAL EVALUATION ADULT - PERTINENT LABORATORY DATA
05-08    137  |  103  |  20  ----------------------------<  114<H>  3.5   |  30  |  1.09    Ca    8.6      08 May 2023 07:05  Phos  2.3     05-08  Mg     2.1     05-08    A1C with Estimated Average Glucose Result: 6.5 %, 140 (03-29-23)

## 2023-05-08 NOTE — PROGRESS NOTE ADULT - NS ATTEND AMEND GEN_ALL_CORE FT
I have seen and examined the patient, the leg edema is better, no pain, NO SOB. The venous duplex is Neg for DVT, INR is corrected.  Awaiting CT of the chest.  Adv-- Please get the input from pulmonary re AC, No indication for IVC filter at this time. I have discussed with Dr Mathur.

## 2023-05-08 NOTE — PROGRESS NOTE ADULT - ASSESSMENT
77M w HTN, HFrEF, severe p-HTN, BPH, DVT/PE on coumadin, out pt records of elevated PSA (90), Left inguinal hernia, DVT right peroneal vein (below the knee), now p/w LE edema. pt also found to have gross hematuria. INR on admission was 12    1. supratherapeutic INR 12  -s/p 10 of vitamin K, improved to 4.4, now down to 1.88  -cont to monitor INR  -CT brain with no acute findings   -trend cbc  -appreciate urology recs  -if pt does in fact require chronic a/c, given this episode as well as the fact that pt very unclear on med regimen at home, would consider IVC filter., urology following     2. SOB  -pt endorsing chronic symptoms, states this is unchanged from his baseline  -pt comfortable appearing  -suspect symptoms may slightly improve with resolution of acute bleeding  -cont tele monitoring  -Repeating CTA chest     3. biV failure with severe pulm htn and ADHF  -pt with significant b/l LE edema, would cont IV lasix for now  -replete lytes as required, however pt with known RV dysfunction slightly pre-load dependent  -cont bbl  -will cont to monitor day to day volume status     will follow with you

## 2023-05-08 NOTE — PROGRESS NOTE ADULT - SUBJECTIVE AND OBJECTIVE BOX
Patient seen and examined at bedside by Dr. Luu.  Patient reports he is doing well, offers no complaints.  Underwent LE DVT this weekend, which was negative for DVTs.   Remains off anticoagulants.   Denies SOB, CP, nausea, vomiting, pain in lower extremities.        MEDICATIONS  (STANDING):  atorvastatin 40 milliGRAM(s) Oral at bedtime  finasteride 5 milliGRAM(s) Oral daily  furosemide   Injectable 40 milliGRAM(s) IV Push daily  metoprolol succinate ER 50 milliGRAM(s) Oral daily  pantoprazole    Tablet 40 milliGRAM(s) Oral before breakfast  tamsulosin 0.4 milliGRAM(s) Oral at bedtime    MEDICATIONS  (PRN):    Vital Signs Last 24 Hrs  T(C): 37.1 (08 May 2023 10:53), Max: 37.1 (08 May 2023 05:17)  T(F): 98.8 (08 May 2023 10:53), Max: 98.8 (08 May 2023 05:17)  HR: 107 (08 May 2023 10:53) (103 - 111)  BP: 113/75 (08 May 2023 10:53) (110/68 - 122/71)  BP(mean): --  RR: 18 (08 May 2023 10:53) (18 - 18)  SpO2: 94% (08 May 2023 10:53) (94% - 96%)    Parameters below as of 08 May 2023 10:53  Patient On (Oxygen Delivery Method): room air      PHYSICAL EXAM:  General: NAD  Neuro:  Alert & oriented x 3  HEENT: NCAT, EOMI, conjunctiva clear  Lung: respirations nonlabored no increased work of breathing on room air, good inspiratory effort  Extremities: bilateral calves nontender to palpation, +LE edema extending to calves bilateral     I&O's Detail    07 May 2023 07:01  -  08 May 2023 07:00  --------------------------------------------------------  IN:  Total IN: 0 mL    OUT:    Voided (mL): 350 mL  Total OUT: 350 mL    Total NET: -350 mL      LABS:                        11.0   7.61  )-----------( 158      ( 08 May 2023 07:05 )             35.7     05-08    137  |  103  |  20  ----------------------------<  114<H>  3.5   |  30  |  1.09    Ca    8.6      08 May 2023 07:05  Phos  2.3     05-08  Mg     2.1     05-08      PT/INR - ( 08 May 2023 07:05 )   PT: 22.7 sec;   INR: 1.88 ratio           < from: US Duplex Venous Lower Ext Complete, Bilateral (05.07.23 @ 08:48) >  ACC: [id number] EXAM:  US DPLX LWR EXT VEINS COMPL BI   ORDERED BY: QAMAR CAROLINA     PROCEDURE DATE:  05/07/2023          INTERPRETATION:  CLINICAL INFORMATION: Swelling of both legs    COMPARISON: Venous Doppler 5/6/2023    TECHNIQUE: Duplex sonography of the BILATERAL LOWER extremity veins with   color and spectral Doppler, with and without compression.    FINDINGS:    RIGHT:  Normal compressibility of the RIGHT common femoral, femoral and popliteal   veins.  Doppler examination shows normal spontaneous and phasic flow.  No RIGHT calf vein thrombosis is detected.    LEFT:  Normal compressibility of the LEFT common femoral, femoral and popliteal   veins.  Doppler examination shows normal spontaneous and phasic flow.  No LEFT calf vein thrombosis is detected.    IMPRESSION:  No evidence of deep venous thrombosis in either lower extremity.    --- End of Report ---      PAULO OSUNA MD; Attending Radiologist  This document has been electronically signed. May  7 2023  8:54AM    < end of copied text >

## 2023-05-08 NOTE — PROGRESS NOTE ADULT - SUBJECTIVE AND OBJECTIVE BOX
Patient seen and examined bedside resting comfortably.  No complaints offered.   Yan clogged  Denies nausea and vomiting. Tolerating diet.  Denies chest pain, dyspnea, cough.    T(F): 98.8 (05-08-23 @ 10:53), Max: 98.8 (05-08-23 @ 05:17)  HR: 109 (05-08-23 @ 11:57) (103 - 111)  BP: 110/76 (05-08-23 @ 11:57) (110/68 - 122/71)  RR: 18 (05-08-23 @ 10:53) (18 - 18)  SpO2: 94% (05-08-23 @ 10:53) (94% - 96%)  Wt(kg): --  CAPILLARY BLOOD GLUCOSE    PHYSICAL EXAM:  General: NAD, alert and awake  HEENT: NCAT, EOMI, conjunctiva clear  Chest: nonlabored respirations, good inspiratory effort  Abdomen: soft, NTND.   Extremities: no pedal edema or calf tenderness noted   : No CVA or SP tenderness. Yan clogged, upsized to 18fr - now draining clear yellow urine NO HEMATURIA  ALEJO : no nodularities or tenderness elicited    LABS:                        11.0   7.61  )-----------( 158      ( 08 May 2023 07:05 )             35.7   05-08    137  |  103  |  20  ----------------------------<  114<H>  3.5   |  30  |  1.09    Ca    8.6      08 May 2023 07:05  Phos  2.3     05-08  Mg     2.1     05-08    PT/INR - ( 08 May 2023 07:05 )   PT: 22.7 sec;   INR: 1.88 ratio           I&O's Detail    07 May 2023 07:01  -  08 May 2023 07:00  --------------------------------------------------------  IN:  Total IN: 0 mL    OUT:    Voided (mL): 350 mL  Total OUT: 350 mL    Total NET: -350 mL      08 May 2023 07:01  -  08 May 2023 14:39  --------------------------------------------------------  IN:    Oral Fluid: 600 mL  Total IN: 600 mL    OUT:    Indwelling Catheter - Urethral (mL): 1600 mL  Total OUT: 1600 mL    Total NET: -1000 mL    Prostate Ca Screen, PSA Total (05.06.23 @ 21:20)   Prostate Ca Screen, PSA Total: 59.30: Method: Roche PSA Electrochemiluminescent Immunoassay

## 2023-05-08 NOTE — PROGRESS NOTE ADULT - SUBJECTIVE AND OBJECTIVE BOX
Patient is a 77y old  Male who presents with a chief complaint of sob/ hematuria (07 May 2023 16:07)    PAST MEDICAL & SURGICAL HISTORY:  HTN (hypertension)    elevated PSA    PE    DVT     CHF    pHTN    INTERVAL HISTORY: in no acute distress  	  MEDICATIONS:  MEDICATIONS  (STANDING):  atorvastatin 40 milliGRAM(s) Oral at bedtime  finasteride 5 milliGRAM(s) Oral daily  furosemide   Injectable 40 milliGRAM(s) IV Push daily  metoprolol succinate ER 50 milliGRAM(s) Oral daily  pantoprazole    Tablet 40 milliGRAM(s) Oral before breakfast  tamsulosin 0.4 milliGRAM(s) Oral at bedtime    Vitals:  T(F): 98.8 (05-08-23 @ 10:53), Max: 98.8 (05-08-23 @ 05:17)  HR: 107 (05-08-23 @ 10:53) (103 - 111)  BP: 113/75 (05-08-23 @ 10:53) (110/68 - 122/71)  RR: 18 (05-08-23 @ 10:53) (18 - 18)  SpO2: 94% (05-08-23 @ 10:53) (94% - 96%)    05-07 @ 07:01  -  05-08 @ 07:00  --------------------------------------------------------  IN:  Total IN: 0 mL    OUT:    Voided (mL): 350 mL  Total OUT: 350 mL    Total NET: -350 mL    Weight (kg): 74.5 (05-06 @ 11:03)  BMI (kg/m2): 26.5 (05-06 @ 11:03)    PHYSICAL EXAM:  Neuro: Awake, responsive  CV: S1 S2 RRR + SM  Lungs: diminished to bases   GI: Soft, BS +, ND, NT  Extremities: + LE edema    TELEMETRY: sinus tachycardia     RADIOLOGY: < from: CT Head No Cont (05.07.23 @ 13:37) >  IMPRESSION:  No CT evidence of acute intracranial pathology.    < end of copied text >    < from: Xray Chest 1 View- PORTABLE-Urgent (Xray Chest 1 View- PORTABLE-Urgent .) (05.06.23 @ 04:33) >  The lungs are clear. There are no pleural effusions. The   cardiomediastinal silhouette is normal. Bones are grossly normal.    IMPRESSION: No evidence of acute cardiopulmonary disease.    < end of copied text >    < from: CT Abdomen and Pelvis No Cont (05.06.23 @ 05:45) >  Increased intraluminal density within the bladder is suspicious for   hemorrhage. Underlying mass cannot be excluded. Further evaluation with   cystoscopy can be obtained.    Yan catheter in place. Intraluminal bladder air, likely iatrogenic.    Enlarged prostate gland.    Moderate left inguinal hernia containing mesenteric fat, nonobstructed   segment of sigmoid colon and small amount of ascites. Given ascites   within the left inguinal hernia, an element of strangulation cannot be   excluded. Recommend clinicalcorrelation.    Anasarca.    Cardiomegaly.    Small bilateral pleural effusions.    < end of copied text >  DIAGNOSTIC TESTING:    [x ] Echocardiogram: < from: TTE Echo Complete w/o Contrast w/ Doppler (05.07.23 @ 08:44) >  Left Ventricle: Increased relative wall thickness with normal mass index   consistent with left ventricular concentric remodeling.  Left ventricular ejection fraction, by visual estimation, is 45 to 50%.   The interventricular septum is flattened in systole and diastole,   consistent with right ventricular pressure and volume overload. Spectral   Doppler shows impaired relaxation pattern of left ventricular myocardial   filling (Grade I diastolic dysfunction).  Right Ventricle: The right ventricular size is severely enlarged. RV   systolic function is moderately reduced.  Left Atrium: The left atrium is normal in size.  Right Atrium: Severely enlarged right atrium.  Pericardium: Trivial pericardial effusion is present. There is no   evidence of cardiac tamponade.  Mitral Valve: Structurally normal mitral valve, with normal leaflet   excursion.  Tricuspid Valve: Structurally normal tricuspid valve, with normal leaflet   excursion. Moderate-severe tricuspid regurgitation is visualized.   Estimated pulmonary artery systolic pressureis 77.5 mmHg assuming a   right atrial pressure of 8 mmHg, which is consistent with severe   pulmonary hypertension.  Aortic Valve: Normal trileaflet aortic valve with normal opening.  Pulmonic Valve: Structurally normal pulmonic valve, with normal leaflet   excursion. Mild pulmonic valve regurgitation.  Aorta: The aortic root is normal in size and structure.  Venous: The inferior vena cava was dilated, with respiratory size   variation less than 50%.      Summary:   1. Left ventricular ejection fraction, by visual estimation, is 45 to   50%.   2. Increased relative wall thickness with normal mass index consistent   with left ventricular concentric remodeling.   3. Spectral Doppler shows impaired relaxation pattern of left   ventricular myocardial filling (Grade I diastolic dysfunction).   4. Structurally normal mitral valve, with normal leaflet excursion.   5. Normal trileaflet aortic valve with normal opening.   6. Severely enlarged right atrium.   7. Severely enlarged right ventricle.   8. Moderately reduced RV systolic function.   9. Right ventricular volume and pressure overload.  10. Moderate-severe tricuspid regurgitation.  11. Structurally normal pulmonic valve, with normal leaflet excursion.  12. Mild pulmonic valve regurgitation.  13. Estimated pulmonary artery systolic pressure is 77.5 mmHg assuming a   right atrial pressure of 8 mmHg, which is consistent with severe   pulmonary hypertension.    < end of copied text >    LABS:	 	    CARDIAC MARKERS:  Troponin I, High Sensitivity Result: 61.7 ng/L (05-06 @ 02:15)    08 May 2023 07:05    137    |  103    |  20     ----------------------------<  114    3.5     |  30     |  1.09   07 May 2023 06:00    138    |  104    |  19     ----------------------------<  126    3.1     |  31     |  1.09   06 May 2023 02:15    138    |  111    |  20     ----------------------------<  121    5.2     |  28     |  1.16     Ca    8.6        08 May 2023 07:05  Phos  2.3       08 May 2023 07:05  Mg     2.1       08 May 2023 07:05                        11.0   7.61  )-----------( 158      ( 08 May 2023 07:05 )             35.7 ,                       10.8   7.96  )-----------( 138      ( 07 May 2023 06:00 )             35.1 ,                       11.2   6.39  )-----------( 144      ( 06 May 2023 07:19 )             36.0 ,                       11.9   6.34  )-----------( 165      ( 06 May 2023 02:15 )             38.7     PT/PTT- ( 08 May 2023 07:05 )   PT: 22.7 sec;  PTT: x        INR: 1.88 ratio (05-08 @ 07:05)  INR: 1.71 ratio (05-07 @ 06:00)  INR: 4.42 ratio (05-06 @ 11:05)  INR: 12.29 ratio (05-06 @ 02:15)

## 2023-05-09 LAB
ANION GAP SERPL CALC-SCNC: 4 MMOL/L — LOW (ref 5–17)
BUN SERPL-MCNC: 22 MG/DL — SIGNIFICANT CHANGE UP (ref 7–23)
CALCIUM SERPL-MCNC: 8.6 MG/DL — SIGNIFICANT CHANGE UP (ref 8.5–10.1)
CHLORIDE SERPL-SCNC: 102 MMOL/L — SIGNIFICANT CHANGE UP (ref 96–108)
CO2 SERPL-SCNC: 32 MMOL/L — HIGH (ref 22–31)
CREAT SERPL-MCNC: 1.33 MG/DL — HIGH (ref 0.5–1.3)
EGFR: 55 ML/MIN/1.73M2 — LOW
GLUCOSE SERPL-MCNC: 138 MG/DL — HIGH (ref 70–99)
HCT VFR BLD CALC: 38.6 % — LOW (ref 39–50)
HGB BLD-MCNC: 12.1 G/DL — LOW (ref 13–17)
MAGNESIUM SERPL-MCNC: 2.2 MG/DL — SIGNIFICANT CHANGE UP (ref 1.6–2.6)
MCHC RBC-ENTMCNC: 24.9 PG — LOW (ref 27–34)
MCHC RBC-ENTMCNC: 31.3 G/DL — LOW (ref 32–36)
MCV RBC AUTO: 79.4 FL — LOW (ref 80–100)
NRBC # BLD: 0 /100 WBCS — SIGNIFICANT CHANGE UP (ref 0–0)
PLATELET # BLD AUTO: 177 K/UL — SIGNIFICANT CHANGE UP (ref 150–400)
POTASSIUM SERPL-MCNC: 3.2 MMOL/L — LOW (ref 3.5–5.3)
POTASSIUM SERPL-SCNC: 3.2 MMOL/L — LOW (ref 3.5–5.3)
RBC # BLD: 4.86 M/UL — SIGNIFICANT CHANGE UP (ref 4.2–5.8)
RBC # FLD: 16.1 % — HIGH (ref 10.3–14.5)
SODIUM SERPL-SCNC: 138 MMOL/L — SIGNIFICANT CHANGE UP (ref 135–145)
TSH SERPL-MCNC: 0.97 UU/ML — SIGNIFICANT CHANGE UP (ref 0.36–3.74)
WBC # BLD: 8.16 K/UL — SIGNIFICANT CHANGE UP (ref 3.8–10.5)
WBC # FLD AUTO: 8.16 K/UL — SIGNIFICANT CHANGE UP (ref 3.8–10.5)

## 2023-05-09 PROCEDURE — 99233 SBSQ HOSP IP/OBS HIGH 50: CPT

## 2023-05-09 RX ORDER — POTASSIUM CHLORIDE 20 MEQ
40 PACKET (EA) ORAL EVERY 4 HOURS
Refills: 0 | Status: COMPLETED | OUTPATIENT
Start: 2023-05-09 | End: 2023-05-09

## 2023-05-09 RX ORDER — POTASSIUM CHLORIDE 20 MEQ
40 PACKET (EA) ORAL
Refills: 0 | Status: DISCONTINUED | OUTPATIENT
Start: 2023-05-09 | End: 2023-05-09

## 2023-05-09 RX ORDER — METOPROLOL TARTRATE 50 MG
50 TABLET ORAL DAILY
Refills: 0 | Status: DISCONTINUED | OUTPATIENT
Start: 2023-05-09 | End: 2023-05-26

## 2023-05-09 RX ADMIN — Medication 40 MILLIEQUIVALENT(S): at 21:32

## 2023-05-09 RX ADMIN — Medication 40 MILLIGRAM(S): at 07:14

## 2023-05-09 RX ADMIN — ENOXAPARIN SODIUM 40 MILLIGRAM(S): 100 INJECTION SUBCUTANEOUS at 17:28

## 2023-05-09 RX ADMIN — Medication 40 MILLIEQUIVALENT(S): at 17:27

## 2023-05-09 RX ADMIN — FINASTERIDE 5 MILLIGRAM(S): 5 TABLET, FILM COATED ORAL at 11:23

## 2023-05-09 RX ADMIN — PANTOPRAZOLE SODIUM 40 MILLIGRAM(S): 20 TABLET, DELAYED RELEASE ORAL at 07:14

## 2023-05-09 RX ADMIN — ATORVASTATIN CALCIUM 40 MILLIGRAM(S): 80 TABLET, FILM COATED ORAL at 21:32

## 2023-05-09 RX ADMIN — TAMSULOSIN HYDROCHLORIDE 0.4 MILLIGRAM(S): 0.4 CAPSULE ORAL at 21:32

## 2023-05-09 RX ADMIN — Medication 40 MILLIEQUIVALENT(S): at 12:00

## 2023-05-09 RX ADMIN — Medication 50 MILLIGRAM(S): at 11:23

## 2023-05-09 NOTE — PROGRESS NOTE ADULT - SUBJECTIVE AND OBJECTIVE BOX
INTERVAL HPI:   77 year old male with HTN, Diastolic CHF, Severe pHTN,  BPH , Prostate  cancer ( elevated PSA ), Recent bone scan with evidence of osseous metastasis,  Urinary retention with indwelling Yan,  Recent  PE/ DVT in April 2023,( was discharged  on coumadin ) and  Left inguinal hernia,   Presented  to ED  with  c/o  bilateral leg edema, sob and on going,  c/c hematuria. Sob is more exertional than at rest.  No fever, chills, cough or sputum production.  In ED with INR of more than 12, Yan with gross  hematuria, also with ventral hernia.  Yan upsized in ED, ventral hernia reduced by surgery. To get FFP to correct INR     OVERNIGHT EVENTS:  Comfortable in bed    Vital Signs Last 24 Hrs  T(C): 36.9 (09 May 2023 16:49), Max: 37.1 (09 May 2023 10:36)  T(F): 98.5 (09 May 2023 16:49), Max: 98.8 (09 May 2023 10:36)  HR: 103 (09 May 2023 16:49) (99 - 105)  BP: 132/79 (09 May 2023 16:49) (105/69 - 132/79)  BP(mean): --  RR: 18 (09 May 2023 16:49) (17 - 18)  SpO2: 92% (09 May 2023 16:49) (92% - 96%)    Parameters below as of 09 May 2023 16:49  Patient On (Oxygen Delivery Method): room air    PHYSICAL EXAM:  GEN:         Awake, responsive and comfortable.  HEENT:    Normal.    RESP:       no wheezing.  CVS:          Regular rate and rhythm.     MEDICATIONS  (STANDING):  atorvastatin 40 milliGRAM(s) Oral at bedtime  enoxaparin Injectable 40 milliGRAM(s) SubCutaneous every 24 hours  finasteride 5 milliGRAM(s) Oral daily  furosemide   Injectable 40 milliGRAM(s) IV Push daily  metoprolol succinate ER 50 milliGRAM(s) Oral daily  pantoprazole    Tablet 40 milliGRAM(s) Oral before breakfast  potassium chloride   Powder 40 milliEquivalent(s) Oral every 4 hours  tamsulosin 0.4 milliGRAM(s) Oral at bedtime    MEDICATIONS  (PRN):  bisacodyl 5 milliGRAM(s) Oral every 12 hours PRN Constipation    LABS:                        12.1   8.16  )-----------( 177      ( 09 May 2023 09:40 )             38.6     05-09    138  |  102  |  22  ----------------------------<  138<H>  3.2<L>   |  32<H>  |  1.33<H>    Ca    8.6      09 May 2023 09:40  Phos  2.3     05-08  Mg     2.2     05-09    PT/INR - ( 08 May 2023 07:05 )   PT: 22.7 sec;   INR: 1.88 ratio       ASSESSMENT AND PLAN:  SOB.  Fluid retention.  Diastolic dysfunction.  Pulmonary HTN by hx.  Hematuria.  Warfarin coagulopathy.  Prostate  CA  BPH.  Urinary retention, with Yan.  VTE.  Inguinal hernia S/P reduction.    SPO2 96% on nasal O2.  INR being corrected with  FFP, follow INR  Left Inguinal hernia reduced.  Diuretics as needed, being seen by Cardiology.    05/07/23: Awake, responsive and comfortable. SPO2 96% .  INR down to 1.71  LE duplex, no DVT.    05/09/23:  Denies SOB, clinically stable. SPO2 92% on room air.  Not safe for anticoagulation at this point per urology.  No indication for IVC filter per vascular surgery.  Continue supportive treatment.

## 2023-05-09 NOTE — PROGRESS NOTE ADULT - ASSESSMENT
77M w HTN, HFrEF, severe p-HTN, BPH, DVT/PE on coumadin, out pt records of elevated PSA (90), Left inguinal hernia, DVT right peroneal vein (below the knee), now p/w LE edema. pt also found to have gross hematuria.  INR on admission was 12    1. supratherapeutic INR 12/hematuria  -s/p 10 of vitamin K, improved to 4.4, now down to 1.88  -cont to monitor INR  -CT brain with no acute findings   -trend cbc  -appreciate urology recs, awaiting pelvic MRI  -No indication for IVC filter at this time as per Vascular     2. SOB  -pt endorsing chronic symptoms, states this is unchanged from his baseline, currently denies any sob  -pt comfortable appearing  -cont tele monitoring  -Repeat CTA chest with chronic PE    3. biV failure with severe pulm htn and ADHF  -pt with significant b/l LE edema, would cont IV lasix for now however pt with known RV dysfunction slightly pre-load dependent  -replete lytes as required, monitor renal function closely   -cont bbl  -will cont to monitor day to day volume status     will follow with you

## 2023-05-09 NOTE — PROGRESS NOTE ADULT - ASSESSMENT
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 Yan draining clear yellow urine.  Had Bone scan 4/3/23 : No evidence of osseous metastasis.  repeat PSA Total: 59.30    Failed trial ov void today   ml with pain and dysuria - Yan catheter reinserted    ALEJO : no nodularities or tenderness elicited, Hematuria resolved, likely due to high INR - will need eventual Cysto to r/o any bladder pathology    - Continue Yan, continue finasteride and Flomax  MRI with and without contrast to evaluate prostate pending  Discussed with Dr Hill

## 2023-05-09 NOTE — PROGRESS NOTE ADULT - SUBJECTIVE AND OBJECTIVE BOX
Patient is a 77y old  Male who presents with a chief complaint of sob/ hematuria (09 May 2023 09:20)    PAST MEDICAL & SURGICAL HISTORY:  HTN (hypertension)    elevated PSA    PE    DVT     CHF    pHTN    INTERVAL HISTORY: resting in chair in no distress, when seen this am, denies any chest pain or sob   	  MEDICATIONS:  MEDICATIONS  (STANDING):  atorvastatin 40 milliGRAM(s) Oral at bedtime  enoxaparin Injectable 40 milliGRAM(s) SubCutaneous every 24 hours  finasteride 5 milliGRAM(s) Oral daily  furosemide   Injectable 40 milliGRAM(s) IV Push daily  metoprolol succinate ER 50 milliGRAM(s) Oral daily  pantoprazole    Tablet 40 milliGRAM(s) Oral before breakfast  potassium chloride   Powder 40 milliEquivalent(s) Oral every 2 hours  tamsulosin 0.4 milliGRAM(s) Oral at bedtime    MEDICATIONS  (PRN):  bisacodyl 5 milliGRAM(s) Oral every 12 hours PRN Constipation    Vitals:  T(F): 98.8 (05-09-23 @ 10:36), Max: 98.8 (05-09-23 @ 10:36)  HR: 104 (05-09-23 @ 10:36) (99 - 105)  BP: 114/75 (05-09-23 @ 10:36) (102/64 - 114/75)  RR: 17 (05-09-23 @ 10:36) (17 - 18)  SpO2: 93% (05-09-23 @ 10:36) (93% - 96%)    05-08 @ 07:01  -  05-09 @ 07:00  --------------------------------------------------------  IN:    Oral Fluid: 840 mL  Total IN: 840 mL    OUT:    Indwelling Catheter - Urethral (mL): 2300 mL  Total OUT: 2300 mL    Total NET: -1460 mL    05-09 @ 07:01  -  05-09 @ 12:52  --------------------------------------------------------  IN:    Oral Fluid: 360 mL  Total IN: 360 mL    OUT:    Voided (mL): 350 mL  Total OUT: 350 mL    Total NET: 10 mL    Weight (kg): 70.76 (05-09 @ 07:00)    PHYSICAL EXAM:  Neuro: Awake, responsive  CV: S1 S2 RRR + SM  Lungs: diminished to bases   GI: Soft, BS +, ND, NT  Extremities: + LE edema    TELEMETRY: sinus tachycardia     RADIOLOGY:   < from: CT Angio Chest PE Protocol w/ IV Cont (05.08.23 @ 11:41) >  Multifocal bilateral peripheral filling defects and occlusions involving   the segmental and subsegmental arteries of upper and lower lobes   pulmonary arteries, similar to the prior study, compatible with chronic   PE.    New airspace disease involving the left upper lobe. Correlate for   pneumonia.    < end of copied text >    DIAGNOSTIC TESTING:    [x ] Echocardiogram: < from: TTE Echo Complete w/o Contrast w/ Doppler (05.07.23 @ 08:44) >  Left Ventricle: Increased relative wall thickness with normal mass index   consistent with left ventricular concentric remodeling.  Left ventricular ejection fraction, by visual estimation, is 45 to 50%.   The interventricular septum is flattened in systole and diastole,   consistent with right ventricular pressure and volume overload. Spectral   Doppler shows impaired relaxation pattern of left ventricular myocardial   filling (Grade I diastolic dysfunction).  Right Ventricle: The right ventricular size is severely enlarged. RV   systolic function is moderately reduced.  Left Atrium: The left atrium is normal in size.  Right Atrium: Severely enlarged right atrium.  Pericardium: Trivial pericardial effusion is present. There is no   evidence of cardiac tamponade.  Mitral Valve: Structurally normal mitral valve, with normal leaflet   excursion.  Tricuspid Valve: Structurally normal tricuspid valve, with normal leaflet   excursion. Moderate-severe tricuspid regurgitation is visualized.   Estimated pulmonary artery systolic pressureis 77.5 mmHg assuming a   right atrial pressure of 8 mmHg, which is consistent with severe   pulmonary hypertension.  Aortic Valve: Normal trileaflet aortic valve with normal opening.  Pulmonic Valve: Structurally normal pulmonic valve, with normal leaflet   excursion. Mild pulmonic valve regurgitation.  Aorta: The aortic root is normal in size and structure.  Venous: The inferior vena cava was dilated, with respiratory size   variation less than 50%.      Summary:   1. Left ventricular ejection fraction, by visual estimation, is 45 to   50%.   2. Increased relative wall thickness with normal mass index consistent   with left ventricular concentric remodeling.   3. Spectral Doppler shows impaired relaxation pattern of left   ventricular myocardial filling (Grade I diastolic dysfunction).   4. Structurally normal mitral valve, with normal leaflet excursion.   5. Normal trileaflet aortic valve with normal opening.   6. Severely enlarged right atrium.   7. Severely enlarged right ventricle.   8. Moderately reduced RV systolic function.   9. Right ventricular volume and pressure overload.  10. Moderate-severe tricuspid regurgitation.  11. Structurally normal pulmonic valve, with normal leaflet excursion.  12. Mild pulmonic valve regurgitation.  13. Estimated pulmonary artery systolic pressure is 77.5 mmHg assuming a   right atrial pressure of 8 mmHg, which is consistent with severe   pulmonary hypertension.    < end of copied text >    LABS:	 	    09 May 2023 09:40    138    |  102    |  22     ----------------------------<  138    3.2     |  32     |  1.33   08 May 2023 07:05    137    |  103    |  20     ----------------------------<  114    3.5     |  30     |  1.09   07 May 2023 06:00    138    |  104    |  19     ----------------------------<  126    3.1     |  31     |  1.09     Ca    8.6        09 May 2023 09:40  Phos  2.3       08 May 2023 07:05  Mg     2.2       09 May 2023 09:40                        12.1   8.16  )-----------( 177      ( 09 May 2023 09:40 )             38.6 ,                       11.0   7.61  )-----------( 158      ( 08 May 2023 07:05 )             35.7 ,                       10.8   7.96  )-----------( 138      ( 07 May 2023 06:00 )             35.1     INR: 1.88 ratio (05-08 @ 07:05)  INR: 1.71 ratio (05-07 @ 06:00)

## 2023-05-09 NOTE — PROGRESS NOTE ADULT - NSPROGADDITIONALINFOA_GEN_ALL_CORE
Better  INR stable  Will ask Pulm/IR on IVC Filter  Urology consulted: may need surgery in house; will need MRI pelvis IV Contrast
Elevated PSA from 90 to 56 could be because of Finasteride. When stable ALEJO to r/o nodular prostate and an MRI of the prostate may help. Hematuria most likely secondary to high INR, however a cystscopy is necessary to r/o bladder pathology.  PVR after removing Yan catheter to evaluate if it is needed.
INR stable  on ppx lovenox  lasix  joseph  management  MRI IV pelvis  Uro/ Card followup
medically acute  lasix  need Vascular/Uro/Gen Surgery followup

## 2023-05-09 NOTE — PROGRESS NOTE ADULT - NS ATTEND AMEND GEN_ALL_CORE FT
Using translation services, we spoke with patient reminding him of issues discussed now and at last visit.  High PSA readings, concern for prostate cancer, retention with large prostate and his GH.  Seemed to understand need for out pt work up.  will need voiding trial, cysto for GH  needs prostate bx, but MR prostate first: assess for PHIL, SVI or PIRAD lesion in need of targeted biopsy    Currently draining clear yellow urine and with INR significantly decreased  Keep Finasteride and Tamsulosin  joseph for now    f/u MRI

## 2023-05-09 NOTE — PROGRESS NOTE ADULT - SUBJECTIVE AND OBJECTIVE BOX
Patient seen and examined bedside resting comfortably.  Voiding with difficulty.  Denies hematuria and dysuria.  Denies nausea and vomiting. Tolerating diet.  Denies chest pain, dyspnea, cough.    T(F): 98.8 (05-09-23 @ 10:36), Max: 98.8 (05-09-23 @ 10:36)  HR: 104 (05-09-23 @ 10:36) (99 - 105)  BP: 114/75 (05-09-23 @ 10:36) (102/64 - 114/75)  RR: 17 (05-09-23 @ 10:36) (17 - 18)  SpO2: 93% (05-09-23 @ 10:36) (93% - 96%)  Wt(kg): --  CAPILLARY BLOOD GLUCOSE          PHYSICAL EXAM:  General: NAD, alert and awake  HEENT: NCAT, EOMI, conjunctiva clear  Chest: nonlabored respirations, good inspiratory effort  Abdomen: soft, NTND.   Extremities: no pedal edema or calf tenderness noted   : + SP tenderness PVR 352ml    LABS:                        12.1   8.16  )-----------( 177      ( 09 May 2023 09:40 )             38.6   05-09    138  |  102  |  22  ----------------------------<  138<H>  3.2<L>   |  32<H>  |  1.33<H>    Ca    8.6      09 May 2023 09:40  Phos  2.3     05-08  Mg     2.2     05-09    PT/INR - ( 08 May 2023 07:05 )   PT: 22.7 sec;   INR: 1.88 ratio           I&O's Detail    08 May 2023 07:01  -  09 May 2023 07:00  --------------------------------------------------------  IN:    Oral Fluid: 840 mL  Total IN: 840 mL    OUT:    Indwelling Catheter - Urethral (mL): 2300 mL  Total OUT: 2300 mL    Total NET: -1460 mL      09 May 2023 07:01  -  09 May 2023 16:40  --------------------------------------------------------  IN:    Oral Fluid: 600 mL  Total IN: 600 mL    OUT:    Indwelling Catheter - Urethral (mL): 500 mL    Voided (mL): 400 mL  Total OUT: 900 mL    Total NET: -300 mL

## 2023-05-09 NOTE — PROGRESS NOTE ADULT - SUBJECTIVE AND OBJECTIVE BOX
patient seen and examined  comfortable  joseph changed by Uro  vitals stable   INR stable  Ct head negative  CT Angio Chest with chronic PE  duplex negative for dopplers  overloaded  Review of Systems:  General:denies fever chills, headache, weakness  HEENT: denies blurry vision,diffculty swallowing, difficulty hearing, tinnitus  Cardiovascular: denies chest pain  ,palpitations  Pulmonary:denies shortness of breath, cough, wheezing, hemoptysis  Gastrointestinal: denies abdominal pain, constipation, diarrhea,nausea , vomiting, hematochezia  : denies hematuria, dysuria, or incontinence  Neurological: denies weakness, numbness , tingling, dizziness, tremors  MSK: denies muscle pain, difficulty ambulating, swelling, back pain  skin: denies skin rash, itching, burning, or  skin lesions  Psychiatrical: denies mood disturbances, anxierty, feeling depressed, depression , or difficulty sleeping    Objective:  Vitals  T(C): 37.1 (05-08-23 @ 10:53), Max: 37.1 (05-08-23 @ 05:17)  HR: 109 (05-08-23 @ 11:57) (103 - 111)  BP: 110/76 (05-08-23 @ 11:57) (110/68 - 122/71)  RR: 18 (05-08-23 @ 10:53) (18 - 18)  SpO2: 94% (05-08-23 @ 10:53) (94% - 96%)    Physical Exam:  General: comfortable, no acute distress  HEENT: Atraumatic, no LAD, trachea midline, PERRLA  Cardiovascular: normal s1s2, no murmurs, gallops or fricition rubs  Pulmonary: crackles, no wheezing , rhonchi  Gastrointestinal: soft non tender non distended, no masses felt, no organomegally  Muscloskeletal:  +lower extremity edema, intact bilateral lower extremity pulses  Neurological: CN II-12 intact. No focal weakness  Psychiatrical: normal mood, cooperative  SKIN: no rash, lesions or ulcers    Labs:                          11.0   7.61  )-----------( 158      ( 08 May 2023 07:05 )             35.7     05-08    137  |  103  |  20  ----------------------------<  114<H>  3.5   |  30  |  1.09    Ca    8.6      08 May 2023 07:05  Phos  2.3     05-08  Mg     2.1     05-08        PT/INR - ( 08 May 2023 07:05 )   PT: 22.7 sec;   INR: 1.88 ratio               Active Medications  MEDICATIONS  (STANDING):  atorvastatin 40 milliGRAM(s) Oral at bedtime  finasteride 5 milliGRAM(s) Oral daily  furosemide   Injectable 40 milliGRAM(s) IV Push daily  metoprolol succinate ER 50 milliGRAM(s) Oral daily  pantoprazole    Tablet 40 milliGRAM(s) Oral before breakfast  tamsulosin 0.4 milliGRAM(s) Oral at bedtime    MEDICATIONS  (PRN):

## 2023-05-09 NOTE — PROGRESS NOTE ADULT - ASSESSMENT
77 year old male with h/o HTN,  CHF, BPH , prostate   cancer h/o  recent  PE/   dvt   and was   d/c on 4/7/23,  on coumadin also  with  on going hematuria/ joseph, noted  from last visit, now, returns  ,with   c/o   bilateral leg edema, sob and hematuria. Patient admiitted for acute hypoxemic respiratory failure secondary to Congestive Heart Failure , gross hematuria due to supertherapeutic INR      Acute Hypoxemic respiratoy failure secondary to acute on chronic systolic CHF  Cardiology consulted / Pulm consulted  on IV lasix  still overloaded  patient has POOR followup. needed outpatient followup with Dr. Anderson and [facility]. Didnot followup  Echo  3/2023,    ef  40,  diastolic  dysfunction and severe Pulm HTN  plan  lasix  tele    Supratherapeutic INR with h/o  recent  PE./   DVT. in 3/2023   Vascular Consulted: Corrected INR now to 1.7  followup dopplers negative for DVT  Vascular followup based on Ct angio: no indication for filter    gross  hematuria  with hx of  BPH / Prostate Ca likely due to supratherapeutic INR  Urology consulted  joseph in place /exchanged  by  Uro 5/8  no CBI needed: joseph draining clear  plan:  d/w with team: needs MRI IV contrast pelvis + likely scope inpatient    Strangulation of  hernia . note  per  imaging   Gen Surgery consulted;NO plans for surgery     Dvt ppx: scds for now      Dispo  home: unable to go to facility or LTC:According to daughter patient HAS  upcoming appt with his  dr/ she does  not remember  the  dr's name    And,   also  stated  , that  pt has  never   seen a  dr  to  check his INR,  since   being  d/c              Patient is going to be a very difficult disposition to treat on discharge

## 2023-05-10 LAB
ANION GAP SERPL CALC-SCNC: 3 MMOL/L — LOW (ref 5–17)
BASOPHILS # BLD AUTO: 0.07 K/UL — SIGNIFICANT CHANGE UP (ref 0–0.2)
BASOPHILS NFR BLD AUTO: 0.9 % — SIGNIFICANT CHANGE UP (ref 0–2)
BUN SERPL-MCNC: 23 MG/DL — SIGNIFICANT CHANGE UP (ref 7–23)
CALCIUM SERPL-MCNC: 9.5 MG/DL — SIGNIFICANT CHANGE UP (ref 8.5–10.1)
CHLORIDE SERPL-SCNC: 105 MMOL/L — SIGNIFICANT CHANGE UP (ref 96–108)
CO2 SERPL-SCNC: 29 MMOL/L — SIGNIFICANT CHANGE UP (ref 22–31)
CREAT SERPL-MCNC: 1.2 MG/DL — SIGNIFICANT CHANGE UP (ref 0.5–1.3)
EGFR: 62 ML/MIN/1.73M2 — SIGNIFICANT CHANGE UP
EOSINOPHIL # BLD AUTO: 0.32 K/UL — SIGNIFICANT CHANGE UP (ref 0–0.5)
EOSINOPHIL NFR BLD AUTO: 4.3 % — SIGNIFICANT CHANGE UP (ref 0–6)
GLUCOSE SERPL-MCNC: 134 MG/DL — HIGH (ref 70–99)
HCT VFR BLD CALC: 37.3 % — LOW (ref 39–50)
HGB BLD-MCNC: 11.7 G/DL — LOW (ref 13–17)
IMM GRANULOCYTES NFR BLD AUTO: 0.3 % — SIGNIFICANT CHANGE UP (ref 0–0.9)
LYMPHOCYTES # BLD AUTO: 1.56 K/UL — SIGNIFICANT CHANGE UP (ref 1–3.3)
LYMPHOCYTES # BLD AUTO: 20.8 % — SIGNIFICANT CHANGE UP (ref 13–44)
MAGNESIUM SERPL-MCNC: 2.4 MG/DL — SIGNIFICANT CHANGE UP (ref 1.6–2.6)
MCHC RBC-ENTMCNC: 24.8 PG — LOW (ref 27–34)
MCHC RBC-ENTMCNC: 31.4 G/DL — LOW (ref 32–36)
MCV RBC AUTO: 79.2 FL — LOW (ref 80–100)
MONOCYTES # BLD AUTO: 0.96 K/UL — HIGH (ref 0–0.9)
MONOCYTES NFR BLD AUTO: 12.8 % — SIGNIFICANT CHANGE UP (ref 2–14)
NEUTROPHILS # BLD AUTO: 4.56 K/UL — SIGNIFICANT CHANGE UP (ref 1.8–7.4)
NEUTROPHILS NFR BLD AUTO: 60.9 % — SIGNIFICANT CHANGE UP (ref 43–77)
NRBC # BLD: 0 /100 WBCS — SIGNIFICANT CHANGE UP (ref 0–0)
PLATELET # BLD AUTO: 197 K/UL — SIGNIFICANT CHANGE UP (ref 150–400)
POTASSIUM SERPL-MCNC: 4.6 MMOL/L — SIGNIFICANT CHANGE UP (ref 3.5–5.3)
POTASSIUM SERPL-SCNC: 4.6 MMOL/L — SIGNIFICANT CHANGE UP (ref 3.5–5.3)
RBC # BLD: 4.71 M/UL — SIGNIFICANT CHANGE UP (ref 4.2–5.8)
RBC # FLD: 16.1 % — HIGH (ref 10.3–14.5)
SODIUM SERPL-SCNC: 137 MMOL/L — SIGNIFICANT CHANGE UP (ref 135–145)
WBC # BLD: 7.49 K/UL — SIGNIFICANT CHANGE UP (ref 3.8–10.5)
WBC # FLD AUTO: 7.49 K/UL — SIGNIFICANT CHANGE UP (ref 3.8–10.5)

## 2023-05-10 PROCEDURE — 99232 SBSQ HOSP IP/OBS MODERATE 35: CPT

## 2023-05-10 PROCEDURE — 99233 SBSQ HOSP IP/OBS HIGH 50: CPT

## 2023-05-10 PROCEDURE — 72197 MRI PELVIS W/O & W/DYE: CPT | Mod: 26

## 2023-05-10 RX ORDER — LANOLIN ALCOHOL/MO/W.PET/CERES
3 CREAM (GRAM) TOPICAL ONCE
Refills: 0 | Status: COMPLETED | OUTPATIENT
Start: 2023-05-10 | End: 2023-05-19

## 2023-05-10 RX ADMIN — FINASTERIDE 5 MILLIGRAM(S): 5 TABLET, FILM COATED ORAL at 18:09

## 2023-05-10 RX ADMIN — ATORVASTATIN CALCIUM 40 MILLIGRAM(S): 80 TABLET, FILM COATED ORAL at 21:57

## 2023-05-10 RX ADMIN — Medication 40 MILLIGRAM(S): at 06:14

## 2023-05-10 RX ADMIN — ENOXAPARIN SODIUM 40 MILLIGRAM(S): 100 INJECTION SUBCUTANEOUS at 18:09

## 2023-05-10 RX ADMIN — Medication 50 MILLIGRAM(S): at 06:13

## 2023-05-10 RX ADMIN — TAMSULOSIN HYDROCHLORIDE 0.4 MILLIGRAM(S): 0.4 CAPSULE ORAL at 21:57

## 2023-05-10 RX ADMIN — PANTOPRAZOLE SODIUM 40 MILLIGRAM(S): 20 TABLET, DELAYED RELEASE ORAL at 06:13

## 2023-05-10 NOTE — PROGRESS NOTE ADULT - SUBJECTIVE AND OBJECTIVE BOX
Patient is a 77y old  Male who presents with a chief complaint of sob/ hematuria (10 May 2023 12:02)    PAST MEDICAL & SURGICAL HISTORY:  HTN (hypertension)    elevated PSA    PE    DVT     CHF    pHTN    INTERVAL HISTORY: resting in bed in no acute distress, denies any sob or chest pain   	  MEDICATIONS:  MEDICATIONS  (STANDING):  atorvastatin 40 milliGRAM(s) Oral at bedtime  enoxaparin Injectable 40 milliGRAM(s) SubCutaneous every 24 hours  finasteride 5 milliGRAM(s) Oral daily  furosemide   Injectable 40 milliGRAM(s) IV Push daily  metoprolol succinate ER 50 milliGRAM(s) Oral daily  pantoprazole    Tablet 40 milliGRAM(s) Oral before breakfast  tamsulosin 0.4 milliGRAM(s) Oral at bedtime    MEDICATIONS  (PRN):  bisacodyl 5 milliGRAM(s) Oral every 12 hours PRN Constipation    Vitals:  T(F): 98.6 (05-10-23 @ 11:04), Max: 98.6 (05-10-23 @ 04:48)  HR: 94 (05-10-23 @ 11:04) (93 - 103)  BP: 106/72 (05-10-23 @ 11:04) (102/72 - 132/79)  RR: 18 (05-10-23 @ 11:04) (17 - 18)  SpO2: 93% (05-10-23 @ 11:04) (90% - 93%)    05-09 @ 07:01  -  05-10 @ 07:00  --------------------------------------------------------  IN:    Oral Fluid: 600 mL  Total IN: 600 mL    OUT:    Indwelling Catheter - Urethral (mL): 1100 mL    Voided (mL): 400 mL  Total OUT: 1500 mL    Total NET: -900 mL    Weight (kg): 70.76 (05-09 @ 07:00)    PHYSICAL EXAM:  Neuro: Awake, responsive  CV: S1 S2 RRR + SM  Lungs: diminished to bases   GI: Soft, BS +, ND, NT  Extremities: + LE edema    TELEMETRY: sinus    RADIOLOGY: < from: CT Angio Chest PE Protocol w/ IV Cont (05.08.23 @ 11:41) >  LUNGS AND AIRWAYS: Patent central airways.  There is evidence of new   multifocal groundglass opacities involving the left upper lobe concerning   for pneumonia. Definite exclusion of additional subtle opacities in both   lungs is difficult to make due to the presence of motion artifact.  PLEURA: Trace bilateral pleural effusions.  MEDIASTINUM AND NAHOMY: No lymphadenopathy.  VESSELS: Redemonstration of eccentric filling defects and occlusions   involving the segmental and subsegmental pulmonary arteries of upper and   lower lobes bilaterally, compatible with chronic PE. Atherosclerotic   calcification including the coronary arteries.  HEART: Cardiomegaly. No pericardial effusion.  CHEST WALL AND LOWER NECK: Within normal limits.  VISUALIZED UPPER ABDOMEN: Within normal limits.  BONES: Stable mild endplate compressions of mid thoracic vertebrae.    IMPRESSION:  Multifocal bilateral peripheral filling defects and occlusions involving   the segmental and subsegmental arteries of upper and lower lobes   pulmonary arteries, similar to the prior study, compatible with chronic   PE.    New airspace disease involving the left upper lobe. Correlate for   pneumonia.    < end of copied text >    DIAGNOSTIC TESTING:    [x ] Echocardiogram:   < from: TTE Echo Complete w/o Contrast w/ Doppler (05.07.23 @ 08:44) >  Left Ventricle: Increased relative wall thickness with normal mass index   consistent with left ventricular concentric remodeling.  Left ventricular ejection fraction, by visual estimation, is 45 to 50%.   The interventricular septum is flattened in systole and diastole,   consistent with right ventricular pressure and volume overload. Spectral   Doppler shows impaired relaxation pattern of left ventricular myocardial   filling (Grade I diastolic dysfunction).  Right Ventricle: The right ventricular size is severely enlarged. RV   systolic function is moderately reduced.  Left Atrium: The left atrium is normal in size.  Right Atrium: Severely enlarged right atrium.  Pericardium: Trivial pericardial effusion is present. There is no   evidence of cardiac tamponade.  Mitral Valve: Structurally normal mitral valve, with normal leaflet   excursion.  Tricuspid Valve: Structurally normal tricuspid valve, with normal leaflet   excursion. Moderate-severe tricuspid regurgitation is visualized.   Estimated pulmonary artery systolic pressureis 77.5 mmHg assuming a   right atrial pressure of 8 mmHg, which is consistent with severe   pulmonary hypertension.  Aortic Valve: Normal trileaflet aortic valve with normal opening.  Pulmonic Valve: Structurally normal pulmonic valve, with normal leaflet   excursion. Mild pulmonic valve regurgitation.  Aorta: The aortic root is normal in size and structure.  Venous: The inferior vena cava was dilated, with respiratory size   variation less than 50%.    Summary:   1. Left ventricular ejection fraction, by visual estimation, is 45 to   50%.   2. Increased relative wall thickness with normal mass index consistent   with left ventricular concentric remodeling.   3. Spectral Doppler shows impaired relaxation pattern of left   ventricular myocardial filling (Grade I diastolic dysfunction).   4. Structurally normal mitral valve, with normal leaflet excursion.   5. Normal trileaflet aortic valve with normal opening.   6. Severely enlarged right atrium.   7. Severely enlarged right ventricle.   8. Moderately reduced RV systolic function.   9. Right ventricular volume and pressure overload.  10. Moderate-severe tricuspid regurgitation.  11. Structurally normal pulmonic valve, with normal leaflet excursion.  12. Mild pulmonic valve regurgitation.  13. Estimated pulmonary artery systolic pressure is 77.5 mmHg assuming a   right atrial pressure of 8 mmHg, which is consistent with severe   pulmonary hypertension.    < end of copied text >    LABS:	 	    10 May 2023 06:38    137    |  105    |  23     ----------------------------<  134    4.6     |  29     |  1.20   09 May 2023 09:40    138    |  102    |  22     ----------------------------<  138    3.2     |  32     |  1.33   08 May 2023 07:05    137    |  103    |  20     ----------------------------<  114    3.5     |  30     |  1.09     Ca    9.5        10 May 2023 06:38  Mg     2.4       10 May 2023 06:38                        11.7   7.49  )-----------( 197      ( 10 May 2023 06:38 )             37.3 ,                       12.1   8.16  )-----------( 177      ( 09 May 2023 09:40 )             38.6 ,                       11.0   7.61  )-----------( 158      ( 08 May 2023 07:05 )             35.7     TSH: Thyroid Stimulating Hormone, Serum: 0.968 uU/mL (05-09 @ 09:40)    INR: 1.88 ratio (05-08 @ 07:05)

## 2023-05-10 NOTE — PROGRESS NOTE ADULT - SUBJECTIVE AND OBJECTIVE BOX
Patient seen and examined bedside resting comfortably.  No complaints offered.   Voiding via joseph   Denies hematuria and dysuria.  Denies nausea and vomiting. Tolerating diet.  Denies chest pain, dyspnea, cough.    T(F): 98.6 (05-10-23 @ 11:04), Max: 98.6 (05-10-23 @ 04:48)  HR: 94 (05-10-23 @ 11:04) (93 - 97)  BP: 106/72 (05-10-23 @ 11:04) (102/72 - 119/86)  RR: 18 (05-10-23 @ 11:04) (17 - 18)  SpO2: 93% (05-10-23 @ 11:04) (90% - 93%)  Wt(kg): --  CAPILLARY BLOOD GLUCOSE      MEDICATIONS  (STANDING):  atorvastatin 40 milliGRAM(s) Oral at bedtime  enoxaparin Injectable 40 milliGRAM(s) SubCutaneous every 24 hours  finasteride 5 milliGRAM(s) Oral daily  furosemide   Injectable 40 milliGRAM(s) IV Push daily  metoprolol succinate ER 50 milliGRAM(s) Oral daily  pantoprazole    Tablet 40 milliGRAM(s) Oral before breakfast  tamsulosin 0.4 milliGRAM(s) Oral at bedtime    MEDICATIONS  (PRN):  bisacodyl 5 milliGRAM(s) Oral every 12 hours PRN Constipation      PHYSICAL EXAM:  General: NAD, alert and awake  HEENT: NCAT, EOMI, conjunctiva clear  Chest: nonlabored respirations, good inspiratory effort  Abdomen: soft, NTND.   : joseph in place draining yellow urine     LABS:                        11.7   7.49  )-----------( 197      ( 10 May 2023 06:38 )             37.3   05-10    137  |  105  |  23  ----------------------------<  134<H>  4.6   |  29  |  1.20    Ca    9.5      10 May 2023 06:38  Mg     2.4     05-10    Prostate Ca Screen, PSA Total: 59.30: Method: Roche PSA Electrochemiluminescent Immunoassay (down from 90 in 3/23)    I&O's Detail    09 May 2023 07:01  -  10 May 2023 07:00  --------------------------------------------------------  IN:    Oral Fluid: 600 mL  Total IN: 600 mL    OUT:    Indwelling Catheter - Urethral (mL): 1100 mL    Voided (mL): 400 mL  Total OUT: 1500 mL    Total NET: -900 mL          Impression: 77y Male admitted with RECTAL Bleeding, ACUTE CONGESTIVE HEART FAILURE, HEMATURIA    PMH   HTN (hypertension)    Prostate cancer    Plan: maintain joseph  follow up MRI pelvis c+/c-  continue proscar and flomax  will discuss further with Dr. Noel.

## 2023-05-10 NOTE — PROGRESS NOTE ADULT - ASSESSMENT
77 year old male with h/o HTN,  CHF, BPH , prostate cancer h/o  recent  PE/   dvt   and was   d/c on 4/7/23,  on coumadin also  with  on going hematuria/ joseph, noted  from last visit, now, returns  ,with   c/o   bilateral leg edema, sob and hematuria. Patient admiitted for acute hypoxemic respiratory failure secondary to Congestive Heart Failure , gross hematuria due to supratherapeutic INR    Acute Hypoxemic respiratoy failure secondary to acute on chronic systolic CHF  biV failure with severe pulm htn and ADHF  -pt still with b/l LE edema  Cardiology consulted / Pulm on board  lasix  still overloaded  patient has POOR followup. needed outpatient followup with Dr. Anderson and [facility]. Didnot followup  Echo  3/2023,    EF  40,  diastolic  dysfunction and severe Pulm HTN  plan  lasix  tele    Supratherapeutic INR with h/o  recent  PE./   DVT. in 3/2023  -s/p 10 of vitamin K, improved   -CT brain with no acute findings   -trend cbc  -No indication for IVC filter at this time as per Vascular  dopplers negative for DVT  Vascular followup based on Ct angio: no indication for filter    gross  hematuria  with hx of  BPH/ Prostate Ca likely due to supratherapeutic INR  Urology consulted  joseph in place /exchanged  by  Uro 5/8  no CBI needed: joseph draining clear  plan:  d/w with team: needs MRI IV contrast pelvis + likely scope inpatient  - Continue Joseph, continue finasteride and Flomax    Strangulation of  hernia . note  per  imaging   Gen Surgery consulted; NO plans for surgery     Dvt ppx: scds for now      Dispo  home: unable to go to facility or LTC:According to daughter patient HAS  upcoming appt with his  dr/ she does  not remember  the  dr's name  And,   also  stated  , that  pt has  never   seen a  dr  to  check his INR,  since   being  d/c              Patient is going to be a very difficult disposition to treat on discharge

## 2023-05-10 NOTE — PROGRESS NOTE ADULT - SUBJECTIVE AND OBJECTIVE BOX
Patient is a 77y old  Male who presents with a chief complaint of sob/ hematuria (10 May 2023 12:49)      OVERNIGHT EVENTS:  Patients seen and examined at bedside this morning. No acute events overnight.    REVIEW OF SYSTEMS: denies chest pain/SOB, diaphoresis, no F/C, cough, dizziness, headache, blurry vision, nausea, vomiting, abdominal pain. All others review of systems negative     MEDICATIONS  (STANDING):  atorvastatin 40 milliGRAM(s) Oral at bedtime  enoxaparin Injectable 40 milliGRAM(s) SubCutaneous every 24 hours  finasteride 5 milliGRAM(s) Oral daily  furosemide   Injectable 40 milliGRAM(s) IV Push daily  metoprolol succinate ER 50 milliGRAM(s) Oral daily  pantoprazole    Tablet 40 milliGRAM(s) Oral before breakfast  tamsulosin 0.4 milliGRAM(s) Oral at bedtime    MEDICATIONS  (PRN):  bisacodyl 5 milliGRAM(s) Oral every 12 hours PRN Constipation      Allergies    No Known Allergies    Intolerances        T(F): 98.6 (05-10-23 @ 11:04), Max: 98.6 (05-10-23 @ 04:48)  HR: 94 (05-10-23 @ 11:04) (93 - 103)  BP: 106/72 (05-10-23 @ 11:04) (102/72 - 132/79)  RR: 18 (05-10-23 @ 11:04) (17 - 18)  SpO2: 93% (05-10-23 @ 11:04) (90% - 93%)  Wt(kg): --    PHYSICAL EXAM:  GENERAL: NAD  HEAD:  Atraumatic, Normocephalic  EYES: PERRLA, conjunctiva and sclera clear  ENMT: Moist mucous membranes  NECK: Supple, No JVD, Normal thyroid  NERVOUS SYSTEM:  Alert & Awake  CHEST/LUNG: Clear to percussion bilaterally;   HEART: Regular rate and rhythm;   ABDOMEN: Soft, Nontender, Nondistended; Bowel sounds present  EXTREMITIES:  no edema BL LE  SKIN: moist    LABS:                        11.7   7.49  )-----------( 197      ( 10 May 2023 06:38 )             37.3     05-10    137  |  105  |  23  ----------------------------<  134<H>  4.6   |  29  |  1.20    Ca    9.5      10 May 2023 06:38  Mg     2.4     05-10          Cultures;   CAPILLARY BLOOD GLUCOSE        Lipid panel:           RADIOLOGY & ADDITIONAL TESTS:    Imaging Personally Reviewed:  [x ] YES      Consultant(s) Notes Reviewed:  [x ] YES     Care Discussed with [x ] Consultants [X ] Patient [ ] Family  [x ]    [x ]  Other; RN

## 2023-05-10 NOTE — PROGRESS NOTE ADULT - ASSESSMENT
77M w HTN, HFrEF, severe p-HTN, BPH, DVT/PE on coumadin, out pt records of elevated PSA (90), Left inguinal hernia, DVT right peroneal vein (below the knee), now p/w LE edema. pt also found to have gross hematuria.  INR on admission was 12    1. supratherapeutic INR 12/hematuria  -s/p 10 of vitamin K, improved to 4.4, now down to 1.88  -cont to monitor INR  -CT brain with no acute findings   -trend cbc  -appreciate urology recs, awaiting pelvic MRI  -No indication for IVC filter at this time as per Vascular     2. SOB  -pt endorsing chronic symptoms, states this is unchanged from his baseline, currently denies any sob  -pt comfortable appearing  -Repeat CTA chest with chronic PE  -O2 sat stable on RA    3. biV failure with severe pulm htn and ADHF  -pt still with b/l LE edema, would cont IV lasix for now however pt with known RV dysfunction slightly pre-load dependent  -replete lytes as required, monitor renal function closely   -cont bbl, Toprol 50 daily  -unable to optimize GDMT for cardiomyopathy 2/2 low BP  -will cont to monitor day to day volume status     will follow with you   77M w HTN, HFrEF, severe p-HTN, BPH, DVT/PE on coumadin, out pt records of elevated PSA (90), Left inguinal hernia, DVT right peroneal vein (below the knee), now p/w LE edema. pt also found to have gross hematuria.  INR on admission was 12    1. supratherapeutic INR 12/hematuria  -s/p 10 of vitamin K, improved to 4.4, now down to 1.88  -cont to monitor INR  -CT brain with no acute findings   -trend cbc  -appreciate urology recs, awaiting pelvic MRI  -No indication for IVC filter at this time as per Vascular     2. SOB  -pt endorsing chronic symptoms, states this is unchanged from his baseline, currently denies any sob  -pt comfortable appearing  -Repeat CTA chest with chronic PE  -O2 sat stable on RA    3. biV failure with severe pulm htn and ADHF  -pt still with b/l LE edema although improved from admission  -would cont IV lasix for now however pt with known RV dysfunction slightly pre-load dependent  -replete lytes as required, monitor renal function closely   -cont bbl, Toprol 50 daily  -unable to optimize GDMT for cardiomyopathy 2/2 low BP  -will cont to monitor day to day volume status     will follow with you

## 2023-05-10 NOTE — PROGRESS NOTE ADULT - SUBJECTIVE AND OBJECTIVE BOX
INTERVAL HPI:   77 year old male with HTN, Diastolic CHF, Severe pHTN,  BPH , Prostate  cancer ( elevated PSA ), Recent bone scan with evidence of osseous metastasis,  Urinary retention with indwelling Yan,  Recent  PE/ DVT in April 2023,( was discharged  on coumadin ) and  Left inguinal hernia,   Presented  to ED  with  c/o  bilateral leg edema, sob and on going,  c/c hematuria. Sob is more exertional than at rest.  No fever, chills, cough or sputum production.  In ED with INR of more than 12, Yan with gross  hematuria, also with ventral hernia.  Yan upsized in ED, ventral hernia reduced by surgery. To get FFP to correct INR     OVERNIGHT EVENTS:  Oxygenation stable    Vital Signs Last 24 Hrs  T(C): 37 (10 May 2023 11:04), Max: 37 (10 May 2023 04:48)  T(F): 98.6 (10 May 2023 11:04), Max: 98.6 (10 May 2023 04:48)  HR: 94 (10 May 2023 11:04) (93 - 103)  BP: 106/72 (10 May 2023 11:04) (102/72 - 132/79)  BP(mean): --  RR: 18 (10 May 2023 11:04) (17 - 18)  SpO2: 93% (10 May 2023 11:04) (90% - 93%)    Parameters below as of 10 May 2023 11:04  Patient On (Oxygen Delivery Method): room air    PHYSICAL EXAM:  GEN:       comfortable.  HEENT:    Normal.    RESP:       no distress  CVS:         Regular rate and rhythm.     MEDICATIONS  (STANDING):  atorvastatin 40 milliGRAM(s) Oral at bedtime  enoxaparin Injectable 40 milliGRAM(s) SubCutaneous every 24 hours  finasteride 5 milliGRAM(s) Oral daily  furosemide   Injectable 40 milliGRAM(s) IV Push daily  metoprolol succinate ER 50 milliGRAM(s) Oral daily  pantoprazole    Tablet 40 milliGRAM(s) Oral before breakfast  tamsulosin 0.4 milliGRAM(s) Oral at bedtime    MEDICATIONS  (PRN):  bisacodyl 5 milliGRAM(s) Oral every 12 hours PRN Constipation    LABS:                        11.7   7.49  )-----------( 197      ( 10 May 2023 06:38 )             37.3     05-10    137  |  105  |  23  ----------------------------<  134<H>  4.6   |  29  |  1.20    Ca    9.5      10 May 2023 06:38  Mg     2.4     05-10     ASSESSMENT AND PLAN:  SOB.  Fluid retention.  Diastolic dysfunction.  Pulmonary HTN by hx.  Hematuria.  Warfarin coagulopathy.  Prostate  CA  BPH.  Urinary retention, with Yan.  VTE.  Inguinal hernia S/P reduction.    SPO2 96% on nasal O2.  INR being corrected with  FFP, follow INR  Left Inguinal hernia reduced.  Diuretics as needed, being seen by Cardiology.    05/07/23: Awake, responsive and comfortable. SPO2 96% .  INR down to 1.71  LE duplex, no DVT.    05/09/23:  Denies SOB, clinically stable. SPO2 92% on room air.  Not safe for anticoagulation at this point per urology.  No indication for IVC filter per vascular surgery.  Continue supportive treatment.    05/10/23: SPO2 stable on room air. Continue supportive care.  For MRI pelvis.

## 2023-05-10 NOTE — PROGRESS NOTE ADULT - NS PANP COMMENT GEN_ALL_CORE FT
per above    urine clear    tov pending per above    urine clear    tov pending    elev psa-? significance with joseph in place

## 2023-05-11 LAB
ANION GAP SERPL CALC-SCNC: 3 MMOL/L — LOW (ref 5–17)
BUN SERPL-MCNC: 22 MG/DL — SIGNIFICANT CHANGE UP (ref 7–23)
CALCIUM SERPL-MCNC: 9 MG/DL — SIGNIFICANT CHANGE UP (ref 8.5–10.1)
CHLORIDE SERPL-SCNC: 103 MMOL/L — SIGNIFICANT CHANGE UP (ref 96–108)
CO2 SERPL-SCNC: 31 MMOL/L — SIGNIFICANT CHANGE UP (ref 22–31)
CREAT SERPL-MCNC: 1.23 MG/DL — SIGNIFICANT CHANGE UP (ref 0.5–1.3)
EGFR: 60 ML/MIN/1.73M2 — SIGNIFICANT CHANGE UP
GLUCOSE SERPL-MCNC: 121 MG/DL — HIGH (ref 70–99)
HCT VFR BLD CALC: 37.1 % — LOW (ref 39–50)
HGB BLD-MCNC: 11.8 G/DL — LOW (ref 13–17)
INR BLD: 1.56 RATIO — HIGH (ref 0.88–1.16)
MCHC RBC-ENTMCNC: 25.1 PG — LOW (ref 27–34)
MCHC RBC-ENTMCNC: 31.8 G/DL — LOW (ref 32–36)
MCV RBC AUTO: 78.9 FL — LOW (ref 80–100)
NRBC # BLD: 0 /100 WBCS — SIGNIFICANT CHANGE UP (ref 0–0)
PLATELET # BLD AUTO: 207 K/UL — SIGNIFICANT CHANGE UP (ref 150–400)
POTASSIUM SERPL-MCNC: 3.8 MMOL/L — SIGNIFICANT CHANGE UP (ref 3.5–5.3)
POTASSIUM SERPL-SCNC: 3.8 MMOL/L — SIGNIFICANT CHANGE UP (ref 3.5–5.3)
PROTHROM AB SERPL-ACNC: 18.8 SEC — HIGH (ref 10.5–13.4)
RBC # BLD: 4.7 M/UL — SIGNIFICANT CHANGE UP (ref 4.2–5.8)
RBC # FLD: 16.1 % — HIGH (ref 10.3–14.5)
SODIUM SERPL-SCNC: 137 MMOL/L — SIGNIFICANT CHANGE UP (ref 135–145)
WBC # BLD: 6.21 K/UL — SIGNIFICANT CHANGE UP (ref 3.8–10.5)
WBC # FLD AUTO: 6.21 K/UL — SIGNIFICANT CHANGE UP (ref 3.8–10.5)

## 2023-05-11 PROCEDURE — 99233 SBSQ HOSP IP/OBS HIGH 50: CPT

## 2023-05-11 PROCEDURE — 99232 SBSQ HOSP IP/OBS MODERATE 35: CPT

## 2023-05-11 RX ORDER — WARFARIN SODIUM 2.5 MG/1
3 TABLET ORAL ONCE
Refills: 0 | Status: COMPLETED | OUTPATIENT
Start: 2023-05-11 | End: 2023-05-11

## 2023-05-11 RX ADMIN — FINASTERIDE 5 MILLIGRAM(S): 5 TABLET, FILM COATED ORAL at 17:37

## 2023-05-11 RX ADMIN — WARFARIN SODIUM 3 MILLIGRAM(S): 2.5 TABLET ORAL at 22:27

## 2023-05-11 RX ADMIN — TAMSULOSIN HYDROCHLORIDE 0.4 MILLIGRAM(S): 0.4 CAPSULE ORAL at 22:27

## 2023-05-11 RX ADMIN — ATORVASTATIN CALCIUM 40 MILLIGRAM(S): 80 TABLET, FILM COATED ORAL at 22:33

## 2023-05-11 RX ADMIN — Medication 40 MILLIGRAM(S): at 05:35

## 2023-05-11 RX ADMIN — Medication 50 MILLIGRAM(S): at 05:35

## 2023-05-11 RX ADMIN — PANTOPRAZOLE SODIUM 40 MILLIGRAM(S): 20 TABLET, DELAYED RELEASE ORAL at 05:35

## 2023-05-11 NOTE — PROGRESS NOTE ADULT - SUBJECTIVE AND OBJECTIVE BOX
Patient is a 77y old  Male who presents with a chief complaint of sob/ hematuria (11 May 2023 12:46)      OVERNIGHT EVENTS:  Patients seen and examined at bedside this morning. No acute events overnight.    REVIEW OF SYSTEMS: denies chest pain/SOB, diaphoresis, no F/C, cough, dizziness, headache, blurry vision, nausea, vomiting, abdominal pain. All others review of systems negative     MEDICATIONS  (STANDING):  atorvastatin 40 milliGRAM(s) Oral at bedtime  enoxaparin Injectable 40 milliGRAM(s) SubCutaneous every 24 hours  finasteride 5 milliGRAM(s) Oral daily  furosemide   Injectable 40 milliGRAM(s) IV Push daily  melatonin 3 milliGRAM(s) Oral once  metoprolol succinate ER 50 milliGRAM(s) Oral daily  pantoprazole    Tablet 40 milliGRAM(s) Oral before breakfast  tamsulosin 0.4 milliGRAM(s) Oral at bedtime    MEDICATIONS  (PRN):  bisacodyl 5 milliGRAM(s) Oral every 12 hours PRN Constipation      Allergies    No Known Allergies    Intolerances        T(F): 97.4 (05-11-23 @ 10:51), Max: 98.5 (05-10-23 @ 23:35)  HR: 94 (05-11-23 @ 10:51) (93 - 99)  BP: 100/65 (05-11-23 @ 10:51) (100/65 - 110/75)  RR: 17 (05-11-23 @ 10:51) (16 - 18)  SpO2: 94% (05-11-23 @ 10:51) (90% - 95%)  Wt(kg): --    PHYSICAL EXAM:  GENERAL: NAD  HEAD:  Atraumatic, Normocephalic  EYES: PERRLA, conjunctiva and sclera clear  ENMT: Moist mucous membranes  NECK: Supple, No JVD, Normal thyroid  NERVOUS SYSTEM:  Alert & Awake  CHEST/LUNG: Clear to percussion bilaterally;   HEART: Regular rate and rhythm;   ABDOMEN: Soft, Nontender, Nondistended; Bowel sounds present  EXTREMITIES:  no edema BL LE  SKIN: moist    LABS:                        11.8   6.21  )-----------( 207      ( 11 May 2023 07:40 )             37.1     05-11    137  |  103  |  22  ----------------------------<  121<H>  3.8   |  31  |  1.23    Ca    9.0      11 May 2023 07:40  Mg     2.4     05-10      PT/INR - ( 11 May 2023 10:15 )   PT: 18.8 sec;   INR: 1.56 ratio             Cultures;   CAPILLARY BLOOD GLUCOSE        Lipid panel:           RADIOLOGY & ADDITIONAL TESTS:    Imaging Personally Reviewed:  [x ] YES      Consultant(s) Notes Reviewed:  [x ] YES     Care Discussed with [x ] Consultants [X ] Patient [ ] Family  [x ]    [x ]  Other; RN

## 2023-05-11 NOTE — PROGRESS NOTE ADULT - NS ATTEND AMEND GEN_ALL_CORE FT
I agree with the statements above.  Patient who failed TOV (). Needs TRUS. Follow up report of MRI w/wo contrast for suspected PCA.  Planning cysto to evaluate prostate obstruction and bladder walls.

## 2023-05-11 NOTE — PROGRESS NOTE ADULT - ASSESSMENT
77M w HTN, HFrEF, severe p-HTN, BPH, DVT/PE on coumadin, out pt records of elevated PSA (90), Left inguinal hernia, DVT right peroneal vein (below the knee), now p/w LE edema. pt also found to have gross hematuria.  INR on admission was 12    1. supratherapeutic INR 12/hematuria  -s/p 10 of vitamin K, now down to 1.6  -CT brain with no acute findings   -trend cbc  -pelvic MRI with Enlarged and heterogeneously enhancing prostate gland, further interventions as per urology recs,  -No indication for IVC filter at this time as per Vascular     2. SOB  -pt endorsing chronic symptoms, states this is unchanged from his baseline  -pt comfortable appearing, currently denies any sob  -Repeat CTA chest with chronic PE  -O2 sat stable on 2 L NC    3. biV failure with severe pulm htn and ADHF  -pt still with b/l LE edema although improved from admission  -would cont IV lasix for now however pt with known RV dysfunction slightly pre-load dependent  -replete lytes as required, monitor renal function closely   -cont Toprol XL 50 daily  -unable to optimize GDMT for cardiomyopathy 2/2 low BP  -will cont to monitor day to day volume status     will follow with you   77M w HTN, HFrEF, severe p-HTN, BPH, DVT/PE on coumadin, out pt records of elevated PSA (90), Left inguinal hernia, DVT right peroneal vein (below the knee), now p/w LE edema. pt also found to have gross hematuria.  INR on admission was 12    1. supratherapeutic INR 12/hematuria  -s/p 10 of vitamin K, now down to 1.6  -CT brain with no acute findings   -trend cbc  -pelvic MRI with Enlarged and heterogeneously enhancing prostate gland, further interventions as per urology recs,  -No indication for IVC filter at this time as per Vascular     2. SOB  -pt endorsing chronic symptoms, states this is unchanged from his baseline  -pt comfortable appearing, currently denies any sob  -Repeat CTA chest with chronic PE  -O2 sat stable on 2 L NC    3. biV failure with severe pulm htn and ADHF  -pt still with b/l LE edema although improved from admission  -would change lasix from IV to po for now given known RV dysfunction. pt is pre-load dependent  -replete lytes as required, monitor renal function closely   -cont Toprol XL 50 daily  -unable to optimize GDMT for cardiomyopathy 2/2 low BP  -will cont to monitor day to day volume status     will follow with you

## 2023-05-11 NOTE — PROGRESS NOTE ADULT - SUBJECTIVE AND OBJECTIVE BOX
INTERVAL HPI:  77 year old male with HTN, Diastolic CHF, Severe pHTN,  BPH , Prostate  cancer ( elevated PSA ), Recent bone scan with evidence of osseous metastasis,  Urinary retention with indwelling Yan,  Recent  PE/ DVT in April 2023,( was discharged  on coumadin ) and  Left inguinal hernia,   Presented  to ED  with  c/o  bilateral leg edema, sob and on going,  c/c hematuria. Sob is more exertional than at rest.  No fever, chills, cough or sputum production.  In ED with INR of more than 12, Yan with gross  hematuria, also with ventral hernia.  Yan upsized in ED, ventral hernia reduced by surgery. To get FFP to correct INR     OVERNIGHT EVENTS:  Comfortable in bed    Vital Signs Last 24 Hrs  T(C): 36.3 (11 May 2023 10:51), Max: 36.9 (10 May 2023 23:35)  T(F): 97.4 (11 May 2023 10:51), Max: 98.5 (10 May 2023 23:35)  HR: 94 (11 May 2023 10:51) (93 - 99)  BP: 100/65 (11 May 2023 10:51) (100/65 - 110/75)  BP(mean): --  RR: 17 (11 May 2023 10:51) (16 - 18)  SpO2: 94% (11 May 2023 10:51) (90% - 95%)    Parameters below as of 11 May 2023 10:51  Patient On (Oxygen Delivery Method): nasal cannula  O2 Flow (L/min): 2    PHYSICAL EXAM:  GEN:         Awake, responsive and comfortable.  HEENT:    Normal.    RESP:      no distress  CVS:         Regular rate and rhythm.     MEDICATIONS  (STANDING):  atorvastatin 40 milliGRAM(s) Oral at bedtime  enoxaparin Injectable 40 milliGRAM(s) SubCutaneous every 24 hours  finasteride 5 milliGRAM(s) Oral daily  furosemide   Injectable 40 milliGRAM(s) IV Push daily  melatonin 3 milliGRAM(s) Oral once  metoprolol succinate ER 50 milliGRAM(s) Oral daily  pantoprazole    Tablet 40 milliGRAM(s) Oral before breakfast  tamsulosin 0.4 milliGRAM(s) Oral at bedtime    MEDICATIONS  (PRN):  bisacodyl 5 milliGRAM(s) Oral every 12 hours PRN Constipation    LABS:                        11.8   6.21  )-----------( 207      ( 11 May 2023 07:40 )             37.1     05-11    137  |  103  |  22  ----------------------------<  121<H>  3.8   |  31  |  1.23    Ca    9.0      11 May 2023 07:40  Mg     2.4     05-10    PT/INR - ( 11 May 2023 10:15 )   PT: 18.8 sec;   INR: 1.56 ratio        ASSESSMENT AND PLAN:  SOB.  Fluid retention.  Diastolic dysfunction.  Pulmonary HTN by hx.  Hematuria.  Warfarin coagulopathy.  Prostate  CA  BPH.  Urinary retention, with Yan.  VTE.  Inguinal hernia S/P reduction.    SPO2 96% on nasal O2.  INR being corrected with  FFP, follow INR  Left Inguinal hernia reduced.  Diuretics as needed, being seen by Cardiology.    05/07/23: Awake, responsive and comfortable. SPO2 96% .  INR down to 1.71  LE duplex, no DVT.    05/09/23:  Denies SOB, clinically stable. SPO2 92% on room air.  Not safe for anticoagulation at this point per urology.  No indication for IVC filter per vascular surgery.  Continue supportive treatment.    05/10/23: SPO2 stable on room air. Continue supportive care.  For MRI pelvis.    05/11/23: Awake, responsive and comfortable. SPO2 94% on room air.  Oxygenation status stable.

## 2023-05-11 NOTE — PROGRESS NOTE ADULT - SUBJECTIVE AND OBJECTIVE BOX
Patient is a 77y old  Male who presents with sob/ hematuria (11 May 2023 11:37)    PAST MEDICAL & SURGICAL HISTORY:  HTN (hypertension)    elevated PSA    PE    DVT     CHF    INTERVAL HISTORY: in no acute distress   	  MEDICATIONS:  MEDICATIONS  (STANDING):  atorvastatin 40 milliGRAM(s) Oral at bedtime  enoxaparin Injectable 40 milliGRAM(s) SubCutaneous every 24 hours  finasteride 5 milliGRAM(s) Oral daily  furosemide   Injectable 40 milliGRAM(s) IV Push daily  melatonin 3 milliGRAM(s) Oral once  metoprolol succinate ER 50 milliGRAM(s) Oral daily  pantoprazole    Tablet 40 milliGRAM(s) Oral before breakfast  tamsulosin 0.4 milliGRAM(s) Oral at bedtime    MEDICATIONS  (PRN):  bisacodyl 5 milliGRAM(s) Oral every 12 hours PRN Constipation    Vitals:  T(F): 97.4 (05-11-23 @ 10:51), Max: 98.5 (05-10-23 @ 23:35)  HR: 94 (05-11-23 @ 10:51) (93 - 99)  BP: 100/65 (05-11-23 @ 10:51) (100/65 - 110/75)  RR: 17 (05-11-23 @ 10:51) (16 - 18)  SpO2: 94% (05-11-23 @ 10:51) (90% - 95%)    05-10 @ 07:01  -  05-11 @ 07:00  --------------------------------------------------------  IN:  Total IN: 0 mL    OUT:    Indwelling Catheter - Urethral (mL): 925 mL  Total OUT: 925 mL    Total NET: -925 mL    05-11 @ 07:01  -  05-11 @ 12:46  --------------------------------------------------------  IN:    Oral Fluid: 320 mL  Total IN: 320 mL    OUT:    Indwelling Catheter - Urethral (mL): 1600 mL  Total OUT: 1600 mL    Total NET: -1280 mL    Weight (kg): 70.76 (05-09 @ 07:00)    PHYSICAL EXAM:  Neuro: Awake, responsive  CV: S1 S2 RRR + SM  Lungs: diminished to bases   GI: Soft, BS +, ND, NT  Extremities: + LE edema    TELEMETRY: sinus    RADIOLOGY:   < from: MR Pelvis w/wo IV Cont (05.10.23 @ 12:46) >  Enlarged and heterogeneously enhancing prostate gland. Recommend   follow-up with dedicated prostate MRI.    < end of copied text >    < from: CT Angio Chest PE Protocol w/ IV Cont (05.08.23 @ 11:41) >  LUNGS AND AIRWAYS: Patent central airways.  There is evidence of new   multifocal groundglass opacities involving the left upper lobe concerning   for pneumonia. Definite exclusion of additional subtle opacities in both   lungs is difficult to make due to the presence of motion artifact.  PLEURA: Trace bilateral pleural effusions.  MEDIASTINUM AND NAHOMY: No lymphadenopathy.  VESSELS: Redemonstration of eccentric filling defects and occlusions   involving the segmental and subsegmental pulmonary arteries of upper and   lower lobes bilaterally, compatible with chronic PE. Atherosclerotic   calcification including the coronary arteries.  HEART: Cardiomegaly. No pericardial effusion.  CHEST WALL AND LOWER NECK: Within normal limits.  VISUALIZED UPPER ABDOMEN: Within normal limits.  BONES: Stable mild endplate compressions of mid thoracic vertebrae.    IMPRESSION:  Multifocal bilateral peripheral filling defects and occlusions involving   the segmental and subsegmental arteries of upper and lower lobes   pulmonary arteries, similar to the prior study, compatible with chronic   PE.    New airspace disease involving the left upper lobe. Correlate for   pneumonia.    < end of copied text >  DIAGNOSTIC TESTING:    [x ] Echocardiogram: < from: TTE Echo Complete w/o Contrast w/ Doppler (05.07.23 @ 08:44) >  Left Ventricle: Increased relative wall thickness with normal mass index   consistent with left ventricular concentric remodeling.  Left ventricular ejection fraction, by visual estimation, is 45 to 50%.   The interventricular septum is flattened in systole and diastole,   consistent with right ventricular pressure and volume overload. Spectral   Doppler shows impaired relaxation pattern of left ventricular myocardial   filling (Grade I diastolic dysfunction).  Right Ventricle: The right ventricular size is severely enlarged. RV   systolic function is moderately reduced.  Left Atrium: The left atrium is normal in size.  Right Atrium: Severely enlarged right atrium.  Pericardium: Trivial pericardial effusion is present. There is no   evidence of cardiac tamponade.  Mitral Valve: Structurally normal mitral valve, with normal leaflet   excursion.  Tricuspid Valve: Structurally normal tricuspid valve, with normal leaflet   excursion. Moderate-severe tricuspid regurgitation is visualized.   Estimated pulmonary artery systolic pressureis 77.5 mmHg assuming a   right atrial pressure of 8 mmHg, which is consistent with severe   pulmonary hypertension.  Aortic Valve: Normal trileaflet aortic valve with normal opening.  Pulmonic Valve: Structurally normal pulmonic valve, with normal leaflet   excursion. Mild pulmonic valve regurgitation.  Aorta: The aortic root is normal in size and structure.  Venous: The inferior vena cava was dilated, with respiratory size   variation less than 50%.      Summary:   1. Left ventricular ejection fraction, by visual estimation, is 45 to   50%.   2. Increased relative wall thickness with normal mass index consistent   with left ventricular concentric remodeling.   3. Spectral Doppler shows impaired relaxation pattern of left   ventricular myocardial filling (Grade I diastolic dysfunction).   4. Structurally normal mitral valve, with normal leaflet excursion.   5. Normal trileaflet aortic valve with normal opening.   6. Severely enlarged right atrium.   7. Severely enlarged right ventricle.   8. Moderately reduced RV systolic function.   9. Right ventricular volume and pressure overload.  10. Moderate-severe tricuspid regurgitation.  11. Structurally normal pulmonic valve, with normal leaflet excursion.  12. Mild pulmonic valve regurgitation.  13. Estimated pulmonary artery systolic pressure is 77.5 mmHg assuming a   right atrial pressure of 8 mmHg, which is consistent with severe   pulmonary hypertension.    < end of copied text >    LABS:	 	    11 May 2023 07:40    137    |  103    |  22     ----------------------------<  121    3.8     |  31     |  1.23   10 May 2023 06:38    137    |  105    |  23     ----------------------------<  134    4.6     |  29     |  1.20   09 May 2023 09:40    138    |  102    |  22     ----------------------------<  138    3.2     |  32     |  1.33     Ca    9.0        11 May 2023 07:40  Mg     2.4       10 May 2023 06:38                        11.8   6.21  )-----------( 207      ( 11 May 2023 07:40 )             37.1 ,                       11.7   7.49  )-----------( 197      ( 10 May 2023 06:38 )             37.3 ,                       12.1   8.16  )-----------( 177      ( 09 May 2023 09:40 )             38.6     TSH: Thyroid Stimulating Hormone, Serum: 0.968 uU/mL (05-09 @ 09:40)    PT/PTT- ( 11 May 2023 10:15 )   PT: 18.8 sec;  PTT: x        INR: 1.56 ratio (05-11 @ 10:15)

## 2023-05-11 NOTE — PROGRESS NOTE ADULT - SUBJECTIVE AND OBJECTIVE BOX
Pt seen and examined at bedside with Dr. Busch.    T(F): 97.4 (05-11-23 @ 10:51), Max: 98.5 (05-10-23 @ 23:35)  HR: 94 (05-11-23 @ 10:51) (93 - 99)  BP: 100/65 (05-11-23 @ 10:51) (100/65 - 110/75)  RR: 17 (05-11-23 @ 10:51) (16 - 18)  SpO2: 94% (05-11-23 @ 10:51) (90% - 95%)      PHYSICAL EXAM:  General: NAD,  Lung: Respirations nonlabored  Abdomen: Soft   : joseph in place draining clear, yellow urine, output 1600cc/24hrs    LABS:                        11.8   6.21  )-----------( 207      ( 11 May 2023 07:40 )             37.1     05-11    137  |  103  |  22  ----------------------------<  121<H>  3.8   |  31  |  1.23    Ca    9.0      11 May 2023 07:40  Mg     2.4     05-10    < from: MR Pelvis w/wo IV Cont (05.10.23 @ 12:46) >    *** ADDENDUM # 1 ***    Please note that although this was not a dedicated prostate MRI, there is   abnormal diffusion, enhancement and signal noted in the right peripheral   zone and seminal vesicles. Cannot exclude prostate carcinoma. Recommend   dedicated prostate MRI.    Findings were discussed with ANABELA Gaines by telephone on 5/11/2023 at 1040   hours.    --- End of Report ---    *** END OF ADDENDUM # 1 ***      PROCEDUREDATE:  05/10/2023          INTERPRETATION:  CLINICAL INFORMATION: Elevated PSA    COMPARISON: Abdominal CT dated 4/3/2023 and 5/6/2023    CONTRAST/COMPLICATIONS:  IV Contrast: Gadavist  8 cc administered   2 cc discarded  Oral Contrast: NONE  Complications: None reported at time of study completion    PROCEDURE:  MRI of the pelvis was performed.  -    FINDINGS:  REPRODUCTIVE ORGANS: Prostate is enlarged (4.3 x 4.2 x 4.8 cm) with   prominent central gland hypertrophy that protrudes into the base of the   urinary bladder. There is heterogeneous enhancement.  BLADDER: Decompressed around Joseph catheter.  LYMPH NODES: No pelvic lymphadenopathy.    VISUALIZED PORTIONS:    ABDOMINAL ORGANS: Renal cysts.  BOWEL: Within normal limits.  PERITONEUM: Trace ascites.  VESSELS: Atherosclerotic changes.  ABDOMINAL WALL: Subcutaneous soft tissue edema. Left inguinal hernia   containing bowel.  BONES: Degenerative changes.    IMPRESSION:  Enlarged and heterogeneously enhancing prostate gland. Recommend   follow-up with dedicated prostate MRI.        --- End of Report ---    ***Please see the addendum at the top of this report. It may contain   additional important information or changes.****    < end of copied text >      A/P: 77M h/o chronic PE/DVT on coumadin, a/w gross hematuria with concern for prostate CA  S/p failed TOV  -- continue joseph catheter, monitor UOP  -- proscar/flomax  -- plan for cystoscopy, will need prostate bx as well, likely as outpatient  -- medical optimization for cysto requested   -- discussed with Dr. Busch

## 2023-05-11 NOTE — PROGRESS NOTE ADULT - NS ATTEND OPT1A GEN_ALL_CORE
History/Exam/Medical decision making
History/Exam/Medical decision making
Exam/Medical decision making
History/Exam/Medical decision making
History/Exam/Medical decision making

## 2023-05-11 NOTE — PROGRESS NOTE ADULT - ASSESSMENT
77 year old male with h/o HTN,  CHF, BPH , prostate cancer h/o  recent  PE/   dvt   and was   d/c on 4/7/23,  on coumadin also  with  on going hematuria/ Joseph noted  from last visit, now, returns  ,with   c/o   bilateral leg edema, sob and hematuria. 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 needed outpatient followup with Dr. Anderson and [facility]. Didnot followup  Echo  3/2023,    EF  40,  diastolic  dysfunction and severe Pulm HTN  plan  lasix  tele    Supratherapeutic INR with h/o recent  PE./   DVT. in 3/2023  -s/p 10 of vitamin K, improved   -CT brain with no acute findings   -trend cbc  -No indication for IVC filter at this time as per Vascular  dopplers negative for DVT  Vascular followup based on Ct angio: no indication for filter    gross  hematuria  with hx of  BPH/ Prostate Ca likely due to supratherapeutic INR  Urology consulted  joseph in place /exchanged  by  [facility] 5/8  no CBI needed: joseph draining clear  plan:  d/w with team: needs MRI IV contrast pelvis + likely scope inpatient  - Continue Joseph, continue finasteride and Flomax    Strangulation of  hernia . note  per  imaging   Gen Surgery consulted; NO plans for surgery    Dvt ppx:  coumadin resumed (as per dtr pt has been following in the clinic for INR check)    Dispo

## 2023-05-12 LAB
ANION GAP SERPL CALC-SCNC: 2 MMOL/L — LOW (ref 5–17)
BUN SERPL-MCNC: 23 MG/DL — SIGNIFICANT CHANGE UP (ref 7–23)
CALCIUM SERPL-MCNC: 8.8 MG/DL — SIGNIFICANT CHANGE UP (ref 8.5–10.1)
CHLORIDE SERPL-SCNC: 103 MMOL/L — SIGNIFICANT CHANGE UP (ref 96–108)
CO2 SERPL-SCNC: 31 MMOL/L — SIGNIFICANT CHANGE UP (ref 22–31)
CREAT SERPL-MCNC: 1.3 MG/DL — SIGNIFICANT CHANGE UP (ref 0.5–1.3)
EGFR: 57 ML/MIN/1.73M2 — LOW
GLUCOSE SERPL-MCNC: 124 MG/DL — HIGH (ref 70–99)
HCT VFR BLD CALC: 39.1 % — SIGNIFICANT CHANGE UP (ref 39–50)
HGB BLD-MCNC: 12.1 G/DL — LOW (ref 13–17)
INR BLD: 1.42 RATIO — HIGH (ref 0.88–1.16)
MCHC RBC-ENTMCNC: 24.9 PG — LOW (ref 27–34)
MCHC RBC-ENTMCNC: 30.9 G/DL — LOW (ref 32–36)
MCV RBC AUTO: 80.5 FL — SIGNIFICANT CHANGE UP (ref 80–100)
NRBC # BLD: 0 /100 WBCS — SIGNIFICANT CHANGE UP (ref 0–0)
PLATELET # BLD AUTO: 233 K/UL — SIGNIFICANT CHANGE UP (ref 150–400)
POTASSIUM SERPL-MCNC: 3.7 MMOL/L — SIGNIFICANT CHANGE UP (ref 3.5–5.3)
POTASSIUM SERPL-SCNC: 3.7 MMOL/L — SIGNIFICANT CHANGE UP (ref 3.5–5.3)
PROTHROM AB SERPL-ACNC: 17 SEC — HIGH (ref 10.5–13.4)
RBC # BLD: 4.86 M/UL — SIGNIFICANT CHANGE UP (ref 4.2–5.8)
RBC # FLD: 16 % — HIGH (ref 10.3–14.5)
SODIUM SERPL-SCNC: 136 MMOL/L — SIGNIFICANT CHANGE UP (ref 135–145)
WBC # BLD: 5.98 K/UL — SIGNIFICANT CHANGE UP (ref 3.8–10.5)
WBC # FLD AUTO: 5.98 K/UL — SIGNIFICANT CHANGE UP (ref 3.8–10.5)

## 2023-05-12 PROCEDURE — 99232 SBSQ HOSP IP/OBS MODERATE 35: CPT

## 2023-05-12 PROCEDURE — 99233 SBSQ HOSP IP/OBS HIGH 50: CPT

## 2023-05-12 RX ORDER — POLYETHYLENE GLYCOL 3350 17 G/17G
17 POWDER, FOR SOLUTION ORAL
Refills: 0 | Status: DISCONTINUED | OUTPATIENT
Start: 2023-05-12 | End: 2023-05-26

## 2023-05-12 RX ORDER — LACTULOSE 10 G/15ML
20 SOLUTION ORAL EVERY 6 HOURS
Refills: 0 | Status: COMPLETED | OUTPATIENT
Start: 2023-05-12 | End: 2023-05-12

## 2023-05-12 RX ORDER — FUROSEMIDE 40 MG
40 TABLET ORAL DAILY
Refills: 0 | Status: DISCONTINUED | OUTPATIENT
Start: 2023-05-13 | End: 2023-05-26

## 2023-05-12 RX ORDER — PHENAZOPYRIDINE HCL 100 MG
100 TABLET ORAL
Refills: 0 | Status: COMPLETED | OUTPATIENT
Start: 2023-05-12 | End: 2023-05-15

## 2023-05-12 RX ORDER — LANOLIN ALCOHOL/MO/W.PET/CERES
3 CREAM (GRAM) TOPICAL AT BEDTIME
Refills: 0 | Status: DISCONTINUED | OUTPATIENT
Start: 2023-05-12 | End: 2023-05-26

## 2023-05-12 RX ORDER — SENNA PLUS 8.6 MG/1
2 TABLET ORAL AT BEDTIME
Refills: 0 | Status: DISCONTINUED | OUTPATIENT
Start: 2023-05-12 | End: 2023-05-26

## 2023-05-12 RX ORDER — WARFARIN SODIUM 2.5 MG/1
5 TABLET ORAL ONCE
Refills: 0 | Status: COMPLETED | OUTPATIENT
Start: 2023-05-12 | End: 2023-05-12

## 2023-05-12 RX ORDER — PHENAZOPYRIDINE HCL 100 MG
100 TABLET ORAL
Refills: 0 | Status: DISCONTINUED | OUTPATIENT
Start: 2023-05-12 | End: 2023-05-12

## 2023-05-12 RX ADMIN — LACTULOSE 20 GRAM(S): 10 SOLUTION ORAL at 17:09

## 2023-05-12 RX ADMIN — SENNA PLUS 2 TABLET(S): 8.6 TABLET ORAL at 21:08

## 2023-05-12 RX ADMIN — WARFARIN SODIUM 5 MILLIGRAM(S): 2.5 TABLET ORAL at 21:07

## 2023-05-12 RX ADMIN — Medication 3 MILLIGRAM(S): at 21:08

## 2023-05-12 RX ADMIN — ATORVASTATIN CALCIUM 40 MILLIGRAM(S): 80 TABLET, FILM COATED ORAL at 21:07

## 2023-05-12 RX ADMIN — Medication 40 MILLIGRAM(S): at 06:18

## 2023-05-12 RX ADMIN — POLYETHYLENE GLYCOL 3350 17 GRAM(S): 17 POWDER, FOR SOLUTION ORAL at 17:09

## 2023-05-12 RX ADMIN — LACTULOSE 20 GRAM(S): 10 SOLUTION ORAL at 12:22

## 2023-05-12 RX ADMIN — FINASTERIDE 5 MILLIGRAM(S): 5 TABLET, FILM COATED ORAL at 11:25

## 2023-05-12 RX ADMIN — TAMSULOSIN HYDROCHLORIDE 0.4 MILLIGRAM(S): 0.4 CAPSULE ORAL at 21:07

## 2023-05-12 RX ADMIN — PANTOPRAZOLE SODIUM 40 MILLIGRAM(S): 20 TABLET, DELAYED RELEASE ORAL at 06:18

## 2023-05-12 RX ADMIN — Medication 50 MILLIGRAM(S): at 06:18

## 2023-05-12 NOTE — PROGRESS NOTE ADULT - SUBJECTIVE AND OBJECTIVE BOX
77 year old male with HTN, Diastolic CHF, Severe pHTN,  BPH , Prostate  cancer ( elevated PSA ), Recent bone scan with evidence of osseous metastasis,  Urinary retention with indwelling Yan,  Recent  PE/ DVT in April 2023,( was discharged  on coumadin ) and  Left inguinal hernia,   Presented  to ED  with  c/o  bilateral leg edema, sob and on going,  c/c hematuria. Sob is more exertional than at rest.  No fever, chills, cough or sputum production.  In ED with INR of more than 12, Yan with gross  hematuria, also with ventral hernia.  Yan upsized in ED, ventral hernia reduced by surgery. To get FFP to correct INR   INTERVAL HPI:      OVERNIGHT EVENTS:  Resting comfortably    Vital Signs Last 24 Hrs  T(C): 36.7 (12 May 2023 10:38), Max: 36.7 (12 May 2023 04:51)  T(F): 98.1 (12 May 2023 10:38), Max: 98.1 (12 May 2023 04:51)  HR: 95 (12 May 2023 10:38) (95 - 102)  BP: 109/71 (12 May 2023 10:38) (103/71 - 115/80)  BP(mean): --  RR: 18 (12 May 2023 10:38) (17 - 18)  SpO2: 93% (12 May 2023 10:38) (93% - 98%)    Parameters below as of 12 May 2023 10:38  Patient On (Oxygen Delivery Method): room air    PHYSICAL EXAM:  GEN:        comfortable.  HEENT:    Normal.    RESP:       no distress  CVS:        Regular rate and rhythm.     MEDICATIONS  (STANDING):  atorvastatin 40 milliGRAM(s) Oral at bedtime  finasteride 5 milliGRAM(s) Oral daily  lactulose Syrup 20 Gram(s) Oral every 6 hours  melatonin 3 milliGRAM(s) Oral once  melatonin 3 milliGRAM(s) Oral at bedtime  metoprolol succinate ER 50 milliGRAM(s) Oral daily  pantoprazole    Tablet 40 milliGRAM(s) Oral before breakfast  phenazopyridine 100 milliGRAM(s) Oral <User Schedule>  polyethylene glycol 3350 17 Gram(s) Oral two times a day  senna 2 Tablet(s) Oral at bedtime  tamsulosin 0.4 milliGRAM(s) Oral at bedtime  warfarin 5 milliGRAM(s) Oral once    MEDICATIONS  (PRN):  bisacodyl 5 milliGRAM(s) Oral every 12 hours PRN Constipation    LABS:                        12.1   5.98  )-----------( 233      ( 12 May 2023 06:14 )             39.1     05-12    136  |  103  |  23  ----------------------------<  124<H>  3.7   |  31  |  1.30    Ca    8.8      12 May 2023 06:14    PT/INR - ( 12 May 2023 06:14 )   PT: 17.0 sec;   INR: 1.42 ratio         ASSESSMENT AND PLAN:  SOB.  Fluid retention.  Diastolic dysfunction.  Pulmonary HTN by hx.  Hematuria.  Warfarin coagulopathy.  Prostate  CA  BPH.  Urinary retention, with Yan.  VTE.  Inguinal hernia S/P reduction.    SPO2 96% on nasal O2.  INR being corrected with  FFP, follow INR  Left Inguinal hernia reduced.  Diuretics as needed, being seen by Cardiology.    05/07/23: Awake, responsive and comfortable. SPO2 96% .  INR down to 1.71  LE duplex, no DVT.    05/09/23:  Denies SOB, clinically stable. SPO2 92% on room air.  Not safe for anticoagulation at this point per urology.  No indication for IVC filter per vascular surgery.  Continue supportive treatment.    05/10/23: SPO2 stable on room air. Continue supportive care.  For MRI pelvis.    05/11/23: Awake, responsive and comfortable. SPO2 94% on room air.  Oxygenation status stable.    05/12/23:  Resting, stable SPO2.

## 2023-05-12 NOTE — PROGRESS NOTE ADULT - SUBJECTIVE AND OBJECTIVE BOX
Patient is a 77y old  Male who presents with a chief complaint of sob/ hematuria (12 May 2023 12:12)      OVERNIGHT EVENTS:  Patients seen and examined at bedside this morning. No acute events overnight.    REVIEW OF SYSTEMS: denies chest pain/SOB, diaphoresis, no F/C, cough, dizziness, headache, blurry vision, nausea, vomiting, abdominal pain. All others review of systems negative     MEDICATIONS  (STANDING):  atorvastatin 40 milliGRAM(s) Oral at bedtime  finasteride 5 milliGRAM(s) Oral daily  lactulose Syrup 20 Gram(s) Oral every 6 hours  melatonin 3 milliGRAM(s) Oral once  melatonin 3 milliGRAM(s) Oral at bedtime  metoprolol succinate ER 50 milliGRAM(s) Oral daily  pantoprazole    Tablet 40 milliGRAM(s) Oral before breakfast  phenazopyridine 100 milliGRAM(s) Oral <User Schedule>  polyethylene glycol 3350 17 Gram(s) Oral two times a day  senna 2 Tablet(s) Oral at bedtime  tamsulosin 0.4 milliGRAM(s) Oral at bedtime    MEDICATIONS  (PRN):  bisacodyl 5 milliGRAM(s) Oral every 12 hours PRN Constipation      Allergies    No Known Allergies    Intolerances        T(F): 98.1 (05-12-23 @ 10:38), Max: 98.1 (05-12-23 @ 04:51)  HR: 95 (05-12-23 @ 10:38) (95 - 102)  BP: 109/71 (05-12-23 @ 10:38) (103/71 - 115/80)  RR: 18 (05-12-23 @ 10:38) (17 - 18)  SpO2: 93% (05-12-23 @ 10:38) (93% - 98%)  Wt(kg): --    PHYSICAL EXAM:  GENERAL: NAD  HEAD:  Atraumatic, Normocephalic  EYES: PERRLA, conjunctiva and sclera clear  ENMT: Moist mucous membranes  NECK: Supple, No JVD, Normal thyroid  NERVOUS SYSTEM:  Alert & Awake  CHEST/LUNG: Clear to percussion bilaterally;   HEART: Regular rate and rhythm;   ABDOMEN: Soft, Nontender, Nondistended; Bowel sounds present  EXTREMITIES:  no edema BL LE  SKIN: moist    LABS:                        12.1   5.98  )-----------( 233      ( 12 May 2023 06:14 )             39.1     05-12    136  |  103  |  23  ----------------------------<  124<H>  3.7   |  31  |  1.30    Ca    8.8      12 May 2023 06:14      PT/INR - ( 12 May 2023 06:14 )   PT: 17.0 sec;   INR: 1.42 ratio             Cultures;   CAPILLARY BLOOD GLUCOSE        Lipid panel:           RADIOLOGY & ADDITIONAL TESTS:    Imaging Personally Reviewed:  [x ] YES      Consultant(s) Notes Reviewed:  [x ] YES     Care Discussed with [x ] Consultants [X ] Patient [ ] Family  [x ]    [x ]  Other; RN

## 2023-05-12 NOTE — PROGRESS NOTE ADULT - SUBJECTIVE AND OBJECTIVE BOX
Patient seen and examined bedside resting comfortably.  No complaints offered.   Joseph draining clear yellow  Denies hematuria and dysuria. Denies nausea and vomiting. Tolerating diet.  Denies chest pain, dyspnea, cough.    T(F): 98.1 (05-12-23 @ 10:38), Max: 98.1 (05-12-23 @ 04:51)  HR: 95 (05-12-23 @ 10:38) (95 - 102)  BP: 109/71 (05-12-23 @ 10:38) (103/71 - 115/80)  RR: 18 (05-12-23 @ 10:38) (17 - 18)  SpO2: 93% (05-12-23 @ 10:38) (93% - 98%)  Wt(kg): --  CAPILLARY BLOOD GLUCOSE          PHYSICAL EXAM:    General: NAD, alert and awake  HEENT: NCAT, EOMI, conjunctiva clear  Chest: nonlabored respirations, CTA b/l.  Abdomen: soft, NT/ND.   Extremities: Calf soft, nontender b/l.   : No suprapubic tenderness or bladder distention.    Joseph intact and draining clear yellow urine.    LABS:                        12.1   5.98  )-----------( 233      ( 12 May 2023 06:14 )             39.1   05-12    136  |  103  |  23  ----------------------------<  124<H>  3.7   |  31  |  1.30    Ca    8.8      12 May 2023 06:14    PT/INR - ( 12 May 2023 06:14 )   PT: 17.0 sec;   INR: 1.42 ratio           I&O's Detail    11 May 2023 07:01  -  12 May 2023 07:00  --------------------------------------------------------  IN:    Oral Fluid: 320 mL  Total IN: 320 mL    OUT:    Indwelling Catheter - Urethral (mL): 3100 mL  Total OUT: 3100 mL    Total NET: -2780 mL      12 May 2023 07:01  -  12 May 2023 12:13  --------------------------------------------------------  IN:  Total IN: 0 mL    OUT:    Indwelling Catheter - Urethral (mL): 1000 mL  Total OUT: 1000 mL    Total NET: -1000 mL          Impression: 77y Male with 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

## 2023-05-12 NOTE — PROGRESS NOTE ADULT - ASSESSMENT
77 year old male with h/o HTN,  CHF, BPH , prostate cancer h/o  recent  PE/   dvt   and was   d/c on 4/7/23,  on coumadin also  with  on going hematuria/ Joseph noted  from last visit, now, returns  ,with   c/o   bilateral leg edema, sob and hematuria. 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 needed outpatient followup with Dr. Anderson and [facility]. Didnot followup  Echo  3/2023,    EF  40,  diastolic  dysfunction and severe Pulm HTN  plan  lasix  tele    Supratherapeutic INR with h/o recent  PE./   DVT. in 3/2023  -s/p 10 of vitamin K, improved   -CT brain with no acute findings   -trend cbc  -No indication for IVC filter at this time as per Vascular  dopplers negative for DVT    gross  hematuria  with hx of  BPH/ Prostate Ca likely due to supratherapeutic INR  Urology consulted  joseph in place /exchanged  by  [facility] 5/8  no CBI needed: joseph draining clear  plan:  d/w with team: needs MRI IV contrast pelvis + likely scope inpatient  - Continue Joseph, continue finasteride and Flomax  -will need cystoscopy and prostate bx as outpatient    Strangulation of  hernia . note  per  imaging   Gen Surgery consulted; NO plans for surgery    Dvt ppx:  coumadin resumed (as per dtr pt has been following in the clinic for INR check)    Dispo; D/C planning

## 2023-05-12 NOTE — PROGRESS NOTE ADULT - ASSESSMENT
77M w HTN, HFrEF, severe p-HTN, BPH, DVT/PE on coumadin, out pt records of elevated PSA (90), Left inguinal hernia, DVT right peroneal vein (below the knee), now p/w LE edema. pt also found to have gross hematuria.  INR on admission was 12, now improved    1. supratherapeutic INR 12 c/b hematuria  -s/p 10 of vitamin K, now improved  -CT brain with no acute findings   -trend cbc  -pelvic MRI with Enlarged and heterogeneously enhancing prostate gland, further interventions as per urology recs,  -No indication for IVC filter at this time as per Vascular   -restart a/c per urology recs    2. SOB  -pt endorsing chronic symptoms, states this is unchanged from his baseline  -pt comfortable appearing, currently denies any sob  -Repeat CTA chest with chronic PE  -O2 sat stable on 2 L NC    3. 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 pt is pre-load dependent  -replete lytes as required, monitor renal function closely   -cont Toprol XL 50 daily  -unable to yet optimize GDMT for cardiomyopathy 2/2 low BP  -will cont to monitor day to day volume status     will follow with you

## 2023-05-12 NOTE — PROGRESS NOTE ADULT - SUBJECTIVE AND OBJECTIVE BOX
24H hour events:   pt resting  comfortable appearing  no complaints  MEDICATIONS:  furosemide   Injectable 40 milliGRAM(s) IV Push daily  metoprolol succinate ER 50 milliGRAM(s) Oral daily        melatonin 3 milliGRAM(s) Oral once  melatonin 3 milliGRAM(s) Oral at bedtime    bisacodyl 5 milliGRAM(s) Oral every 12 hours PRN  pantoprazole    Tablet 40 milliGRAM(s) Oral before breakfast  polyethylene glycol 3350 17 Gram(s) Oral two times a day  senna 2 Tablet(s) Oral at bedtime    atorvastatin 40 milliGRAM(s) Oral at bedtime  finasteride 5 milliGRAM(s) Oral daily    tamsulosin 0.4 milliGRAM(s) Oral at bedtime          PHYSICAL EXAM:  T(C): 36.7 (05-12-23 @ 10:38), Max: 36.7 (05-12-23 @ 04:51)  HR: 95 (05-12-23 @ 10:38) (95 - 102)  BP: 109/71 (05-12-23 @ 10:38) (103/71 - 115/80)  RR: 18 (05-12-23 @ 10:38) (17 - 18)  SpO2: 93% (05-12-23 @ 10:38) (93% - 98%)  Wt(kg): --  I&O's Summary    11 May 2023 07:01  -  12 May 2023 07:00  --------------------------------------------------------  IN: 320 mL / OUT: 3100 mL / NET: -2780 mL    12 May 2023 07:01  -  12 May 2023 11:17  --------------------------------------------------------  IN: 0 mL / OUT: 1000 mL / NET: -1000 mL        Appearance: Normal	  HEENT:   Normal oral mucosa, PERRL, EOMI	  Lymphatic: No lymphadenopathy  Cardiovascular: Normal S1 S2, No JVD, No murmurs, 2+ pitting b/l LE edema  Respiratory: Lungs grossly clear to auscultation	  Psychiatry: A & O x 3, Mood & affect appropriate  Gastrointestinal:  Soft, Non-tender, + BS	  Skin: No rashes, No ecchymoses, No cyanosis	  Neurologic: Non-focal  Extremities: Normal range of motion, No clubbing, cyanosis       LABS:	 	    CBC Full  -  ( 12 May 2023 06:14 )  WBC Count : 5.98 K/uL  Hemoglobin : 12.1 g/dL  Hematocrit : 39.1 %  Platelet Count - Automated : 233 K/uL  Mean Cell Volume : 80.5 fl  Mean Cell Hemoglobin : 24.9 pg  Mean Cell Hemoglobin Concentration : 30.9 g/dL  Auto Neutrophil # : x  Auto Lymphocyte # : x  Auto Monocyte # : x  Auto Eosinophil # : x  Auto Basophil # : x  Auto Neutrophil % : x  Auto Lymphocyte % : x  Auto Monocyte % : x  Auto Eosinophil % : x  Auto Basophil % : x    05-12    136  |  103  |  23  ----------------------------<  124<H>  3.7   |  31  |  1.30  05-11    137  |  103  |  22  ----------------------------<  121<H>  3.8   |  31  |  1.23    Ca    8.8      12 May 2023 06:14  Ca    9.0      11 May 2023 07:40        proBNP:   Lipid Profile:   HgA1c:   TSH:       CARDIAC MARKERS:            TELEMETRY: 	    ECG:  	  RADIOLOGY:  OTHER: 	    PREVIOUS DIAGNOSTIC TESTING:    [ ] Echocardiogram:  [ ]  Catheterization:  [ ] Stress Test:  	  	  ASSESSMENT/PLAN:

## 2023-05-13 LAB
ANION GAP SERPL CALC-SCNC: 3 MMOL/L — LOW (ref 5–17)
BUN SERPL-MCNC: 21 MG/DL — SIGNIFICANT CHANGE UP (ref 7–23)
CALCIUM SERPL-MCNC: 8.5 MG/DL — SIGNIFICANT CHANGE UP (ref 8.5–10.1)
CHLORIDE SERPL-SCNC: 102 MMOL/L — SIGNIFICANT CHANGE UP (ref 96–108)
CO2 SERPL-SCNC: 29 MMOL/L — SIGNIFICANT CHANGE UP (ref 22–31)
CREAT SERPL-MCNC: 1.18 MG/DL — SIGNIFICANT CHANGE UP (ref 0.5–1.3)
EGFR: 64 ML/MIN/1.73M2 — SIGNIFICANT CHANGE UP
GLUCOSE SERPL-MCNC: 117 MG/DL — HIGH (ref 70–99)
HCT VFR BLD CALC: 37.5 % — LOW (ref 39–50)
HGB BLD-MCNC: 11.8 G/DL — LOW (ref 13–17)
INR BLD: 1.43 RATIO — HIGH (ref 0.88–1.16)
MCHC RBC-ENTMCNC: 24.8 PG — LOW (ref 27–34)
MCHC RBC-ENTMCNC: 31.5 G/DL — LOW (ref 32–36)
MCV RBC AUTO: 78.8 FL — LOW (ref 80–100)
NRBC # BLD: 0 /100 WBCS — SIGNIFICANT CHANGE UP (ref 0–0)
PLATELET # BLD AUTO: 240 K/UL — SIGNIFICANT CHANGE UP (ref 150–400)
POTASSIUM SERPL-MCNC: 3.6 MMOL/L — SIGNIFICANT CHANGE UP (ref 3.5–5.3)
POTASSIUM SERPL-SCNC: 3.6 MMOL/L — SIGNIFICANT CHANGE UP (ref 3.5–5.3)
PROTHROM AB SERPL-ACNC: 17.2 SEC — HIGH (ref 10.5–13.4)
RBC # BLD: 4.76 M/UL — SIGNIFICANT CHANGE UP (ref 4.2–5.8)
RBC # FLD: 15.9 % — HIGH (ref 10.3–14.5)
SODIUM SERPL-SCNC: 134 MMOL/L — LOW (ref 135–145)
WBC # BLD: 6.08 K/UL — SIGNIFICANT CHANGE UP (ref 3.8–10.5)
WBC # FLD AUTO: 6.08 K/UL — SIGNIFICANT CHANGE UP (ref 3.8–10.5)

## 2023-05-13 PROCEDURE — 99233 SBSQ HOSP IP/OBS HIGH 50: CPT

## 2023-05-13 PROCEDURE — 99232 SBSQ HOSP IP/OBS MODERATE 35: CPT

## 2023-05-13 RX ORDER — WARFARIN SODIUM 2.5 MG/1
7.5 TABLET ORAL ONCE
Refills: 0 | Status: COMPLETED | OUTPATIENT
Start: 2023-05-13 | End: 2023-05-13

## 2023-05-13 RX ADMIN — PANTOPRAZOLE SODIUM 40 MILLIGRAM(S): 20 TABLET, DELAYED RELEASE ORAL at 05:35

## 2023-05-13 RX ADMIN — Medication 100 MILLIGRAM(S): at 17:13

## 2023-05-13 RX ADMIN — FINASTERIDE 5 MILLIGRAM(S): 5 TABLET, FILM COATED ORAL at 11:50

## 2023-05-13 RX ADMIN — Medication 40 MILLIGRAM(S): at 05:35

## 2023-05-13 RX ADMIN — SENNA PLUS 2 TABLET(S): 8.6 TABLET ORAL at 22:02

## 2023-05-13 RX ADMIN — Medication 100 MILLIGRAM(S): at 05:36

## 2023-05-13 RX ADMIN — Medication 3 MILLIGRAM(S): at 22:03

## 2023-05-13 RX ADMIN — WARFARIN SODIUM 7.5 MILLIGRAM(S): 2.5 TABLET ORAL at 22:03

## 2023-05-13 RX ADMIN — ATORVASTATIN CALCIUM 40 MILLIGRAM(S): 80 TABLET, FILM COATED ORAL at 22:03

## 2023-05-13 RX ADMIN — POLYETHYLENE GLYCOL 3350 17 GRAM(S): 17 POWDER, FOR SOLUTION ORAL at 05:35

## 2023-05-13 RX ADMIN — TAMSULOSIN HYDROCHLORIDE 0.4 MILLIGRAM(S): 0.4 CAPSULE ORAL at 22:02

## 2023-05-13 NOTE — PROGRESS NOTE ADULT - ASSESSMENT
77 year old male with h/o HTN,  CHF, BPH , prostate cancer h/o  recent  PE/   dvt   and was   d/c on 4/7/23,  on coumadin also  with  on going hematuria/ Joseph noted  from last visit, now, returns  ,with   c/o   bilateral leg edema, sob and hematuria. 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 needed outpatient followup with Dr. Anderson and [facility]. Didnot followup  Echo  3/2023,    EF  40,  diastolic  dysfunction and severe Pulm HTN  plan  lasix  tele    s/p Supratherapeutic INR with h/o recent  PE./   DVT. in 3/2023  -s/p 10 of vitamin K, improved   -CT brain with no acute findings   -trend cbc  -No indication for IVC filter at this time as per Vascular  -dopplers negative for DVT  Coumadin resumed, trend INR    gross  hematuria  with hx of  BPH/ Prostate Ca likely due to supratherapeutic INR  Urology consulted  joseph in place /exchanged  by  [facility] 5/8  no CBI needed: joseph draining clear  - Continue Joseph, continue finasteride and Flomax  -will need cystoscopy and prostate bx as outpatient    Strangulation of  hernia . note  per  imaging   Gen Surgery consulted; NO plans for surgery    Dvt ppx:  coumadin resumed (as per dtr pt has been following in the clinic for INR check)    Dispo; D/C planning

## 2023-05-13 NOTE — PROGRESS NOTE ADULT - SUBJECTIVE AND OBJECTIVE BOX
Patient is a 77y old  Male who presents with a chief complaint of sob/ hematuria (13 May 2023 11:46)      OVERNIGHT EVENTS:  Patients seen and examined at bedside this morning. No acute events overnight.    REVIEW OF SYSTEMS: denies chest pain/SOB, diaphoresis, no F/C, cough, dizziness, headache, blurry vision, nausea, vomiting, abdominal pain. All others review of systems negative     MEDICATIONS  (STANDING):  atorvastatin 40 milliGRAM(s) Oral at bedtime  finasteride 5 milliGRAM(s) Oral daily  furosemide    Tablet 40 milliGRAM(s) Oral daily  melatonin 3 milliGRAM(s) Oral once  melatonin 3 milliGRAM(s) Oral at bedtime  metoprolol succinate ER 50 milliGRAM(s) Oral daily  pantoprazole    Tablet 40 milliGRAM(s) Oral before breakfast  phenazopyridine 100 milliGRAM(s) Oral <User Schedule>  polyethylene glycol 3350 17 Gram(s) Oral two times a day  senna 2 Tablet(s) Oral at bedtime  tamsulosin 0.4 milliGRAM(s) Oral at bedtime    MEDICATIONS  (PRN):  bisacodyl 5 milliGRAM(s) Oral every 12 hours PRN Constipation      Allergies    No Known Allergies    Intolerances        T(F): 98.3 (05-13-23 @ 11:24), Max: 98.5 (05-13-23 @ 04:45)  HR: 99 (05-13-23 @ 11:24) (88 - 99)  BP: 107/73 (05-13-23 @ 11:24) (100/68 - 112/78)  RR: 18 (05-13-23 @ 11:24) (17 - 18)  SpO2: 93% (05-13-23 @ 11:24) (92% - 93%)  Wt(kg): --    PHYSICAL EXAM:  GENERAL: NAD  HEAD:  Atraumatic, Normocephalic  EYES: PERRLA, conjunctiva and sclera clear  ENMT: Moist mucous membranes  NECK: Supple, No JVD, Normal thyroid  NERVOUS SYSTEM:  Alert & Awake  CHEST/LUNG: Clear to percussion bilaterally;   HEART: Regular rate and rhythm;   ABDOMEN: Soft, Nontender, Nondistended; Bowel sounds present  EXTREMITIES:  no edema BL LE  SKIN: moist    LABS:                        11.8   6.08  )-----------( 240      ( 13 May 2023 06:30 )             37.5     05-13    134<L>  |  102  |  21  ----------------------------<  117<H>  3.6   |  29  |  1.18    Ca    8.5      13 May 2023 06:30      PT/INR - ( 13 May 2023 06:30 )   PT: 17.2 sec;   INR: 1.43 ratio             Cultures;   CAPILLARY BLOOD GLUCOSE        Lipid panel:           RADIOLOGY & ADDITIONAL TESTS:    Imaging Personally Reviewed:  [x ] YES      Consultant(s) Notes Reviewed:  [x ] YES     Care Discussed with [x ] Consultants [X ] Patient [ ] Family  [x ]    [x ]  Other; RN

## 2023-05-13 NOTE — PROGRESS NOTE ADULT - SUBJECTIVE AND OBJECTIVE BOX
CHIEF COMPLAINT:  Patient is a 77y old  Male who presents with a chief complaint of sob/ hematuria (12 May 2023 16:01)      HPI:   77 year old malewith h/o HTN, CHF, BPH ,  prostate    cancer h/o  recent  PE/ DVT . in april 2023,  and  was   d/c on coumadin          pt  is  a  poor  historian     presents to  er  with  c/o  bilateral leg edema, sob and   on going,  c/c hematuria,      pt was seen in the ER on 3/38/23,      pt  was   seen in  er, has  a joseph  with gross  hematuria      currently denies cp/sob./abd  pain / fevers         REVIEW OF SYSTEMS:  General:  No wt loss, fevers, chills, night sweats  Eyes:  Good vision, no reported pain  ENT:  No sore throat, pain, runny nose, dysphagia  CV:  No pain, palpitations, hypo/hypertension  Resp:  No dyspnea, cough, tachypnea, wheezing  GI:  No pain, nausea, vomiting, diarrhea, constipation  :  No pain, bleeding, incontinence, nocturia  Muscle:  No pain, weakness  Breast:  No pain, abscess, mass, discharge  Neuro:  No weakness, tingling, memory problems  Psych:  No fatigue, insomnia, mood problems, depression  Endocrine:  No polyuria, polydipsia, cold/heat intolerance  Heme:  No petechiae, ecchymosis, easy bruisability  Skin:  No rash, tattoos, scars, edema    PHYSICAL EXAM:  Vital Signs:  Vital Signs Last 24 Hrs  T(C): 36.8 (13 May 2023 11:24), Max: 36.9 (13 May 2023 04:45)  T(F): 98.3 (13 May 2023 11:24), Max: 98.5 (13 May 2023 04:45)  HR: 99 (13 May 2023 11:24) (88 - 99)  BP: 107/73 (13 May 2023 11:24) (100/68 - 112/78)  RR: 18 (13 May 2023 11:24) (17 - 18)  SpO2: 93% (13 May 2023 11:24) (92% - 93%)    Parameters below as of 13 May 2023 11:24  Patient On (Oxygen Delivery Method): room air      I&O's Summary    12 May 2023 07:01  -  13 May 2023 07:00  --------------------------------------------------------  IN: 860 mL / OUT: 1800 mL / NET: -940 mL    13 May 2023 07:01  -  13 May 2023 11:46  --------------------------------------------------------  IN: 320 mL / OUT: 0 mL / NET: 320 mL      I&O's Detail    12 May 2023 07:01  -  13 May 2023 07:00  --------------------------------------------------------  IN:    Oral Fluid: 860 mL  Total IN: 860 mL    OUT:    Indwelling Catheter - Urethral (mL): 1800 mL  Total OUT: 1800 mL    Total NET: -940 mL      13 May 2023 07:01  -  13 May 2023 11:46  --------------------------------------------------------  IN:    Oral Fluid: 320 mL  Total IN: 320 mL    OUT:  Total OUT: 0 mL    Total NET: 320 mL          Tele:     Constitutional: well developed, normal appearance, well groomed, well nourished, no deformities and no acute distress.   Eyes: the conjunctiva exhibited no abnormalities and the eyelids demonstrated no xanthelasmas.   HEENT: normal oral mucosa, no oral pallor and no oral cyanosis.   Neck: normal jugular venous A waves present, normal jugular venous V waves present and no jugular venous sandoval A waves.   Pulmonary: no respiratory distress, normal respiratory rhythm and effort, no accessory muscle use and lungs were clear to auscultation bilaterally.   Cardiovascular: heart rate and rhythm were normal, normal S1 and S2 and no murmur, gallop, rub, heave or thrill are present.   Abdomen: soft, non-tender, no hepato-splenomegaly and no abdominal mass palpated.   Musculoskeletal: the gait could not be assessed..   Extremities: no clubbing of the fingernails, no localized cyanosis, no petechial hemorrhages and no ischemic changes.   Skin: normal skin color and pigmentation, no rash, no venous stasis, no skin lesions, no skin ulcer and no xanthoma was observed.   Psychiatric: oriented to person, place, and time, the affect was normal, the mood was normal and not feeling anxious.      LABORATORY:                          11.8   6.08  )-----------( 240      ( 13 May 2023 06:30 )             37.5     05-13    134<L>  |  102  |  21  ----------------------------<  117<H>  3.6   |  29  |  1.18    Ca    8.5      13 May 2023 06:30      PT/INR - ( 13 May 2023 06:30 )   PT: 17.2 sec;   INR: 1.43 ratio         BNP    IMAGING:    < from: 12 Lead ECG (05.06.23 @ 02:34) >  Sinus tachycardia with occasional premature ventricular complexes  Possible Left atrial enlargement  Rightward axis  Nonspecific T wave abnormality  Abnormal ECG  When compared with ECG of 28-MAR-2023 14:39,  premature ventricular complexes are now present  prematureatrial complexes are no longer present  Nonspecific T wave abnormality now evident in Lateral leads    < end of copied text >    < from: TTE Echo Complete w/o Contrast w/ Doppler (05.07.23 @ 08:44) >   1. Left ventricular ejection fraction, by visual estimation, is 45 to   50%.   2. Increased relative wall thickness with normal mass index consistent   with left ventricular concentric remodeling.   3. Spectral Doppler shows impaired relaxation pattern of left   ventricular myocardial filling (Grade I diastolic dysfunction).   4. Structurally normal mitral valve, with normal leaflet excursion.   5. Normal trileaflet aortic valve with normal opening.   6. Severely enlarged right atrium.   7. Severely enlarged right ventricle.   8. Moderately reduced RV systolic function.   9. Right ventricular volume and pressure overload.  10. Moderate-severe tricuspid regurgitation.  11. Structurally normal pulmonic valve, with normal leaflet excursion.  12. Mild pulmonic valve regurgitation.  13. Estimated pulmonary artery systolic pressure is 77.5 mmHg assuming a   right atrial pressure of 8 mmHg, which is consistent with severe   pulmonary hypertension.    < end of copied text >    < from: CT Angio Chest PE Protocol w/ IV Cont (05.08.23 @ 11:41) >  IMPRESSION:  Multifocal bilateral peripheral filling defects and occlusions involving   the segmental and subsegmental arteries of upper and lower lobes   pulmonary arteries, similar to the prior study, compatible with chronic   PE.    New airspace disease involving the left upper lobe. Correlate for   pneumonia.    Additional findings as above.    < end of copied text >    ASSESSMENT:   77 year old malewith h/o HTN, CHF, BPH ,  prostate    cancer h/o  recent  PE/ DVT . in april 2023,  and  was   d/c on coumadin          pt  is  a  poor  historian     presents to  er  with  c/o  bilateral leg edema, sob and   on going,  c/c hematuria,      pt was seen in the ER on 3/38/23,      pt  was   seen in  er, has  a joseph  with gross  hematuria      currently denies cp/sob./abd  pain / fevers     PLAN:     MEDICATIONS  (STANDING):  atorvastatin 40 milliGRAM(s) Oral at bedtime  finasteride 5 milliGRAM(s) Oral daily  furosemide    Tablet 40 milliGRAM(s) Oral daily  melatonin 3 milliGRAM(s) Oral once  melatonin 3 milliGRAM(s) Oral at bedtime  metoprolol succinate ER 50 milliGRAM(s) Oral daily  pantoprazole    Tablet 40 milliGRAM(s) Oral before breakfast  phenazopyridine 100 milliGRAM(s) Oral <User Schedule>  polyethylene glycol 3350 17 Gram(s) Oral two times a day  senna 2 Tablet(s) Oral at bedtime  tamsulosin 0.4 milliGRAM(s) Oral at bedtime    tele, labs.    Jose Baltazar MD, FACC, FASE, FASNC, FACP  Director, Heart Failure Services  [facility]  , Department of Cardiology  [facility]     CHIEF COMPLAINT:  Patient is a 77y old  Male who presents with a chief complaint of sob/ hematuria (12 May 2023 16:01)      HPI:  77-year-old with history of prostate cancer, BPH, heart failure, hypertension, diagnosed with PE and DVT in April 23.  Minute with bilateral leg edema and hematuria.  Status post transfusional support.  Resting comfortably in no acute distress.      REVIEW OF SYSTEMS:  General:  No wt loss, fevers, chills, night sweats  Eyes:  Good vision, no reported pain  ENT:  No sore throat, pain, runny nose, dysphagia  CV:  No pain, palpitations, hypo/hypertension  Resp:  No dyspnea, cough, tachypnea, wheezing  GI:  No pain, nausea, vomiting, diarrhea, constipation  :  No pain, bleeding, incontinence, nocturia  Muscle:  No pain, weakness  Breast:  No pain, abscess, mass, discharge  Neuro:  No weakness, tingling, memory problems  Psych:  No fatigue, insomnia, mood problems, depression  Endocrine:  No polyuria, polydipsia, cold/heat intolerance  Heme:  No petechiae, ecchymosis, easy bruisability  Skin:  No rash, tattoos, scars, edema    PHYSICAL EXAM:  Vital Signs:  Vital Signs Last 24 Hrs  T(C): 36.8 (13 May 2023 11:24), Max: 36.9 (13 May 2023 04:45)  T(F): 98.3 (13 May 2023 11:24), Max: 98.5 (13 May 2023 04:45)  HR: 99 (13 May 2023 11:24) (88 - 99)  BP: 107/73 (13 May 2023 11:24) (100/68 - 112/78)  RR: 18 (13 May 2023 11:24) (17 - 18)  SpO2: 93% (13 May 2023 11:24) (92% - 93%)    Parameters below as of 13 May 2023 11:24  Patient On (Oxygen Delivery Method): room air      I&O's Summary    12 May 2023 07:01  -  13 May 2023 07:00  --------------------------------------------------------  IN: 860 mL / OUT: 1800 mL / NET: -940 mL    13 May 2023 07:01  -  13 May 2023 11:46  --------------------------------------------------------  IN: 320 mL / OUT: 0 mL / NET: 320 mL      I&O's Detail    12 May 2023 07:01  -  13 May 2023 07:00  --------------------------------------------------------  IN:    Oral Fluid: 860 mL  Total IN: 860 mL    OUT:    Indwelling Catheter - Urethral (mL): 1800 mL  Total OUT: 1800 mL    Total NET: -940 mL      13 May 2023 07:01  -  13 May 2023 11:46  --------------------------------------------------------  IN:    Oral Fluid: 320 mL  Total IN: 320 mL    OUT:  Total OUT: 0 mL    Total NET: 320 mL          Tele: SR/ST    Constitutional: well developed, normal appearance, well groomed, well nourished, no deformities and no acute distress.   Eyes: the conjunctiva exhibited no abnormalities and the eyelids demonstrated no xanthelasmas.   HEENT: normal oral mucosa, no oral pallor and no oral cyanosis.   Neck: normal jugular venous A waves present, normal jugular venous V waves present and no jugular venous sandoval A waves.   Pulmonary: no respiratory distress, normal respiratory rhythm and effort, no accessory muscle use and lungs were clear to auscultation bilaterally.   Cardiovascular: heart rate and rhythm were normal, normal S1 and S2 and no murmur, gallop, rub, heave or thrill are present.   Abdomen: soft, non-tender, no hepato-splenomegaly and no abdominal mass palpated.   Musculoskeletal: the gait could not be assessed..   Extremities: no clubbing of the fingernails, no localized cyanosis, no petechial hemorrhages and no ischemic changes.   Skin: normal skin color and pigmentation, no rash, no venous stasis, no skin lesions, no skin ulcer and no xanthoma was observed.   Psychiatric: oriented to person, place, and time, the affect was normal, the mood was normal and not feeling anxious.      LABORATORY:                          11.8   6.08  )-----------( 240      ( 13 May 2023 06:30 )             37.5     05-13    134<L>  |  102  |  21  ----------------------------<  117<H>  3.6   |  29  |  1.18    Ca    8.5      13 May 2023 06:30      PT/INR - ( 13 May 2023 06:30 )   PT: 17.2 sec;   INR: 1.43 ratio         BNP    IMAGING:    < from: 12 Lead ECG (05.06.23 @ 02:34) >  Sinus tachycardia with occasional premature ventricular complexes  Possible Left atrial enlargement  Rightward axis  Nonspecific T wave abnormality  Abnormal ECG  When compared with ECG of 28-MAR-2023 14:39,  premature ventricular complexes are now present  prematureatrial complexes are no longer present  Nonspecific T wave abnormality now evident in Lateral leads    < end of copied text >    < from: TTE Echo Complete w/o Contrast w/ Doppler (05.07.23 @ 08:44) >   1. Left ventricular ejection fraction, by visual estimation, is 45 to   50%.   2. Increased relative wall thickness with normal mass index consistent   with left ventricular concentric remodeling.   3. Spectral Doppler shows impaired relaxation pattern of left   ventricular myocardial filling (Grade I diastolic dysfunction).   4. Structurally normal mitral valve, with normal leaflet excursion.   5. Normal trileaflet aortic valve with normal opening.   6. Severely enlarged right atrium.   7. Severely enlarged right ventricle.   8. Moderately reduced RV systolic function.   9. Right ventricular volume and pressure overload.  10. Moderate-severe tricuspid regurgitation.  11. Structurally normal pulmonic valve, with normal leaflet excursion.  12. Mild pulmonic valve regurgitation.  13. Estimated pulmonary artery systolic pressure is 77.5 mmHg assuming a   right atrial pressure of 8 mmHg, which is consistent with severe   pulmonary hypertension.    < end of copied text >    < from: CT Angio Chest PE Protocol w/ IV Cont (05.08.23 @ 11:41) >  IMPRESSION:  Multifocal bilateral peripheral filling defects and occlusions involving   the segmental and subsegmental arteries of upper and lower lobes   pulmonary arteries, similar to the prior study, compatible with chronic   PE.    New airspace disease involving the left upper lobe. Correlate for   pneumonia.    Additional findings as above.    < end of copied text >    ASSESSMENT:  77-year-old with history of prostate cancer, BPH, heart failure, hypertension, diagnosed with PE and DVT in April 23.  Minute with bilateral leg edema and hematuria.  Status post transfusional support.  Resting comfortably in no acute distress.    PLAN:     Continue atorvastatin for lipid lowering metoprolol for heart rate and blood pressure management.  Neurologic management.  Follow labs and telemetry monitoring.  No further cardiac measures recommended at present.  Signing off.  Please call back if needed.    Jose Baltazar MD, FACC, AMADOR ROBERT, FACP  Director, Heart Failure Services  [facility]  , Department of Cardiology  [facility]     CHIEF COMPLAINT:  Patient is a 77y old  Male who presents with a chief complaint of sob/ hematuria (12 May 2023 16:01)      HPI:  77-year-old with history of prostate cancer, BPH, heart failure, hypertension, diagnosed with PE and DVT in April 23.  Minute with bilateral leg edema and hematuria.  Resting comfortably in no acute distress.      REVIEW OF SYSTEMS:  General:  No wt loss, fevers, chills, night sweats  Eyes:  Good vision, no reported pain  ENT:  No sore throat, pain, runny nose, dysphagia  CV:  No pain, palpitations, hypo/hypertension  Resp:  No dyspnea, cough, tachypnea, wheezing  GI:  No pain, nausea, vomiting, diarrhea, constipation  :  No pain, bleeding, incontinence, nocturia  Muscle:  No pain, weakness  Breast:  No pain, abscess, mass, discharge  Neuro:  No weakness, tingling, memory problems  Psych:  No fatigue, insomnia, mood problems, depression  Endocrine:  No polyuria, polydipsia, cold/heat intolerance  Heme:  No petechiae, ecchymosis, easy bruisability  Skin:  No rash, tattoos, scars, edema    PHYSICAL EXAM:  Vital Signs:  Vital Signs Last 24 Hrs  T(C): 36.8 (13 May 2023 11:24), Max: 36.9 (13 May 2023 04:45)  T(F): 98.3 (13 May 2023 11:24), Max: 98.5 (13 May 2023 04:45)  HR: 99 (13 May 2023 11:24) (88 - 99)  BP: 107/73 (13 May 2023 11:24) (100/68 - 112/78)  RR: 18 (13 May 2023 11:24) (17 - 18)  SpO2: 93% (13 May 2023 11:24) (92% - 93%)    Parameters below as of 13 May 2023 11:24  Patient On (Oxygen Delivery Method): room air      I&O's Summary    12 May 2023 07:01  -  13 May 2023 07:00  --------------------------------------------------------  IN: 860 mL / OUT: 1800 mL / NET: -940 mL    13 May 2023 07:01  -  13 May 2023 11:46  --------------------------------------------------------  IN: 320 mL / OUT: 0 mL / NET: 320 mL      I&O's Detail    12 May 2023 07:01  -  13 May 2023 07:00  --------------------------------------------------------  IN:    Oral Fluid: 860 mL  Total IN: 860 mL    OUT:    Indwelling Catheter - Urethral (mL): 1800 mL  Total OUT: 1800 mL    Total NET: -940 mL      13 May 2023 07:01  -  13 May 2023 11:46  --------------------------------------------------------  IN:    Oral Fluid: 320 mL  Total IN: 320 mL    OUT:  Total OUT: 0 mL    Total NET: 320 mL          Tele: SR/ST    Constitutional: well developed, normal appearance, well groomed, well nourished, no deformities and no acute distress.   Eyes: the conjunctiva exhibited no abnormalities and the eyelids demonstrated no xanthelasmas.   HEENT: normal oral mucosa, no oral pallor and no oral cyanosis.   Neck: normal jugular venous A waves present, normal jugular venous V waves present and no jugular venous sandoval A waves.   Pulmonary: no respiratory distress, normal respiratory rhythm and effort, no accessory muscle use and lungs were clear to auscultation bilaterally.   Cardiovascular: heart rate and rhythm were normal, normal S1 and S2 and no murmur, gallop, rub, heave or thrill are present.   Abdomen: soft, non-tender, no hepato-splenomegaly and no abdominal mass palpated.   Musculoskeletal: the gait could not be assessed..   Extremities: no clubbing of the fingernails, no localized cyanosis, no petechial hemorrhages and no ischemic changes.   Skin: normal skin color and pigmentation, no rash, no venous stasis, no skin lesions, no skin ulcer and no xanthoma was observed.   Psychiatric: oriented to person, place, and time, the affect was normal, the mood was normal and not feeling anxious.      LABORATORY:                          11.8   6.08  )-----------( 240      ( 13 May 2023 06:30 )             37.5     05-13    134<L>  |  102  |  21  ----------------------------<  117<H>  3.6   |  29  |  1.18    Ca    8.5      13 May 2023 06:30      PT/INR - ( 13 May 2023 06:30 )   PT: 17.2 sec;   INR: 1.43 ratio         BNP    IMAGING:    < from: 12 Lead ECG (05.06.23 @ 02:34) >  Sinus tachycardia with occasional premature ventricular complexes  Possible Left atrial enlargement  Rightward axis  Nonspecific T wave abnormality  Abnormal ECG  When compared with ECG of 28-MAR-2023 14:39,  premature ventricular complexes are now present  prematureatrial complexes are no longer present  Nonspecific T wave abnormality now evident in Lateral leads    < end of copied text >    < from: TTE Echo Complete w/o Contrast w/ Doppler (05.07.23 @ 08:44) >   1. Left ventricular ejection fraction, by visual estimation, is 45 to   50%.   2. Increased relative wall thickness with normal mass index consistent   with left ventricular concentric remodeling.   3. Spectral Doppler shows impaired relaxation pattern of left   ventricular myocardial filling (Grade I diastolic dysfunction).   4. Structurally normal mitral valve, with normal leaflet excursion.   5. Normal trileaflet aortic valve with normal opening.   6. Severely enlarged right atrium.   7. Severely enlarged right ventricle.   8. Moderately reduced RV systolic function.   9. Right ventricular volume and pressure overload.  10. Moderate-severe tricuspid regurgitation.  11. Structurally normal pulmonic valve, with normal leaflet excursion.  12. Mild pulmonic valve regurgitation.  13. Estimated pulmonary artery systolic pressure is 77.5 mmHg assuming a   right atrial pressure of 8 mmHg, which is consistent with severe   pulmonary hypertension.    < end of copied text >    < from: CT Angio Chest PE Protocol w/ IV Cont (05.08.23 @ 11:41) >  IMPRESSION:  Multifocal bilateral peripheral filling defects and occlusions involving   the segmental and subsegmental arteries of upper and lower lobes   pulmonary arteries, similar to the prior study, compatible with chronic   PE.    New airspace disease involving the left upper lobe. Correlate for   pneumonia.    Additional findings as above.    < end of copied text >    ASSESSMENT:  77-year-old with history of prostate cancer, BPH, heart failure, hypertension, diagnosed with PE and DVT in April 23.  Minute with bilateral leg edema and hematuria.  Resting comfortably in no acute distress.    PLAN:     Continue atorvastatin for lipid lowering metoprolol for heart rate and blood pressure management.  Neurologic management.  Follow labs and telemetry monitoring.  No further cardiac measures recommended at present.  Signing off.  Please call back if needed.    Jose Baltazar MD, FACC, AMADOR ROBERT, FACP  Director, Heart Failure Services  [facility]  , Department of Cardiology  [facility]

## 2023-05-14 LAB
ANION GAP SERPL CALC-SCNC: 3 MMOL/L — LOW (ref 5–17)
BUN SERPL-MCNC: 21 MG/DL — SIGNIFICANT CHANGE UP (ref 7–23)
CALCIUM SERPL-MCNC: 9 MG/DL — SIGNIFICANT CHANGE UP (ref 8.5–10.1)
CHLORIDE SERPL-SCNC: 106 MMOL/L — SIGNIFICANT CHANGE UP (ref 96–108)
CO2 SERPL-SCNC: 28 MMOL/L — SIGNIFICANT CHANGE UP (ref 22–31)
CREAT SERPL-MCNC: 1.22 MG/DL — SIGNIFICANT CHANGE UP (ref 0.5–1.3)
EGFR: 61 ML/MIN/1.73M2 — SIGNIFICANT CHANGE UP
GLUCOSE SERPL-MCNC: 122 MG/DL — HIGH (ref 70–99)
HCT VFR BLD CALC: 39.4 % — SIGNIFICANT CHANGE UP (ref 39–50)
HGB BLD-MCNC: 12 G/DL — LOW (ref 13–17)
INR BLD: 1.46 RATIO — HIGH (ref 0.88–1.16)
MCHC RBC-ENTMCNC: 24.3 PG — LOW (ref 27–34)
MCHC RBC-ENTMCNC: 30.5 G/DL — LOW (ref 32–36)
MCV RBC AUTO: 79.9 FL — LOW (ref 80–100)
NRBC # BLD: 0 /100 WBCS — SIGNIFICANT CHANGE UP (ref 0–0)
PLATELET # BLD AUTO: 238 K/UL — SIGNIFICANT CHANGE UP (ref 150–400)
POTASSIUM SERPL-MCNC: 4 MMOL/L — SIGNIFICANT CHANGE UP (ref 3.5–5.3)
POTASSIUM SERPL-SCNC: 4 MMOL/L — SIGNIFICANT CHANGE UP (ref 3.5–5.3)
PROTHROM AB SERPL-ACNC: 17.5 SEC — HIGH (ref 10.5–13.4)
RBC # BLD: 4.93 M/UL — SIGNIFICANT CHANGE UP (ref 4.2–5.8)
RBC # FLD: 15.9 % — HIGH (ref 10.3–14.5)
SODIUM SERPL-SCNC: 137 MMOL/L — SIGNIFICANT CHANGE UP (ref 135–145)
WBC # BLD: 5.59 K/UL — SIGNIFICANT CHANGE UP (ref 3.8–10.5)
WBC # FLD AUTO: 5.59 K/UL — SIGNIFICANT CHANGE UP (ref 3.8–10.5)

## 2023-05-14 PROCEDURE — 99232 SBSQ HOSP IP/OBS MODERATE 35: CPT

## 2023-05-14 RX ORDER — WARFARIN SODIUM 2.5 MG/1
6 TABLET ORAL ONCE
Refills: 0 | Status: COMPLETED | OUTPATIENT
Start: 2023-05-14 | End: 2023-05-14

## 2023-05-14 RX ADMIN — Medication 100 MILLIGRAM(S): at 06:11

## 2023-05-14 RX ADMIN — FINASTERIDE 5 MILLIGRAM(S): 5 TABLET, FILM COATED ORAL at 12:24

## 2023-05-14 RX ADMIN — TAMSULOSIN HYDROCHLORIDE 0.4 MILLIGRAM(S): 0.4 CAPSULE ORAL at 22:29

## 2023-05-14 RX ADMIN — POLYETHYLENE GLYCOL 3350 17 GRAM(S): 17 POWDER, FOR SOLUTION ORAL at 17:53

## 2023-05-14 RX ADMIN — Medication 100 MILLIGRAM(S): at 17:53

## 2023-05-14 RX ADMIN — POLYETHYLENE GLYCOL 3350 17 GRAM(S): 17 POWDER, FOR SOLUTION ORAL at 06:11

## 2023-05-14 RX ADMIN — WARFARIN SODIUM 6 MILLIGRAM(S): 2.5 TABLET ORAL at 22:29

## 2023-05-14 RX ADMIN — Medication 40 MILLIGRAM(S): at 06:11

## 2023-05-14 RX ADMIN — PANTOPRAZOLE SODIUM 40 MILLIGRAM(S): 20 TABLET, DELAYED RELEASE ORAL at 06:12

## 2023-05-14 RX ADMIN — Medication 3 MILLIGRAM(S): at 22:29

## 2023-05-14 RX ADMIN — Medication 50 MILLIGRAM(S): at 06:11

## 2023-05-14 RX ADMIN — ATORVASTATIN CALCIUM 40 MILLIGRAM(S): 80 TABLET, FILM COATED ORAL at 22:29

## 2023-05-14 NOTE — PROGRESS NOTE ADULT - ASSESSMENT
77 year old male with history of HTN, chronic sys CHF, BPH, prostate cancer, recent  PE/DVT in 3/23 who was d/c on 4/7/23 on coumadin with ongoing hematuria/ Joseph noted from last visit presented w/ bilateral leg edema, sob and hematuria and was admitted for acute hypoxemic respiratory failure secondary to acute on chronic systolic CHF & gross hematuria due to supra-therapeutic INR.     Acute on chronic systolic CHF w/ biV failure with severe pulm HTN  - Echo showed EF 45-50% w/ left ventricular concentric remodeling, grade I diastolic dysfunction, severely enlarged right atrium and right ventricle, moderate-severe tricuspid regurgitation & severe pulmonary hypertension  - Cardiology & Pulm following  - c/w Lasix   - c/w Lipitor, metoprolol   - patient has POOR followup - will needed outpatient followup with Dr. Anderson and [facility] - he did not followup before    s/p Supratherapeutic INR with h/o recent  PE/DVT in 3/2023  -s/p 10 of vitamin K- INR improved   - CT brain with no acute findings   - No indication for IVC filter at this time as per Vascular  - dopplers negative for DVT  - Coumadin resumed - will trend INR    PE/DVT in 3/2023  - US showed no evidence for acute DVT in the bilateral lower extremity deep venous systems  - CTA showed multifocal bilateral peripheral filling defects and occlusions involving the segmental and subsegmental arteries of upper and lower lobes pulmonary arteries, similar to the prior study, compatible with chronic PE  - Per Dr. Luu, no need for IVC filter because no DVT in major veins of legs present  - c/w Coumadin     gross hematuria  with hx of BPH/ Prostate Ca  - likely due to supratherapeutic INR  - Urology consulted  - joseph in place /exchanged by Uro 5/8  - no CBI needed: joseph draining clear  - Continue Joseph - patient Failed trial ov void on 5/9  - c/w finasteride and Flomax  - PSA 59.3  - MRI showed an abnormal diffusion, enhancement and signal noted in the right peripheral zone and seminal vesicles concerning for prostate carcinoma  - Underlying bladder mass could not be excluded on CT - patient will need cystoscopy and prostate bx as outpatient     Acute blood loss anemia  - due to hematuria - resolved  - H&H is stable     Strangulation of hernia on imaging   - CT showed a moderate left inguinal hernia containing mesenteric fat, nonobstructed segment of sigmoid colon and small amount of ascites. Given ascites within the left inguinal hernia, an element of strangulation couldnot be excluded by radiology  - Gen Surgery consulted - NO plans for surgery as inguinal Hernia was reduced at bedside without difficulty or pain    CT showed a probable complex cyst in the small upper right renal pole demonstrating partial increased density  - will need outpatient follow up    HLD  - c/w Lipitor     Constipation  - c/w Senna, Miralax and Dulcolax PRN    Prophylaxis:  DVT: coumadin   GI: Protonix     Dispo; D/C planning  77 year old male with history of HTN, chronic sys CHF, BPH, prostate cancer, recent  PE/DVT in 3/23 who was d/c on 4/7/23 on coumadin with ongoing hematuria/ Joseph noted from last visit presented w/ bilateral leg edema, sob and hematuria and was admitted for acute hypoxemic respiratory failure secondary to acute on chronic systolic CHF & gross hematuria due to supra-therapeutic INR.     Acute on chronic systolic CHF w/ biV failure with severe pulm HTN  - Echo showed EF 45-50% w/ left ventricular concentric remodeling, grade I diastolic dysfunction, severely enlarged right atrium and right ventricle, moderate-severe tricuspid regurgitation & severe pulmonary hypertension  - Cardiology & Pulm following  - c/w Lasix   - c/w Lipitor, metoprolol   - patient has POOR followup - will needed outpatient followup with Dr. Anderson and [facility] - he did not followup before    s/p Supratherapeutic INR with h/o recent  PE/DVT in 3/2023  -s/p 10 of vitamin K- INR improved   - CT brain with no acute findings   - No indication for IVC filter at this time as per Vascular  - dopplers negative for DVT  - Coumadin resumed - dose lowered from 7mg to 6mg - will trend INR    PE/DVT in 3/2023  - US showed no evidence for acute DVT in the bilateral lower extremity deep venous systems  - CTA showed multifocal bilateral peripheral filling defects and occlusions involving the segmental and subsegmental arteries of upper and lower lobes pulmonary arteries, similar to the prior study, compatible with chronic PE  - Per Dr. Luu, no need for IVC filter because no DVT in major veins of legs present  - c/w Coumadin     gross hematuria  with hx of BPH/ Prostate Ca  - likely due to supratherapeutic INR  - Urology consulted  - joseph in place /exchanged by Uro 5/8  - no CBI needed: joseph draining clear  - Continue Joseph - patient Failed trial ov void on 5/9  - c/w finasteride and Flomax  - PSA 59.3  - MRI showed an abnormal diffusion, enhancement and signal noted in the right peripheral zone and seminal vesicles concerning for prostate carcinoma  - Underlying bladder mass could not be excluded on CT - patient will need cystoscopy and prostate bx as outpatient     Acute blood loss anemia  - due to hematuria - resolved  - H&H is stable     Strangulation of hernia on imaging   - CT showed a moderate left inguinal hernia containing mesenteric fat, nonobstructed segment of sigmoid colon and small amount of ascites. Given ascites within the left inguinal hernia, an element of strangulation couldnot be excluded by radiology  - Gen Surgery consulted - NO plans for surgery as inguinal Hernia was reduced at bedside without difficulty or pain    CT showed a probable complex cyst in the small upper right renal pole demonstrating partial increased density  - will need outpatient follow up    HLD  - c/w Lipitor     Constipation  - c/w Senna, Miralax and Dulcolax PRN    Prophylaxis:  DVT: coumadin   GI: Protonix     Dispo; D/C planning

## 2023-05-14 NOTE — PROGRESS NOTE ADULT - SUBJECTIVE AND OBJECTIVE BOX
Patient is a 77y old  Male who presents with a chief complaint of sob/ hematuria (13 May 2023 14:19)      INTERVAL HPI/OVERNIGHT EVENTS:    MEDICATIONS  (STANDING):  atorvastatin 40 milliGRAM(s) Oral at bedtime  finasteride 5 milliGRAM(s) Oral daily  furosemide    Tablet 40 milliGRAM(s) Oral daily  melatonin 3 milliGRAM(s) Oral once  melatonin 3 milliGRAM(s) Oral at bedtime  metoprolol succinate ER 50 milliGRAM(s) Oral daily  pantoprazole    Tablet 40 milliGRAM(s) Oral before breakfast  phenazopyridine 100 milliGRAM(s) Oral <User Schedule>  polyethylene glycol 3350 17 Gram(s) Oral two times a day  senna 2 Tablet(s) Oral at bedtime  tamsulosin 0.4 milliGRAM(s) Oral at bedtime    MEDICATIONS  (PRN):  bisacodyl 5 milliGRAM(s) Oral every 12 hours PRN Constipation      Allergies    No Known Allergies    Intolerances        Vital Signs Last 24 Hrs  T(C): 36.9 (14 May 2023 04:57), Max: 37 (14 May 2023 00:03)  T(F): 98.4 (14 May 2023 04:57), Max: 98.6 (14 May 2023 00:03)  HR: 101 (14 May 2023 04:57) (87 - 101)  BP: 110/75 (14 May 2023 04:57) (107/73 - 124/80)  BP(mean): --  RR: 19 (14 May 2023 04:57) (18 - 19)  SpO2: 95% (14 May 2023 04:57) (93% - 95%)    Parameters below as of 14 May 2023 04:57  Patient On (Oxygen Delivery Method): room air        PHYSICAL EXAM:  GENERAL: NAD, well-groomed, well-developed  HEAD:  Atraumatic, Normocephalic  EYES: EOMI, PERRLA, conjunctiva and sclera clear  ENMT: No tonsillar erythema, exudates, or enlargement; Moist mucous membranes, Good dentition, No lesions  NECK: Supple, No JVD, Normal thyroid  NERVOUS SYSTEM:  Alert & Oriented X3, Good concentration; Motor Strength 5/5 B/L upper and lower extremities; DTRs 2+ intact and symmetric  CHEST/LUNG: Clear to auscultation bilaterally; No rales, rhonchi, wheezing, or rubs  HEART: Regular rate and rhythm; No murmurs, rubs, or gallops  ABDOMEN: Soft, Nontender, Nondistended; Bowel sounds present  EXTREMITIES:  2+ Peripheral Pulses, No clubbing, cyanosis, or edema  LYMPH: No lymphadenopathy noted  SKIN: No rashes or lesions    LABS:                        12.0   5.59  )-----------( 238      ( 14 May 2023 06:10 )             39.4     05-14    137  |  106  |  21  ----------------------------<  122<H>  4.0   |  28  |  1.22    Ca    9.0      14 May 2023 06:10      PT/INR - ( 14 May 2023 06:10 )   PT: 17.5 sec;   INR: 1.46 ratio             CAPILLARY BLOOD GLUCOSE          RADIOLOGY & ADDITIONAL TESTS:    05-13-23 @ 07:01  -  05-14-23 @ 07:00  --------------------------------------------------------  IN:    Oral Fluid: 320 mL  Total IN: 320 mL    OUT:    Indwelling Catheter - Urethral (mL): 400 mL    Voided (mL): 700 mL  Total OUT: 1100 mL    Total NET: -780 mL       77 year old male with history of HTN, chronic sys CHF, BPH, prostate cancer, recent  PE/DVT in 3/23 who was d/c on 4/7/23 on coumadin with ongoing hematuria/ Yan noted from last visit presented w/ bilateral leg edema, sob and hematuria and was admitted for acute hypoxemic respiratory failure secondary to acute on chronic systolic CHF & gross hematuria due to supra-therapeutic INR.     MEDICATIONS  (STANDING):  atorvastatin 40 milliGRAM(s) Oral at bedtime  finasteride 5 milliGRAM(s) Oral daily  furosemide    Tablet 40 milliGRAM(s) Oral daily  melatonin 3 milliGRAM(s) Oral once  melatonin 3 milliGRAM(s) Oral at bedtime  metoprolol succinate ER 50 milliGRAM(s) Oral daily  pantoprazole    Tablet 40 milliGRAM(s) Oral before breakfast  phenazopyridine 100 milliGRAM(s) Oral <User Schedule>  polyethylene glycol 3350 17 Gram(s) Oral two times a day  senna 2 Tablet(s) Oral at bedtime  tamsulosin 0.4 milliGRAM(s) Oral at bedtime    MEDICATIONS  (PRN):  bisacodyl 5 milliGRAM(s) Oral every 12 hours PRN Constipation      Allergies    No Known Allergies    Intolerances        Vital Signs Last 24 Hrs  T(C): 36.9 (14 May 2023 04:57), Max: 37 (14 May 2023 00:03)  T(F): 98.4 (14 May 2023 04:57), Max: 98.6 (14 May 2023 00:03)  HR: 101 (14 May 2023 04:57) (87 - 101)  BP: 110/75 (14 May 2023 04:57) (107/73 - 124/80)  BP(mean): --  RR: 19 (14 May 2023 04:57) (18 - 19)  SpO2: 95% (14 May 2023 04:57) (93% - 95%)    Parameters below as of 14 May 2023 04:57  Patient On (Oxygen Delivery Method): room air        PHYSICAL EXAM:  GENERAL: NAD, well-groomed, well-developed  HEAD:  Atraumatic, Normocephalic  EYES: EOMI, PERRLA   NECK: Supple, No JVD,    NERVOUS SYSTEM:  Alert   CHEST/LUNG: Clear to auscultation bilaterally; No rales, rhonchi, wheezing, or rubs  HEART: Regular rate and rhythm; No murmurs, rubs, or gallops  ABDOMEN: Soft, Nontender, mildly distended; Bowel sounds present  EXTREMITIES: bilateral LE edema       LABS:                        12.0   5.59  )-----------( 238      ( 14 May 2023 06:10 )             39.4     05-14    137  |  106  |  21  ----------------------------<  122<H>  4.0   |  28  |  1.22    Ca    9.0      14 May 2023 06:10      PT/INR - ( 14 May 2023 06:10 )   PT: 17.5 sec;   INR: 1.46 ratio             CAPILLARY BLOOD GLUCOSE          RADIOLOGY & ADDITIONAL TESTS:    05-13-23 @ 07:01  -  05-14-23 @ 07:00  --------------------------------------------------------  IN:    Oral Fluid: 320 mL  Total IN: 320 mL    OUT:    Indwelling Catheter - Urethral (mL): 400 mL    Voided (mL): 700 mL  Total OUT: 1100 mL    Total NET: -780 mL

## 2023-05-15 LAB
ANION GAP SERPL CALC-SCNC: 3 MMOL/L — LOW (ref 5–17)
APTT BLD: 35.7 SEC — HIGH (ref 27.5–35.5)
BUN SERPL-MCNC: 20 MG/DL — SIGNIFICANT CHANGE UP (ref 7–23)
CALCIUM SERPL-MCNC: 8.3 MG/DL — LOW (ref 8.5–10.1)
CHLORIDE SERPL-SCNC: 106 MMOL/L — SIGNIFICANT CHANGE UP (ref 96–108)
CO2 SERPL-SCNC: 28 MMOL/L — SIGNIFICANT CHANGE UP (ref 22–31)
CREAT SERPL-MCNC: 1.18 MG/DL — SIGNIFICANT CHANGE UP (ref 0.5–1.3)
EGFR: 64 ML/MIN/1.73M2 — SIGNIFICANT CHANGE UP
GLUCOSE SERPL-MCNC: 103 MG/DL — HIGH (ref 70–99)
HCT VFR BLD CALC: 39.2 % — SIGNIFICANT CHANGE UP (ref 39–50)
HGB BLD-MCNC: 12 G/DL — LOW (ref 13–17)
INR BLD: 1.84 RATIO — HIGH (ref 0.88–1.16)
MAGNESIUM SERPL-MCNC: 2.2 MG/DL — SIGNIFICANT CHANGE UP (ref 1.6–2.6)
MCHC RBC-ENTMCNC: 24.5 PG — LOW (ref 27–34)
MCHC RBC-ENTMCNC: 30.6 G/DL — LOW (ref 32–36)
MCV RBC AUTO: 80.2 FL — SIGNIFICANT CHANGE UP (ref 80–100)
NRBC # BLD: 0 /100 WBCS — SIGNIFICANT CHANGE UP (ref 0–0)
PHOSPHATE SERPL-MCNC: 2.9 MG/DL — SIGNIFICANT CHANGE UP (ref 2.5–4.5)
PLATELET # BLD AUTO: 252 K/UL — SIGNIFICANT CHANGE UP (ref 150–400)
POTASSIUM SERPL-MCNC: 3.7 MMOL/L — SIGNIFICANT CHANGE UP (ref 3.5–5.3)
POTASSIUM SERPL-SCNC: 3.7 MMOL/L — SIGNIFICANT CHANGE UP (ref 3.5–5.3)
PROTHROM AB SERPL-ACNC: 22.2 SEC — HIGH (ref 10.5–13.4)
RBC # BLD: 4.89 M/UL — SIGNIFICANT CHANGE UP (ref 4.2–5.8)
RBC # FLD: 15.9 % — HIGH (ref 10.3–14.5)
SODIUM SERPL-SCNC: 137 MMOL/L — SIGNIFICANT CHANGE UP (ref 135–145)
WBC # BLD: 5.55 K/UL — SIGNIFICANT CHANGE UP (ref 3.8–10.5)
WBC # FLD AUTO: 5.55 K/UL — SIGNIFICANT CHANGE UP (ref 3.8–10.5)

## 2023-05-15 PROCEDURE — 99232 SBSQ HOSP IP/OBS MODERATE 35: CPT

## 2023-05-15 RX ORDER — WARFARIN SODIUM 2.5 MG/1
6 TABLET ORAL ONCE
Refills: 0 | Status: COMPLETED | OUTPATIENT
Start: 2023-05-15 | End: 2023-05-15

## 2023-05-15 RX ADMIN — POLYETHYLENE GLYCOL 3350 17 GRAM(S): 17 POWDER, FOR SOLUTION ORAL at 17:53

## 2023-05-15 RX ADMIN — TAMSULOSIN HYDROCHLORIDE 0.4 MILLIGRAM(S): 0.4 CAPSULE ORAL at 22:56

## 2023-05-15 RX ADMIN — Medication 1200 MILLIGRAM(S): at 22:56

## 2023-05-15 RX ADMIN — Medication 3 MILLIGRAM(S): at 22:56

## 2023-05-15 RX ADMIN — Medication 100 MILLIGRAM(S): at 06:20

## 2023-05-15 RX ADMIN — SENNA PLUS 2 TABLET(S): 8.6 TABLET ORAL at 23:09

## 2023-05-15 RX ADMIN — WARFARIN SODIUM 6 MILLIGRAM(S): 2.5 TABLET ORAL at 22:56

## 2023-05-15 RX ADMIN — Medication 50 MILLIGRAM(S): at 06:19

## 2023-05-15 RX ADMIN — FINASTERIDE 5 MILLIGRAM(S): 5 TABLET, FILM COATED ORAL at 11:59

## 2023-05-15 RX ADMIN — PANTOPRAZOLE SODIUM 40 MILLIGRAM(S): 20 TABLET, DELAYED RELEASE ORAL at 06:19

## 2023-05-15 RX ADMIN — Medication 40 MILLIGRAM(S): at 06:19

## 2023-05-15 RX ADMIN — ATORVASTATIN CALCIUM 40 MILLIGRAM(S): 80 TABLET, FILM COATED ORAL at 22:56

## 2023-05-15 NOTE — PROGRESS NOTE ADULT - ASSESSMENT
77 year old male with history of HTN, chronic sys CHF, BPH, prostate cancer, recent  PE/DVT in 3/23 who was d/c on 4/7/23 on coumadin with ongoing hematuria/ Joseph noted from last visit presented w/ bilateral leg edema, sob and hematuria and was admitted for acute hypoxemic respiratory failure secondary to acute on chronic systolic CHF & gross hematuria due to supra-therapeutic INR.     Acute on chronic systolic CHF w/ biV failure with severe pulm HTN  - Echo showed EF 45-50% w/ left ventricular concentric remodeling, grade I diastolic dysfunction, severely enlarged right atrium and right ventricle, moderate-severe tricuspid regurgitation & severe pulmonary hypertension  - Cardiology & Pulm following  - c/w Lasix   - c/w Lipitor, metoprolol   - patient has POOR followup - will needed outpatient followup with Dr. Anderson and [facility] - he did not followup before    s/p Supratherapeutic INR with h/o recent  PE/DVT in 3/2023  -s/p 10 of vitamin K- INR improved   - CT brain with no acute findings   - No indication for IVC filter at this time as per Vascular  - dopplers negative for DVT  - Coumadin resumed - dose lowered from 7mg to 6mg - will trend INR    PE/DVT in 3/2023  - US showed no evidence for acute DVT in the bilateral lower extremity deep venous systems  - CTA showed multifocal bilateral peripheral filling defects and occlusions involving the segmental and subsegmental arteries of upper and lower lobes pulmonary arteries, similar to the prior study, compatible with chronic PE  - Per Dr. Luu, no need for IVC filter because no DVT in major veins of legs present  - c/w Coumadin     gross hematuria  with hx of BPH/ Prostate Ca  - likely due to supratherapeutic INR  - Urology consulted  - joseph in place /exchanged by Uro 5/8  - no CBI needed: joseph draining clear  - Continue Joseph - patient Failed trial ov void on 5/9  - c/w finasteride and Flomax  - PSA 59.3  - MRI showed an abnormal diffusion, enhancement and signal noted in the right peripheral zone and seminal vesicles concerning for prostate carcinoma  - Underlying bladder mass could not be excluded on CT - patient will need cystoscopy and prostate bx as outpatient     Acute blood loss anemia  - due to hematuria - resolved  - H&H is stable     Strangulation of hernia on imaging   - CT showed a moderate left inguinal hernia containing mesenteric fat, nonobstructed segment of sigmoid colon and small amount of ascites. Given ascites within the left inguinal hernia, an element of strangulation couldnot be excluded by radiology  - Gen Surgery consulted - NO plans for surgery as inguinal Hernia was reduced at bedside without difficulty or pain    CT showed a probable complex cyst in the small upper right renal pole demonstrating partial increased density  - will need outpatient follow up    HLD  - c/w Lipitor     Constipation  - c/w Senna, Miralax and Dulcolax PRN    Prophylaxis:  DVT: coumadin   GI: Protonix     Dispo; D/C planning

## 2023-05-15 NOTE — PROGRESS NOTE ADULT - SUBJECTIVE AND OBJECTIVE BOX
77 year old male with history of HTN, chronic sys CHF, BPH, prostate cancer, recent  PE/DVT in 3/23 who was d/c on 4/7/23 on coumadin with ongoing hematuria/ Yan noted from last visit presented w/ bilateral leg edema, sob and hematuria and was admitted for acute hypoxemic respiratory failure secondary to acute on chronic systolic CHF & gross hematuria due to supra-therapeutic INR.       MEDICATIONS  (STANDING):  atorvastatin 40 milliGRAM(s) Oral at bedtime  finasteride 5 milliGRAM(s) Oral daily  furosemide    Tablet 40 milliGRAM(s) Oral daily  melatonin 3 milliGRAM(s) Oral once  melatonin 3 milliGRAM(s) Oral at bedtime  metoprolol succinate ER 50 milliGRAM(s) Oral daily  pantoprazole    Tablet 40 milliGRAM(s) Oral before breakfast  phenazopyridine 100 milliGRAM(s) Oral <User Schedule>  polyethylene glycol 3350 17 Gram(s) Oral two times a day  senna 2 Tablet(s) Oral at bedtime  tamsulosin 0.4 milliGRAM(s) Oral at bedtime    MEDICATIONS  (PRN):  bisacodyl 5 milliGRAM(s) Oral every 12 hours PRN Constipation      Allergies    No Known Allergies    Intolerances        Vital Signs Last 24 Hrs  T(C): 36.3 (15 May 2023 16:31), Max: 36.9 (15 May 2023 00:18)  T(F): 97.4 (15 May 2023 16:31), Max: 98.5 (15 May 2023 04:47)  HR: 97 (15 May 2023 16:31) (92 - 97)  BP: 123/75 (15 May 2023 16:31) (101/63 - 123/75)   RR: 16 (15 May 2023 16:31) (16 - 18)  SpO2: 90% (15 May 2023 16:31) (90% - 95%)    Parameters below as of 15 May 2023 16:31  Patient On (Oxygen Delivery Method): room air       PHYSICAL EXAM:  GENERAL: NAD, well-groomed, well-developed  HEAD:  Atraumatic, Normocephalic  EYES: EOMI, PERRLA   NECK: Supple, No JVD,    NERVOUS SYSTEM:  Alert   CHEST/LUNG: Clear to auscultation bilaterally; No rales, rhonchi, wheezing, or rubs  HEART: Regular rate and rhythm; No murmurs, rubs, or gallops  ABDOMEN: Soft, Nontender, mildly distended; Bowel sounds present  EXTREMITIES: bilateral LE edema      LABS:                        12.0   5.55  )-----------( 252      ( 15 May 2023 07:45 )             39.2     05-15    137  |  106  |  20  ----------------------------<  103<H>  3.7   |  28  |  1.18    Ca    8.3<L>      15 May 2023 07:45  Phos  2.9     05-15  Mg     2.2     05-15      PT/INR - ( 15 May 2023 07:45 )   PT: 22.2 sec;   INR: 1.84 ratio         PTT - ( 15 May 2023 07:45 )  PTT:35.7 sec    CAPILLARY BLOOD GLUCOSE         RADIOLOGY & ADDITIONAL TESTS:    05-14-23 @ 07:01  -  05-15-23 @ 07:00  --------------------------------------------------------  IN:    Oral Fluid: 240 mL  Total IN: 240 mL    OUT:    Indwelling Catheter - Urethral (mL): 1900 mL  Total OUT: 1900 mL    Total NET: -1660 mL
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
## 2023-05-16 LAB
ANION GAP SERPL CALC-SCNC: 4 MMOL/L — LOW (ref 5–17)
BUN SERPL-MCNC: 21 MG/DL — SIGNIFICANT CHANGE UP (ref 7–23)
CALCIUM SERPL-MCNC: 8.6 MG/DL — SIGNIFICANT CHANGE UP (ref 8.5–10.1)
CHLORIDE SERPL-SCNC: 106 MMOL/L — SIGNIFICANT CHANGE UP (ref 96–108)
CO2 SERPL-SCNC: 28 MMOL/L — SIGNIFICANT CHANGE UP (ref 22–31)
CREAT SERPL-MCNC: 1.4 MG/DL — HIGH (ref 0.5–1.3)
EGFR: 52 ML/MIN/1.73M2 — LOW
GLUCOSE SERPL-MCNC: 110 MG/DL — HIGH (ref 70–99)
HCT VFR BLD CALC: 38.5 % — LOW (ref 39–50)
HGB BLD-MCNC: 11.8 G/DL — LOW (ref 13–17)
INR BLD: 2.11 RATIO — HIGH (ref 0.88–1.16)
MCHC RBC-ENTMCNC: 24.5 PG — LOW (ref 27–34)
MCHC RBC-ENTMCNC: 30.6 G/DL — LOW (ref 32–36)
MCV RBC AUTO: 80 FL — SIGNIFICANT CHANGE UP (ref 80–100)
NRBC # BLD: 0 /100 WBCS — SIGNIFICANT CHANGE UP (ref 0–0)
PLATELET # BLD AUTO: 265 K/UL — SIGNIFICANT CHANGE UP (ref 150–400)
POTASSIUM SERPL-MCNC: 4 MMOL/L — SIGNIFICANT CHANGE UP (ref 3.5–5.3)
POTASSIUM SERPL-SCNC: 4 MMOL/L — SIGNIFICANT CHANGE UP (ref 3.5–5.3)
PROTHROM AB SERPL-ACNC: 25.3 SEC — HIGH (ref 10.5–13.4)
RBC # BLD: 4.81 M/UL — SIGNIFICANT CHANGE UP (ref 4.2–5.8)
RBC # FLD: 16 % — HIGH (ref 10.3–14.5)
SODIUM SERPL-SCNC: 138 MMOL/L — SIGNIFICANT CHANGE UP (ref 135–145)
WBC # BLD: 5.91 K/UL — SIGNIFICANT CHANGE UP (ref 3.8–10.5)
WBC # FLD AUTO: 5.91 K/UL — SIGNIFICANT CHANGE UP (ref 3.8–10.5)

## 2023-05-16 PROCEDURE — 99232 SBSQ HOSP IP/OBS MODERATE 35: CPT

## 2023-05-16 PROCEDURE — 99233 SBSQ HOSP IP/OBS HIGH 50: CPT

## 2023-05-16 RX ORDER — HEPARIN SODIUM 5000 [USP'U]/ML
INJECTION INTRAVENOUS; SUBCUTANEOUS
Qty: 25000 | Refills: 0 | Status: DISCONTINUED | OUTPATIENT
Start: 2023-05-16 | End: 2023-05-16

## 2023-05-16 RX ORDER — PHYTONADIONE (VIT K1) 5 MG
5 TABLET ORAL ONCE
Refills: 0 | Status: COMPLETED | OUTPATIENT
Start: 2023-05-16 | End: 2023-05-16

## 2023-05-16 RX ORDER — HEPARIN SODIUM 5000 [USP'U]/ML
3000 INJECTION INTRAVENOUS; SUBCUTANEOUS EVERY 6 HOURS
Refills: 0 | Status: DISCONTINUED | OUTPATIENT
Start: 2023-05-16 | End: 2023-05-16

## 2023-05-16 RX ORDER — HEPARIN SODIUM 5000 [USP'U]/ML
6000 INJECTION INTRAVENOUS; SUBCUTANEOUS EVERY 6 HOURS
Refills: 0 | Status: DISCONTINUED | OUTPATIENT
Start: 2023-05-16 | End: 2023-05-16

## 2023-05-16 RX ADMIN — Medication 1200 MILLIGRAM(S): at 05:25

## 2023-05-16 RX ADMIN — ATORVASTATIN CALCIUM 40 MILLIGRAM(S): 80 TABLET, FILM COATED ORAL at 21:39

## 2023-05-16 RX ADMIN — FINASTERIDE 5 MILLIGRAM(S): 5 TABLET, FILM COATED ORAL at 13:11

## 2023-05-16 RX ADMIN — Medication 40 MILLIGRAM(S): at 05:24

## 2023-05-16 RX ADMIN — Medication 5 MILLIGRAM(S): at 19:27

## 2023-05-16 RX ADMIN — Medication 3 MILLIGRAM(S): at 21:40

## 2023-05-16 RX ADMIN — Medication 1200 MILLIGRAM(S): at 19:27

## 2023-05-16 RX ADMIN — PANTOPRAZOLE SODIUM 40 MILLIGRAM(S): 20 TABLET, DELAYED RELEASE ORAL at 05:25

## 2023-05-16 RX ADMIN — Medication 50 MILLIGRAM(S): at 05:24

## 2023-05-16 RX ADMIN — TAMSULOSIN HYDROCHLORIDE 0.4 MILLIGRAM(S): 0.4 CAPSULE ORAL at 21:40

## 2023-05-16 RX ADMIN — POLYETHYLENE GLYCOL 3350 17 GRAM(S): 17 POWDER, FOR SOLUTION ORAL at 05:24

## 2023-05-16 RX ADMIN — POLYETHYLENE GLYCOL 3350 17 GRAM(S): 17 POWDER, FOR SOLUTION ORAL at 19:00

## 2023-05-16 NOTE — PROGRESS NOTE ADULT - NS ATTEND OPT1 GEN_ALL_CORE
I attest my time as attending is greater than 50% of the total combined time spent on qualifying patient care activities by the PA/NP and attending.
I independently performed the documented:
I independently performed the documented:
I attest my time as attending is greater than 50% of the total combined time spent on qualifying patient care activities by the PA/NP and attending.
I independently performed the documented:

## 2023-05-16 NOTE — PROGRESS NOTE ADULT - SUBJECTIVE AND OBJECTIVE BOX
77 year old male with history of HTN, chronic sys CHF, BPH, prostate cancer, recent  PE/DVT in 3/23 who was d/c on 4/7/23 on coumadin with ongoing hematuria/ Yan noted from last visit presented w/ bilateral leg edema, sob and hematuria and was admitted for acute hypoxemic respiratory failure secondary to acute on chronic systolic CHF & gross hematuria due to supra-therapeutic INR.     MEDICATIONS  (STANDING):  atorvastatin 40 milliGRAM(s) Oral at bedtime  finasteride 5 milliGRAM(s) Oral daily  furosemide    Tablet 40 milliGRAM(s) Oral daily  guaiFENesin ER 1200 milliGRAM(s) Oral every 12 hours  melatonin 3 milliGRAM(s) Oral once  melatonin 3 milliGRAM(s) Oral at bedtime  metoprolol succinate ER 50 milliGRAM(s) Oral daily  pantoprazole    Tablet 40 milliGRAM(s) Oral before breakfast  phenazopyridine 100 milliGRAM(s) Oral <User Schedule>  polyethylene glycol 3350 17 Gram(s) Oral two times a day  senna 2 Tablet(s) Oral at bedtime  tamsulosin 0.4 milliGRAM(s) Oral at bedtime    MEDICATIONS  (PRN):  bisacodyl 5 milliGRAM(s) Oral every 12 hours PRN Constipation      Allergies    No Known Allergies    Intolerances        Vital Signs Last 24 Hrs  T(C): 36.6 (16 May 2023 16:24), Max: 36.8 (16 May 2023 11:34)  T(F): 97.8 (16 May 2023 16:24), Max: 98.2 (16 May 2023 11:34)  HR: 98 (16 May 2023 16:24) (94 - 98)  BP: 105/74 (16 May 2023 16:24) (105/70 - 110/74)  BP(mean): --  RR: 16 (16 May 2023 16:24) (16 - 18)  SpO2: 94% (16 May 2023 16:24) (94% - 96%)    Parameters below as of 16 May 2023 16:24  Patient On (Oxygen Delivery Method): room air        PHYSICAL EXAM:  GENERAL: NAD, well-groomed, well-developed  HEAD:  Atraumatic, Normocephalic  EYES: EOMI, PERRLA   NECK: Supple, No JVD,    NERVOUS SYSTEM:  Alert   CHEST/LUNG: Clear to auscultation bilaterally; No rales, rhonchi, wheezing, or rubs  HEART: Regular rate and rhythm; No murmurs, rubs, or gallops  ABDOMEN: Soft, Nontender, mildly distended; Bowel sounds present  EXTREMITIES: bilateral LE edema      LABS:                        11.8   5.91  )-----------( 265      ( 16 May 2023 05:50 )             38.5     05-16    138  |  106  |  21  ----------------------------<  110<H>  4.0   |  28  |  1.40<H>    Ca    8.6      16 May 2023 05:50  Phos  2.9     05-15  Mg     2.2     05-15      PT/INR - ( 16 May 2023 05:50 )   PT: 25.3 sec;   INR: 2.11 ratio         PTT - ( 15 May 2023 07:45 )  PTT:35.7 sec       RADIOLOGY & ADDITIONAL TESTS:    05-15-23 @ 07:01  -  05-16-23 @ 07:00  --------------------------------------------------------  IN:  Total IN: 0 mL    OUT:    Voided (mL): 1100 mL  Total OUT: 1100 mL    Total NET: -1100 mL

## 2023-05-16 NOTE — CHART NOTE - NSCHARTNOTEFT_GEN_A_CORE
Pt presented to ED c SOB, hematuria (pt c Farrah), b/l leg edema; pt was recently admitted c similar issues; advised at that time to f/u as outpatient c cardiology; pt failed to do so.  Pt now found c acute hypoxemic respiratory failure 2/2 acute on chronic CHF c BiV failure & pulmonary HTN & gross hematuria due to supra-therapeutic INR. Attempt to use video  was unsuccessful.     Factors impacting intake: [X ] none [ ] nausea  [ ] vomiting [ ] diarrhea [ ] constipation  [ ]chewing problems [ ] swallowing issues  [ ] other:     Diet Prescription: Diet, Regular (05-06-23 @ 10:32)    Intake:  pt eating well; % of meals    Current Weight:  71.2 kg (5/16); admission wt 73/6 kg (5/7)  % Weight Change: 3.3% wt loss x 9 days most likely due to fluid loss    No edema noted since 5/14; at that time 1+ edema noted b/l feet. At admission pt c 3+ edema b/l LEs    Pertinent Medications: MEDICATIONS  (STANDING):  atorvastatin 40 milliGRAM(s) Oral at bedtime  finasteride 5 milliGRAM(s) Oral daily  furosemide    Tablet 40 milliGRAM(s) Oral daily  guaiFENesin ER 1200 milliGRAM(s) Oral every 12 hours  melatonin 3 milliGRAM(s) Oral once  melatonin 3 milliGRAM(s) Oral at bedtime  metoprolol succinate ER 50 milliGRAM(s) Oral daily  pantoprazole    Tablet 40 milliGRAM(s) Oral before breakfast  phenazopyridine 100 milliGRAM(s) Oral <User Schedule>  polyethylene glycol 3350 17 Gram(s) Oral two times a day  senna 2 Tablet(s) Oral at bedtime  tamsulosin 0.4 milliGRAM(s) Oral at bedtime    MEDICATIONS  (PRN):  bisacodyl 5 milliGRAM(s) Oral every 12 hours PRN Constipation    Pertinent Labs: 05-16 Na138 mmol/L Glu 110 mg/dL<H> K+ 4.0 mmol/L Cr  1.40 mg/dL<H> BUN 21 mg/dL 05-15 Phos 2.9 mg/dL      Skin:   no pressure ulcers noted    Estimated Needs:   [ X] no change since previous assessment (5/8)  [ ] recalculated:     Previous Nutrition Diagnosis:   [X ]  Decreased Nutrient Needs (fluid, Na)  Etiology:  CHF  Signs & Symptoms:  3+ edema LEs   & respiratory failure @ admission    GOAL:  Pt to verbalize need for lifestyle changes & 3 high Na foods to limit/avoid in daily diet - pt unable to meet    Nutrition Diagnosis is [X ] ongoing  [ ] resolved [ ] not applicable     New Nutrition Diagnosis: [ X] not applicable      Interventions:     Recommend  [ X] Change Diet To: Low Na diet  [ ] Nutrition Supplement  [ ] Nutrition Support  [ ] Other:     Monitoring and Evaluation:   [x ] PO intake [ x ] Tolerance to diet prescription [ x ] weights [ x ] labs[ x ] follow up per protocol  [ ] other:

## 2023-05-16 NOTE — PROGRESS NOTE ADULT - ASSESSMENT
77 year old male with history of HTN, chronic sys CHF, BPH, prostate cancer, recent  PE/DVT in 3/23 who was d/c on 4/7/23 on coumadin with ongoing hematuria/ Joseph noted from last visit presented w/ bilateral leg edema, sob and hematuria and was admitted for acute hypoxemic respiratory failure secondary to acute on chronic systolic CHF & gross hematuria due to supra-therapeutic INR.     gross hematuria  with hx of BPH/ Prostate Ca  - likely due to supratherapeutic INR  - Urology consulted  - joseph in place /exchanged by Uro 5/8  - no CBI needed: joseph draining clear  - Continue Joseph - patient Failed trial ov void on 5/9  - c/w finasteride and Flomax  - PSA 59.3  - MRI showed an abnormal diffusion, enhancement and signal noted in the right peripheral zone and seminal vesicles concerning for prostate carcinoma  - Underlying bladder mass could not be excluded on CT - patient will need cystoscopy and prostate bx as outpatient - urology reports that prostate bx cannot be done inhouse but cystoscopy will be done Thursday. Coumadin is being reverse. I've placed a consult w/ cardio to have them see the patient again for preop clearance at urology's request. Pt will need to be made NPO at midnight starting tomorrow night.       PE/DVT in 3/2023  - US showed no evidence for acute DVT in the bilateral lower extremity deep venous systems  - CTA showed multifocal bilateral peripheral filling defects and occlusions involving the segmental and subsegmental arteries of upper and lower lobes pulmonary arteries, similar to the prior study, compatible with chronic PE  - Per Dr. Luu, no need for IVC filter because no DVT in major veins of legs present  - Coumadin resumed - dose lowered from 7mg to 6mg - will give Vit K to reverse INR for cystoscopy and start heparin gtt to cover as INR is coming down - will need to restart Coumadin after cystoscopy     Acute on chronic systolic CHF w/ biV failure with severe pulm HTN  - Echo showed EF 45-50% w/ left ventricular concentric remodeling, grade I diastolic dysfunction, severely enlarged right atrium and right ventricle, moderate-severe tricuspid regurgitation & severe pulmonary hypertension  - Cardiology & Pulm following  - c/w Lasix   - c/w Lipitor, metoprolol   - patient has POOR followup - will needed outpatient followup with Dr. Anderson and [facility] - he did not followup before      s/p Supratherapeutic INR with h/o recent  PE/DVT in 3/2023  -s/p 10 of vitamin K- INR improved   - CT brain with no acute findings   - No indication for IVC filter at this time as per Vascular  - dopplers negative for DVT     Acute blood loss anemia  - due to hematuria - resolved  - H&H is stable     Strangulation of hernia on imaging   - CT showed a moderate left inguinal hernia containing mesenteric fat, nonobstructed segment of sigmoid colon and small amount of ascites. Given ascites within the left inguinal hernia, an element of strangulation couldnot be excluded by radiology  - Gen Surgery consulted - NO plans for surgery as inguinal Hernia was reduced at bedside without difficulty or pain    CT showed a probable complex cyst in the small upper right renal pole demonstrating partial increased density  - will need outpatient follow up    HLD  - c/w Lipitor     Constipation  - c/w Senna, Miralax and Dulcolax PRN    Prophylaxis:  DVT: coumadin   GI: Protonix     Dispo; D/C planning  77 year old male with history of HTN, chronic sys CHF, BPH, prostate cancer, recent  PE/DVT in 3/23 who was d/c on 4/7/23 on coumadin with ongoing hematuria/ Joseph noted from last visit presented w/ bilateral leg edema, sob and hematuria and was admitted for acute hypoxemic respiratory failure secondary to acute on chronic systolic CHF & gross hematuria due to supra-therapeutic INR.     gross hematuria  with hx of BPH/ Prostate Ca  - likely due to supratherapeutic INR  - Urology consulted  - joseph in place /exchanged by Uro 5/8  - no CBI needed: joseph draining clear  - Continue Joseph - patient Failed trial ov void on 5/9  - c/w finasteride and Flomax  - PSA 59.3  - MRI showed an abnormal diffusion, enhancement and signal noted in the right peripheral zone and seminal vesicles concerning for prostate carcinoma  - Underlying bladder mass could not be excluded on CT - patient will need cystoscopy and prostate bx as outpatient - urology reports that prostate bx cannot be done inhouse but cystoscopy will be done Thursday. Coumadin is being reverse. I've placed a consult w/ cardio to have them see the patient again for preop clearance at urology's request. Pt will need to be made NPO at midnight starting tomorrow night.       PE/DVT in 3/2023  - US showed no evidence for acute DVT in the bilateral lower extremity deep venous systems  - CTA showed multifocal bilateral peripheral filling defects and occlusions involving the segmental and subsegmental arteries of upper and lower lobes pulmonary arteries, similar to the prior study, compatible with chronic PE  - Per Dr. Luu, no need for IVC filter because no DVT in major veins of legs present  - Coumadin resumed - dose lowered from 7mg to 6mg - will give Vit K to reverse INR for cystoscopy and start heparin gtt tomorrow in AM to cover if INR is coming down - will need to restart Coumadin after cystoscopy     Acute on chronic systolic CHF w/ biV failure with severe pulm HTN  - Echo showed EF 45-50% w/ left ventricular concentric remodeling, grade I diastolic dysfunction, severely enlarged right atrium and right ventricle, moderate-severe tricuspid regurgitation & severe pulmonary hypertension  - Cardiology & Pulm following  - c/w Lasix   - c/w Lipitor, metoprolol   - patient has POOR followup - [facility] did not follow[facility] before      s/p Supratherapeutic INR with h/o recent  PE/DVT in 3/2023  -s/p 10 of vitamin K- INR improved   - CT brain with no acute findings   - No indication for IVC filter at this time as per Vascular  - dopplers negative for DVT     Acute blood loss anemia  - due to hematuria - resolved  - H&H is stable     Strangulation of hernia on imaging   - CT showed a moderate left inguinal hernia containing mesenteric fat, nonobstructed segment of sigmoid colon and small amount of ascites. Given ascites within the left inguinal hernia, an element of strangulation couldnot be excluded by radiology  - Gen Surgery consulted - NO plans for surgery as inguinal Hernia was reduced at bedside without difficulty or pain    CT showed a probable complex cyst in the small upper right renal pole demonstrating partial increased density  - will need outpatient follow up    HLD  - c/w Lipitor     Constipation  - c/w Senna, Miralax and Dulcolax PRN    Prophylaxis:  DVT: coumadin   GI: Protonix     Dispo; D/C planning

## 2023-05-16 NOTE — PROGRESS NOTE ADULT - SUBJECTIVE AND OBJECTIVE BOX
UROLOGY PA NOTE ON BEHALF OF DR. HUNTER:    S: Patient seen and examined at bedside.   Patient has no new complaints this am.      MEDICATIONS:  atorvastatin 40 milliGRAM(s) Oral at bedtime  bisacodyl 5 milliGRAM(s) Oral every 12 hours PRN  finasteride 5 milliGRAM(s) Oral daily  furosemide    Tablet 40 milliGRAM(s) Oral daily  guaiFENesin ER 1200 milliGRAM(s) Oral every 12 hours  melatonin 3 milliGRAM(s) Oral once  melatonin 3 milliGRAM(s) Oral at bedtime  metoprolol succinate ER 50 milliGRAM(s) Oral daily  pantoprazole    Tablet 40 milliGRAM(s) Oral before breakfast  phenazopyridine 100 milliGRAM(s) Oral <User Schedule>  polyethylene glycol 3350 17 Gram(s) Oral two times a day  senna 2 Tablet(s) Oral at bedtime  tamsulosin 0.4 milliGRAM(s) Oral at bedtime      O:  Vital Signs Last 24 Hrs  T(C): 36.8 (16 May 2023 11:34), Max: 36.8 (16 May 2023 11:34)  T(F): 98.2 (16 May 2023 11:34), Max: 98.2 (16 May 2023 11:34)  HR: 97 (16 May 2023 11:34) (94 - 97)  BP: 105/70 (16 May 2023 11:34) (105/70 - 123/75)  BP(mean): --  RR: 16 (16 May 2023 11:34) (16 - 18)  SpO2: 94% (16 May 2023 11:34) (90% - 96%)    Parameters below as of 16 May 2023 11:34  Patient On (Oxygen Delivery Method): room air        I&O SUMMARY:    05-15-23 @ 07:01  -  05-16-23 @ 07:00  --------------------------------------------------------  IN: 0 mL / OUT: 1100 mL / NET: -1100 mL        PHYSICAL EXAM:  Lungs: CTA bilat without W/R/R  Card: S1S2  Abd: Soft, NT, ND.  +BS x 4.  No rebound/guarding.    Ext: Calves soft, NT, without edema bilat    LABS:                        11.8   5.91  )-----------( 265      ( 16 May 2023 05:50 )             38.5     05-16    138  |  106  |  21  ----------------------------<  110<H>  4.0   |  28  |  1.40<H>    Ca    8.6      16 May 2023 05:50  Phos  2.9     05-15  Mg     2.2     05-15      PT/INR - ( 16 May 2023 05:50 )   PT: 25.3 sec;   INR: 2.11 ratio         PTT - ( 15 May 2023 07:45 )  PTT:35.7 sec          RADIOLOGY:    Assessment:  77y Male with h/o chronic PE/DVT on coumadin, a/w gross hematuria with concern for prostate CA    Plan:  - As discussed with Dr. Hunter, will book for Cystoscopy w/ possible bladder biopsy on 5/18 with Dr. Busch  [facility]  - Urology to follow  - Discussed with Dr. Hunter     UROLOGY PA NOTE ON BEHALF OF DR. HUNTER:    S: Patient seen and examined at bedside.   Patient has no new complaints this am.  Denies pyruria, dysuria, fevers, chills, chest pain, SOB, palpations.      MEDICATIONS:  atorvastatin 40 milliGRAM(s) Oral at bedtime  bisacodyl 5 milliGRAM(s) Oral every 12 hours PRN  finasteride 5 milliGRAM(s) Oral daily  furosemide    Tablet 40 milliGRAM(s) Oral daily  guaiFENesin ER 1200 milliGRAM(s) Oral every 12 hours  melatonin 3 milliGRAM(s) Oral once  melatonin 3 milliGRAM(s) Oral at bedtime  metoprolol succinate ER 50 milliGRAM(s) Oral daily  pantoprazole    Tablet 40 milliGRAM(s) Oral before breakfast  phenazopyridine 100 milliGRAM(s) Oral <User Schedule>  polyethylene glycol 3350 17 Gram(s) Oral two times a day  senna 2 Tablet(s) Oral at bedtime  tamsulosin 0.4 milliGRAM(s) Oral at bedtime      O:  Vital Signs Last 24 Hrs  T(C): 36.8 (16 May 2023 11:34), Max: 36.8 (16 May 2023 11:34)  T(F): 98.2 (16 May 2023 11:34), Max: 98.2 (16 May 2023 11:34)  HR: 97 (16 May 2023 11:34) (94 - 97)  BP: 105/70 (16 May 2023 11:34) (105/70 - 123/75)  BP(mean): --  RR: 16 (16 May 2023 11:34) (16 - 18)  SpO2: 94% (16 May 2023 11:34) (90% - 96%)    Parameters below as of 16 May 2023 11:34  Patient On (Oxygen Delivery Method): room air        I&O SUMMARY:    05-15-23 @ 07:01  -  05-16-23 @ 07:00  --------------------------------------------------------  IN: 0 mL / OUT: 1100 mL / NET: -1100 mL        PHYSICAL EXAM:  Lungs: CTA bilat without W/R/R  Card: S1S2  Abd: Soft, NT, ND.  +BS x 4.  No rebound/guarding.    Ext: Calves soft, NT, without edema bilat    LABS:                        11.8   5.91  )-----------( 265      ( 16 May 2023 05:50 )             38.5     05-16    138  |  106  |  21  ----------------------------<  110<H>  4.0   |  28  |  1.40<H>    Ca    8.6      16 May 2023 05:50  Phos  2.9     05-15  Mg     2.2     05-15      PT/INR - ( 16 May 2023 05:50 )   PT: 25.3 sec;   INR: 2.11 ratio         PTT - ( 15 May 2023 07:45 )  PTT:35.7 sec          RADIOLOGY:    Assessment:  77 year old male with h/o chronic PE/DVT on coumadin, a/w gross hematuria with concern for prostate CA.  Yan catheter inserted, murky straw-colored urine in bag.  1.1L UOP/24Hours.      Plan:  - As discussed with Dr. Hunter, will book for Cystoscopy w/ possible bladder biopsy on 5/18 with Dr. Busch  - Pre-op Wedneday night  - Urology to follow  - Discussed with Dr. Hunter

## 2023-05-16 NOTE — PROGRESS NOTE ADULT - NS ATTEND AMEND GEN_ALL_CORE FT
Pt for OR Thurs for cysto due to GH - diagnostic    Can't have prostate biopsy in house, as we do not have the equipment for that procedure as an in patient.    He will need to call [phone] and arrange a sliding scale. Then the appointment is made for the resident clinic for pt to have a biopsy. Pt for OR Thurs for cysto due to GH - diagnostic    Can't have prostate biopsy in house, as we do not have the equipment for that procedure as an in patient.    He will need to call [phone] and arrange a sliding scale.   Then the appointment is made for the resident clinic for pt to have prostate biopsy, due to concern for prostate cancer  PSA 90 and then next visit 60, and with concerning lesion seen in prostate.

## 2023-05-17 LAB
ANION GAP SERPL CALC-SCNC: 2 MMOL/L — LOW (ref 5–17)
APTT BLD: 34.9 SEC — SIGNIFICANT CHANGE UP (ref 27.5–35.5)
APTT BLD: 38.7 SEC — HIGH (ref 27.5–35.5)
BUN SERPL-MCNC: 23 MG/DL — SIGNIFICANT CHANGE UP (ref 7–23)
CALCIUM SERPL-MCNC: 8.8 MG/DL — SIGNIFICANT CHANGE UP (ref 8.5–10.1)
CHLORIDE SERPL-SCNC: 106 MMOL/L — SIGNIFICANT CHANGE UP (ref 96–108)
CO2 SERPL-SCNC: 29 MMOL/L — SIGNIFICANT CHANGE UP (ref 22–31)
CREAT SERPL-MCNC: 1.25 MG/DL — SIGNIFICANT CHANGE UP (ref 0.5–1.3)
EGFR: 59 ML/MIN/1.73M2 — LOW
GLUCOSE SERPL-MCNC: 131 MG/DL — HIGH (ref 70–99)
HCT VFR BLD CALC: 39.7 % — SIGNIFICANT CHANGE UP (ref 39–50)
HGB BLD-MCNC: 12.2 G/DL — LOW (ref 13–17)
INR BLD: 1.57 RATIO — HIGH (ref 0.88–1.16)
INR BLD: 2.04 RATIO — HIGH (ref 0.88–1.16)
MAGNESIUM SERPL-MCNC: 2.3 MG/DL — SIGNIFICANT CHANGE UP (ref 1.6–2.6)
MCHC RBC-ENTMCNC: 24.4 PG — LOW (ref 27–34)
MCHC RBC-ENTMCNC: 30.7 G/DL — LOW (ref 32–36)
MCV RBC AUTO: 79.2 FL — LOW (ref 80–100)
NRBC # BLD: 0 /100 WBCS — SIGNIFICANT CHANGE UP (ref 0–0)
PHOSPHATE SERPL-MCNC: 3 MG/DL — SIGNIFICANT CHANGE UP (ref 2.5–4.5)
PLATELET # BLD AUTO: 248 K/UL — SIGNIFICANT CHANGE UP (ref 150–400)
POTASSIUM SERPL-MCNC: 4.6 MMOL/L — SIGNIFICANT CHANGE UP (ref 3.5–5.3)
POTASSIUM SERPL-SCNC: 4.6 MMOL/L — SIGNIFICANT CHANGE UP (ref 3.5–5.3)
PROTHROM AB SERPL-ACNC: 18.9 SEC — HIGH (ref 10.5–13.4)
PROTHROM AB SERPL-ACNC: 24.7 SEC — HIGH (ref 10.5–13.4)
RBC # BLD: 5.01 M/UL — SIGNIFICANT CHANGE UP (ref 4.2–5.8)
RBC # FLD: 16 % — HIGH (ref 10.3–14.5)
SODIUM SERPL-SCNC: 137 MMOL/L — SIGNIFICANT CHANGE UP (ref 135–145)
WBC # BLD: 6.15 K/UL — SIGNIFICANT CHANGE UP (ref 3.8–10.5)
WBC # FLD AUTO: 6.15 K/UL — SIGNIFICANT CHANGE UP (ref 3.8–10.5)

## 2023-05-17 PROCEDURE — 99233 SBSQ HOSP IP/OBS HIGH 50: CPT

## 2023-05-17 PROCEDURE — 99232 SBSQ HOSP IP/OBS MODERATE 35: CPT

## 2023-05-17 RX ORDER — PHYTONADIONE (VIT K1) 5 MG
5 TABLET ORAL ONCE
Refills: 0 | Status: COMPLETED | OUTPATIENT
Start: 2023-05-17 | End: 2023-05-17

## 2023-05-17 RX ADMIN — Medication 50 MILLIGRAM(S): at 06:03

## 2023-05-17 RX ADMIN — Medication 3 MILLIGRAM(S): at 23:09

## 2023-05-17 RX ADMIN — ATORVASTATIN CALCIUM 40 MILLIGRAM(S): 80 TABLET, FILM COATED ORAL at 23:09

## 2023-05-17 RX ADMIN — Medication 1200 MILLIGRAM(S): at 17:03

## 2023-05-17 RX ADMIN — Medication 1200 MILLIGRAM(S): at 06:04

## 2023-05-17 RX ADMIN — FINASTERIDE 5 MILLIGRAM(S): 5 TABLET, FILM COATED ORAL at 11:20

## 2023-05-17 RX ADMIN — SENNA PLUS 2 TABLET(S): 8.6 TABLET ORAL at 23:09

## 2023-05-17 RX ADMIN — POLYETHYLENE GLYCOL 3350 17 GRAM(S): 17 POWDER, FOR SOLUTION ORAL at 06:04

## 2023-05-17 RX ADMIN — Medication 40 MILLIGRAM(S): at 06:03

## 2023-05-17 RX ADMIN — PANTOPRAZOLE SODIUM 40 MILLIGRAM(S): 20 TABLET, DELAYED RELEASE ORAL at 06:03

## 2023-05-17 RX ADMIN — Medication 5 MILLIGRAM(S): at 17:29

## 2023-05-17 RX ADMIN — TAMSULOSIN HYDROCHLORIDE 0.4 MILLIGRAM(S): 0.4 CAPSULE ORAL at 23:09

## 2023-05-17 NOTE — PROGRESS NOTE ADULT - ASSESSMENT
77M w HTN, HFrEF, severe p-HTN, BPH, DVT/PE on coumadin, out pt records of elevated PSA (90), Left inguinal hernia, DVT right peroneal vein (below the knee), now p/w LE edema. pt also found to have gross hematuria.  INR on admission was 12, now improved  Awaiting cystoscopy/biopsy in AM.  Echo:  LVEF 45-50%, grade 1 DD, severe LAE/CELESTINO, moderately reduced RV function with signs of pressure/volume overload, moderate-severe TR with severe pulm HTN (PAPs almost 80).    Pt at high cardiovascular risk (cardiomyopathy with no revascularization / SEVERE PULM HTN with PAP 80 / moderate RV dysfunction), for a low risk procedure (cystoscopy w/ biopsy).  In general, recommend pts with severe pulm be done at a tertiary care center.   However, if procedure is to be done at , recommend judicious use of IVFs and as minimal procedure time as possible.  Cont current cardiac meds otherwise.

## 2023-05-17 NOTE — PROGRESS NOTE ADULT - SUBJECTIVE AND OBJECTIVE BOX
CARDIOLOGY CONSULT NOTE    Patient is a 77y Male with a known history of :  Coumadin toxicity [T45.511A]    Pulmonary thromboembolism [633475084]    Thrombophlebitis of the femoral vein [[id number]]      HPI:  77M w HTN, HFrEF, severe p-HTN, BPH, DVT/PE on coumadin, out pt records of elevated PSA (90), Left inguinal hernia, DVT right peroneal vein (below the knee), now p/w LE edema. pt also found to have gross hematuria.  INR on admission was 12, now improved  Awaiting cystoscopy/biopsy in AM.  Echo:  LVEF 45-50%, grade 1 DD, severe LAE/CELESTINO, moderately reduced RV function with signs of pressure/volume overload, moderate-severe TR with severe pulm HTN (PAPs almost 80).      REVIEW OF SYSTEMS:  CONSTITUTIONAL: No fever, weight loss, or fatigue  EYES: No eye pain, visual disturbances, or discharge  ENMT:  No difficulty hearing, tinnitus, vertigo; No sinus or throat pain  NECK: No pain or stiffness  BREASTS: No pain, masses, or nipple discharge  RESPIRATORY: No cough, wheezing, chills or hemoptysis; No shortness of breath  CARDIOVASCULAR: No chest pain, palpitations, dizziness, or leg swelling  GASTROINTESTINAL: No abdominal or epigastric pain. No nausea, vomiting, or hematemesis; No diarrhea or constipation. No melena or hematochezia.  GENITOURINARY: No dysuria, frequency, hematuria, or incontinence  NEUROLOGICAL: No headaches, memory loss, loss of strength, numbness, or tremors  SKIN: No itching, burning, rashes, or lesions   LYMPH NODES: No enlarged glands  ENDOCRINE: No heat or cold intolerance; No hair loss  MUSCULOSKELETAL: No joint pain or swelling; No muscle, back, or extremity pain  PSYCHIATRIC: No depression, anxiety, mood swings, or difficulty sleeping  HEME/LYMPH: No easy bruising, or bleeding gums  ALLERGY AND IMMUNOLOGIC: No hives or eczema    MEDICATIONS  (STANDING):  atorvastatin 40 milliGRAM(s) Oral at bedtime  finasteride 5 milliGRAM(s) Oral daily  furosemide    Tablet 40 milliGRAM(s) Oral daily  guaiFENesin ER 1200 milliGRAM(s) Oral every 12 hours  melatonin 3 milliGRAM(s) Oral once  melatonin 3 milliGRAM(s) Oral at bedtime  metoprolol succinate ER 50 milliGRAM(s) Oral daily  pantoprazole    Tablet 40 milliGRAM(s) Oral before breakfast  phenazopyridine 100 milliGRAM(s) Oral <User Schedule>  polyethylene glycol 3350 17 Gram(s) Oral two times a day  senna 2 Tablet(s) Oral at bedtime  tamsulosin 0.4 milliGRAM(s) Oral at bedtime    MEDICATIONS  (PRN):  bisacodyl 5 milliGRAM(s) Oral every 12 hours PRN Constipation      ALLERGIES: No Known Allergies      FAMILY HISTORY:      PHYSICAL EXAMINATION:  -----------------------------  T(C): 36.3 (05-17-23 @ 10:53), Max: 36.6 (05-16-23 @ 16:24)  HR: 90 (05-17-23 @ 10:53) (90 - 98)  BP: 105/72 (05-17-23 @ 10:53) (105/72 - 109/74)  RR: 18 (05-17-23 @ 10:53) (16 - 18)  SpO2: 96% (05-17-23 @ 10:53) (94% - 96%)      Constitutional: well developed, normal appearance, well groomed, well nourished, no deformities and no acute distress.   Eyes: the conjunctiva exhibited no abnormalities and the eyelids demonstrated no xanthelasmas.   HEENT: normal oral mucosa, no oral pallor and no oral cyanosis.   Neck: normal jugular venous A waves present, normal jugular venous V waves present and no jugular venous sandoval A waves.   Pulmonary: no respiratory distress, normal respiratory rhythm and effort, no accessory muscle use and lungs were clear to auscultation bilaterally.   Cardiovascular: heart rate and rhythm were normal, normal S1 and S2 and no murmur, gallop, rub, heave or thrill are present.   Abdomen: soft, non-tender, no hepato-splenomegaly and no abdominal mass palpated.   Musculoskeletal: the gait could not be assessed..   Extremities: no clubbing of the fingernails, no localized cyanosis, no petechial hemorrhages and no ischemic changes.   Skin: normal skin color and pigmentation, no rash, no venous stasis, no skin lesions, no skin ulcer and no xanthoma was observed.   Psychiatric: oriented to person, place, and time, the affect was normal, the mood was normal and not feeling anxious.       LABS:   --------  05-17    137  |  106  |  23  ----------------------------<  131<H>  4.6   |  29  |  1.25    Ca    8.8      17 May 2023 05:52  Phos  3.0     05-17  Mg     2.3     05-17                           12.2   6.15  )-----------( 248      ( 17 May 2023 05:52 )             39.7     PT/INR - ( 17 May 2023 05:52 )   PT: 24.7 sec;   INR: 2.04 ratio         PTT - ( 17 May 2023 05:52 )  PTT:38.7 sec            RADIOLOGY:  -----------------    e< from: TTE Echo Complete w/o Contrast w/ Doppler (05.07.23 @ 08:44) >  Summary:   1. Left ventricular ejection fraction, by visual estimation, is 45 to   50%.   2. Increased relative wall thickness with normal mass index consistent   with left ventricular concentric remodeling.   3. Spectral Doppler shows impaired relaxation pattern of left   ventricular myocardial filling (Grade I diastolic dysfunction).   4. Structurally normal mitral valve, with normal leaflet excursion.   5. Normal trileaflet aortic valve with normal opening.   6. Severely enlarged right atrium.   7. Severely enlarged right ventricle.   8. Moderately reduced RV systolic function.   9. Right ventricular volume and pressure overload.  10. Moderate-severe tricuspid regurgitation.  11. Structurally normal pulmonic valve, with normal leaflet excursion.  12. Mild pulmonic valve regurgitation.  13. Estimated pulmonary artery systolic pressure is 77.5 mmHg assuming a   right atrial pressure of 8 mmHg, which is consistent with severe   pulmonary hypertension.      [id number] Jorje Cancino MD, FACC  Electronically signed on 5/7/2023 at 10:07:47 AM    < end of copied text >

## 2023-05-17 NOTE — PROGRESS NOTE ADULT - SUBJECTIVE AND OBJECTIVE BOX
77 year old male with history of HTN, chronic sys CHF, BPH, prostate cancer, recent  PE/DVT in 3/23 who was d/c on 4/7/23 on coumadin with ongoing hematuria/ Yan noted from last visit presented w/ bilateral leg edema, sob and hematuria and was admitted for acute hypoxemic respiratory failure secondary to acute on chronic systolic CHF & gross hematuria due to supra-therapeutic INR.     MEDICATIONS  (STANDING):  atorvastatin 40 milliGRAM(s) Oral at bedtime  finasteride 5 milliGRAM(s) Oral daily  furosemide    Tablet 40 milliGRAM(s) Oral daily  guaiFENesin ER 1200 milliGRAM(s) Oral every 12 hours  melatonin 3 milliGRAM(s) Oral once  melatonin 3 milliGRAM(s) Oral at bedtime  metoprolol succinate ER 50 milliGRAM(s) Oral daily  pantoprazole    Tablet 40 milliGRAM(s) Oral before breakfast  phenazopyridine 100 milliGRAM(s) Oral <User Schedule>  polyethylene glycol 3350 17 Gram(s) Oral two times a day  senna 2 Tablet(s) Oral at bedtime  tamsulosin 0.4 milliGRAM(s) Oral at bedtime    MEDICATIONS  (PRN):  bisacodyl 5 milliGRAM(s) Oral every 12 hours PRN Constipation        Allergies    No Known Allergies    Intolerances        Vital Signs Last 24 Hrs  T(C): 36.3 (17 May 2023 10:53), Max: 36.6 (16 May 2023 16:24)  T(F): 97.4 (17 May 2023 10:53), Max: 97.9 (17 May 2023 05:19)  HR: 90 (17 May 2023 10:53) (90 - 98)  BP: 105/72 (17 May 2023 10:53) (105/72 - 109/74)  BP(mean): --  RR: 18 (17 May 2023 10:53) (16 - 18)  SpO2: 96% (17 May 2023 10:53) (94% - 96%)    Parameters below as of 17 May 2023 10:53  Patient On (Oxygen Delivery Method): room air            PHYSICAL EXAM:  GENERAL: NAD, well-groomed, well-developed  HEAD:  Atraumatic, Normocephalic  EYES: EOMI, PERRLA   NECK: Supple, No JVD,    NERVOUS SYSTEM:  Alert   CHEST/LUNG: Clear to auscultation bilaterally; No rales, rhonchi, wheezing, or rubs  HEART: Regular rate and rhythm; No murmurs, rubs, or gallops  ABDOMEN: Soft, Nontender, mildly distended; Bowel sounds present  EXTREMITIES: bilateral LE edema      LABS:                                           12.2   6.15  )-----------( 248      ( 17 May 2023 05:52 )             39.7     05-17    137  |  106  |  23  ----------------------------<  131<H>  4.6   |  29  |  1.25    Ca    8.8      17 May 2023 05:52  Phos  3.0     05-17  Mg     2.3     05-17      PT/INR - ( 17 May 2023 05:52 )   PT: 24.7 sec;   INR: 2.04 ratio         PTT - ( 17 May 2023 05:52 )  PTT:38.7 sec      RADIOLOGY & ADDITIONAL TESTS:    05-15-23 @ 07:01  -  05-16-23 @ 07:00  --------------------------------------------------------  IN:  Total IN: 0 mL    OUT:    Voided (mL): 1100 mL  Total OUT: 1100 mL    Total NET: -1100 mL

## 2023-05-17 NOTE — PROGRESS NOTE ADULT - SUBJECTIVE AND OBJECTIVE BOX
Patient seen and  examined at bedside resting comfortably. Offers no complaints at this time.   Joseph catheter remains in place.   Denies fever, chills, N/V/D, CP, SOB.     Vital Signs Last 24 Hrs  T(F): 98.2 (05-17-23 @ 16:16), Max: 98.2 (05-17-23 @ 16:16)  HR: 96 (05-17-23 @ 16:16)  BP: 118/73 (05-17-23 @ 16:16)  RR: 17 (05-17-23 @ 16:16)  SpO2: 94% (05-17-23 @ 16:16)    PHYSICAL EXAM:  GENERAL: Alert, NAD  CHEST/LUNG: Clear to auscultation bilaterally, respirations nonlabored  HEART: Regular rate and rhythm; S1 & S2 appreciated  ABDOMEN: + Bowel sounds, soft, Nontender, Nondistended  : no suprapubic tenderness or distention, joseph catheter with straw colored urine in tubing  EXTREMITIES:  no calf tenderness, No edema    I&O's Detail    16 May 2023 07:01  -  17 May 2023 07:00  --------------------------------------------------------  IN:  Total IN: 0 mL    OUT:    Indwelling Catheter - Urethral (mL): 1300 mL  Total OUT: 1300 mL    Total NET: -1300 mL      17 May 2023 07:01  -  17 May 2023 18:55  --------------------------------------------------------  IN:  Total IN: 0 mL    OUT:    Indwelling Catheter - Urethral (mL): 800 mL  Total OUT: 800 mL    Total NET: -800 mL    LABS:                        12.2   6.15  )-----------( 248      ( 17 May 2023 05:52 )             39.7     05-17    137  |  106  |  23  ----------------------------<  131<H>  4.6   |  29  |  1.25    Ca    8.8      17 May 2023 05:52  Phos  3.0     05-17  Mg     2.3     05-17      PT/INR - ( 17 May 2023 05:52 )   PT: 24.7 sec;   INR: 2.04 ratio         PTT - ( 17 May 2023 05:52 )  PTT:38.7 sec    RADIOLOGY & ADDITIONAL STUDIES:    A/P  77 year old male with h/o chronic PE/DVT on coumadin, a/w gross hematuria with concern for prostate CA.  Joseph catheter inserted, murky straw-colored urine in bag.     - patient booked for cystoscopy tomorrow at 11:30 with Dr. Busch  - NPO pMN, f/u pre op labs  - vitamin K ordered per primary team, INR 1.5 and under for OR  - continue care per primary team  - d/w Dr. Hill

## 2023-05-17 NOTE — PROGRESS NOTE ADULT - ASSESSMENT
77 year old male with history of HTN, chronic sys CHF, BPH, prostate cancer, recent  PE/DVT in 3/23 who was d/c on 4/7/23 on coumadin with ongoing hematuria/ Joseph noted from last visit presented w/ bilateral leg edema, sob and hematuria and was admitted for acute hypoxemic respiratory failure secondary to acute on chronic systolic CHF & gross hematuria due to supra-therapeutic INR.     gross hematuria  with hx of BPH/ Prostate Ca  - likely due to supratherapeutic INR  - Urology consulted  - joseph in place /exchanged by Uro 5/8  - no CBI needed: joseph draining clear  - Continue Joseph - patient Failed trial ov void on 5/9  - c/w finasteride and Flomax  - PSA 59.3  - MRI showed an abnormal diffusion, enhancement and signal noted in the right peripheral zone and seminal vesicles concerning for prostate carcinoma  - Underlying bladder mass could not be excluded on CT - patient will need cystoscopy and prostate bx as outpatient - urology reports that prostate bx cannot be done inhouse but cystoscopy will be done Thursday. Pt still therapeutic despite vit k yesterday . Can redose vit k again and place on heparin once INR is below 2   - reconsulted cardio for clearance      PE/DVT in 3/2023  - US showed no evidence for acute DVT in the bilateral lower extremity deep venous systems  - CTA showed multifocal bilateral peripheral filling defects and occlusions involving the segmental and subsegmental arteries of upper and lower lobes pulmonary arteries, similar to the prior study, compatible with chronic PE  - Per Dr. Luu, no need for IVC filter because no DVT in major veins of legs present  - Coumadin resumed - dose lowered from 7mg to 6mg - will give Vit K to reverse INR for cystoscopy and start heparin gtt .  will need to restart Coumadin after cystoscopy     Acute on chronic systolic CHF w/ biV failure with severe pulm HTN  - Echo showed EF 45-50% w/ left ventricular concentric remodeling, grade I diastolic dysfunction, severely enlarged right atrium and right ventricle, moderate-severe tricuspid regurgitation & severe pulmonary hypertension  - Cardiology & Pulm following  - c/w Lasix   - c/w Lipitor, metoprolol   - patient has POOR followup - will needed outpatient followup with Dr. Anderson and [facility] - he did not followup before      s/p Supratherapeutic INR with h/o recent  PE/DVT in 3/2023  -s/p 10 of vitamin K- INR improved   - CT brain with no acute findings   - No indication for IVC filter at this time as per Vascular  - dopplers negative for DVT     Acute blood loss anemia  - due to hematuria - resolved  - H&H is stable     Strangulation of hernia on imaging   - CT showed a moderate left inguinal hernia containing mesenteric fat, nonobstructed segment of sigmoid colon and small amount of ascites. Given ascites within the left inguinal hernia, an element of strangulation couldnot be excluded by radiology  - Gen Surgery consulted - NO plans for surgery as inguinal Hernia was reduced at bedside without difficulty or pain    CT showed a probable complex cyst in the small upper right renal pole demonstrating partial increased density  - will need outpatient follow up    HLD  - c/w Lipitor     Constipation  - c/w Senna, Miralax and Dulcolax PRN    Prophylaxis:  DVT: coumadin   GI: Protonix     Dispo; restart coumadin with heparin bridge after cysto

## 2023-05-18 LAB
ANION GAP SERPL CALC-SCNC: 3 MMOL/L — LOW (ref 5–17)
BLD GP AB SCN SERPL QL: SIGNIFICANT CHANGE UP
BUN SERPL-MCNC: 21 MG/DL — SIGNIFICANT CHANGE UP (ref 7–23)
CALCIUM SERPL-MCNC: 8.5 MG/DL — SIGNIFICANT CHANGE UP (ref 8.5–10.1)
CHLORIDE SERPL-SCNC: 109 MMOL/L — HIGH (ref 96–108)
CO2 SERPL-SCNC: 28 MMOL/L — SIGNIFICANT CHANGE UP (ref 22–31)
CREAT SERPL-MCNC: 1.24 MG/DL — SIGNIFICANT CHANGE UP (ref 0.5–1.3)
EGFR: 60 ML/MIN/1.73M2 — SIGNIFICANT CHANGE UP
FLUAV AG NPH QL: SIGNIFICANT CHANGE UP
FLUBV AG NPH QL: SIGNIFICANT CHANGE UP
GLUCOSE SERPL-MCNC: 93 MG/DL — SIGNIFICANT CHANGE UP (ref 70–99)
HCT VFR BLD CALC: 38.5 % — LOW (ref 39–50)
HGB BLD-MCNC: 11.9 G/DL — LOW (ref 13–17)
INR BLD: 1.48 RATIO — HIGH (ref 0.88–1.16)
MAGNESIUM SERPL-MCNC: 2.2 MG/DL — SIGNIFICANT CHANGE UP (ref 1.6–2.6)
MCHC RBC-ENTMCNC: 24.4 PG — LOW (ref 27–34)
MCHC RBC-ENTMCNC: 30.9 G/DL — LOW (ref 32–36)
MCV RBC AUTO: 79.1 FL — LOW (ref 80–100)
NRBC # BLD: 0 /100 WBCS — SIGNIFICANT CHANGE UP (ref 0–0)
PHOSPHATE SERPL-MCNC: 2.9 MG/DL — SIGNIFICANT CHANGE UP (ref 2.5–4.5)
PLATELET # BLD AUTO: 244 K/UL — SIGNIFICANT CHANGE UP (ref 150–400)
POTASSIUM SERPL-MCNC: 3.9 MMOL/L — SIGNIFICANT CHANGE UP (ref 3.5–5.3)
POTASSIUM SERPL-SCNC: 3.9 MMOL/L — SIGNIFICANT CHANGE UP (ref 3.5–5.3)
PROTHROM AB SERPL-ACNC: 17.7 SEC — HIGH (ref 10.5–13.4)
RBC # BLD: 4.87 M/UL — SIGNIFICANT CHANGE UP (ref 4.2–5.8)
RBC # FLD: 15.9 % — HIGH (ref 10.3–14.5)
SARS-COV-2 RNA SPEC QL NAA+PROBE: SIGNIFICANT CHANGE UP
SODIUM SERPL-SCNC: 140 MMOL/L — SIGNIFICANT CHANGE UP (ref 135–145)
WBC # BLD: 6.37 K/UL — SIGNIFICANT CHANGE UP (ref 3.8–10.5)
WBC # FLD AUTO: 6.37 K/UL — SIGNIFICANT CHANGE UP (ref 3.8–10.5)

## 2023-05-18 PROCEDURE — 99232 SBSQ HOSP IP/OBS MODERATE 35: CPT

## 2023-05-18 RX ORDER — BENZOCAINE AND MENTHOL 5; 1 G/100ML; G/100ML
1 LIQUID ORAL EVERY 6 HOURS
Refills: 0 | Status: DISCONTINUED | OUTPATIENT
Start: 2023-05-18 | End: 2023-05-26

## 2023-05-18 RX ADMIN — PANTOPRAZOLE SODIUM 40 MILLIGRAM(S): 20 TABLET, DELAYED RELEASE ORAL at 05:26

## 2023-05-18 RX ADMIN — Medication 3 MILLIGRAM(S): at 21:32

## 2023-05-18 RX ADMIN — Medication 100 MILLIGRAM(S): at 21:32

## 2023-05-18 RX ADMIN — Medication 100 MILLIGRAM(S): at 06:19

## 2023-05-18 RX ADMIN — Medication 50 MILLIGRAM(S): at 05:26

## 2023-05-18 RX ADMIN — POLYETHYLENE GLYCOL 3350 17 GRAM(S): 17 POWDER, FOR SOLUTION ORAL at 18:24

## 2023-05-18 RX ADMIN — Medication 1200 MILLIGRAM(S): at 05:25

## 2023-05-18 RX ADMIN — FINASTERIDE 5 MILLIGRAM(S): 5 TABLET, FILM COATED ORAL at 11:27

## 2023-05-18 RX ADMIN — BENZOCAINE AND MENTHOL 1 LOZENGE: 5; 1 LIQUID ORAL at 18:24

## 2023-05-18 RX ADMIN — Medication 1200 MILLIGRAM(S): at 18:24

## 2023-05-18 RX ADMIN — TAMSULOSIN HYDROCHLORIDE 0.4 MILLIGRAM(S): 0.4 CAPSULE ORAL at 21:32

## 2023-05-18 RX ADMIN — Medication 100 MILLIGRAM(S): at 00:37

## 2023-05-18 RX ADMIN — SENNA PLUS 2 TABLET(S): 8.6 TABLET ORAL at 21:32

## 2023-05-18 RX ADMIN — POLYETHYLENE GLYCOL 3350 17 GRAM(S): 17 POWDER, FOR SOLUTION ORAL at 05:26

## 2023-05-18 RX ADMIN — ATORVASTATIN CALCIUM 40 MILLIGRAM(S): 80 TABLET, FILM COATED ORAL at 21:32

## 2023-05-18 RX ADMIN — Medication 40 MILLIGRAM(S): at 05:25

## 2023-05-18 RX ADMIN — BENZOCAINE AND MENTHOL 1 LOZENGE: 5; 1 LIQUID ORAL at 11:38

## 2023-05-18 NOTE — PROGRESS NOTE ADULT - ASSESSMENT
77 year old male with history of HTN, chronic sys CHF, BPH, prostate cancer, recent  PE/DVT in 3/23 who was d/c on 4/7/23 on coumadin with ongoing hematuria/ Joseph noted from last visit presented w/ bilateral leg edema, sob and hematuria and was admitted for acute hypoxemic respiratory failure secondary to acute on chronic systolic CHF & gross hematuria due to supra-therapeutic INR.     gross hematuria  with hx of BPH/ Prostate Ca  - likely due to supratherapeutic INR  - Urology consulted  - joseph in place /exchanged by Uro 5/8  - no CBI needed: joseph draining clear  - Continue Joseph - patient Failed trial ov void on 5/9  - c/w finasteride and Flomax  - PSA 59.3  - MRI showed an abnormal diffusion, enhancement and signal noted in the right peripheral zone and seminal vesicles concerning for prostate carcinoma  - Underlying bladder mass could not be excluded on CT - patient will need cystoscopy and prostate bx as outpatient - urology reports that prostate bx cannot be done inhouse but cystoscopy will be done Thursday.      PE/DVT in 3/2023  - US showed no evidence for acute DVT in the bilateral lower extremity deep venous systems  - CTA showed multifocal bilateral peripheral filling defects and occlusions involving the segmental and subsegmental arteries of upper and lower lobes pulmonary arteries, similar to the prior study, compatible with chronic PE  - Per Dr. Luu, no need for IVC filter because no DVT in major veins of legs present  - Coumadin resumed - dose lowered from 7mg to 6mg - will give Vit K to reverse INR for cystoscopy and start heparin gtt .  will need to restart Coumadin after cystoscopy     Acute on chronic systolic CHF w/ biV failure with severe pulm HTN  - Echo showed EF 45-50% w/ left ventricular concentric remodeling, grade I diastolic dysfunction, severely enlarged right atrium and right ventricle, moderate-severe tricuspid regurgitation & severe pulmonary hypertension  - Cardiology & Pulm following  - c/w Lasix   - c/w Lipitor, metoprolol   - patient has POOR followup - will needed outpatient followup with Dr. Anderson and [facility] - he did not followup before      s/p Supratherapeutic INR with h/o recent  PE/DVT in 3/2023  -s/p 10 of vitamin K- INR improved   - CT brain with no acute findings   - No indication for IVC filter at this time as per Vascular  - dopplers negative for DVT     Acute blood loss anemia  - due to hematuria - resolved  - H&H is stable     Strangulation of hernia on imaging   - CT showed a moderate left inguinal hernia containing mesenteric fat, nonobstructed segment of sigmoid colon and small amount of ascites. Given ascites within the left inguinal hernia, an element of strangulation couldnot be excluded by radiology  - Gen Surgery consulted - NO plans for surgery as inguinal Hernia was reduced at bedside without difficulty or pain    CT showed a probable complex cyst in the small upper right renal pole demonstrating partial increased density  - will need outpatient follow up    HLD  - c/w Lipitor     Constipation  - c/w Senna, Miralax and Dulcolax PRN    Prophylaxis:  DVT: coumadin   GI: Protonix     Dispo; restart coumadin with heparin bridge after cysto

## 2023-05-18 NOTE — PROGRESS NOTE ADULT - NS ATTEND BILL GEN_ALL_CORE
Attending to bill
PA/NP to bill
Attending to bill
PA/NP to bill
Attending to bill

## 2023-05-18 NOTE — PROGRESS NOTE ADULT - SUBJECTIVE AND OBJECTIVE BOX
CC: Patient is a 77y old  Male who presents with a chief complaint of sob/ hematuria (17 May 2023 18:54)      Patient seen and examined at bedside, No acute overnight events.    ROS: Denies fever, nausea, vomiting, chest pain, SOB, abdominal pain, diarrhea and constipation    Vital Sign  Vital Signs Last 24 Hrs  T(C): 36.8 (18 May 2023 10:48), Max: 37.2 (18 May 2023 00:15)  T(F): 98.2 (18 May 2023 10:48), Max: 98.9 (18 May 2023 00:15)  HR: 109 (18 May 2023 10:48) (90 - 109)  BP: 122/69 (18 May 2023 10:48) (104/66 - 122/69)  BP(mean): --  RR: 17 (18 May 2023 10:48) (17 - 19)  SpO2: 94% (18 May 2023 10:48) (94% - 96%)    Parameters below as of 18 May 2023 05:07  Patient On (Oxygen Delivery Method): room air        Physical Exam:   Gen: NAD  HEENT: NCAT, EOMI, PERRLA, Pupils_  CVS: RRR, +S1/S2, no murmurs, rubs or gallops appreciated  Lungs: CTAB, no wheeze, rales, rhonchi  Abdomen: +BS, soft, ND, NT. no palpable flank tenderness or mass, no CVA tenderness  Ext: No cyanosis, edema or calf tenderness  Neuro: AAOx3, no focal deficits.    Labs:                        11.9   6.37  )-----------( 244      ( 18 May 2023 08:13 )             38.5   05-18    140  |  109<H>  |  21  ----------------------------<  93  3.9   |  28  |  1.24    Ca    8.5      18 May 2023 08:13  Phos  2.9     05-18  Mg     2.2     05-18 05-17 @ 07:01  -  05-18 @ 07:00  --------------------------------------------------------  IN: 0 mL / OUT: 2000 mL / NET: -2000 mL        Radiology:  *Pull    Medications:  MEDICATIONS  (STANDING):  atorvastatin 40 milliGRAM(s) Oral at bedtime  finasteride 5 milliGRAM(s) Oral daily  furosemide    Tablet 40 milliGRAM(s) Oral daily  guaiFENesin ER 1200 milliGRAM(s) Oral every 12 hours  melatonin 3 milliGRAM(s) Oral once  melatonin 3 milliGRAM(s) Oral at bedtime  metoprolol succinate ER 50 milliGRAM(s) Oral daily  pantoprazole    Tablet 40 milliGRAM(s) Oral before breakfast  polyethylene glycol 3350 17 Gram(s) Oral two times a day  senna 2 Tablet(s) Oral at bedtime  tamsulosin 0.4 milliGRAM(s) Oral at bedtime    MEDICATIONS  (PRN):  benzocaine/menthol Lozenge 1 Lozenge Oral every 6 hours PRN Sore Throat  bisacodyl 5 milliGRAM(s) Oral every 12 hours PRN Constipation  guaiFENesin Oral Liquid (Sugar-Free) 100 milliGRAM(s) Oral every 6 hours PRN Cough

## 2023-05-18 NOTE — PROGRESS NOTE ADULT - SUBJECTIVE AND OBJECTIVE BOX
Patient seen and  examined at bedside, with Dr. Busch, resting comfortably. Offers no complaints at this time.   Joseph catheter remains in place.   Denies fever, chills, N/V/D, CP, SOB.       MEDICATIONS  (STANDING):  atorvastatin 40 milliGRAM(s) Oral at bedtime  finasteride 5 milliGRAM(s) Oral daily  furosemide    Tablet 40 milliGRAM(s) Oral daily  guaiFENesin ER 1200 milliGRAM(s) Oral every 12 hours  melatonin 3 milliGRAM(s) Oral once  melatonin 3 milliGRAM(s) Oral at bedtime  metoprolol succinate ER 50 milliGRAM(s) Oral daily  pantoprazole    Tablet 40 milliGRAM(s) Oral before breakfast  polyethylene glycol 3350 17 Gram(s) Oral two times a day  senna 2 Tablet(s) Oral at bedtime  tamsulosin 0.4 milliGRAM(s) Oral at bedtime    MEDICATIONS  (PRN):  benzocaine/menthol Lozenge 1 Lozenge Oral every 6 hours PRN Sore Throat  bisacodyl 5 milliGRAM(s) Oral every 12 hours PRN Constipation  guaiFENesin Oral Liquid (Sugar-Free) 100 milliGRAM(s) Oral every 6 hours PRN Cough      Vital Signs Last 24 Hrs  T(C): 36.8 (18 May 2023 10:48), Max: 37.2 (18 May 2023 00:15)  T(F): 98.2 (18 May 2023 10:48), Max: 98.9 (18 May 2023 00:15)  HR: 109 (18 May 2023 10:48) (90 - 109)  BP: 122/69 (18 May 2023 10:48) (104/66 - 122/69)  RR: 17 (18 May 2023 10:48) (17 - 19)  SpO2: 94% (18 May 2023 10:48) (94% - 96%)    Parameters below as of 18 May 2023 05:07  Patient On (Oxygen Delivery Method): room air      PHYSICAL EXAM:  GENERAL: Alert, NAD  CHEST/LUNG: Respirations nonlabored  HEART: S1 & S2  ABDOMEN: soft, Nontender, Nondistended  : no suprapubic tenderness or distention, joseph catheter with straw colored urine in tubing  EXTREMITIES:  no calf tenderness, No edema          LABS:                        11.9   6.37  )-----------( 244      ( 18 May 2023 08:13 )             38.5     05-18    140  |  109<H>  |  21  ----------------------------<  93  3.9   |  28  |  1.24    Ca    8.5      18 May 2023 08:13  Phos  2.9     05-18  Mg     2.2     05-18      PT/INR - ( 18 May 2023 08:13 )   PT: 17.7 sec;   INR: 1.48 ratio         PTT - ( 17 May 2023 19:50 )  PTT:34.9 sec

## 2023-05-18 NOTE — PROGRESS NOTE ADULT - ASSESSMENT
77 year old male with h/o chronic PE/DVT on coumadin, a/w gross hematuria with concern for prostate CA.  Yan catheter inserted, murky straw-colored urine in bag.     - Cystoscopy canceled due to high cardiovascular risk, as it is non-emergent procedure  - Per cardiology, patient will likely remain at high risk, and may not be able to tolerate general anesthesia  - F/U urine cytology  Discussed with Dr. Busch

## 2023-05-18 NOTE — PROGRESS NOTE ADULT - NS PANP COMMENT GEN_ALL_CORE FT
I agree with the statements above.  Patient with gross hematuria. high cardiology risk. Asking for cardio evaluation for eventual TURBT.  Ordered urine cytology

## 2023-05-19 LAB
ANION GAP SERPL CALC-SCNC: 5 MMOL/L — SIGNIFICANT CHANGE UP (ref 5–17)
BUN SERPL-MCNC: 22 MG/DL — SIGNIFICANT CHANGE UP (ref 7–23)
CALCIUM SERPL-MCNC: 9.3 MG/DL — SIGNIFICANT CHANGE UP (ref 8.5–10.1)
CHLORIDE SERPL-SCNC: 108 MMOL/L — SIGNIFICANT CHANGE UP (ref 96–108)
CO2 SERPL-SCNC: 27 MMOL/L — SIGNIFICANT CHANGE UP (ref 22–31)
CREAT SERPL-MCNC: 1.18 MG/DL — SIGNIFICANT CHANGE UP (ref 0.5–1.3)
EGFR: 64 ML/MIN/1.73M2 — SIGNIFICANT CHANGE UP
GLUCOSE SERPL-MCNC: 136 MG/DL — HIGH (ref 70–99)
HCT VFR BLD CALC: 42.5 % — SIGNIFICANT CHANGE UP (ref 39–50)
HGB BLD-MCNC: 12.9 G/DL — LOW (ref 13–17)
INR BLD: 1.43 RATIO — HIGH (ref 0.88–1.16)
MCHC RBC-ENTMCNC: 24.3 PG — LOW (ref 27–34)
MCHC RBC-ENTMCNC: 30.4 G/DL — LOW (ref 32–36)
MCV RBC AUTO: 80 FL — SIGNIFICANT CHANGE UP (ref 80–100)
NRBC # BLD: 0 /100 WBCS — SIGNIFICANT CHANGE UP (ref 0–0)
PLATELET # BLD AUTO: 253 K/UL — SIGNIFICANT CHANGE UP (ref 150–400)
POTASSIUM SERPL-MCNC: 4.3 MMOL/L — SIGNIFICANT CHANGE UP (ref 3.5–5.3)
POTASSIUM SERPL-SCNC: 4.3 MMOL/L — SIGNIFICANT CHANGE UP (ref 3.5–5.3)
PROTHROM AB SERPL-ACNC: 17.2 SEC — HIGH (ref 10.5–13.4)
RBC # BLD: 5.31 M/UL — SIGNIFICANT CHANGE UP (ref 4.2–5.8)
RBC # FLD: 15.9 % — HIGH (ref 10.3–14.5)
SODIUM SERPL-SCNC: 140 MMOL/L — SIGNIFICANT CHANGE UP (ref 135–145)
WBC # BLD: 4.79 K/UL — SIGNIFICANT CHANGE UP (ref 3.8–10.5)
WBC # FLD AUTO: 4.79 K/UL — SIGNIFICANT CHANGE UP (ref 3.8–10.5)

## 2023-05-19 PROCEDURE — 99232 SBSQ HOSP IP/OBS MODERATE 35: CPT

## 2023-05-19 PROCEDURE — 99233 SBSQ HOSP IP/OBS HIGH 50: CPT

## 2023-05-19 PROCEDURE — 88112 CYTOPATH CELL ENHANCE TECH: CPT | Mod: 26

## 2023-05-19 RX ORDER — ACETAMINOPHEN 500 MG
650 TABLET ORAL ONCE
Refills: 0 | Status: COMPLETED | OUTPATIENT
Start: 2023-05-19 | End: 2023-05-19

## 2023-05-19 RX ORDER — WARFARIN SODIUM 2.5 MG/1
5 TABLET ORAL ONCE
Refills: 0 | Status: COMPLETED | OUTPATIENT
Start: 2023-05-19 | End: 2023-05-19

## 2023-05-19 RX ADMIN — SENNA PLUS 2 TABLET(S): 8.6 TABLET ORAL at 22:26

## 2023-05-19 RX ADMIN — WARFARIN SODIUM 5 MILLIGRAM(S): 2.5 TABLET ORAL at 22:27

## 2023-05-19 RX ADMIN — TAMSULOSIN HYDROCHLORIDE 0.4 MILLIGRAM(S): 0.4 CAPSULE ORAL at 22:29

## 2023-05-19 RX ADMIN — Medication 1200 MILLIGRAM(S): at 05:05

## 2023-05-19 RX ADMIN — Medication 650 MILLIGRAM(S): at 22:51

## 2023-05-19 RX ADMIN — Medication 1200 MILLIGRAM(S): at 17:33

## 2023-05-19 RX ADMIN — FINASTERIDE 5 MILLIGRAM(S): 5 TABLET, FILM COATED ORAL at 11:07

## 2023-05-19 RX ADMIN — PANTOPRAZOLE SODIUM 40 MILLIGRAM(S): 20 TABLET, DELAYED RELEASE ORAL at 05:06

## 2023-05-19 RX ADMIN — Medication 40 MILLIGRAM(S): at 05:05

## 2023-05-19 RX ADMIN — Medication 3 MILLIGRAM(S): at 22:25

## 2023-05-19 RX ADMIN — Medication 50 MILLIGRAM(S): at 05:05

## 2023-05-19 RX ADMIN — Medication 650 MILLIGRAM(S): at 23:51

## 2023-05-19 RX ADMIN — Medication 3 MILLIGRAM(S): at 22:26

## 2023-05-19 RX ADMIN — BENZOCAINE AND MENTHOL 1 LOZENGE: 5; 1 LIQUID ORAL at 17:34

## 2023-05-19 RX ADMIN — Medication 100 MILLIGRAM(S): at 17:35

## 2023-05-19 RX ADMIN — ATORVASTATIN CALCIUM 40 MILLIGRAM(S): 80 TABLET, FILM COATED ORAL at 22:29

## 2023-05-19 RX ADMIN — POLYETHYLENE GLYCOL 3350 17 GRAM(S): 17 POWDER, FOR SOLUTION ORAL at 05:06

## 2023-05-19 NOTE — PROGRESS NOTE ADULT - SUBJECTIVE AND OBJECTIVE BOX
CC: Patient is a 77y old  Male who presents with a chief complaint of sob/ hematuria (18 May 2023 15:54)      Patient seen and examined at bedside, No acute overnight events. Pt denies any acute complaints    ROS: Denies fever, nausea, vomiting, chest pain, SOB, abdominal pain, diarrhea and constipation    Vital Sign  Vital Signs Last 24 Hrs  T(C): 37 (19 May 2023 12:01), Max: 37.1 (19 May 2023 04:51)  T(F): 98.6 (19 May 2023 12:01), Max: 98.8 (19 May 2023 04:51)  HR: 99 (19 May 2023 12:01) (99 - 113)  BP: 106/74 (19 May 2023 12:01) (106/74 - 130/80)  BP(mean): --  RR: 18 (19 May 2023 12:01) (18 - 18)  SpO2: 92% (19 May 2023 12:01) (92% - 94%)    Parameters below as of 19 May 2023 04:51  Patient On (Oxygen Delivery Method): nasal cannula        Physical Exam:   Gen: NAD  HEENT: NCAT, EOMI, PERRLA, Pupils_  CVS: RRR, +S1/S2, no murmurs, rubs or gallops appreciated  Lungs: CTAB, no wheeze, rales, rhonchi  Abdomen: +BS, soft, ND, NT. no palpable flank tenderness or mass, no CVA tenderness  Ext: No cyanosis, edema or calf tenderness  Neuro: AAOx3, no focal deficits.    Labs:                        11.9   6.37  )-----------( 244      ( 18 May 2023 08:13 )             38.5   05-18    140  |  109<H>  |  21  ----------------------------<  93  3.9   |  28  |  1.24    Ca    8.5      18 May 2023 08:13  Phos  2.9     05-18  Mg     2.2     05-18 05-18 @ 07:01  -  05-19 @ 07:00  --------------------------------------------------------  IN: 200 mL / OUT: 600 mL / NET: -400 mL    05-19 @ 07:01  - 05-19 @ 17:21  --------------------------------------------------------  IN: 440 mL / OUT: 0 mL / NET: 440 mL        Radiology:  *Pull    Medications:  MEDICATIONS  (STANDING):  atorvastatin 40 milliGRAM(s) Oral at bedtime  finasteride 5 milliGRAM(s) Oral daily  furosemide    Tablet 40 milliGRAM(s) Oral daily  guaiFENesin ER 1200 milliGRAM(s) Oral every 12 hours  melatonin 3 milliGRAM(s) Oral once  melatonin 3 milliGRAM(s) Oral at bedtime  metoprolol succinate ER 50 milliGRAM(s) Oral daily  pantoprazole    Tablet 40 milliGRAM(s) Oral before breakfast  polyethylene glycol 3350 17 Gram(s) Oral two times a day  senna 2 Tablet(s) Oral at bedtime  tamsulosin 0.4 milliGRAM(s) Oral at bedtime    MEDICATIONS  (PRN):  benzocaine/menthol Lozenge 1 Lozenge Oral every 6 hours PRN Sore Throat  bisacodyl 5 milliGRAM(s) Oral every 12 hours PRN Constipation  guaiFENesin Oral Liquid (Sugar-Free) 100 milliGRAM(s) Oral every 6 hours PRN Cough

## 2023-05-19 NOTE — PROGRESS NOTE ADULT - ASSESSMENT
77 year old male with history of HTN, chronic sys CHF, BPH, prostate cancer, recent  PE/DVT in 3/23 who was d/c on 4/7/23 on coumadin with ongoing hematuria/ Joseph noted from last visit presented w/ bilateral leg edema, sob and hematuria and was admitted for acute hypoxemic respiratory failure secondary to acute on chronic systolic CHF & gross hematuria due to supra-therapeutic INR.     gross hematuria  with hx of BPH/ Prostate Ca  - likely due to supratherapeutic INR  - Urology consulted  - joseph in place /exchanged by Uro 5/8  - no CBI needed: joseph draining clear  - Continue Joseph - patient Failed trial ov void on 5/9  - c/w finasteride and Flomax  - PSA 59.3  - MRI showed an abnormal diffusion, enhancement and signal noted in the right peripheral zone and seminal vesicles concerning for prostate carcinoma  - Underlying bladder mass could not be excluded on CT - patient will need cystoscopy and prostate bx as outpatient   - Cystoscopy cancelled- patient is high risk at this time.    PE/DVT in 3/2023  - US showed no evidence for acute DVT in the bilateral lower extremity deep venous systems  - CTA showed multifocal bilateral peripheral filling defects and occlusions involving the segmental and subsegmental arteries of upper and lower lobes pulmonary arteries, similar to the prior study, compatible with chronic PE  - Per Dr. Luu, no need for IVC filter because no DVT in major veins of legs present  - Coumadin resumed - dose lowered from 7mg to 6mg - will give Vit K to reverse INR for cystoscopy and start heparin gtt .  will need to restart Coumadin after cystoscopy     Acute on chronic systolic CHF w/ biV failure with severe pulm HTN  - Echo showed EF 45-50% w/ left ventricular concentric remodeling, grade I diastolic dysfunction, severely enlarged right atrium and right ventricle, moderate-severe tricuspid regurgitation & severe pulmonary hypertension  - Cardiology & Pulm following  - c/w Lasix   - c/w Lipitor, metoprolol   - patient has POOR followup - will needed outpatient followup with Dr. Anderson and [facility] - he did not followup before      s/p Supratherapeutic INR with h/o recent  PE/DVT in 3/2023  -s/p 10 of vitamin K- INR improved   - CT brain with no acute findings   - No indication for IVC filter at this time as per Vascular  - dopplers negative for DVT     Acute blood loss anemia  - due to hematuria - resolved  - H&H is stable     Strangulation of hernia on imaging   - CT showed a moderate left inguinal hernia containing mesenteric fat, nonobstructed segment of sigmoid colon and small amount of ascites. Given ascites within the left inguinal hernia, an element of strangulation couldnot be excluded by radiology  - Gen Surgery consulted - NO plans for surgery as inguinal Hernia was reduced at bedside without difficulty or pain    CT showed a probable complex cyst in the small upper right renal pole demonstrating partial increased density  - will need outpatient follow up    HLD  - c/w Lipitor     Constipation  - c/w Senna, Miralax and Dulcolax PRN    Prophylaxis:  DVT: coumadin   GI: Protonix     Dispo; restart coumadin with heparin bridge after cysto

## 2023-05-20 LAB
ANION GAP SERPL CALC-SCNC: 5 MMOL/L — SIGNIFICANT CHANGE UP (ref 5–17)
ANISOCYTOSIS BLD QL: SLIGHT — SIGNIFICANT CHANGE UP
BASOPHILS # BLD AUTO: 0 K/UL — SIGNIFICANT CHANGE UP (ref 0–0.2)
BASOPHILS NFR BLD AUTO: 0 % — SIGNIFICANT CHANGE UP (ref 0–2)
BUN SERPL-MCNC: 20 MG/DL — SIGNIFICANT CHANGE UP (ref 7–23)
BURR CELLS BLD QL SMEAR: SLIGHT — SIGNIFICANT CHANGE UP
CALCIUM SERPL-MCNC: 9 MG/DL — SIGNIFICANT CHANGE UP (ref 8.5–10.1)
CHLORIDE SERPL-SCNC: 108 MMOL/L — SIGNIFICANT CHANGE UP (ref 96–108)
CO2 SERPL-SCNC: 27 MMOL/L — SIGNIFICANT CHANGE UP (ref 22–31)
CREAT SERPL-MCNC: 1.15 MG/DL — SIGNIFICANT CHANGE UP (ref 0.5–1.3)
EGFR: 66 ML/MIN/1.73M2 — SIGNIFICANT CHANGE UP
ELLIPTOCYTES BLD QL SMEAR: SLIGHT — SIGNIFICANT CHANGE UP
EOSINOPHIL # BLD AUTO: 0.3 K/UL — SIGNIFICANT CHANGE UP (ref 0–0.5)
EOSINOPHIL NFR BLD AUTO: 6 % — SIGNIFICANT CHANGE UP (ref 0–6)
GLUCOSE SERPL-MCNC: 95 MG/DL — SIGNIFICANT CHANGE UP (ref 70–99)
HCT VFR BLD CALC: 40.1 % — SIGNIFICANT CHANGE UP (ref 39–50)
HGB BLD-MCNC: 12 G/DL — LOW (ref 13–17)
INR BLD: 1.41 RATIO — HIGH (ref 0.88–1.16)
LYMPHOCYTES # BLD AUTO: 2.07 K/UL — SIGNIFICANT CHANGE UP (ref 1–3.3)
LYMPHOCYTES # BLD AUTO: 42 % — SIGNIFICANT CHANGE UP (ref 13–44)
MACROCYTES BLD QL: SLIGHT — SIGNIFICANT CHANGE UP
MANUAL SMEAR VERIFICATION: SIGNIFICANT CHANGE UP
MCHC RBC-ENTMCNC: 24.2 PG — LOW (ref 27–34)
MCHC RBC-ENTMCNC: 29.9 G/DL — LOW (ref 32–36)
MCV RBC AUTO: 81 FL — SIGNIFICANT CHANGE UP (ref 80–100)
MICROCYTES BLD QL: SLIGHT — SIGNIFICANT CHANGE UP
MONOCYTES # BLD AUTO: 0.49 K/UL — SIGNIFICANT CHANGE UP (ref 0–0.9)
MONOCYTES NFR BLD AUTO: 10 % — SIGNIFICANT CHANGE UP (ref 2–14)
NEUTROPHILS # BLD AUTO: 2.07 K/UL — SIGNIFICANT CHANGE UP (ref 1.8–7.4)
NEUTROPHILS NFR BLD AUTO: 41 % — LOW (ref 43–77)
NEUTS BAND # BLD: 1 % — SIGNIFICANT CHANGE UP (ref 0–8)
NRBC # BLD: 0 /100 — SIGNIFICANT CHANGE UP (ref 0–0)
NRBC # BLD: SIGNIFICANT CHANGE UP /100 WBCS (ref 0–0)
OVALOCYTES BLD QL SMEAR: SLIGHT — SIGNIFICANT CHANGE UP
PLAT MORPH BLD: NORMAL — SIGNIFICANT CHANGE UP
PLATELET # BLD AUTO: 237 K/UL — SIGNIFICANT CHANGE UP (ref 150–400)
POTASSIUM SERPL-MCNC: 4.5 MMOL/L — SIGNIFICANT CHANGE UP (ref 3.5–5.3)
POTASSIUM SERPL-SCNC: 4.5 MMOL/L — SIGNIFICANT CHANGE UP (ref 3.5–5.3)
PROTHROM AB SERPL-ACNC: 16.8 SEC — HIGH (ref 10.5–13.4)
RBC # BLD: 4.95 M/UL — SIGNIFICANT CHANGE UP (ref 4.2–5.8)
RBC # FLD: 16.1 % — HIGH (ref 10.3–14.5)
RBC BLD AUTO: ABNORMAL
SODIUM SERPL-SCNC: 140 MMOL/L — SIGNIFICANT CHANGE UP (ref 135–145)
WBC # BLD: 4.92 K/UL — SIGNIFICANT CHANGE UP (ref 3.8–10.5)
WBC # FLD AUTO: 4.92 K/UL — SIGNIFICANT CHANGE UP (ref 3.8–10.5)

## 2023-05-20 PROCEDURE — 99232 SBSQ HOSP IP/OBS MODERATE 35: CPT

## 2023-05-20 RX ORDER — WARFARIN SODIUM 2.5 MG/1
6 TABLET ORAL ONCE
Refills: 0 | Status: COMPLETED | OUTPATIENT
Start: 2023-05-20 | End: 2023-05-20

## 2023-05-20 RX ORDER — ENOXAPARIN SODIUM 100 MG/ML
70 INJECTION SUBCUTANEOUS EVERY 12 HOURS
Refills: 0 | Status: DISCONTINUED | OUTPATIENT
Start: 2023-05-20 | End: 2023-05-23

## 2023-05-20 RX ADMIN — Medication 1200 MILLIGRAM(S): at 06:34

## 2023-05-20 RX ADMIN — Medication 40 MILLIGRAM(S): at 06:34

## 2023-05-20 RX ADMIN — Medication 100 MILLIGRAM(S): at 21:40

## 2023-05-20 RX ADMIN — WARFARIN SODIUM 6 MILLIGRAM(S): 2.5 TABLET ORAL at 21:41

## 2023-05-20 RX ADMIN — FINASTERIDE 5 MILLIGRAM(S): 5 TABLET, FILM COATED ORAL at 11:46

## 2023-05-20 RX ADMIN — POLYETHYLENE GLYCOL 3350 17 GRAM(S): 17 POWDER, FOR SOLUTION ORAL at 06:35

## 2023-05-20 RX ADMIN — TAMSULOSIN HYDROCHLORIDE 0.4 MILLIGRAM(S): 0.4 CAPSULE ORAL at 21:40

## 2023-05-20 RX ADMIN — PANTOPRAZOLE SODIUM 40 MILLIGRAM(S): 20 TABLET, DELAYED RELEASE ORAL at 11:46

## 2023-05-20 RX ADMIN — Medication 1200 MILLIGRAM(S): at 18:26

## 2023-05-20 RX ADMIN — Medication 3 MILLIGRAM(S): at 21:42

## 2023-05-20 RX ADMIN — ENOXAPARIN SODIUM 70 MILLIGRAM(S): 100 INJECTION SUBCUTANEOUS at 18:26

## 2023-05-20 RX ADMIN — SENNA PLUS 2 TABLET(S): 8.6 TABLET ORAL at 21:41

## 2023-05-20 RX ADMIN — ATORVASTATIN CALCIUM 40 MILLIGRAM(S): 80 TABLET, FILM COATED ORAL at 21:41

## 2023-05-20 NOTE — PROGRESS NOTE ADULT - SUBJECTIVE AND OBJECTIVE BOX
CC: Patient is a 77y old  Male who presents with a chief complaint of sob/ hematuria (19 May 2023 17:20)      Patient seen and examined at bedside, No acute overnight events.Pt denies any acute complaints  - Inteperter ID: [id number]    ROS: Denies fever, nausea, vomiting, chest pain, SOB, abdominal pain, diarrhea and constipation    Vital Sign  Vital Signs Last 24 Hrs  T(C): 37.6 (20 May 2023 11:59), Max: 37.6 (20 May 2023 11:59)  T(F): 99.7 (20 May 2023 11:59), Max: 99.7 (20 May 2023 11:59)  HR: 111 (20 May 2023 11:59) (99 - 111)  BP: 102/73 (20 May 2023 11:59) (102/70 - 113/74)  BP(mean): --  RR: 18 (20 May 2023 11:59) (17 - 18)  SpO2: 92% (20 May 2023 11:59) (92% - 94%)        Physical Exam:   Gen: NAD  HEENT: NCAT, EOMI, PERRLA, Pupils_  CVS: RRR, +S1/S2, no murmurs, rubs or gallops appreciated  Lungs: CTAB, no wheeze, rales, rhonchi  Abdomen: +BS, soft, ND, NT. no palpable flank tenderness or mass, no CVA tenderness  Ext: No cyanosis, edema or calf tenderness  Neuro: AAOx3, no focal deficits.    Labs:                        12.0   4.92  )-----------( 237      ( 20 May 2023 05:50 )             40.1   05-20    140  |  108  |  20  ----------------------------<  95  4.5   |  27  |  1.15    Ca    9.0      20 May 2023 05:50        05-19 @ 07:01  -  05-20 @ 07:00  --------------------------------------------------------  IN: 440 mL / OUT: 1350 mL / NET: -910 mL        Radiology:  *Pull    Medications:  MEDICATIONS  (STANDING):  atorvastatin 40 milliGRAM(s) Oral at bedtime  finasteride 5 milliGRAM(s) Oral daily  furosemide    Tablet 40 milliGRAM(s) Oral daily  guaiFENesin ER 1200 milliGRAM(s) Oral every 12 hours  melatonin 3 milliGRAM(s) Oral at bedtime  metoprolol succinate ER 50 milliGRAM(s) Oral daily  pantoprazole    Tablet 40 milliGRAM(s) Oral before breakfast  polyethylene glycol 3350 17 Gram(s) Oral two times a day  senna 2 Tablet(s) Oral at bedtime  tamsulosin 0.4 milliGRAM(s) Oral at bedtime    MEDICATIONS  (PRN):  benzocaine/menthol Lozenge 1 Lozenge Oral every 6 hours PRN Sore Throat  bisacodyl 5 milliGRAM(s) Oral every 12 hours PRN Constipation  guaiFENesin Oral Liquid (Sugar-Free) 100 milliGRAM(s) Oral every 6 hours PRN Cough

## 2023-05-20 NOTE — PROGRESS NOTE ADULT - ASSESSMENT
77 year old male with history of HTN, chronic sys CHF, BPH, prostate cancer, recent  PE/DVT in 3/23 who was d/c on 4/7/23 on coumadin with ongoing hematuria/ Joseph noted from last visit presented w/ bilateral leg edema, sob and hematuria and was admitted for acute hypoxemic respiratory failure secondary to acute on chronic systolic CHF & gross hematuria due to supra-therapeutic INR.     gross hematuria  with hx of BPH/ Prostate Ca  - likely due to supratherapeutic INR  - Urology consulted  - joseph in place /exchanged by Uro 5/8  - no CBI needed: joseph draining clear  - Continue Joseph - patient Failed trial ov void on 5/9  - c/w finasteride and Flomax  - PSA 59.3  - MRI showed an abnormal diffusion, enhancement and signal noted in the right peripheral zone and seminal vesicles concerning for prostate carcinoma  - Underlying bladder mass could not be excluded on CT - patient will need cystoscopy and prostate bx as outpatient   - Cystoscopy cancelled- patient is high risk at this time.    PE/DVT in 3/2023  - US showed no evidence for acute DVT in the bilateral lower extremity deep venous systems  - CTA showed multifocal bilateral peripheral filling defects and occlusions involving the segmental and subsegmental arteries of upper and lower lobes pulmonary arteries, similar to the prior study, compatible with chronic PE  - Per Dr. Luu, no need for IVC filter because no DVT in major veins of legs present  - Coumadin resumed - dose lowered from 7mg to 6mg    Acute on chronic systolic CHF w/ biV failure with severe pulm HTN  - Echo showed EF 45-50% w/ left ventricular concentric remodeling, grade I diastolic dysfunction, severely enlarged right atrium and right ventricle, moderate-severe tricuspid regurgitation & severe pulmonary hypertension  - Cardiology & Pulm following  - c/w Lasix   - c/w Lipitor, metoprolol   - patient has POOR followup - will needed outpatient followup with Dr. Anderson and [facility] - he did not followup before      s/p Supratherapeutic INR with h/o recent  PE/DVT in 3/2023  -s/p 10 of vitamin K- INR improved   - CT brain with no acute findings   - No indication for IVC filter at this time as per Vascular  - dopplers negative for DVT     Acute blood loss anemia  - due to hematuria - resolved  - H&H is stable     Strangulation of hernia on imaging   - CT showed a moderate left inguinal hernia containing mesenteric fat, nonobstructed segment of sigmoid colon and small amount of ascites. Given ascites within the left inguinal hernia, an element of strangulation couldnot be excluded by radiology  - Gen Surgery consulted - NO plans for surgery as inguinal Hernia was reduced at bedside without difficulty or pain    CT showed a probable complex cyst in the small upper right renal pole demonstrating partial increased density  - will need outpatient follow up    HLD  - c/w Lipitor     Constipation  - c/w Senna, Miralax and Dulcolax PRN    Prophylaxis:  DVT: coumadin   GI: Protonix     Dispo; Home/[facility]       Called the daughter  5/20/2023- Provided an update.

## 2023-05-20 NOTE — PHARMACOTHERAPY INTERVENTION NOTE - COMMENTS
Recommended to initiate enoxaparin 70 mG subcutaneously twice a day as bridge therapy as patient is being reinitiated on warfarin therapy and patient's INR is under 2. 
Recommended to order a repeat INR for 5/20 as patient is being reinitiated on warfarin therapy and INR is currently not at goal.

## 2023-05-21 LAB
INR BLD: 1.63 RATIO — HIGH (ref 0.88–1.16)
PROTHROM AB SERPL-ACNC: 19.6 SEC — HIGH (ref 10.5–13.4)

## 2023-05-21 PROCEDURE — 99232 SBSQ HOSP IP/OBS MODERATE 35: CPT

## 2023-05-21 RX ORDER — WARFARIN SODIUM 2.5 MG/1
6 TABLET ORAL ONCE
Refills: 0 | Status: COMPLETED | OUTPATIENT
Start: 2023-05-21 | End: 2023-05-21

## 2023-05-21 RX ORDER — ACETAMINOPHEN 500 MG
650 TABLET ORAL EVERY 6 HOURS
Refills: 0 | Status: DISCONTINUED | OUTPATIENT
Start: 2023-05-21 | End: 2023-05-26

## 2023-05-21 RX ORDER — FLUTICASONE PROPIONATE 50 MCG
1 SPRAY, SUSPENSION NASAL
Refills: 0 | Status: DISCONTINUED | OUTPATIENT
Start: 2023-05-21 | End: 2023-05-26

## 2023-05-21 RX ADMIN — TAMSULOSIN HYDROCHLORIDE 0.4 MILLIGRAM(S): 0.4 CAPSULE ORAL at 22:17

## 2023-05-21 RX ADMIN — ENOXAPARIN SODIUM 70 MILLIGRAM(S): 100 INJECTION SUBCUTANEOUS at 06:43

## 2023-05-21 RX ADMIN — Medication 650 MILLIGRAM(S): at 22:20

## 2023-05-21 RX ADMIN — Medication 3 MILLIGRAM(S): at 22:16

## 2023-05-21 RX ADMIN — WARFARIN SODIUM 6 MILLIGRAM(S): 2.5 TABLET ORAL at 22:17

## 2023-05-21 RX ADMIN — Medication 650 MILLIGRAM(S): at 14:27

## 2023-05-21 RX ADMIN — FINASTERIDE 5 MILLIGRAM(S): 5 TABLET, FILM COATED ORAL at 12:35

## 2023-05-21 RX ADMIN — SENNA PLUS 2 TABLET(S): 8.6 TABLET ORAL at 22:17

## 2023-05-21 RX ADMIN — POLYETHYLENE GLYCOL 3350 17 GRAM(S): 17 POWDER, FOR SOLUTION ORAL at 06:44

## 2023-05-21 RX ADMIN — Medication 1200 MILLIGRAM(S): at 17:26

## 2023-05-21 RX ADMIN — Medication 1200 MILLIGRAM(S): at 06:43

## 2023-05-21 RX ADMIN — Medication 40 MILLIGRAM(S): at 06:43

## 2023-05-21 RX ADMIN — Medication 100 MILLIGRAM(S): at 12:35

## 2023-05-21 RX ADMIN — BENZOCAINE AND MENTHOL 1 LOZENGE: 5; 1 LIQUID ORAL at 17:30

## 2023-05-21 RX ADMIN — Medication 100 MILLIGRAM(S): at 22:21

## 2023-05-21 RX ADMIN — Medication 650 MILLIGRAM(S): at 23:20

## 2023-05-21 RX ADMIN — Medication 50 MILLIGRAM(S): at 06:43

## 2023-05-21 RX ADMIN — Medication 650 MILLIGRAM(S): at 14:11

## 2023-05-21 RX ADMIN — Medication 1 SPRAY(S): at 17:25

## 2023-05-21 RX ADMIN — PANTOPRAZOLE SODIUM 40 MILLIGRAM(S): 20 TABLET, DELAYED RELEASE ORAL at 12:35

## 2023-05-21 RX ADMIN — ENOXAPARIN SODIUM 70 MILLIGRAM(S): 100 INJECTION SUBCUTANEOUS at 17:26

## 2023-05-21 RX ADMIN — ATORVASTATIN CALCIUM 40 MILLIGRAM(S): 80 TABLET, FILM COATED ORAL at 22:17

## 2023-05-21 NOTE — PHYSICAL THERAPY INITIAL EVALUATION ADULT - GENERAL OBSERVATIONS, REHAB EVAL
Pt was seen in supine c joseph catheter +, alert and Ox4.  Video  was used throughout P.T. intervention. Pt c/o pain at gee Achilles tendon and needed encouragement to participate in P.T. intervention.
pt encountered in bed supine with cardiac monitor and foly cath

## 2023-05-21 NOTE — PROGRESS NOTE ADULT - SUBJECTIVE AND OBJECTIVE BOX
CC: Patient is a 77y old  Male who presents with a chief complaint of sob/ hematuria (20 May 2023 12:43)      Patient seen and examined at bedside. admits to runny nose and cough    ROS: Denies fever, nausea, vomiting, chest pain, SOB, abdominal pain, diarrhea and constipation    Vital Sign  Vital Signs Last 24 Hrs  T(C): 36.6 (21 May 2023 05:20), Max: 37.6 (20 May 2023 11:59)  T(F): 97.8 (21 May 2023 05:20), Max: 99.7 (20 May 2023 11:59)  HR: 108 (21 May 2023 05:20) (108 - 115)  BP: 112/67 (21 May 2023 05:20) (102/73 - 134/80)  BP(mean): --  RR: 18 (21 May 2023 05:20) (18 - 18)  SpO2: 93% (21 May 2023 05:20) (92% - 94%)        Physical Exam:   Gen: NAD  HEENT: NCAT, EOMI, PERRLA, Pupils_  CVS: RRR, +S1/S2, no murmurs, rubs or gallops appreciated  Lungs: CTAB, no wheeze, rales, rhonchi  Abdomen: +BS, soft, ND, NT. no palpable flank tenderness or mass, no CVA tenderness  Ext: No cyanosis, edema or calf tenderness  Neuro: AAOx3, no focal deficits.    Labs:                        12.0   4.92  )-----------( 237      ( 20 May 2023 05:50 )             40.1   05-20    140  |  108  |  20  ----------------------------<  95  4.5   |  27  |  1.15    Ca    9.0      20 May 2023 05:50        05-20 @ 07:01  -  05-21 @ 07:00  --------------------------------------------------------  IN: 240 mL / OUT: 900 mL / NET: -660 mL        Radiology:  *Pull    Medications:  MEDICATIONS  (STANDING):  atorvastatin 40 milliGRAM(s) Oral at bedtime  enoxaparin Injectable 70 milliGRAM(s) SubCutaneous every 12 hours  finasteride 5 milliGRAM(s) Oral daily  furosemide    Tablet 40 milliGRAM(s) Oral daily  guaiFENesin ER 1200 milliGRAM(s) Oral every 12 hours  melatonin 3 milliGRAM(s) Oral at bedtime  metoprolol succinate ER 50 milliGRAM(s) Oral daily  pantoprazole    Tablet 40 milliGRAM(s) Oral before breakfast  polyethylene glycol 3350 17 Gram(s) Oral two times a day  senna 2 Tablet(s) Oral at bedtime  tamsulosin 0.4 milliGRAM(s) Oral at bedtime    MEDICATIONS  (PRN):  benzocaine/menthol Lozenge 1 Lozenge Oral every 6 hours PRN Sore Throat  bisacodyl 5 milliGRAM(s) Oral every 12 hours PRN Constipation  guaiFENesin Oral Liquid (Sugar-Free) 100 milliGRAM(s) Oral every 6 hours PRN Cough

## 2023-05-21 NOTE — PHYSICAL THERAPY INITIAL EVALUATION ADULT - ADDITIONAL COMMENTS
AS per pt thru , pt lives in a house with 7 steps to enter with b/l HR's going down, no other steps. He was independent in all functional mobility using no AD, PTA.
pt encountered in bed supine, aaox 4, + foly cath and cardiac monitor. pt indep in bed mob and transfers, able to amb without AD x 100ft, able to negotiate 1 flight of stairs with gee HR and reciprocating gait. no balance deficit noted, assisted to bathroom for ADL. no skilled P.T need at this time.

## 2023-05-21 NOTE — PROGRESS NOTE ADULT - ASSESSMENT
77 year old male with history of HTN, chronic sys CHF, BPH, prostate cancer, recent  PE/DVT in 3/23 who was d/c on 4/7/23 on coumadin with ongoing hematuria/ Joseph noted from last visit presented w/ bilateral leg edema, sob and hematuria and was admitted for acute hypoxemic respiratory failure secondary to acute on chronic systolic CHF & gross hematuria due to supra-therapeutic INR.     gross hematuria  with hx of BPH/ Prostate Ca  - likely due to supratherapeutic INR  - Urology consulted  - joseph in place /exchanged by Uro 5/8  - no CBI needed: joseph draining clear  - Continue Joseph - patient Failed trial ov void on 5/9  - c/w finasteride and Flomax  - PSA 59.3  - MRI showed an abnormal diffusion, enhancement and signal noted in the right peripheral zone and seminal vesicles concerning for prostate carcinoma  - Underlying bladder mass could not be excluded on CT - patient will need cystoscopy and prostate bx as outpatient   - Cystoscopy cancelled- patient is high risk at this time.    PE/DVT in 3/2023  - US showed no evidence for acute DVT in the bilateral lower extremity deep venous systems  - CTA showed multifocal bilateral peripheral filling defects and occlusions involving the segmental and subsegmental arteries of upper and lower lobes pulmonary arteries, similar to the prior study, compatible with chronic PE  - Per Dr. Luu, no need for IVC filter because no DVT in major veins of legs present  - Coumadin resumed - dose lowered from 7mg to 6mg- currently being bridged  -     Acute on chronic systolic CHF w/ biV failure with severe pulm HTN  - Echo showed EF 45-50% w/ left ventricular concentric remodeling, grade I diastolic dysfunction, severely enlarged right atrium and right ventricle, moderate-severe tricuspid regurgitation & severe pulmonary hypertension  - Cardiology & Pulm following  - c/w Lasix   - c/w Lipitor, metoprolol   - patient has POOR followup - will needed outpatient followup with Dr. Anderson and [facility] - he did not followup before      s/p Supratherapeutic INR with h/o recent  PE/DVT in 3/2023  -s/p 10 of vitamin K- INR improved   - CT brain with no acute findings   - No indication for IVC filter at this time as per Vascular  - dopplers negative for DVT     Acute blood loss anemia  - due to hematuria - resolved  - H&H is stable     Strangulation of hernia on imaging   - CT showed a moderate left inguinal hernia containing mesenteric fat, nonobstructed segment of sigmoid colon and small amount of ascites. Given ascites within the left inguinal hernia, an element of strangulation couldnot be excluded by radiology  - Gen Surgery consulted - NO plans for surgery as inguinal Hernia was reduced at bedside without difficulty or pain    CT showed a probable complex cyst in the small upper right renal pole demonstrating partial increased density  - will need outpatient follow up    HLD  - c/w Lipitor     Constipation  - c/w Senna, Miralax and Dulcolax PRN    Prophylaxis:  DVT: coumadin   GI: Protonix     Dispo; Home/GISELL       Called the daughter  5/20/2023- Provided an update. 77 year old male with history of HTN, chronic sys CHF, BPH, prostate cancer, recent  PE/DVT in 3/23 who was d/c on 4/7/23 on coumadin with ongoing hematuria/ Joseph noted from last visit presented w/ bilateral leg edema, sob and hematuria and was admitted for acute hypoxemic respiratory failure secondary to acute on chronic systolic CHF & gross hematuria due to supra-therapeutic INR.     gross hematuria  with hx of BPH/ Prostate Ca  - likely due to supratherapeutic INR  - Urology consulted  - joseph in place /exchanged by Uro 5/8  - no CBI needed: joseph draining clear  - Continue Joseph - patient Failed trial ov void on 5/9  - c/w finasteride and Flomax  - PSA 59.3  - MRI showed an abnormal diffusion, enhancement and signal noted in the right peripheral zone and seminal vesicles concerning for prostate carcinoma  - Underlying bladder mass could not be excluded on CT - patient will need cystoscopy and prostate bx as outpatient   - Cystoscopy cancelled- patient is high risk at this time.    PE/DVT in 3/2023  - US showed no evidence for acute DVT in the bilateral lower extremity deep venous systems  - CTA showed multifocal bilateral peripheral filling defects and occlusions involving the segmental and subsegmental arteries of upper and lower lobes pulmonary arteries, similar to the prior study, compatible with chronic PE  - Per Dr. Luu, no need for IVC filter because no DVT in major veins of legs present  - Coumadin resumed - dose lowered from 7mg to 6mg- currently being bridged  -     Acute on chronic systolic CHF w/ biV failure with severe pulm HTN  - Echo showed EF 45-50% w/ left ventricular concentric remodeling, grade I diastolic dysfunction, severely enlarged right atrium and right ventricle, moderate-severe tricuspid regurgitation & severe pulmonary hypertension  - Cardiology & Pulm following  - c/w Lasix   - c/w Lipitor, metoprolol   - patient has POOR followup - will needed outpatient followup with Dr. Anderson and [facility] - he did not followup before      s/p Supratherapeutic INR with h/o recent  PE/DVT in 3/2023  -s/p 10 of vitamin K- INR improved   - CT brain with no acute findings   - No indication for IVC filter at this time as per Vascular  - dopplers negative for DVT     Acute blood loss anemia  - due to hematuria - resolved  - H&H is stable     Strangulation of hernia on imaging   - CT showed a moderate left inguinal hernia containing mesenteric fat, nonobstructed segment of sigmoid colon and small amount of ascites. Given ascites within the left inguinal hernia, an element of strangulation could not be excluded by radiology  - Gen Surgery consulted - NO plans for surgery as inguinal Hernia was reduced at bedside without difficulty or pain    CT showed a probable complex cyst in the small upper right renal pole demonstrating partial increased density  - will need outpatient follow up    HLD  - c/w Lipitor     Constipation  - c/w Senna, Miralax and Dulcolax PRN    Prophylaxis:  DVT: coumadin   GI: Protonix     Dispo; Home/GISELL       Called the daughter  5/21/2023- Provided an update.  - The daughter will make an appointment to be scheduled with the PCP. To monitor Coumadin levels  - will than need to follow up with urology as out-patient to discuss treatment options if any.

## 2023-05-22 LAB
BASOPHILS # BLD AUTO: 0.05 K/UL — SIGNIFICANT CHANGE UP (ref 0–0.2)
BASOPHILS NFR BLD AUTO: 1.2 % — SIGNIFICANT CHANGE UP (ref 0–2)
EOSINOPHIL # BLD AUTO: 0.33 K/UL — SIGNIFICANT CHANGE UP (ref 0–0.5)
EOSINOPHIL NFR BLD AUTO: 8.2 % — HIGH (ref 0–6)
HCT VFR BLD CALC: 40 % — SIGNIFICANT CHANGE UP (ref 39–50)
HGB BLD-MCNC: 12.2 G/DL — LOW (ref 13–17)
IMM GRANULOCYTES NFR BLD AUTO: 0.2 % — SIGNIFICANT CHANGE UP (ref 0–0.9)
INR BLD: 1.83 RATIO — HIGH (ref 0.88–1.16)
LYMPHOCYTES # BLD AUTO: 1.47 K/UL — SIGNIFICANT CHANGE UP (ref 1–3.3)
LYMPHOCYTES # BLD AUTO: 36.5 % — SIGNIFICANT CHANGE UP (ref 13–44)
MCHC RBC-ENTMCNC: 24.6 PG — LOW (ref 27–34)
MCHC RBC-ENTMCNC: 30.5 G/DL — LOW (ref 32–36)
MCV RBC AUTO: 80.6 FL — SIGNIFICANT CHANGE UP (ref 80–100)
MONOCYTES # BLD AUTO: 0.52 K/UL — SIGNIFICANT CHANGE UP (ref 0–0.9)
MONOCYTES NFR BLD AUTO: 12.9 % — SIGNIFICANT CHANGE UP (ref 2–14)
NEUTROPHILS # BLD AUTO: 1.65 K/UL — LOW (ref 1.8–7.4)
NEUTROPHILS NFR BLD AUTO: 41 % — LOW (ref 43–77)
NON-GYNECOLOGICAL CYTOLOGY STUDY: SIGNIFICANT CHANGE UP
NRBC # BLD: 0 /100 WBCS — SIGNIFICANT CHANGE UP (ref 0–0)
PLATELET # BLD AUTO: 244 K/UL — SIGNIFICANT CHANGE UP (ref 150–400)
PROTHROM AB SERPL-ACNC: 21.9 SEC — HIGH (ref 10.5–13.4)
RBC # BLD: 4.96 M/UL — SIGNIFICANT CHANGE UP (ref 4.2–5.8)
RBC # FLD: 16 % — HIGH (ref 10.3–14.5)
WBC # BLD: 4.03 K/UL — SIGNIFICANT CHANGE UP (ref 3.8–10.5)
WBC # FLD AUTO: 4.03 K/UL — SIGNIFICANT CHANGE UP (ref 3.8–10.5)

## 2023-05-22 PROCEDURE — 99232 SBSQ HOSP IP/OBS MODERATE 35: CPT

## 2023-05-22 RX ORDER — WARFARIN SODIUM 2.5 MG/1
6 TABLET ORAL ONCE
Refills: 0 | Status: COMPLETED | OUTPATIENT
Start: 2023-05-22 | End: 2023-05-22

## 2023-05-22 RX ADMIN — ENOXAPARIN SODIUM 70 MILLIGRAM(S): 100 INJECTION SUBCUTANEOUS at 06:17

## 2023-05-22 RX ADMIN — ATORVASTATIN CALCIUM 40 MILLIGRAM(S): 80 TABLET, FILM COATED ORAL at 21:32

## 2023-05-22 RX ADMIN — PANTOPRAZOLE SODIUM 40 MILLIGRAM(S): 20 TABLET, DELAYED RELEASE ORAL at 08:03

## 2023-05-22 RX ADMIN — Medication 1200 MILLIGRAM(S): at 17:08

## 2023-05-22 RX ADMIN — WARFARIN SODIUM 6 MILLIGRAM(S): 2.5 TABLET ORAL at 21:32

## 2023-05-22 RX ADMIN — ENOXAPARIN SODIUM 70 MILLIGRAM(S): 100 INJECTION SUBCUTANEOUS at 17:07

## 2023-05-22 RX ADMIN — Medication 100 MILLIGRAM(S): at 06:17

## 2023-05-22 RX ADMIN — Medication 100 MILLIGRAM(S): at 21:32

## 2023-05-22 RX ADMIN — Medication 3 MILLIGRAM(S): at 21:32

## 2023-05-22 RX ADMIN — BENZOCAINE AND MENTHOL 1 LOZENGE: 5; 1 LIQUID ORAL at 17:10

## 2023-05-22 RX ADMIN — Medication 100 MILLIGRAM(S): at 17:11

## 2023-05-22 RX ADMIN — TAMSULOSIN HYDROCHLORIDE 0.4 MILLIGRAM(S): 0.4 CAPSULE ORAL at 21:32

## 2023-05-22 RX ADMIN — Medication 1200 MILLIGRAM(S): at 06:17

## 2023-05-22 RX ADMIN — FINASTERIDE 5 MILLIGRAM(S): 5 TABLET, FILM COATED ORAL at 11:27

## 2023-05-22 RX ADMIN — Medication 1 SPRAY(S): at 06:18

## 2023-05-22 RX ADMIN — Medication 40 MILLIGRAM(S): at 06:17

## 2023-05-22 RX ADMIN — Medication 50 MILLIGRAM(S): at 06:17

## 2023-05-22 RX ADMIN — Medication 1 SPRAY(S): at 17:08

## 2023-05-22 RX ADMIN — Medication 100 MILLIGRAM(S): at 13:08

## 2023-05-22 RX ADMIN — POLYETHYLENE GLYCOL 3350 17 GRAM(S): 17 POWDER, FOR SOLUTION ORAL at 06:19

## 2023-05-22 NOTE — PROGRESS NOTE ADULT - ASSESSMENT
77 year old male with history of HTN, chronic sys CHF, BPH, prostate cancer, recent  PE/DVT in 3/23 who was d/c on 4/7/23 on coumadin with ongoing hematuria/ Joseph noted from last visit presented w/ bilateral leg edema, sob and hematuria and was admitted for acute hypoxemic respiratory failure secondary to acute on chronic systolic CHF & gross hematuria due to supra-therapeutic INR.     gross hematuria  with hx of BPH/ Prostate Ca  - likely due to supratherapeutic INR  - Urology consulted  - joseph in place /exchanged by Uro 5/8  - no CBI needed: joseph draining clear  - Continue Joseph - patient Failed trial ov void on 5/9  - c/w finasteride and Flomax  - PSA 59.3  - MRI showed an abnormal diffusion, enhancement and signal noted in the right peripheral zone and seminal vesicles concerning for prostate carcinoma  - Underlying bladder mass could not be excluded on CT - patient will need cystoscopy and prostate bx as outpatient   - Cystoscopy cancelled- patient is high risk at this time.    PE/DVT in 3/2023  - US showed no evidence for acute DVT in the bilateral lower extremity deep venous systems  - CTA showed multifocal bilateral peripheral filling defects and occlusions involving the segmental and subsegmental arteries of upper and lower lobes pulmonary arteries, similar to the prior study, compatible with chronic PE  - Per Dr. Luu, no need for IVC filter because no DVT in major veins of legs present  - Coumadin resumed - dose lowered from 7mg to 6mg- currently being bridged  -     Acute on chronic systolic CHF w/ biV failure with severe pulm HTN  - Echo showed EF 45-50% w/ left ventricular concentric remodeling, grade I diastolic dysfunction, severely enlarged right atrium and right ventricle, moderate-severe tricuspid regurgitation & severe pulmonary hypertension  - Cardiology & Pulm following  - c/w Lasix   - c/w Lipitor, metoprolol   - patient has POOR followup - will needed outpatient followup with Dr. Anderson and [facility] - he did not followup before      s/p Supratherapeutic INR with h/o recent  PE/DVT in 3/2023  -s/p 10 of vitamin K- INR improved   - CT brain with no acute findings   - No indication for IVC filter at this time as per Vascular  - dopplers negative for DVT     Acute blood loss anemia  - due to hematuria - resolved  - H&H is stable     Strangulation of hernia on imaging   - CT showed a moderate left inguinal hernia containing mesenteric fat, nonobstructed segment of sigmoid colon and small amount of ascites. Given ascites within the left inguinal hernia, an element of strangulation could not be excluded by radiology  - Gen Surgery consulted - NO plans for surgery as inguinal Hernia was reduced at bedside without difficulty or pain    CT showed a probable complex cyst in the small upper right renal pole demonstrating partial increased density  - will need outpatient follow up    HLD  - c/w Lipitor     Constipation  - c/w Senna, Miralax and Dulcolax PRN    Prophylaxis:  DVT: coumadin   GI: Protonix     Dispo; Home/GISELL       Called the daughter  5/22/2023- Provided an update.  - The daughter will make an appointment to be scheduled with the PCP. To monitor Coumadin levels  - will than need to follow up with urology as out-patient to discuss treatment options if any.  - The daughter will be able to  her dad either this afternoon vs tomorrow morning- dispo planning in place

## 2023-05-22 NOTE — DISCHARGE NOTE PROVIDER - PROVIDER TOKENS
PROVIDER:[TOKEN:[[id number]]],PROVIDER:[TOKEN:[[id number]]] PROVIDER:[TOKEN:[[id number]]],PROVIDER:[TOKEN:[[id number]]] PROVIDER:[TOKEN:[[id number]]],PROVIDER:[TOKEN:[[id number]]],FREE:[LAST:[ryan],FIRST:[[facility] [id number]],PHONE:[phone]],FAX:[(   )    -],ADDRESS:[[address] [place] [address]]] FREE:[LAST:[ryan],FIRST:[[facility] [id number]],PHONE:[phone]],FAX:[(   )    -],ADDRESS:[[address] [place] [address]]],FREE:[LAST:[yazan],FIRST:[carmen],PHONE:[phone]],FAX:[(   )    -],ADDRESS:[[address] [place] [address]]],FREE:[LAST:[[facility] cardiology clinic],PHONE:[phone]],FAX:[(   )    -],ADDRESS:[[address] [address]]],FREE:[LAST:[[facility]],PHONE:[phone]],FAX:[(   )    -],ADDRESS:[[address] [facility] [address]]] FREE:[LAST:[ryan],FIRST:[[facility] [id number]],PHONE:[phone]],FAX:[(   )    -],ADDRESS:[[address] [place] [address]]],FREE:[LAST:[yazan],FIRST:[carmen],PHONE:[phone]],FAX:[(   )    -],ADDRESS:[[address] [place] [address]]],FREE:[LAST:[[facility] cardiology clinic],PHONE:[phone]],FAX:[(   )    -],ADDRESS:[[address] [address]]],FREE:[LAST:[[facility] pulmonaty clinic],PHONE:[phone]],FAX:[(   )    -],ADDRESS:[[address] [address]]],PROVIDER:[TOKEN:[[id number]],FOLLOWUP:[1 week]]

## 2023-05-22 NOTE — DISCHARGE NOTE PROVIDER - NSDCACTIVITY_GEN_ALL_CORE
Do not drive or operate machinery/Do not make important decisions/No heavy lifting/straining Do not drive or operate machinery/No heavy lifting/straining

## 2023-05-22 NOTE — DISCHARGE NOTE PROVIDER - CARE PROVIDER_API CALL
Ismael Noel)  Urology  [address] [facility], [address]nd [place] [address]  Phone: [phone]  Fax: [phone]  Follow Up Time:     Yen Anderson)  Critical Care Medicine; Internal Medicine; Pulmonary Disease; Sleep Medicine  [address] [facility], [address]  [place] [address]  Phone: [phone]  Fax: [phone]  Follow Up Time:    Yen Anderson)  Critical Care Medicine; Internal Medicine; Pulmonary Disease; Sleep Medicine  [address] [facility], [address]  [place] [address]  Phone: [phone]  Fax: [phone]  Follow Up Time:     Jamal Shore)  Urology  [address] [facility], [address] [place] [address]  Phone: [phone]  Fax: [phone]  Follow Up Time:    Yen Anderson)  Critical Care Medicine; Internal Medicine; Pulmonary Disease; Sleep Medicine  [address] [facility], [address]  [place] [address]  Phone: [phone]  Fax: [phone]  Follow Up Time:     Jamal Shore)  Urology  [address] [facility], [address]nd [place] [address]  Phone: [phone]  Fax: [phone]  Follow Up Time:     ryan [facility] [id number]  [address] [place] [address]  Phone: [phone]  Fax: (   )    -  [facility]:    [facility] [id number]  [address] [facility] [address]  Phone: [phone]  Fax: (   )    -  Follow Up Time:     carmen hand  [address] [facility] [address]  Phone: [phone]  Fax: (   )    -  Follow Up Time:     [facility] cardiology clinic,   [address]   [facility] [address]  Phone: [phone]  Fax: (   )    -  Follow Up Time:     [facility],   [address] [facility] [address]  Phone: [phone]  Fax: (   )    -  [facility]:    [facility] [id number]  [address] [facility] [address]  Phone: [phone]  Fax: (   )    -  Follow Up Time:     carmen hand  [address] [facility] [address]  Phone: [phone]  Fax: (   )    -  Follow Up Time:     [facility] cardiology clinic,   [address]   [facility] [address]  Phone: [phone]  Fax: (   )    -  Follow Up Time:     [facility] pulmonaty clinic,   [address] [facility] [address]  Phone: [phone]  Fax: (   )    -  Follow Up Time:     Yen Anderson)  Critical Care Medicine; Internal Medicine; Pulmonary Disease; Sleep Medicine  [address] [facility], [address]  [place] [address]  Phone: [phone]  Fax: [phone]  Follow Up Time: 1 week

## 2023-05-22 NOTE — DISCHARGE NOTE PROVIDER - CARE PROVIDERS DIRECT ADDRESSES
,[email or web address] ,[email or web address][email or web address] ,[email or web address],DirectAddress_Unknown ,DirectAddress_Unknown,DirectAddress_Unknown,DirectAddress_Unknown,DirectAddress_Unknown ,DirectAddress_Unknown,DirectAddress_Unknown,DirectAddress_Unknown,DirectAddress_Unknown,[email or web address]

## 2023-05-22 NOTE — DISCHARGE NOTE PROVIDER - NPI NUMBER (FOR SYSADMIN USE ONLY) :
[[id number]],[[id number]] [[id number]],[[id number]] [[id number]],[[id number]],[UNKNOWN] [UNKNOWN],[UNKNOWN],[UNKNOWN],[UNKNOWN] [UNKNOWN],[UNKNOWN],[UNKNOWN],[UNKNOWN],[[id number]]

## 2023-05-22 NOTE — DISCHARGE NOTE PROVIDER - NSDCCPCAREPLAN_GEN_ALL_CORE_FT
PRINCIPAL DISCHARGE DIAGNOSIS  Diagnosis: Acute CHF  Assessment and Plan of Treatment: improved      SECONDARY DISCHARGE DIAGNOSES  Diagnosis: Hematuria  Assessment and Plan of Treatment: improved    Diagnosis: Abnormal CT scan, bladder  Assessment and Plan of Treatment: fu urology prostate cence     PRINCIPAL DISCHARGE DIAGNOSIS  Diagnosis: Acute CHF  Assessment and Plan of Treatment: improved      SECONDARY DISCHARGE DIAGNOSES  Diagnosis: Hematuria  Assessment and Plan of Treatment: improved    Diagnosis: Abnormal CT scan, bladder  Assessment and Plan of Treatment: fu urology    Diagnosis: DVT, lower extremity  Assessment and Plan of Treatment: follow-up with PMD within 3 days for INR check.

## 2023-05-22 NOTE — PROGRESS NOTE ADULT - SUBJECTIVE AND OBJECTIVE BOX
CC: Patient is a 77y old  Male who presents with a chief complaint of sob/ hematuria (22 May 2023 11:33)      Patient seen and examined at bedside, No acute overnight events.    ROS: Denies fever, nausea, vomiting, chest pain, SOB, abdominal pain, diarrhea and constipation    Vital Sign  Vital Signs Last 24 Hrs  T(C): 36.4 (22 May 2023 12:01), Max: 37.1 (21 May 2023 16:52)  T(F): 97.5 (22 May 2023 12:01), Max: 98.7 (21 May 2023 16:52)  HR: 91 (22 May 2023 12:01) (90 - 92)  BP: 113/73 (22 May 2023 12:01) (111/78 - 122/85)  BP(mean): --  RR: 17 (22 May 2023 12:01) (17 - 18)  SpO2: 92% (22 May 2023 12:01) (92% - 96%)    Parameters below as of 22 May 2023 12:01  Patient On (Oxygen Delivery Method): room air        Physical Exam:   Gen: NAD  HEENT: NCAT, EOMI, PERRLA, Pupils_  CVS: RRR, +S1/S2, no murmurs, rubs or gallops appreciated  Lungs: CTAB, no wheeze, rales, rhonchi  Abdomen: +BS, soft, ND, NT. no palpable flank tenderness or mass, no CVA tenderness  Ext: No cyanosis, edema or calf tenderness  Neuro: AAOx3, no focal deficits.    Labs:                        12.2   4.03  )-----------( 244      ( 22 May 2023 07:00 )             40.0           05-21 @ 07:01  -  05-22 @ 07:00  --------------------------------------------------------  IN: 0 mL / OUT: 1100 mL / NET: -1100 mL        Radiology:  *Pull    Medications:  MEDICATIONS  (STANDING):  atorvastatin 40 milliGRAM(s) Oral at bedtime  benzonatate 100 milliGRAM(s) Oral three times a day  enoxaparin Injectable 70 milliGRAM(s) SubCutaneous every 12 hours  finasteride 5 milliGRAM(s) Oral daily  fluticasone propionate 50 MICROgram(s)/spray Nasal Spray 1 Spray(s) Both Nostrils two times a day  furosemide    Tablet 40 milliGRAM(s) Oral daily  guaiFENesin ER 1200 milliGRAM(s) Oral every 12 hours  melatonin 3 milliGRAM(s) Oral at bedtime  metoprolol succinate ER 50 milliGRAM(s) Oral daily  pantoprazole    Tablet 40 milliGRAM(s) Oral before breakfast  polyethylene glycol 3350 17 Gram(s) Oral two times a day  senna 2 Tablet(s) Oral at bedtime  tamsulosin 0.4 milliGRAM(s) Oral at bedtime    MEDICATIONS  (PRN):  acetaminophen     Tablet .. 650 milliGRAM(s) Oral every 6 hours PRN Mild Pain (1 - 3), Moderate Pain (4 - 6)  benzocaine/menthol Lozenge 1 Lozenge Oral every 6 hours PRN Sore Throat  bisacodyl 5 milliGRAM(s) Oral every 12 hours PRN Constipation  guaiFENesin Oral Liquid (Sugar-Free) 100 milliGRAM(s) Oral every 6 hours PRN Cough

## 2023-05-22 NOTE — DISCHARGE NOTE PROVIDER - HOSPITAL COURSE
77 year old male with HTN, Diastolic CHF, Severe pHTN,  BPH , Prostate  cancer ( elevated PSA ), Recent bone scan with evidence of osseous metastasis,  Urinary retention with indwelling Yan,  Recent  PE/ DVT in April 2023,( was discharged  on coumadin ) and  Left inguinal hernia, came to er 5/6/23 with c/o  hematuria ,lower extremity b/l edema noted with oxygen saturation of 91% in er and started on nasal canula @3 liters , Initial work up significant for supratherauputic inr , bnp 4115 cxr -clear lungs, b/l lower extremity dopplers negative for dvt . CT a/p showing bladder density Left inguinal hernia (reduced successfully in the er) . small left renal complex cyst .  'cardiology ,pulmonary, urology ,vascular consults don e folely re-sited -cbi initiated and discontinued after urine cleared.   5/7/23 tte ef 45% right atrium and ventricle enlarged , rv overload , moderate TR  ,pa pressure of 77.5   cta chest 5/8 multiple chronic pulmonary emoli ,left upper lobe air space disease   mri a/p 5/10 positive for enlarged heterogenous prostate gland   tte 5/11 ef 45%  5/18 cytoscopy held as pt is hogh risk for procedure .pt will need TURBT at future date 77 year old male with HTN, Diastolic CHF, Severe pHTN,  BPH , Prostate  cancer ( elevated PSA ), Recent bone scan with evidence of osseous metastasis,  Urinary retention with indwelling Yan,  Recent  PE/ DVT in April 2023,( was discharged  on coumadin ) and  Left inguinal hernia, came to er 5/6/23 with c/o  hematuria ,lower extremity b/l edema noted with oxygen saturation of 91% in er and started on nasal canula @3 liters , Initial work up significant for supratherauputic inr , bnp 4115 cxr -clear lungs, b/l lower extremity dopplers negative for dvt . CT a/p showing bladder density Left inguinal hernia (reduced successfully in the er) . small left renal complex cyst .  'cardiology ,pulmonary, urology ,vascular consults don e folely re-sited -cbi initiated and discontinued after urine cleared.   5/7/23 tte ef 45% right atrium and ventricle enlarged , rv overload , moderate TR  ,pa pressure of 77.5   cta chest 5/8 multiple chronic pulmonary emoli ,left upper lobe air space disease   mri a/p 5/10 positive for enlarged heterogenous prostate gland   tte 5/11 ef 45%  5/18 cytoscopy held as pt is hogh risk for procedure .pt will need TURBT at future     Patient will need follow up with Dr Noel [phone]    patient will need follow up with Dr Anderson for pulmonary hypertension     patient will need follow up with [facility] cardiology at [place] [facility]     patient will need follow up with his general medical doctor to ar[place]ange for renal appointment for furtther evaluation fo left renal cyst    77 year old male with HTN, Diastolic CHF, Severe pHTN,  BPH , Prostate  cancer ( elevated PSA ), Recent bone scan with evidence of osseous metastasis,  Urinary retention with indwelling Joseph,  Recent  PE/ DVT in April 2023,( was discharged  on coumadin ) and  Left inguinal hernia, came to er 5/6/23 with c/o  hematuria ,lower extremity b/l edema noted with oxygen saturation of 91% in er and started on nasal canula @3 liters , Initial work up significant for supra-therapeutic inr , bnp 4115 cxr -clear lungs, b/l lower extremity dopplers negative for dvt . CT a/p showing bladder density Left inguinal hernia (reduced successfully in the er) . small left renal complex cyst .  'cardiology ,pulmonary, urology ,vascular consults don e hallie re-sited -cbi initiated and discontinued after urine cleared.   5/7/23 tte ef 45% right atrium and ventricle enlarged , rv overload , moderate TR  ,pa pressure of 77.5   cta chest 5/8 multiple chronic pulmonary emoli ,left upper lobe air space disease   mri a/p 5/10 positive for enlarged heterogenous prostate gland   tte 5/11 ef 45%  5/18 cytoscopy held as pt is hegh risk for procedure .pt will need TURBT at future     Patient will need follow up with Dr Shore of urology for future cystoscopy /hematuria/indwelling joseph     patient will need follow up with Dr Anderson for pulmonary hypertension     patient will need follow up with [facility] cardiology at [place] [facility]     patient will need follow up with his general medical doctor to arri[place] for renal appointment for furtther evaluation fo left renal cyst    77 year old male with HTN, Diastolic CHF, Severe pHTN,  BPH , Prostate  cancer ( elevated PSA ), Recent bone scan with evidence of osseous metastasis,  Urinary retention with indwelling Joseph,  Recent  PE/ DVT in April 2023,( was discharged  on coumadin ) and  Left inguinal hernia, came to er 5/6/23 with c/o  hematuria ,lower extremity b/l edema noted with oxygen saturation of 91% in er and started on nasal canula @3 liters , Initial work up significant for supra-therapeutic inr , bnp 4115 cxr -clear lungs, b/l lower extremity dopplers negative for dvt . CT a/p showing bladder density Left inguinal hernia (reduced successfully in the er) . small left renal complex cyst .  'cardiology ,pulmonary, urology ,vascular consults don e folely re-sited -cbi initiated and discontinued after urine cleared.     5/7/23 tte ef 45% right atrium and ventricle enlarged , rv overload , moderate TR  ,pa pressure of 77.5     cta chest 5/8 multiple chronic pulmonary emboli ,left upper lobe air space disease     mri a/p 5/10 positive for enlarged heterogenous prostate gland     tte 5/11 ef 45% grade 1 diastolic dysfunction, severe aright atrial and ventricular dilation , pulonary artery pressure 77.5   cystoscopy held as pt is hegh risk for procedure .pt will need TURBT at future     Patient will need follow up with Dr Shore of urology for future cystoscopy /hematuria/indwelling joseph     patient will need follow up with Dr Anderson for pulmonary hypertension     patient will need follow up with [facility] cardiology at [place] cardiology     patient will need follow up with his general medical doctor to arrange for renal appointment for further evaluation fo left renal cyst    77 year old male with HTN, Diastolic CHF, Severe pHTN,  BPH , Prostate  cancer ( elevated PSA ), Recent bone scan with evidence of osseous metastasis,  Urinary retention with indwelling Joseph,  Recent  PE/ DVT in April 2023,( was discharged  on coumadin ) and  Left inguinal hernia, came to er 5/6/23 with c/o  hematuria ,lower extremity b/l edema noted with oxygen saturation of 91% in er and started on nasal canula @3 liters , Initial work up significant for supra-therapeutic inr , bnp 4115 cxr -clear lungs, b/l lower extremity dopplers negative for dvt . CT a/p showing bladder density Left inguinal hernia (reduced successfully in the er) . small left renal complex cyst .  'cardiology ,pulmonary, urology ,vascular consults don e folely re-sited -cbi initiated and discontinued after urine cleared.     5/7/23 tte ef 45% right atrium and ventricle enlarged , rv overload , moderate TR  ,pa pressure of 77.5     cta chest 5/8 multiple chronic pulmonary emboli ,left upper lobe air space disease     mri a/p 5/10 positive for enlarged heterogenous prostate gland     tte 5/11 ef 45% grade 1 diastolic dysfunction, severe aright atrial and ventricular dilation , pulonary artery pressure 77.5   cystoscopy held as pt is hegh risk for procedure .pt will need TURBT at future     Patient will need follow up with Dr Shore of urology for future cystoscopy /hematuria/indwelling joseph     patient will need follow up with Dr Anderson for pulmonary hypertension I have spoken with the pulmonary hypertension center at [address] concerning an appointment for further carre . as pt has emergency medicaid he will need to be approved for sliding scale insurance prior to visit . The financial office will reach out to his daughter for further information .     patient will need follow up with [facility] cardiology at [place] [facility] .[place] with [facility] concerning follow up , they requested the daughters phone number and they will call her to make a plan for follow up .    patient will need follow up with his general medical doctor to arrange for renal appointment for further evaluation fo left renal cyst    77 year old male with HTN, Diastolic CHF, Severe pHTN,  BPH , Prostate  cancer ( elevated PSA ), Recent bone scan with evidence of osseous metastasis,  Urinary retention with indwelling Yan,  Recent  PE/ DVT in April 2023,( was discharged  on coumadin ) and  Left inguinal hernia, came to er 5/6/23 with c/o  hematuria ,lower extremity b/l edema noted with oxygen saturation of 91% in er and started on nasal canula @3 liters , Initial work up significant for supra-therapeutic inr , bnp 4115 cxr -clear lungs, b/l lower extremity dopplers negative for dvt . CT a/p showing bladder density Left inguinal hernia (reduced successfully in the er) . small left renal complex cyst .  'cardiology ,pulmonary, urology ,vascular consults don e folely re-sited -cbi initiated and discontinued after urine cleared.   physical therapy consult suggest Sub acute rehab however pt is not covered for this option     5/7/23 tte ef 45% right atrium and ventricle enlarged , rv overload , moderate TR  ,pa pressure of 77.5     cta chest 5/8 multiple chronic pulmonary emboli ,left upper lobe air space disease     mri a/p 5/10 positive for enlarged heterogenous prostate gland     tte 5/11 ef 45% grade 1 diastolic dysfunction, severe aright atrial and ventricular dilation , pulmonary artery pressure 77.5   cystoscopy held as pt is hegh risk for procedure .pt will need TURBT at future     Patient will need follow up with Dr Noel  of urology for future cystoscopy /hematuria/indwelling urinary catheter change -please call Dr Noel's office for appointment tell them you were a patient of Dr Noel in the hospital     patient will need follow up with Dr Anderson for pulmonary hypertension I have spoken with the pulmonary hypertension center at [address] concerning an appointment for further carre . as pt has emergency medicaid he will need to be approved for sliding scale insurance prior to visit . The financial office will reach out to his daughter for further information .     patient will need follow up with [facility] cardiology at [place] cardiology .I have spoken with [facility] concerning follow up , they requested the daughters phone number and they will call her to make a plan for follow up .    patient will need follow up with his general medical doctor to arrange for renal appointment for further evaluation fo left renal cyst and form monitoring of inr while on coumadin therapy    77 year old male with HTN, Diastolic CHF, Severe pHTN,  BPH , Prostate  cancer ( elevated PSA ), Recent bone scan with evidence of osseous metastasis,  Urinary retention with indwelling Yan,  Recent  PE/ DVT in April 2023,( was discharged  on coumadin ) and  Left inguinal hernia, came to er 5/6/23 with c/o  hematuria ,lower extremity b/l edema noted with oxygen saturation of 91% in er and started on nasal canula @3 liters , Initial work up significant for supra-therapeutic inr , bnp 4115 cxr -clear lungs, b/l lower extremity dopplers negative for dvt . CT a/p showing bladder density Left inguinal hernia (reduced successfully in the er) . small left renal complex cyst .  'cardiology ,pulmonary, urology ,vascular consults don e folely re-sited -cbi initiated and discontinued after urine cleared.   physical therapy consult suggest Sub acute rehab however pt is not covered for this option     5/7/23 tte ef 45% right atrium and ventricle enlarged , rv overload , moderate TR  ,pa pressure of 77.5     cta chest 5/8 multiple chronic pulmonary emboli ,left upper lobe air space disease     mri a/p 5/10 positive for enlarged heterogenous prostate gland     tte 5/11 ef 45% grade 1 diastolic dysfunction, severe aright atrial and ventricular dilation , pulmonary artery pressure 77.5   cystoscopy held as pt is hegh risk for procedure .pt will need TURBT at future     Patient will need follow up with Dr Noel  of urology for future cystoscopy /hematuria/indwelling urinary catheter change -please call Dr Noel's office for appointment tell them you were a patient of Dr Noel in the hospital     patient will need follow up with Dr Anderson for pulmonary hypertension I have spoken with the pulmonary hypertension center at [address] [place] [address] concerning an appointment for further carre . as pt has emergency medicaid he will need to be approved for sliding scale insurance prior to visit . The financial office will reach out to his daughter for further information .     patient will need follow up with [facility] cardiology at [place] [facility] .I have spoken with [facility] concerning follow up , they requested the daughters phone number and they will call her to make a plan for follow up .    patient will need follow up with his general medical doctor to arrange for renal appointment     for further evaluation fo left renal cyst and form monitoring of inr while on coumadin therapy     Discharge time : 40 min   RETURN PARAMETERS DISCUSSED WITH PATIENT, PATIENT EXPRESSED UNDERSTANDING AND IS AGREEABLE. DISCUSSED WITH PATIENT ON REFRAINING FROM DRIVING UNTIL FOLLOW-UP/ CLEARED BY PMD. PATIENT EXPRESSED UNDERSTANDING.   Care plan and all findings were discussed in detail with patient.  All questions and concerns addressed   77 year old male with HTN,   CHF, Severe pHTN,  BPH , Prostate  cancer ( elevated PSA ), Recent bone scan without evidence of osseous metastasis,  Urinary retention with indwelling Joseph,  Recent  PE/ DVT in April 2023,( was discharged  on coumadin ) and  Left inguinal hernia, came to er 5/6/23 with c/o  hematuria ,lower extremity b/l edema noted with oxygen saturation of 91% in er and started on nasal canula @3 liters , Initial work up significant for supra-therapeutic inr,  cxr -clear lungs, b/l lower extremity dopplers negative for dvt . CT a/p showing bladder density Left inguinal hernia (reduced successfully in the er) . small left renal complex cyst .  'cardiology ,pulmonary, urology ,vascular consults done, folely re-sited -cbi initiated and discontinued after urine cleared.   physical therapy consult suggest Sub acute rehab however pt does not have insurance.     gross hematuria  with hx of BPH/ Prostate Ca  - likely due to supratherapeutic INR  - Urology consulted  - joseph in place /exchanged by Uro 5/8  - no CBI needed: joseph draining clear  - Continue Joseph - patient Failed trial ov void on 5/9  - c/w finasteride and Flomax  - PSA 59.3  --cytopath: URINE  NEGATIVE FOR HIGH GRADE UROTHELIAL CARCINOMA  Scattered benign urothelial cells and bacteria present.  - MRI showed an abnormal diffusion, enhancement and signal noted in the right peripheral zone and seminal vesicles concerning for prostate carcinoma  - Underlying bladder mass could not be excluded on CT - patient will need cystoscopy and prostate bx as outpatient   - Cystoscopy cancelled- patient is high risk at this time.  no further hematuria     PE/DVT in 3/2023  - US showed no evidence for acute DVT in the bilateral lower extremity deep venous systems  - CTA showed multifocal bilateral peripheral filling defects and occlusions involving the segmental and subsegmental arteries of upper and lower lobes pulmonary arteries, similar to the prior study, compatible with chronic PE  - Per Dr. Luu, no need for IVC filter because no DVT in major veins of legs present  - Coumadin resumed ; INR therapeutic ; patient received his medications from vivo to bedside     Acute on chronic systolic CHF w/ biV failure with severe pulm HTN  - Echo:  LVEF 45-50%, grade 1 DD, severe LAE/CELESTINO, moderately reduced RV function with signs of pressure/volume overload, moderate-severe TR with severe pulm HTN (PAPs almost 80).  - Cardiology & Pulm following  - c/w Lasix   - c/w Lipitor, metoprolol   - patient has POOR followup - will needed outpatient followup with Dr. Anderson and [facility] - he did not followup before      s/p Supratherapeutic INR with h/o recent  PE/DVT in 3/2023  -s/p 10 of vitamin K- INR improved   - CT brain with no acute findings   - No indication for IVC filter at this time as per Vascular  - dopplers negative for DVT     Acute blood loss anemia  - due to hematuria - resolved  - H&H is stable     Strangulation of hernia on imaging   - CT showed a moderate left inguinal hernia containing mesenteric fat, nonobstructed segment of sigmoid colon and small amount of ascites. Given ascites within the left inguinal hernia, an element of strangulation could not be excluded by radiology  - Gen Surgery consulted - NO plans for surgery as inguinal Hernia was reduced at bedside without difficulty or pain    CT showed a probable complex cyst in the small upper right renal pole demonstrating partial increased density  - will need outpatient follow up    HLD  - c/w Lipitor     Constipation  - c/w Senna, Miralax and Dulcolax PRN    daughter made aware of care plan, findings and need for follow-up appointments     Discharge time : 40 min   RETURN PARAMETERS DISCUSSED WITH PATIENT, PATIENT EXPRESSED UNDERSTANDING AND IS AGREEABLE. DISCUSSED WITH PATIENT ON REFRAINING FROM DRIVING UNTIL FOLLOW-UP/ CLEARED BY PMD. PATIENT EXPRESSED UNDERSTANDING.   Care plan and all findings were discussed in detail with patient.  All questions and concerns addressed

## 2023-05-22 NOTE — DISCHARGE NOTE PROVIDER - NSDCFUADDAPPT_GEN_ALL_CORE_FT
It is important to see your primary physician as well as other necessary consultants within the next week to perform a comprehensive medical review.  Call their offices for an appointment as soon as you leave the hospital.  If you do not have a primary physician or cant reach him/her, contact the [facility] Physician Referral Service at [phone].  Your medical issues appear to be stable at this time, but if your symptoms recur or worsen, contact your physicians and/or return to the hospital if necessary.  If you encounter any issues or questions with your medication, call your physicians before stopping the medication.

## 2023-05-22 NOTE — DISCHARGE NOTE PROVIDER - NSDCMRMEDTOKEN_GEN_ALL_CORE_FT
aspirin 81 mg oral tablet, chewable: 1 tab(s) orally once a day  atorvastatin 40 mg oral tablet: 1 tab(s) orally once a day (at bedtime)  CHECK INR: CHECK INR FOR DVT/PE  finasteride 5 mg oral tablet: 1 tab(s) orally once a day  furosemide 40 mg oral tablet: 1 tab(s) orally once a day  losartan 25 mg oral tablet: 1 tab(s) orally once a day  metoprolol succinate 50 mg oral tablet, extended release: 1 tab(s) orally once a day  pantoprazole 40 mg oral delayed release tablet: 1 tab(s) orally once a day (before a meal)  tamsulosin 0.4 mg oral capsule: 1 cap(s) orally once a day (at bedtime)  warfarin 3 mg oral tablet: 1 tab(s) orally once a day (at bedtime)  warfarin 4 mg oral tablet: 1 tab(s) orally once a day (at bedtime)   aspirin 81 mg oral tablet, chewable: 1 tab(s) orally once a day  atorvastatin 40 mg oral tablet: 1 tab(s) orally once a day (at bedtime)  CHECK INR within 3 days of discharge: CHECK INR FOR DVT/PE  finasteride 5 mg oral tablet: 1 tab(s) orally once a day  furosemide 40 mg oral tablet: 1 tab(s) orally once a day  metoprolol succinate 50 mg oral tablet, extended release: 1 tab(s) orally once a day  pantoprazole 40 mg oral delayed release tablet: 1 tab(s) orally once a day (before a meal)  senna leaf extract oral tablet: 2 tab(s) orally once a day (at bedtime)  tamsulosin 0.4 mg oral capsule: 1 cap(s) orally once a day (at bedtime)  warfarin 3 mg oral tablet: 1 tab(s) orally once a day (at bedtime)   aspirin 81 mg oral tablet, chewable: 1 tab(s) orally once a day  atorvastatin 40 mg oral tablet: 1 tab(s) orally once a day (at bedtime)  finasteride 5 mg oral tablet: 1 tab(s) orally once a day  furosemide 40 mg oral tablet: 1 tab(s) orally once a day  metoprolol succinate 50 mg oral tablet, extended release: 1 tab(s) orally once a day  pantoprazole 40 mg oral delayed release tablet: 1 tab(s) orally once a day (before a meal)  senna leaf extract oral tablet: 2 tab(s) orally once a day (at bedtime)  tamsulosin 0.4 mg oral capsule: 1 cap(s) orally once a day (at bedtime)  warfarin 3 mg oral tablet: 1 tab(s) orally once a day (at bedtime)

## 2023-05-22 NOTE — DISCHARGE NOTE PROVIDER - NSDCHHATTENDCERT_GEN_ALL_CORE
Per mom, patient started with a congestion last night  Today has yellow nasal discharge  No fever  Drinking ok  Please advise      Please call dad cell  [phone]
TC to mom -- reviewed that she can buy otc little remedies saline and use that along with bulb suction or sheron to remove mucus especially around feeding times  Should monitor wet diapers  If fever should develop will call office back or go to ER, mom agreeable with plan 
My signature below certifies that the above stated patient is homebound and upon completion of the Face-To-Face encounter, has the need for intermittent skilled nursing, physical therapy and/or speech or occupational therapy services in their home for their current diagnosis as outlined in their initial plan of care. These services will continue to be monitored by myself or another physician.

## 2023-05-23 LAB
ANION GAP SERPL CALC-SCNC: 5 MMOL/L — SIGNIFICANT CHANGE UP (ref 5–17)
BASOPHILS # BLD AUTO: 0.05 K/UL — SIGNIFICANT CHANGE UP (ref 0–0.2)
BASOPHILS NFR BLD AUTO: 1.1 % — SIGNIFICANT CHANGE UP (ref 0–2)
BUN SERPL-MCNC: 20 MG/DL — SIGNIFICANT CHANGE UP (ref 7–23)
CALCIUM SERPL-MCNC: 8.5 MG/DL — SIGNIFICANT CHANGE UP (ref 8.5–10.1)
CHLORIDE SERPL-SCNC: 107 MMOL/L — SIGNIFICANT CHANGE UP (ref 96–108)
CO2 SERPL-SCNC: 29 MMOL/L — SIGNIFICANT CHANGE UP (ref 22–31)
CREAT SERPL-MCNC: 1.12 MG/DL — SIGNIFICANT CHANGE UP (ref 0.5–1.3)
EGFR: 68 ML/MIN/1.73M2 — SIGNIFICANT CHANGE UP
EOSINOPHIL # BLD AUTO: 0.23 K/UL — SIGNIFICANT CHANGE UP (ref 0–0.5)
EOSINOPHIL NFR BLD AUTO: 5.1 % — SIGNIFICANT CHANGE UP (ref 0–6)
GLUCOSE SERPL-MCNC: 91 MG/DL — SIGNIFICANT CHANGE UP (ref 70–99)
HCT VFR BLD CALC: 38.6 % — LOW (ref 39–50)
HGB BLD-MCNC: 11.8 G/DL — LOW (ref 13–17)
IMM GRANULOCYTES NFR BLD AUTO: 0.2 % — SIGNIFICANT CHANGE UP (ref 0–0.9)
INR BLD: 2.22 RATIO — HIGH (ref 0.88–1.16)
LYMPHOCYTES # BLD AUTO: 1.45 K/UL — SIGNIFICANT CHANGE UP (ref 1–3.3)
LYMPHOCYTES # BLD AUTO: 32.4 % — SIGNIFICANT CHANGE UP (ref 13–44)
MCHC RBC-ENTMCNC: 24 PG — LOW (ref 27–34)
MCHC RBC-ENTMCNC: 30.6 G/DL — LOW (ref 32–36)
MCV RBC AUTO: 78.5 FL — LOW (ref 80–100)
MONOCYTES # BLD AUTO: 0.51 K/UL — SIGNIFICANT CHANGE UP (ref 0–0.9)
MONOCYTES NFR BLD AUTO: 11.4 % — SIGNIFICANT CHANGE UP (ref 2–14)
NEUTROPHILS # BLD AUTO: 2.22 K/UL — SIGNIFICANT CHANGE UP (ref 1.8–7.4)
NEUTROPHILS NFR BLD AUTO: 49.8 % — SIGNIFICANT CHANGE UP (ref 43–77)
NRBC # BLD: 0 /100 WBCS — SIGNIFICANT CHANGE UP (ref 0–0)
PLATELET # BLD AUTO: 239 K/UL — SIGNIFICANT CHANGE UP (ref 150–400)
POTASSIUM SERPL-MCNC: 3.4 MMOL/L — LOW (ref 3.5–5.3)
POTASSIUM SERPL-SCNC: 3.4 MMOL/L — LOW (ref 3.5–5.3)
PROTHROM AB SERPL-ACNC: 26.8 SEC — HIGH (ref 10.5–13.4)
RBC # BLD: 4.92 M/UL — SIGNIFICANT CHANGE UP (ref 4.2–5.8)
RBC # FLD: 15.9 % — HIGH (ref 10.3–14.5)
SODIUM SERPL-SCNC: 141 MMOL/L — SIGNIFICANT CHANGE UP (ref 135–145)
WBC # BLD: 4.47 K/UL — SIGNIFICANT CHANGE UP (ref 3.8–10.5)
WBC # FLD AUTO: 4.47 K/UL — SIGNIFICANT CHANGE UP (ref 3.8–10.5)

## 2023-05-23 PROCEDURE — 99222 1ST HOSP IP/OBS MODERATE 55: CPT

## 2023-05-23 RX ORDER — WARFARIN SODIUM 2.5 MG/1
5 TABLET ORAL ONCE
Refills: 0 | Status: COMPLETED | OUTPATIENT
Start: 2023-05-23 | End: 2023-05-23

## 2023-05-23 RX ORDER — POTASSIUM CHLORIDE 20 MEQ
40 PACKET (EA) ORAL ONCE
Refills: 0 | Status: COMPLETED | OUTPATIENT
Start: 2023-05-23 | End: 2023-05-23

## 2023-05-23 RX ADMIN — Medication 650 MILLIGRAM(S): at 22:16

## 2023-05-23 RX ADMIN — Medication 100 MILLIGRAM(S): at 06:30

## 2023-05-23 RX ADMIN — Medication 3 MILLIGRAM(S): at 21:55

## 2023-05-23 RX ADMIN — ATORVASTATIN CALCIUM 40 MILLIGRAM(S): 80 TABLET, FILM COATED ORAL at 21:55

## 2023-05-23 RX ADMIN — Medication 1200 MILLIGRAM(S): at 06:31

## 2023-05-23 RX ADMIN — SENNA PLUS 2 TABLET(S): 8.6 TABLET ORAL at 21:56

## 2023-05-23 RX ADMIN — Medication 100 MILLIGRAM(S): at 21:56

## 2023-05-23 RX ADMIN — Medication 1 SPRAY(S): at 18:27

## 2023-05-23 RX ADMIN — Medication 650 MILLIGRAM(S): at 23:16

## 2023-05-23 RX ADMIN — ENOXAPARIN SODIUM 70 MILLIGRAM(S): 100 INJECTION SUBCUTANEOUS at 06:31

## 2023-05-23 RX ADMIN — Medication 1 SPRAY(S): at 06:29

## 2023-05-23 RX ADMIN — PANTOPRAZOLE SODIUM 40 MILLIGRAM(S): 20 TABLET, DELAYED RELEASE ORAL at 06:31

## 2023-05-23 RX ADMIN — TAMSULOSIN HYDROCHLORIDE 0.4 MILLIGRAM(S): 0.4 CAPSULE ORAL at 21:55

## 2023-05-23 RX ADMIN — POLYETHYLENE GLYCOL 3350 17 GRAM(S): 17 POWDER, FOR SOLUTION ORAL at 18:27

## 2023-05-23 RX ADMIN — WARFARIN SODIUM 5 MILLIGRAM(S): 2.5 TABLET ORAL at 21:56

## 2023-05-23 RX ADMIN — FINASTERIDE 5 MILLIGRAM(S): 5 TABLET, FILM COATED ORAL at 13:18

## 2023-05-23 RX ADMIN — Medication 50 MILLIGRAM(S): at 06:30

## 2023-05-23 RX ADMIN — Medication 1200 MILLIGRAM(S): at 18:28

## 2023-05-23 RX ADMIN — Medication 40 MILLIEQUIVALENT(S): at 13:18

## 2023-05-23 RX ADMIN — Medication 40 MILLIGRAM(S): at 06:31

## 2023-05-23 NOTE — PROGRESS NOTE ADULT - SUBJECTIVE AND OBJECTIVE BOX
CC: Patient is a 77y old  Male who presents with a chief complaint of sob/ hematuria (22 May 2023 12:48)      Patient seen and examined at bedside, No acute overnight events. Yan no evidence of hematuria    ROS: Denies fever, nausea, vomiting, chest pain, SOB, abdominal pain, diarrhea and constipation    Vital Sign  Vital Signs Last 24 Hrs  T(C): 36.7 (23 May 2023 10:51), Max: 36.9 (22 May 2023 17:14)  T(F): 98.1 (23 May 2023 10:51), Max: 98.5 (22 May 2023 17:14)  HR: 86 (23 May 2023 10:51) (86 - 103)  BP: 111/72 (23 May 2023 10:51) (103/63 - 113/74)  BP(mean): --  RR: 17 (23 May 2023 10:51) (17 - 18)  SpO2: 94% (23 May 2023 10:51) (92% - 95%)    Parameters below as of 23 May 2023 05:14  Patient On (Oxygen Delivery Method): room air        Physical Exam:   Gen: NAD  HEENT: NCAT, EOMI, PERRLA, Pupils_  CVS: RRR, +S1/S2, no murmurs, rubs or gallops appreciated  Lungs: CTAB, no wheeze, rales, rhonchi  Abdomen: +BS, soft, ND, NT. no palpable flank tenderness or mass, no CVA tenderness  Ext: No cyanosis, edema or calf tenderness  : Yan  Neuro: AAOx3, no focal deficits.    Labs:                        11.8   4.47  )-----------( 239      ( 23 May 2023 06:20 )             38.6   05-23    141  |  107  |  20  ----------------------------<  91  3.4<L>   |  29  |  1.12    Ca    8.5      23 May 2023 06:20        05-22 @ 07:01  -  05-23 @ 07:00  --------------------------------------------------------  IN: 0 mL / OUT: 400 mL / NET: -400 mL        Radiology:  *Pull    Medications:  MEDICATIONS  (STANDING):  atorvastatin 40 milliGRAM(s) Oral at bedtime  enoxaparin Injectable 70 milliGRAM(s) SubCutaneous every 12 hours  finasteride 5 milliGRAM(s) Oral daily  fluticasone propionate 50 MICROgram(s)/spray Nasal Spray 1 Spray(s) Both Nostrils two times a day  furosemide    Tablet 40 milliGRAM(s) Oral daily  guaiFENesin ER 1200 milliGRAM(s) Oral every 12 hours  melatonin 3 milliGRAM(s) Oral at bedtime  metoprolol succinate ER 50 milliGRAM(s) Oral daily  pantoprazole    Tablet 40 milliGRAM(s) Oral before breakfast  polyethylene glycol 3350 17 Gram(s) Oral two times a day  potassium chloride   Powder 40 milliEquivalent(s) Oral once  senna 2 Tablet(s) Oral at bedtime  tamsulosin 0.4 milliGRAM(s) Oral at bedtime    MEDICATIONS  (PRN):  acetaminophen     Tablet .. 650 milliGRAM(s) Oral every 6 hours PRN Mild Pain (1 - 3), Moderate Pain (4 - 6)  benzocaine/menthol Lozenge 1 Lozenge Oral every 6 hours PRN Sore Throat  bisacodyl 5 milliGRAM(s) Oral every 12 hours PRN Constipation  guaiFENesin Oral Liquid (Sugar-Free) 100 milliGRAM(s) Oral every 6 hours PRN Cough

## 2023-05-23 NOTE — PROGRESS NOTE ADULT - ASSESSMENT
77 year old male with history of HTN, chronic sys CHF, BPH, prostate cancer, recent  PE/DVT in 3/23 who was d/c on 4/7/23 on coumadin with ongoing hematuria/ Joseph noted from last visit presented w/ bilateral leg edema, sob and hematuria and was admitted for acute hypoxemic respiratory failure secondary to acute on chronic systolic CHF & gross hematuria due to supra-therapeutic INR.     gross hematuria  with hx of BPH/ Prostate Ca  - likely due to supratherapeutic INR  - Urology consulted  - joseph in place /exchanged by Uro 5/8  - no CBI needed: joseph draining clear  - Continue Joseph - patient Failed trial ov void on 5/9  - c/w finasteride and Flomax  - PSA 59.3  - MRI showed an abnormal diffusion, enhancement and signal noted in the right peripheral zone and seminal vesicles concerning for prostate carcinoma  - Underlying bladder mass could not be excluded on CT - patient will need cystoscopy and prostate bx as outpatient   - Cystoscopy cancelled- patient is high risk at this time.    PE/DVT in 3/2023  - US showed no evidence for acute DVT in the bilateral lower extremity deep venous systems  - CTA showed multifocal bilateral peripheral filling defects and occlusions involving the segmental and subsegmental arteries of upper and lower lobes pulmonary arteries, similar to the prior study, compatible with chronic PE  - Per Dr. Luu, no need for IVC filter because no DVT in major veins of legs present  - Coumadin resumed- Lovenox stopped  -     Acute on chronic systolic CHF w/ biV failure with severe pulm HTN  - Echo showed EF 45-50% w/ left ventricular concentric remodeling, grade I diastolic dysfunction, severely enlarged right atrium and right ventricle, moderate-severe tricuspid regurgitation & severe pulmonary hypertension  - Cardiology & Pulm following  - c/w Lasix   - c/w Lipitor, metoprolol   - patient has POOR followup - will needed outpatient followup with Dr. Anderson and [facility] - he did not followup before      s/p Supratherapeutic INR with h/o recent  PE/DVT in 3/2023  -s/p 10 of vitamin K- INR improved   - CT brain with no acute findings   - No indication for IVC filter at this time as per Vascular  - dopplers negative for DVT     Acute blood loss anemia  - due to hematuria - resolved  - H&H is stable     Strangulation of hernia on imaging   - CT showed a moderate left inguinal hernia containing mesenteric fat, nonobstructed segment of sigmoid colon and small amount of ascites. Given ascites within the left inguinal hernia, an element of strangulation could not be excluded by radiology  - Gen Surgery consulted - NO plans for surgery as inguinal Hernia was reduced at bedside without difficulty or pain    CT showed a probable complex cyst in the small upper right renal pole demonstrating partial increased density  - will need outpatient follow up    HLD  - c/w Lipitor     Constipation  - c/w Senna, Miralax and Dulcolax PRN    Prophylaxis:  DVT: coumadin   GI: Protonix     Dispo; Home/GISELL       Called the daughter  5/22/2023- Provided an update.  - The daughter will make an appointment to be scheduled with the PCP. To monitor Coumadin levels  - will than need to follow up with urology as out-patient to discuss treatment options if any.  - The daughter will be able to  her dad either this afternoon vs tomorrow morning- dispo planning in place

## 2023-05-24 LAB
ANION GAP SERPL CALC-SCNC: 5 MMOL/L — SIGNIFICANT CHANGE UP (ref 5–17)
BUN SERPL-MCNC: 19 MG/DL — SIGNIFICANT CHANGE UP (ref 7–23)
CALCIUM SERPL-MCNC: 8.5 MG/DL — SIGNIFICANT CHANGE UP (ref 8.5–10.1)
CHLORIDE SERPL-SCNC: 108 MMOL/L — SIGNIFICANT CHANGE UP (ref 96–108)
CO2 SERPL-SCNC: 30 MMOL/L — SIGNIFICANT CHANGE UP (ref 22–31)
CREAT SERPL-MCNC: 1.25 MG/DL — SIGNIFICANT CHANGE UP (ref 0.5–1.3)
EGFR: 59 ML/MIN/1.73M2 — LOW
GLUCOSE SERPL-MCNC: 131 MG/DL — HIGH (ref 70–99)
HCT VFR BLD CALC: 43.6 % — SIGNIFICANT CHANGE UP (ref 39–50)
HGB BLD-MCNC: 13.1 G/DL — SIGNIFICANT CHANGE UP (ref 13–17)
INR BLD: 2.74 RATIO — HIGH (ref 0.88–1.16)
MCHC RBC-ENTMCNC: 24.1 PG — LOW (ref 27–34)
MCHC RBC-ENTMCNC: 30 G/DL — LOW (ref 32–36)
MCV RBC AUTO: 80.1 FL — SIGNIFICANT CHANGE UP (ref 80–100)
NRBC # BLD: 0 /100 WBCS — SIGNIFICANT CHANGE UP (ref 0–0)
PLATELET # BLD AUTO: 237 K/UL — SIGNIFICANT CHANGE UP (ref 150–400)
POTASSIUM SERPL-MCNC: 3.7 MMOL/L — SIGNIFICANT CHANGE UP (ref 3.5–5.3)
POTASSIUM SERPL-SCNC: 3.7 MMOL/L — SIGNIFICANT CHANGE UP (ref 3.5–5.3)
PROTHROM AB SERPL-ACNC: 32.9 SEC — HIGH (ref 10.5–13.4)
RBC # BLD: 5.44 M/UL — SIGNIFICANT CHANGE UP (ref 4.2–5.8)
RBC # FLD: 16.2 % — HIGH (ref 10.3–14.5)
SODIUM SERPL-SCNC: 143 MMOL/L — SIGNIFICANT CHANGE UP (ref 135–145)
WBC # BLD: 4.53 K/UL — SIGNIFICANT CHANGE UP (ref 3.8–10.5)
WBC # FLD AUTO: 4.53 K/UL — SIGNIFICANT CHANGE UP (ref 3.8–10.5)

## 2023-05-24 PROCEDURE — 99232 SBSQ HOSP IP/OBS MODERATE 35: CPT

## 2023-05-24 RX ORDER — FUROSEMIDE 40 MG
1 TABLET ORAL
Qty: 30 | Refills: 0
Start: 2023-05-24 | End: 2023-06-22

## 2023-05-24 RX ORDER — METOPROLOL TARTRATE 50 MG
1 TABLET ORAL
Qty: 30 | Refills: 0
Start: 2023-05-24 | End: 2023-06-22

## 2023-05-24 RX ORDER — FINASTERIDE 5 MG/1
1 TABLET, FILM COATED ORAL
Qty: 30 | Refills: 0
Start: 2023-05-24 | End: 2023-06-22

## 2023-05-24 RX ORDER — WARFARIN SODIUM 2.5 MG/1
1 TABLET ORAL
Qty: 10 | Refills: 0
Start: 2023-05-24 | End: 2023-06-02

## 2023-05-24 RX ORDER — ATORVASTATIN CALCIUM 80 MG/1
1 TABLET, FILM COATED ORAL
Qty: 30 | Refills: 0
Start: 2023-05-24 | End: 2023-06-22

## 2023-05-24 RX ORDER — TAMSULOSIN HYDROCHLORIDE 0.4 MG/1
1 CAPSULE ORAL
Qty: 30 | Refills: 0
Start: 2023-05-24 | End: 2023-06-22

## 2023-05-24 RX ORDER — WARFARIN SODIUM 2.5 MG/1
3 TABLET ORAL ONCE
Refills: 0 | Status: COMPLETED | OUTPATIENT
Start: 2023-05-24 | End: 2023-05-24

## 2023-05-24 RX ORDER — SENNA PLUS 8.6 MG/1
2 TABLET ORAL
Qty: 0 | Refills: 0 | DISCHARGE
Start: 2023-05-24

## 2023-05-24 RX ORDER — PANTOPRAZOLE SODIUM 20 MG/1
1 TABLET, DELAYED RELEASE ORAL
Qty: 30 | Refills: 0
Start: 2023-05-24 | End: 2023-06-22

## 2023-05-24 RX ADMIN — Medication 650 MILLIGRAM(S): at 23:08

## 2023-05-24 RX ADMIN — POLYETHYLENE GLYCOL 3350 17 GRAM(S): 17 POWDER, FOR SOLUTION ORAL at 05:55

## 2023-05-24 RX ADMIN — Medication 100 MILLIGRAM(S): at 08:58

## 2023-05-24 RX ADMIN — Medication 40 MILLIGRAM(S): at 05:54

## 2023-05-24 RX ADMIN — Medication 1200 MILLIGRAM(S): at 05:53

## 2023-05-24 RX ADMIN — TAMSULOSIN HYDROCHLORIDE 0.4 MILLIGRAM(S): 0.4 CAPSULE ORAL at 21:29

## 2023-05-24 RX ADMIN — WARFARIN SODIUM 3 MILLIGRAM(S): 2.5 TABLET ORAL at 23:08

## 2023-05-24 RX ADMIN — Medication 50 MILLIGRAM(S): at 05:55

## 2023-05-24 RX ADMIN — Medication 1 SPRAY(S): at 08:18

## 2023-05-24 RX ADMIN — Medication 3 MILLIGRAM(S): at 21:29

## 2023-05-24 RX ADMIN — Medication 100 MILLIGRAM(S): at 23:09

## 2023-05-24 RX ADMIN — PANTOPRAZOLE SODIUM 40 MILLIGRAM(S): 20 TABLET, DELAYED RELEASE ORAL at 08:18

## 2023-05-24 RX ADMIN — ATORVASTATIN CALCIUM 40 MILLIGRAM(S): 80 TABLET, FILM COATED ORAL at 21:29

## 2023-05-24 RX ADMIN — FINASTERIDE 5 MILLIGRAM(S): 5 TABLET, FILM COATED ORAL at 12:14

## 2023-05-24 RX ADMIN — Medication 1 SPRAY(S): at 17:13

## 2023-05-24 RX ADMIN — Medication 1200 MILLIGRAM(S): at 17:14

## 2023-05-24 NOTE — PROGRESS NOTE ADULT - SUBJECTIVE AND OBJECTIVE BOX
CC: Patient is a 77y old  Male who presents with a chief complaint of sob/ hematuria (22 May 2023 12:48)      Patient seen and examined bedside.   no overnight events     REVIEW OF SYSTEMS: remaining ROS negative     Vital Signs Last 24 Hrs  T(C): 36.8 (24 May 2023 16:58), Max: 36.8 (24 May 2023 16:58)  T(F): 98.2 (24 May 2023 16:58), Max: 98.2 (24 May 2023 16:58)  HR: 80 (24 May 2023 16:58) (80 - 99)  BP: 102/66 (24 May 2023 16:58) (102/66 - 127/79)  BP(mean): --  RR: 18 (24 May 2023 16:58) (17 - 18)  SpO2: 94% (24 May 2023 16:58) (93% - 96%)    Parameters below as of 24 May 2023 05:38  Patient On (Oxygen Delivery Method): room air            Physical Exam:   Gen: NAD, no increased WOB   HEENT: NCAT, EOMI, PERRLA, Pupils_  CVS: RRR, +S1/S2, no murmurs, rubs or gallops appreciated  Lungs: CTAB, no wheeze, rales, rhonchi  Abdomen: +BS, soft, ND, NT. no palpable flank tenderness or mass, no CVA tenderness  Ext: No cyanosis, edema or calf tenderness  : Yan  Neuro: AAOx3, no focal deficits.    Labs:                                   13.1   4.53  )-----------( 237      ( 24 May 2023 10:50 )             43.6   05-24    143  |  108  |  19  ----------------------------<  131<H>  3.7   |  30  |  1.25    Ca    8.5      24 May 2023 10:50          Medications:  MEDICATIONS  (STANDING):  atorvastatin 40 milliGRAM(s) Oral at bedtime  enoxaparin Injectable 70 milliGRAM(s) SubCutaneous every 12 hours  finasteride 5 milliGRAM(s) Oral daily  fluticasone propionate 50 MICROgram(s)/spray Nasal Spray 1 Spray(s) Both Nostrils two times a day  furosemide    Tablet 40 milliGRAM(s) Oral daily  guaiFENesin ER 1200 milliGRAM(s) Oral every 12 hours  melatonin 3 milliGRAM(s) Oral at bedtime  metoprolol succinate ER 50 milliGRAM(s) Oral daily  pantoprazole    Tablet 40 milliGRAM(s) Oral before breakfast  polyethylene glycol 3350 17 Gram(s) Oral two times a day  potassium chloride   Powder 40 milliEquivalent(s) Oral once  senna 2 Tablet(s) Oral at bedtime  tamsulosin 0.4 milliGRAM(s) Oral at bedtime    MEDICATIONS  (PRN):  acetaminophen     Tablet .. 650 milliGRAM(s) Oral every 6 hours PRN Mild Pain (1 - 3), Moderate Pain (4 - 6)  benzocaine/menthol Lozenge 1 Lozenge Oral every 6 hours PRN Sore Throat  bisacodyl 5 milliGRAM(s) Oral every 12 hours PRN Constipation  guaiFENesin Oral Liquid (Sugar-Free) 100 milliGRAM(s) Oral every 6 hours PRN Cough    Consultant(s) Notes Reveiwed [x ] Yes     Care Discussed with [x ] Consultants  [ x] Patient  [ ] Family  [x ] /   [x ] Other; RN

## 2023-05-24 NOTE — PROGRESS NOTE ADULT - ASSESSMENT
77 year old male with history of HTN, chronic sys CHF, BPH, prostate cancer, recent  PE/DVT in 3/23 who was d/c on 4/7/23 on coumadin with ongoing hematuria/ Joseph noted from last visit presented w/ bilateral leg edema, sob and hematuria and was admitted for acute hypoxemic respiratory failure secondary to acute on chronic systolic CHF & gross hematuria due to supra-therapeutic INR.     gross hematuria  with hx of BPH/ Prostate Ca  - likely due to supratherapeutic INR  - Urology consulted  - joseph in place /exchanged by Uro 5/8  - no CBI needed: joseph draining clear  - Continue Joseph - patient Failed trial ov void on 5/9  - c/w finasteride and Flomax  - PSA 59.3  - MRI showed an abnormal diffusion, enhancement and signal noted in the right peripheral zone and seminal vesicles concerning for prostate carcinoma  - Underlying bladder mass could not be excluded on CT - patient will need cystoscopy and prostate bx as outpatient   - Cystoscopy cancelled- patient is high risk at this time.    PE/DVT in 3/2023  - US showed no evidence for acute DVT in the bilateral lower extremity deep venous systems  - CTA showed multifocal bilateral peripheral filling defects and occlusions involving the segmental and subsegmental arteries of upper and lower lobes pulmonary arteries, similar to the prior study, compatible with chronic PE  - Per Dr. Luu, no need for IVC filter because no DVT in major veins of legs present  - Coumadin resumed- Lovenox stopped  -     Acute on chronic systolic CHF w/ biV failure with severe pulm HTN  - Echo showed EF 45-50% w/ left ventricular concentric remodeling, grade I diastolic dysfunction, severely enlarged right atrium and right ventricle, moderate-severe tricuspid regurgitation & severe pulmonary hypertension  - Cardiology & Pulm following  - c/w Lasix   - c/w Lipitor, metoprolol   - patient has POOR followup - will needed outpatient followup with Dr. Anderson and [facility] - he did not followup before      s/p Supratherapeutic INR with h/o recent  PE/DVT in 3/2023  -s/p 10 of vitamin K- INR improved   - CT brain with no acute findings   - No indication for IVC filter at this time as per Vascular  - dopplers negative for DVT     Acute blood loss anemia  - due to hematuria - resolved  - H&H is stable     Strangulation of hernia on imaging   - CT showed a moderate left inguinal hernia containing mesenteric fat, nonobstructed segment of sigmoid colon and small amount of ascites. Given ascites within the left inguinal hernia, an element of strangulation could not be excluded by radiology  - Gen Surgery consulted - NO plans for surgery as inguinal Hernia was reduced at bedside without difficulty or pain    CT showed a probable complex cyst in the small upper right renal pole demonstrating partial increased density  - will need outpatient follow up    HLD  - c/w Lipitor     Constipation  - c/w Senna, Miralax and Dulcolax PRN    Prophylaxis:  DVT: coumadin   GI: Protonix     Dispo; Home/GISELL       Called the daughter  5/22/2023- Provided an update.  - The daughter will make an appointment to be scheduled with the PCP. To monitor Coumadin levels  - will than need to follow up with urology as out-patient to discuss treatment options if any.  - The daughter will be able to  her dad either this afternoon vs tomorrow morning- dispo planning in place

## 2023-05-25 LAB
INR BLD: 2.96 RATIO — HIGH (ref 0.88–1.16)
PROTHROM AB SERPL-ACNC: 35.9 SEC — HIGH (ref 10.5–13.4)

## 2023-05-25 PROCEDURE — 99232 SBSQ HOSP IP/OBS MODERATE 35: CPT

## 2023-05-25 RX ADMIN — Medication 1 SPRAY(S): at 17:32

## 2023-05-25 RX ADMIN — ATORVASTATIN CALCIUM 40 MILLIGRAM(S): 80 TABLET, FILM COATED ORAL at 21:45

## 2023-05-25 RX ADMIN — Medication 1200 MILLIGRAM(S): at 17:32

## 2023-05-25 RX ADMIN — PANTOPRAZOLE SODIUM 40 MILLIGRAM(S): 20 TABLET, DELAYED RELEASE ORAL at 06:14

## 2023-05-25 RX ADMIN — Medication 40 MILLIGRAM(S): at 06:14

## 2023-05-25 RX ADMIN — Medication 50 MILLIGRAM(S): at 06:14

## 2023-05-25 RX ADMIN — SENNA PLUS 2 TABLET(S): 8.6 TABLET ORAL at 21:45

## 2023-05-25 RX ADMIN — Medication 1200 MILLIGRAM(S): at 06:14

## 2023-05-25 RX ADMIN — Medication 650 MILLIGRAM(S): at 00:08

## 2023-05-25 RX ADMIN — Medication 3 MILLIGRAM(S): at 21:44

## 2023-05-25 RX ADMIN — Medication 1 SPRAY(S): at 06:15

## 2023-05-25 RX ADMIN — TAMSULOSIN HYDROCHLORIDE 0.4 MILLIGRAM(S): 0.4 CAPSULE ORAL at 21:45

## 2023-05-25 RX ADMIN — Medication 100 MILLIGRAM(S): at 22:27

## 2023-05-25 RX ADMIN — FINASTERIDE 5 MILLIGRAM(S): 5 TABLET, FILM COATED ORAL at 12:44

## 2023-05-25 NOTE — DISCHARGE NOTE NURSING/CASE MANAGEMENT/SOCIAL WORK - PATIENT PORTAL LINK FT
You can access the FollowMyHealth Patient Portal offered by [facility] by registering at the following website: [email or web address]. By joining [employer]’s FollowMyHealth portal, you will also be able to view your health information using other applications (apps) compatible with our system.

## 2023-05-25 NOTE — DISCHARGE NOTE NURSING/CASE MANAGEMENT/SOCIAL WORK - NSDCFUADDAPPT_GEN_ALL_CORE_FT
It is important to see your primary physician as well as other necessary consultants within the next week to perform a comprehensive medical review.  Call their offices for an appointment as soon as you leave the hospital.  If you do not have a primary physician or cant reach him/her, contact the [facility] Physician Referral Service at [phone].  Your medical issues appear to be stable at this time, but if your symptoms recur or worsen, contact your physicians and/or return to the hospital if necessary.  If you encounter any issues or questions with your medication, call your physicians before stopping the medication.

## 2023-05-25 NOTE — DISCHARGE NOTE NURSING/CASE MANAGEMENT/SOCIAL WORK - NSDCPEFALRISK_GEN_ALL_CORE
For information on Fall & Injury Prevention, visit: [email or web address] OR  [email or web address] OR  https://www.cdc.gov/steadi/patient.html

## 2023-05-26 VITALS
RESPIRATION RATE: 18 BRPM | TEMPERATURE: 98 F | OXYGEN SATURATION: 94 % | SYSTOLIC BLOOD PRESSURE: 113 MMHG | HEART RATE: 86 BPM | DIASTOLIC BLOOD PRESSURE: 71 MMHG

## 2023-05-26 PROCEDURE — 99239 HOSP IP/OBS DSCHRG MGMT >30: CPT

## 2023-05-26 RX ADMIN — Medication 1 SPRAY(S): at 06:00

## 2023-05-26 RX ADMIN — POLYETHYLENE GLYCOL 3350 17 GRAM(S): 17 POWDER, FOR SOLUTION ORAL at 05:58

## 2023-05-26 RX ADMIN — Medication 50 MILLIGRAM(S): at 05:58

## 2023-05-26 RX ADMIN — Medication 1200 MILLIGRAM(S): at 05:58

## 2023-05-26 RX ADMIN — PANTOPRAZOLE SODIUM 40 MILLIGRAM(S): 20 TABLET, DELAYED RELEASE ORAL at 05:58

## 2023-05-26 RX ADMIN — Medication 40 MILLIGRAM(S): at 05:58

## 2023-05-26 NOTE — PROGRESS NOTE ADULT - REASON FOR ADMISSION
sob/ hematuria

## 2023-05-26 NOTE — PROGRESS NOTE ADULT - TIME BILLING
Patient encounter, exam, F2F, chart review and documentation
Patient encounter, exam, F2F, chart review and documentation
min spent reviewing chart, examining patient, discussing plan with patient and family and staff, writing progress note and placing orders.
min spent reviewing chart, examining patient, discussing plan with patient and family and staff, writing progress note and placing orders.
Patient encounter, exam, F2F, chart review and documentation

## 2023-05-26 NOTE — PROGRESS NOTE ADULT - PROVIDER SPECIALTY LIST ADULT
Cardiology
Hospitalist
Hospitalist
Pulmonology
Urology
Cardiology
Hospitalist
Pulmonology
Urology
Vascular Surgery
Cardiology
Hospitalist
Hospitalist
Internal Medicine
Pulmonology
Urology
Cardiology
Hospitalist
Internal Medicine
Hospitalist
Internal Medicine
Hospitalist
Hospitalist

## 2023-05-26 NOTE — PROGRESS NOTE ADULT - ASSESSMENT
77 year old male with history of HTN, chronic sys CHF, BPH, prostate cancer, recent  PE/DVT in 3/23 who was d/c on 4/7/23 on coumadin with ongoing hematuria/ Joseph noted from last visit presented w/ bilateral leg edema, sob and hematuria and was admitted for acute hypoxemic respiratory failure secondary to acute on chronic systolic CHF & gross hematuria due to supra-therapeutic INR.     gross hematuria  with hx of BPH/ Prostate Ca  - likely due to supratherapeutic INR  - Urology consulted  - joseph in place /exchanged by Uro 5/8  - no CBI needed: joseph draining clear  - Continue Joseph - patient Failed trial ov void on 5/9  - c/w finasteride and Flomax  - PSA 59.3  - MRI showed an abnormal diffusion, enhancement and signal noted in the right peripheral zone and seminal vesicles concerning for prostate carcinoma  - Underlying bladder mass could not be excluded on CT - patient will need cystoscopy and prostate bx as outpatient   - Cystoscopy cancelled- patient is high risk at this time.    PE/DVT in 3/2023  - US showed no evidence for acute DVT in the bilateral lower extremity deep venous systems  - CTA showed multifocal bilateral peripheral filling defects and occlusions involving the segmental and subsegmental arteries of upper and lower lobes pulmonary arteries, similar to the prior study, compatible with chronic PE  - Per Dr. Luu, no need for IVC filter because no DVT in major veins of legs present  - Coumadin resumed- Lovenox stopped  -     Acute on chronic systolic CHF w/ biV failure with severe pulm HTN  - Echo showed EF 45-50% w/ left ventricular concentric remodeling, grade I diastolic dysfunction, severely enlarged right atrium and right ventricle, moderate-severe tricuspid regurgitation & severe pulmonary hypertension  - Cardiology & Pulm following  - c/w Lasix   - c/w Lipitor, metoprolol   - patient has POOR followup - will needed outpatient followup with Dr. Anderson and [facility] - he did not followup before      s/p Supratherapeutic INR with h/o recent  PE/DVT in 3/2023  -s/p 10 of vitamin K- INR improved   - CT brain with no acute findings   - No indication for IVC filter at this time as per Vascular  - dopplers negative for DVT     Acute blood loss anemia  - due to hematuria - resolved  - H&H is stable     Strangulation of hernia on imaging   - CT showed a moderate left inguinal hernia containing mesenteric fat, nonobstructed segment of sigmoid colon and small amount of ascites. Given ascites within the left inguinal hernia, an element of strangulation could not be excluded by radiology  - Gen Surgery consulted - NO plans for surgery as inguinal Hernia was reduced at bedside without difficulty or pain    CT showed a probable complex cyst in the small upper right renal pole demonstrating partial increased density  - will need outpatient follow up    HLD  - c/w Lipitor     Constipation  - c/w Senna, Miralax and Dulcolax PRN    Prophylaxis:  DVT: coumadin   GI: Protonix     Dispo; Home/GISELL       Called the daughter  5/22/2023- Provided an update.  - The daughter will make an appointment to be scheduled with the PCP. To monitor Coumadin levels  - will than need to follow up with urology as out-patient to discuss treatment options if any.  - The daughter will be able to  her dad either this afternoon vs tomorrow morning- dispo planning in place

## 2023-05-31 DIAGNOSIS — N40.1 BENIGN PROSTATIC HYPERPLASIA WITH LOWER URINARY TRACT SYMPTOMS: ICD-10-CM

## 2023-05-31 DIAGNOSIS — I27.82 CHRONIC PULMONARY EMBOLISM: ICD-10-CM

## 2023-05-31 DIAGNOSIS — I11.0 HYPERTENSIVE HEART DISEASE WITH HEART FAILURE: ICD-10-CM

## 2023-05-31 DIAGNOSIS — J96.01 ACUTE RESPIRATORY FAILURE WITH HYPOXIA: ICD-10-CM

## 2023-05-31 DIAGNOSIS — I50.9 HEART FAILURE, UNSPECIFIED: ICD-10-CM

## 2023-05-31 DIAGNOSIS — R33.8 OTHER RETENTION OF URINE: ICD-10-CM

## 2023-05-31 DIAGNOSIS — R18.8 OTHER ASCITES: ICD-10-CM

## 2023-05-31 DIAGNOSIS — D62 ACUTE POSTHEMORRHAGIC ANEMIA: ICD-10-CM

## 2023-05-31 DIAGNOSIS — K59.00 CONSTIPATION, UNSPECIFIED: ICD-10-CM

## 2023-05-31 DIAGNOSIS — T45.515A ADVERSE EFFECT OF ANTICOAGULANTS, INITIAL ENCOUNTER: ICD-10-CM

## 2023-05-31 DIAGNOSIS — C61 MALIGNANT NEOPLASM OF PROSTATE: ICD-10-CM

## 2023-05-31 DIAGNOSIS — Z79.82 LONG TERM (CURRENT) USE OF ASPIRIN: ICD-10-CM

## 2023-05-31 DIAGNOSIS — I27.20 PULMONARY HYPERTENSION, UNSPECIFIED: ICD-10-CM

## 2023-05-31 DIAGNOSIS — R31.0 GROSS HEMATURIA: ICD-10-CM

## 2023-05-31 DIAGNOSIS — D68.32 HEMORRHAGIC DISORDER DUE TO EXTRINSIC CIRCULATING ANTICOAGULANTS: ICD-10-CM

## 2023-05-31 DIAGNOSIS — N28.1 CYST OF KIDNEY, ACQUIRED: ICD-10-CM

## 2023-05-31 DIAGNOSIS — Z86.718 PERSONAL HISTORY OF OTHER VENOUS THROMBOSIS AND EMBOLISM: ICD-10-CM

## 2023-05-31 DIAGNOSIS — Z79.01 LONG TERM (CURRENT) USE OF ANTICOAGULANTS: ICD-10-CM

## 2023-05-31 DIAGNOSIS — I50.23 ACUTE ON CHRONIC SYSTOLIC (CONGESTIVE) HEART FAILURE: ICD-10-CM

## 2023-05-31 DIAGNOSIS — K40.90 UNILATERAL INGUINAL HERNIA, WITHOUT OBSTRUCTION OR GANGRENE, NOT SPECIFIED AS RECURRENT: ICD-10-CM

## 2023-06-02 ENCOUNTER — INPATIENT (INPATIENT)
Facility: HOSPITAL | Age: 78
LOS: 8 days | Discharge: HOME | End: 2023-06-11
Attending: INTERNAL MEDICINE | Admitting: INTERNAL MEDICINE
Payer: MEDICAID

## 2023-06-02 VITALS
HEIGHT: 66 IN | RESPIRATION RATE: 18 BRPM | HEART RATE: 91 BPM | OXYGEN SATURATION: 95 % | WEIGHT: 175.05 LBS | TEMPERATURE: 99 F | DIASTOLIC BLOOD PRESSURE: 75 MMHG | SYSTOLIC BLOOD PRESSURE: 110 MMHG

## 2023-06-02 LAB
ALBUMIN SERPL ELPH-MCNC: 3.1 G/DL — LOW (ref 3.3–5)
ALP SERPL-CCNC: 98 U/L — SIGNIFICANT CHANGE UP (ref 40–120)
ALT FLD-CCNC: 26 U/L — SIGNIFICANT CHANGE UP (ref 12–78)
ANION GAP SERPL CALC-SCNC: 4 MMOL/L — LOW (ref 5–17)
APPEARANCE UR: ABNORMAL
APTT BLD: 35.1 SEC — SIGNIFICANT CHANGE UP (ref 27.5–35.5)
AST SERPL-CCNC: 24 U/L — SIGNIFICANT CHANGE UP (ref 15–37)
BACTERIA # UR AUTO: ABNORMAL
BASOPHILS # BLD AUTO: 0.05 K/UL — SIGNIFICANT CHANGE UP (ref 0–0.2)
BASOPHILS NFR BLD AUTO: 0.9 % — SIGNIFICANT CHANGE UP (ref 0–2)
BILIRUB SERPL-MCNC: 0.9 MG/DL — SIGNIFICANT CHANGE UP (ref 0.2–1.2)
BILIRUB UR-MCNC: NEGATIVE — SIGNIFICANT CHANGE UP
BUN SERPL-MCNC: 22 MG/DL — SIGNIFICANT CHANGE UP (ref 7–23)
CALCIUM SERPL-MCNC: 8.3 MG/DL — LOW (ref 8.5–10.1)
CHLORIDE SERPL-SCNC: 107 MMOL/L — SIGNIFICANT CHANGE UP (ref 96–108)
CO2 SERPL-SCNC: 30 MMOL/L — SIGNIFICANT CHANGE UP (ref 22–31)
COLOR SPEC: YELLOW — SIGNIFICANT CHANGE UP
CREAT SERPL-MCNC: 1.37 MG/DL — HIGH (ref 0.5–1.3)
DIFF PNL FLD: ABNORMAL
EGFR: 53 ML/MIN/1.73M2 — LOW
EOSINOPHIL # BLD AUTO: 0.25 K/UL — SIGNIFICANT CHANGE UP (ref 0–0.5)
EOSINOPHIL NFR BLD AUTO: 4.5 % — SIGNIFICANT CHANGE UP (ref 0–6)
GLUCOSE SERPL-MCNC: 99 MG/DL — SIGNIFICANT CHANGE UP (ref 70–99)
GLUCOSE UR QL: NEGATIVE MG/DL — SIGNIFICANT CHANGE UP
HCT VFR BLD CALC: 36.2 % — LOW (ref 39–50)
HGB BLD-MCNC: 11 G/DL — LOW (ref 13–17)
IMM GRANULOCYTES NFR BLD AUTO: 0.2 % — SIGNIFICANT CHANGE UP (ref 0–0.9)
INR BLD: 1.44 RATIO — HIGH (ref 0.88–1.16)
KETONES UR-MCNC: NEGATIVE — SIGNIFICANT CHANGE UP
LEUKOCYTE ESTERASE UR-ACNC: ABNORMAL
LYMPHOCYTES # BLD AUTO: 1.21 K/UL — SIGNIFICANT CHANGE UP (ref 1–3.3)
LYMPHOCYTES # BLD AUTO: 22 % — SIGNIFICANT CHANGE UP (ref 13–44)
MAGNESIUM SERPL-MCNC: 2.4 MG/DL — SIGNIFICANT CHANGE UP (ref 1.6–2.6)
MCHC RBC-ENTMCNC: 24 PG — LOW (ref 27–34)
MCHC RBC-ENTMCNC: 30.4 G/DL — LOW (ref 32–36)
MCV RBC AUTO: 79 FL — LOW (ref 80–100)
MONOCYTES # BLD AUTO: 0.73 K/UL — SIGNIFICANT CHANGE UP (ref 0–0.9)
MONOCYTES NFR BLD AUTO: 13.2 % — SIGNIFICANT CHANGE UP (ref 2–14)
NEUTROPHILS # BLD AUTO: 3.26 K/UL — SIGNIFICANT CHANGE UP (ref 1.8–7.4)
NEUTROPHILS NFR BLD AUTO: 59.2 % — SIGNIFICANT CHANGE UP (ref 43–77)
NITRITE UR-MCNC: NEGATIVE — SIGNIFICANT CHANGE UP
NRBC # BLD: 0 /100 WBCS — SIGNIFICANT CHANGE UP (ref 0–0)
NT-PROBNP SERPL-SCNC: 2358 PG/ML — HIGH (ref 0–450)
PH UR: 6.5 — SIGNIFICANT CHANGE UP (ref 5–8)
PLATELET # BLD AUTO: 171 K/UL — SIGNIFICANT CHANGE UP (ref 150–400)
POTASSIUM SERPL-MCNC: 3.9 MMOL/L — SIGNIFICANT CHANGE UP (ref 3.5–5.3)
POTASSIUM SERPL-SCNC: 3.9 MMOL/L — SIGNIFICANT CHANGE UP (ref 3.5–5.3)
PROT SERPL-MCNC: 7.3 GM/DL — SIGNIFICANT CHANGE UP (ref 6–8.3)
PROT UR-MCNC: 100 MG/DL
PROTHROM AB SERPL-ACNC: 17.2 SEC — HIGH (ref 10.5–13.4)
RBC # BLD: 4.58 M/UL — SIGNIFICANT CHANGE UP (ref 4.2–5.8)
RBC # FLD: 16 % — HIGH (ref 10.3–14.5)
RBC CASTS # UR COMP ASSIST: ABNORMAL /HPF (ref 0–4)
SODIUM SERPL-SCNC: 141 MMOL/L — SIGNIFICANT CHANGE UP (ref 135–145)
SP GR SPEC: 1.01 — SIGNIFICANT CHANGE UP (ref 1.01–1.02)
TROPONIN I, HIGH SENSITIVITY RESULT: 37.5 NG/L — SIGNIFICANT CHANGE UP
UROBILINOGEN FLD QL: 8 MG/DL
WBC # BLD: 5.51 K/UL — SIGNIFICANT CHANGE UP (ref 3.8–10.5)
WBC # FLD AUTO: 5.51 K/UL — SIGNIFICANT CHANGE UP (ref 3.8–10.5)
WBC UR QL: ABNORMAL

## 2023-06-02 PROCEDURE — 93010 ELECTROCARDIOGRAM REPORT: CPT

## 2023-06-02 PROCEDURE — 74177 CT ABD & PELVIS W/CONTRAST: CPT | Mod: 26,MA

## 2023-06-02 PROCEDURE — 71275 CT ANGIOGRAPHY CHEST: CPT | Mod: 26,MA

## 2023-06-02 PROCEDURE — 93970 EXTREMITY STUDY: CPT | Mod: 26

## 2023-06-02 PROCEDURE — 71045 X-RAY EXAM CHEST 1 VIEW: CPT | Mod: 26

## 2023-06-02 PROCEDURE — 99285 EMERGENCY DEPT VISIT HI MDM: CPT

## 2023-06-02 RX ORDER — SODIUM CHLORIDE 9 MG/ML
250 INJECTION INTRAMUSCULAR; INTRAVENOUS; SUBCUTANEOUS ONCE
Refills: 0 | Status: COMPLETED | OUTPATIENT
Start: 2023-06-02 | End: 2023-06-02

## 2023-06-02 RX ADMIN — SODIUM CHLORIDE 250 MILLILITER(S): 9 INJECTION INTRAMUSCULAR; INTRAVENOUS; SUBCUTANEOUS at 22:58

## 2023-06-02 RX ADMIN — SODIUM CHLORIDE 250 MILLILITER(S): 9 INJECTION INTRAMUSCULAR; INTRAVENOUS; SUBCUTANEOUS at 21:56

## 2023-06-02 NOTE — ED PROVIDER NOTE - CARE PLAN
1 Principal Discharge DX:	Congestive heart failure  Secondary Diagnosis:	UTI (urinary tract infection)

## 2023-06-02 NOTE — ED ADULT NURSE NOTE - ED STAT RN HANDOFF DETAILS
Report given to Deneen VASQUEZ. RN made aware of patient [place] and lab results as well as bilateral PE. Patient A&Ox4, VSS, creole speaking.

## 2023-06-02 NOTE — ED ADULT NURSE NOTE - OBJECTIVE STATEMENT
Pt presents a&ox3 creole speaking, c/o paindul urination and he has a joseph with leg bag in place. Pt states the joseph has been in place for 22 days, intermittent painful urination over this course. Pt also has pain in bilateral legs, edema noted in ankles and legs, +2, and 10/10 pain in legs especially when touched and moved. Feet and legs are warm, pulses present. Pt states he had a fall on Friday, denies hitting his head, says he only hit his legs. When pt was admitted, low O2 sat high 80s, placed on 3L and O2 sat improved to 95%. PMH htn

## 2023-06-02 NOTE — ED ADULT NURSE NOTE - PATIENT/CAREGIVER ACCEPTED INTERPRETER SERVICES
Patient ID: Jose Reyes is a 45 y.o. male.    Chief Complaint: No chief complaint on file.      HPI:  Patient is a 45-year-old white male referred by dermatology service.  He has noted a pigmented lesion on the mid anterior left upper arm.  Initially he thought there were 2 small freckles with a seen to emerge together and then over the last couple years and become darker and more irregular and color change in the middle.  Denies any ulceration or bleeding.  He was felt not to have any other significant lesions noted by dermatology.  The patient underwent a shave biopsy of this lesion.  It returned melanoma invasive.  The depth was 0.52 mm.  There was no ulceration and the mitotic count was less than 1/mm².        Review of Systems   Constitutional: Negative.    HENT: Negative.    Eyes: Negative.    Respiratory: Negative.    Cardiovascular: Negative.    Gastrointestinal: Negative.    Endocrine:        Elevated triglycerides   Genitourinary: Negative.    Musculoskeletal: Negative.    Skin:        See history of present illness   Neurological: Negative.    Psychiatric/Behavioral: Negative.        Current Outpatient Prescriptions   Medication Sig Dispense Refill    rosuvastatin (CRESTOR) 10 MG tablet Take 1 tablet (10 mg total) by mouth once daily. 30 tablet 2    efinaconazole 10 % Ian Apply one application topically to toenail once daily 8 mL 11     No current facility-administered medications for this visit.        Review of patient's allergies indicates:  No Known Allergies    No past medical history on file.    Past Surgical History:   Procedure Laterality Date    EYE SURGERY         Family History   Problem Relation Age of Onset    Heart disease Father     Cancer Brother     Birth defects Brother     Heart disease Brother        Social History     Social History    Marital status:      Spouse name: N/A    Number of children: N/A    Years of education: N/A     Occupational History    Not on  file.     Social History Main Topics    Smoking status: Never Smoker    Smokeless tobacco: Never Used    Alcohol use No    Drug use: No    Sexual activity: Yes     Other Topics Concern    Not on file     Social History Narrative    No narrative on file       Vitals:    07/26/17 1456   BP: 123/78   Pulse: 78   Temp: 98.1 °F (36.7 °C)       Physical Exam   Constitutional: He is oriented to person, place, and time. He appears well-developed and well-nourished.   HENT:   Head: Atraumatic.   Eyes: EOM are normal. Pupils are equal, round, and reactive to light.   Neck: Normal range of motion. Neck supple.   Cardiovascular: Normal rate and regular rhythm.    Pulmonary/Chest: Effort normal and breath sounds normal.   Abdominal: Soft. Bowel sounds are normal.   Musculoskeletal: Normal range of motion.   Neurological: He is alert and oriented to person, place, and time.   Skin:   There is a 1.6 x 1.2 cm scab at the area of the shave excision of the melanoma.  The scab appears to be clean and dry and healing well is no surrounding erythema.  This scan was on the anterior mid upper arm.  His have tattoo proximal to that bilaterally.  Do not feel any axillary lymphadenopathy.   Psychiatric: He has a normal mood and affect. His behavior is normal.       Assessment & Plan:    melanoma left upper arm.  This appears to be a T1 lesion.  Because the depth is only 0.52 mm and there is no ulceration and the mitotic count was less than 1/mm² would not recommend a sentinel node biopsy.  However he does need a wide excision.  He needs 1 cm margin on his excision.  I marked with a marker a 1 cm margin around this lesion it appears that it could be excised without any skin graft.  Also to excision of the lesion with a 1 cm margin.  Counseled as to the risk of bleeding infection local recurrence of the melanoma possibility of breakdown or blistering of the skin.  There is a small possibility of skin breakdown or opening.  He is to  avoid any aspirin or NSAIDs before the procedure.   no yes

## 2023-06-02 NOTE — ED PROVIDER NOTE - OBJECTIVE STATEMENT
Pertinent PMH/PSH/FHx/SHx and Review of Systems contained within:  Patient presents to the ED for multiple reasons.  Patient interviewed in Creole.  Patient was at home, complaining to visiting RN about joseph cath discomfort, has indwelling catheter.  Denies any abdominal pain, reports suprapubic discomfort.  No blood noted in joseph bag.  Patient also found to be hypoxic at home in the 80's.  Has known h/o CHF, denies chest pain, fever, cough.  Patient was just admitted for similar episode. He is on coumadin for prior PE/DVT.      Relevant PMHx/SHx/SOCHx/FAMH:  HTN, CHF, prostate cancer, PE/DVT on coumadin  Patient denies EtOH/tobacco/illicit substance use.      ROS: No fever/chills, No headache/photophobia/eye pain/changes in vision, No ear pain/sore throat/dysphagia, No chest pain/palpitations, no cough/wheeze/stridor, No abdominal pain, No N/V/D/melena, no frequency/discharge, No neck/back pain, no rash, no changes in neurological status/function.

## 2023-06-02 NOTE — ED ADULT TRIAGE NOTE - CHIEF COMPLAINT QUOTE
BIBEMS from home c/o SOB and blood in his urine  and that the catheter  is hurting pt with bi lateral leg swelling  pt with cough and chills recently treated and released from the hospital  as per EMS pt was 91% o2 room air and went to 88% on excretion pt arrives with Yan with leg bag  from home pt reports its been in place for a month pt noted to have abdominal distention

## 2023-06-02 NOTE — ED ADULT NURSE NOTE - NSFALLHARMRISKINTERV_ED_ALL_ED
Assistance with ambulation/Communicate risk of Fall with Harm to all staff, patient, and family/Provide visual cue: red socks, yellow wristband, yellow gown, etc/Reinforce activity limits and safety measures with patient and family/Bed in lowest position, wheels locked, appropriate side rails in place/Call bell, personal items and telephone in reach/Instruct patient to call for assistance before getting out of bed/chair/stretcher/Non-slip footwear applied when patient is off stretcher/[place] to call system/Physically safe environment - no spills, clutter or unnecessary equipment/Purposeful Proactive Rounding/Room/bathroom lighting operational, light cord in reach

## 2023-06-02 NOTE — ED PROVIDER NOTE - CLINICAL SUMMARY MEDICAL DECISION MAKING FREE TEXT BOX
Patient with complaint of joseph cath discomfort, blood in urine, suprapubic discomfort.  Also with acute CHF exacerbation.  Stable with NC.  Will evaluate with labs, UA, CXR, CT imaging, likely admit. Patient with complaint of joseph cath discomfort, blood in urine, suprapubic discomfort.  Also with acute CHF exacerbation.  Stable with NC.  Will evaluate with labs, UA, CXR, CT imaging, likely admit.    Admit note:  Patient in CHF, lasix given, not requiring Bipap.  UA with UTI, started on rocephin.  CT negative for acute findings in chest or abd, chronic clot burden noted.  Patient is to be admitted to the hospital and the case was discussed with the admitting physician.  Any changes in plan, additional imaging/labs, and further work up will be at the discretion of the admitting physician. Per discussion with admitting physician, all necessary consults for admitted patients to be obtained by morning team unless patient requires emergent intervention or medical advice by specialist overnight.

## 2023-06-02 NOTE — ED PROVIDER NOTE - PHYSICAL EXAMINATION
Gen: Alert, NAD, well appearing  Head: NC, AT, EOMI, normal lids/conjunctiva  ENT: normal hearing, patent oropharynx without erythema/exudate, uvula midline  Neck: +supple, no JVD, +Trachea midline  Pulm: Bilateral BS, slightly increased resp effort, +fine BL crackles  Abd: soft, NT/ND, Negative Salley signs, +BS, no palpable masses  : clear dark urine draining  Mskel: no edema/erythema/cyanosis  Skin: no rash, warm/dry  Neuro: AAOx3, no apparent sensory/motor deficits, coordination intact

## 2023-06-03 DIAGNOSIS — C61 MALIGNANT NEOPLASM OF PROSTATE: ICD-10-CM

## 2023-06-03 DIAGNOSIS — R79.1 ABNORMAL COAGULATION PROFILE: ICD-10-CM

## 2023-06-03 DIAGNOSIS — I50.41 ACUTE COMBINED SYSTOLIC (CONGESTIVE) AND DIASTOLIC (CONGESTIVE) HEART FAILURE: ICD-10-CM

## 2023-06-03 DIAGNOSIS — I50.43 ACUTE ON CHRONIC COMBINED SYSTOLIC (CONGESTIVE) AND DIASTOLIC (CONGESTIVE) HEART FAILURE: ICD-10-CM

## 2023-06-03 DIAGNOSIS — I26.99 OTHER PULMONARY EMBOLISM WITHOUT ACUTE COR PULMONALE: ICD-10-CM

## 2023-06-03 DIAGNOSIS — I10 ESSENTIAL (PRIMARY) HYPERTENSION: ICD-10-CM

## 2023-06-03 DIAGNOSIS — Z86.711 PERSONAL HISTORY OF PULMONARY EMBOLISM: ICD-10-CM

## 2023-06-03 LAB
ANION GAP SERPL CALC-SCNC: 7 MMOL/L — SIGNIFICANT CHANGE UP (ref 5–17)
BUN SERPL-MCNC: 20 MG/DL — SIGNIFICANT CHANGE UP (ref 7–23)
CALCIUM SERPL-MCNC: 9 MG/DL — SIGNIFICANT CHANGE UP (ref 8.5–10.1)
CHLORIDE SERPL-SCNC: 107 MMOL/L — SIGNIFICANT CHANGE UP (ref 96–108)
CO2 SERPL-SCNC: 30 MMOL/L — SIGNIFICANT CHANGE UP (ref 22–31)
CREAT SERPL-MCNC: 1.24 MG/DL — SIGNIFICANT CHANGE UP (ref 0.5–1.3)
EGFR: 60 ML/MIN/1.73M2 — SIGNIFICANT CHANGE UP
GLUCOSE SERPL-MCNC: 119 MG/DL — HIGH (ref 70–99)
INR BLD: 1.49 RATIO — HIGH (ref 0.88–1.16)
MAGNESIUM SERPL-MCNC: 2.2 MG/DL — SIGNIFICANT CHANGE UP (ref 1.6–2.6)
POTASSIUM SERPL-MCNC: 3.2 MMOL/L — LOW (ref 3.5–5.3)
POTASSIUM SERPL-SCNC: 3.2 MMOL/L — LOW (ref 3.5–5.3)
PROTHROM AB SERPL-ACNC: 18 SEC — HIGH (ref 10.5–13.4)
SODIUM SERPL-SCNC: 144 MMOL/L — SIGNIFICANT CHANGE UP (ref 135–145)

## 2023-06-03 PROCEDURE — 99223 1ST HOSP IP/OBS HIGH 75: CPT

## 2023-06-03 RX ORDER — FUROSEMIDE 40 MG
40 TABLET ORAL ONCE
Refills: 0 | Status: COMPLETED | OUTPATIENT
Start: 2023-06-03 | End: 2023-06-03

## 2023-06-03 RX ORDER — ONDANSETRON 8 MG/1
4 TABLET, FILM COATED ORAL EVERY 8 HOURS
Refills: 0 | Status: DISCONTINUED | OUTPATIENT
Start: 2023-06-03 | End: 2023-06-11

## 2023-06-03 RX ORDER — FUROSEMIDE 40 MG
40 TABLET ORAL
Refills: 0 | Status: DISCONTINUED | OUTPATIENT
Start: 2023-06-03 | End: 2023-06-07

## 2023-06-03 RX ORDER — POTASSIUM CHLORIDE 20 MEQ
40 PACKET (EA) ORAL ONCE
Refills: 0 | Status: COMPLETED | OUTPATIENT
Start: 2023-06-03 | End: 2023-06-03

## 2023-06-03 RX ORDER — PANTOPRAZOLE SODIUM 20 MG/1
40 TABLET, DELAYED RELEASE ORAL
Refills: 0 | Status: DISCONTINUED | OUTPATIENT
Start: 2023-06-03 | End: 2023-06-11

## 2023-06-03 RX ORDER — TAMSULOSIN HYDROCHLORIDE 0.4 MG/1
0.4 CAPSULE ORAL AT BEDTIME
Refills: 0 | Status: DISCONTINUED | OUTPATIENT
Start: 2023-06-03 | End: 2023-06-11

## 2023-06-03 RX ORDER — ATORVASTATIN CALCIUM 80 MG/1
40 TABLET, FILM COATED ORAL AT BEDTIME
Refills: 0 | Status: DISCONTINUED | OUTPATIENT
Start: 2023-06-03 | End: 2023-06-11

## 2023-06-03 RX ORDER — LANOLIN ALCOHOL/MO/W.PET/CERES
3 CREAM (GRAM) TOPICAL AT BEDTIME
Refills: 0 | Status: DISCONTINUED | OUTPATIENT
Start: 2023-06-03 | End: 2023-06-11

## 2023-06-03 RX ORDER — ACETAMINOPHEN 500 MG
650 TABLET ORAL EVERY 6 HOURS
Refills: 0 | Status: DISCONTINUED | OUTPATIENT
Start: 2023-06-03 | End: 2023-06-11

## 2023-06-03 RX ORDER — CEFTRIAXONE 500 MG/1
1000 INJECTION, POWDER, FOR SOLUTION INTRAMUSCULAR; INTRAVENOUS ONCE
Refills: 0 | Status: COMPLETED | OUTPATIENT
Start: 2023-06-03 | End: 2023-06-03

## 2023-06-03 RX ORDER — METOPROLOL TARTRATE 50 MG
50 TABLET ORAL DAILY
Refills: 0 | Status: DISCONTINUED | OUTPATIENT
Start: 2023-06-03 | End: 2023-06-11

## 2023-06-03 RX ORDER — FINASTERIDE 5 MG/1
5 TABLET, FILM COATED ORAL DAILY
Refills: 0 | Status: DISCONTINUED | OUTPATIENT
Start: 2023-06-03 | End: 2023-06-11

## 2023-06-03 RX ORDER — ENOXAPARIN SODIUM 100 MG/ML
80 INJECTION SUBCUTANEOUS EVERY 12 HOURS
Refills: 0 | Status: DISCONTINUED | OUTPATIENT
Start: 2023-06-03 | End: 2023-06-11

## 2023-06-03 RX ADMIN — FINASTERIDE 5 MILLIGRAM(S): 5 TABLET, FILM COATED ORAL at 13:22

## 2023-06-03 RX ADMIN — TAMSULOSIN HYDROCHLORIDE 0.4 MILLIGRAM(S): 0.4 CAPSULE ORAL at 21:09

## 2023-06-03 RX ADMIN — Medication 40 MILLIGRAM(S): at 05:24

## 2023-06-03 RX ADMIN — Medication 40 MILLIGRAM(S): at 13:22

## 2023-06-03 RX ADMIN — CEFTRIAXONE 1000 MILLIGRAM(S): 500 INJECTION, POWDER, FOR SOLUTION INTRAMUSCULAR; INTRAVENOUS at 02:03

## 2023-06-03 RX ADMIN — ENOXAPARIN SODIUM 80 MILLIGRAM(S): 100 INJECTION SUBCUTANEOUS at 05:24

## 2023-06-03 RX ADMIN — ENOXAPARIN SODIUM 80 MILLIGRAM(S): 100 INJECTION SUBCUTANEOUS at 17:24

## 2023-06-03 RX ADMIN — Medication 50 MILLIGRAM(S): at 05:24

## 2023-06-03 RX ADMIN — PANTOPRAZOLE SODIUM 40 MILLIGRAM(S): 20 TABLET, DELAYED RELEASE ORAL at 06:13

## 2023-06-03 RX ADMIN — Medication 40 MILLIGRAM(S): at 02:09

## 2023-06-03 RX ADMIN — ATORVASTATIN CALCIUM 40 MILLIGRAM(S): 80 TABLET, FILM COATED ORAL at 21:09

## 2023-06-03 RX ADMIN — CEFTRIAXONE 100 MILLIGRAM(S): 500 INJECTION, POWDER, FOR SOLUTION INTRAMUSCULAR; INTRAVENOUS at 01:20

## 2023-06-03 RX ADMIN — Medication 40 MILLIEQUIVALENT(S): at 17:23

## 2023-06-03 NOTE — H&P ADULT - HISTORY OF PRESENT ILLNESS
Patient 77m with PMH HTN, CHF, prostate cancer, PE/DVT on coumadin presents to the ED for multiple reasons.  Patient interviewed in Creole.  Patient was at home, complaining to visiting RN about joseph cath discomfort, has indwelling catheter.  Denies any abdominal pain, reports suprapubic discomfort.  No blood noted in joseph bag.  Patient also found to be hypoxic at home in the 80's.  Has known h/o CHF, denies chest pain, fever, cough.  Patient was just admitted for similar episode and discharged on 5/25. He is on coumadin for prior PE/DVT.

## 2023-06-03 NOTE — H&P ADULT - NSICDXPASTMEDICALHX_GEN_ALL_CORE_FT
PAST MEDICAL HISTORY:  Chronic combined systolic and diastolic heart failure     History of pulmonary embolism     HTN (hypertension)     Prostate cancer

## 2023-06-03 NOTE — H&P ADULT - PROBLEM SELECTOR PLAN 1
Fluid restriction  IV lasix 40 BID  recent echo 1 month ago no indication for repeat. noted EF 45-50% diastolic dysfunction and severe pulm HTN .  daily wts  chf labs

## 2023-06-03 NOTE — H&P ADULT - NSHPLABSRESULTS_GEN_ALL_CORE
=======================================================  Labs:                        11.0   5.51  )-----------( 171      ( 02 Jun 2023 21:22 )             36.2     06-02    141  |  107  |  22  ----------------------------<  99  3.9   |  30  |  1.37<H>    Ca    8.3<L>      02 Jun 2023 21:22  Mg     2.4     06-02    TPro  7.3  /  Alb  3.1<L>  /  TBili  0.9  /  DBili  x   /  AST  24  /  ALT  26  /  AlkPhos  98  06-02      Creatinine, Serum: 1.37 mg/dL (06-02-23 @ 21:22)            WBC Count: 5.51 K/uL (06-02-23 @ 21:22)    SARS-CoV-2 Result: NotDetec (05-18-23 @ 05:00)      Alkaline Phosphatase, Serum: 98 U/L (06-02-23 @ 21:22)  Alanine Aminotransferase (ALT/SGPT): 26 U/L (06-02-23 @ 21:22)  Aspartate Aminotransferase (AST/SGOT): 24 U/L (06-02-23 @ 21:22)  Bilirubin Total, Serum: 0.9 mg/dL (06-02-23 @ 21:22)      < from: CT Angio Chest PE Protocol w/ IV Cont (06.02.23 @ 23:06) >      IMPRESSION:  No significant change compared to previous studies.    Chronic bilateral pulmonary emboli, clot volume not appreciably changed.    CHF with cardiomegaly, mild interstitial pulmonary edema, tracepleural   effusions, small amount of ascites, hepatomegaly, and anasarca.    Enlarged prostate. Urinary bladder decompressed with Yan catheter.        --- End of Report ---    < end of copied text >

## 2023-06-03 NOTE — PROGRESS NOTE ADULT - ASSESSMENT
78 yo haitain creole speaking male w/ pmhx ofHTN, CHF, prostate cancer, PE/DVT on coumadin admitted to TELE for acute hypoxemic respiratory failure 2/2 chf exacerbation. 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 noted EF 45-50% diastolic dysfunction and severe pulm HTN   - unclear etiology of decompensation     HEME/ONC  # PE/DVT  # subtherapuetic INR  - INR 1.44  - unclear compliance with medication  - on weight based lovenox    UROLOGY  # Prostate cancer.   - joseph   - finesteride  - flomax.    PLAN  - c/w TELE  - diuresis  - follow up with CARDIOLOGY

## 2023-06-03 NOTE — H&P ADULT - ASSESSMENT
Patient 77m with PMH HTN, CHF, prostate cancer, PE/DVT on coumadin presents to the ED after found to be hypoxic at home in the 80's. Workup with CHF exacerbation also subtherapeutic INR

## 2023-06-03 NOTE — H&P ADULT - NSHPPHYSICALEXAM_GEN_ALL_CORE
VITALS:   T(C): 36.4 (06-03-23 @ 04:38), Max: 37.1 (06-02-23 @ 18:10)  HR: 88 (06-03-23 @ 04:38) (81 - 91)  BP: 119/74 (06-03-23 @ 04:38) (101/66 - 133/86)  RR: 18 (06-03-23 @ 04:38) (17 - 19)  SpO2: 95% (06-03-23 @ 04:38) (94% - 96%)    GENERAL: NAD, lying in bed comfortably  HEAD:  Atraumatic, Normocephalic  EYES: EOMI, PERRLA, conjunctiva and sclera clear  ENT: Moist mucous membranes  NECK: Supple, No JVD  CHEST/LUNG: b/l crackles  HEART: Regular rate and rhythm; No murmurs, rubs, or gallops  ABDOMEN: BSx4; Soft, nontender, nondistended  EXTREMITIES:  No clubbing, cyanosis, or edema  NERVOUS SYSTEM:  A&Ox3, no focal deficits   SKIN: No rashes or lesions VITALS:   T(C): 36.4 (06-03-23 @ 04:38), Max: 37.1 (06-02-23 @ 18:10)  HR: 88 (06-03-23 @ 04:38) (81 - 91)  BP: 119/74 (06-03-23 @ 04:38) (101/66 - 133/86)  RR: 18 (06-03-23 @ 04:38) (17 - 19)  SpO2: 95% (06-03-23 @ 04:38) (94% - 96%)    GENERAL: NAD, lying in bed comfortably NC in place  HEAD:  Atraumatic, Normocephalic  EYES: EOMI, PERRLA, conjunctiva and sclera clear  ENT: Moist mucous membranes  NECK: Supple, No JVD  CHEST/LUNG: b/l crackles  HEART: Regular rate and rhythm; No murmurs, rubs, or gallops  ABDOMEN: BSx4; Soft, nontender, nondistended  EXTREMITIES:  No clubbing, cyanosis, 3+ pitting edema b/l  NERVOUS SYSTEM:  A&Ox3, no focal deficits   SKIN: No rashes or lesions

## 2023-06-03 NOTE — ED ADULT NURSE REASSESSMENT NOTE - NS ED NURSE REASSESS COMMENT FT1
16Fr joseph catheter in place prior to arrival. 16 Fr joseph placed now in ED for clean urine specimen.
Patient voided 1,250 mL
Patient voided 2,000mL
Patient A&Ox4 received on stretcher from CHRISTINA Summers at change of shift; patient identified. VSS, no signs of distress noted or verbalized; patient endorsing some SOB and pain in legs. Breathing spontaneous, equal, unlabored. Creole  Janey ([id number]) utilized. EKG ordered, IV access placed 20g to right AC, patient made aware of plan of care at this time. Patient on cardiac monitor.

## 2023-06-03 NOTE — H&P ADULT - NSHPREVIEWOFSYSTEMS_GEN_ALL_CORE
REVIEW OF SYSTEMS:    CONSTITUTIONAL: No weakness, fevers or chills  EYES/ENT: No visual changes;  No vertigo or throat pain   NECK: No pain or stiffness  RESPIRATORY: No cough, wheezing, hemoptysis; +shortness of breath  CARDIOVASCULAR: No chest pain or palpitations  GASTROINTESTINAL: No abdominal or epigastric pain. No nausea, vomiting, or hematemesis; No diarrhea or constipation. No melena or hematochezia.  GENITOURINARY: joseph discomfort   NEUROLOGICAL: No numbness or weakness  SKIN: No itching, rashes

## 2023-06-04 LAB
ANION GAP SERPL CALC-SCNC: 5 MMOL/L — SIGNIFICANT CHANGE UP (ref 5–17)
BUN SERPL-MCNC: 21 MG/DL — SIGNIFICANT CHANGE UP (ref 7–23)
CALCIUM SERPL-MCNC: 8.2 MG/DL — LOW (ref 8.5–10.1)
CHLORIDE SERPL-SCNC: 105 MMOL/L — SIGNIFICANT CHANGE UP (ref 96–108)
CO2 SERPL-SCNC: 29 MMOL/L — SIGNIFICANT CHANGE UP (ref 22–31)
CREAT SERPL-MCNC: 1.37 MG/DL — HIGH (ref 0.5–1.3)
EGFR: 53 ML/MIN/1.73M2 — LOW
GLUCOSE SERPL-MCNC: 116 MG/DL — HIGH (ref 70–99)
INR BLD: 1.41 RATIO — HIGH (ref 0.88–1.16)
MAGNESIUM SERPL-MCNC: 2.2 MG/DL — SIGNIFICANT CHANGE UP (ref 1.6–2.6)
NT-PROBNP SERPL-SCNC: 2632 PG/ML — HIGH (ref 0–450)
PHOSPHATE SERPL-MCNC: 2.8 MG/DL — SIGNIFICANT CHANGE UP (ref 2.5–4.5)
POTASSIUM SERPL-MCNC: 3.4 MMOL/L — LOW (ref 3.5–5.3)
POTASSIUM SERPL-SCNC: 3.4 MMOL/L — LOW (ref 3.5–5.3)
PROTHROM AB SERPL-ACNC: 16.8 SEC — HIGH (ref 10.5–13.4)
SODIUM SERPL-SCNC: 139 MMOL/L — SIGNIFICANT CHANGE UP (ref 135–145)

## 2023-06-04 PROCEDURE — 99232 SBSQ HOSP IP/OBS MODERATE 35: CPT

## 2023-06-04 PROCEDURE — 99223 1ST HOSP IP/OBS HIGH 75: CPT

## 2023-06-04 RX ORDER — POTASSIUM CHLORIDE 20 MEQ
40 PACKET (EA) ORAL ONCE
Refills: 0 | Status: COMPLETED | OUTPATIENT
Start: 2023-06-04 | End: 2023-06-04

## 2023-06-04 RX ADMIN — ATORVASTATIN CALCIUM 40 MILLIGRAM(S): 80 TABLET, FILM COATED ORAL at 21:17

## 2023-06-04 RX ADMIN — FINASTERIDE 5 MILLIGRAM(S): 5 TABLET, FILM COATED ORAL at 12:30

## 2023-06-04 RX ADMIN — Medication 50 MILLIGRAM(S): at 05:42

## 2023-06-04 RX ADMIN — Medication 40 MILLIGRAM(S): at 13:06

## 2023-06-04 RX ADMIN — ENOXAPARIN SODIUM 80 MILLIGRAM(S): 100 INJECTION SUBCUTANEOUS at 17:11

## 2023-06-04 RX ADMIN — PANTOPRAZOLE SODIUM 40 MILLIGRAM(S): 20 TABLET, DELAYED RELEASE ORAL at 05:42

## 2023-06-04 RX ADMIN — Medication 40 MILLIGRAM(S): at 05:44

## 2023-06-04 RX ADMIN — Medication 40 MILLIEQUIVALENT(S): at 12:30

## 2023-06-04 RX ADMIN — ENOXAPARIN SODIUM 80 MILLIGRAM(S): 100 INJECTION SUBCUTANEOUS at 05:44

## 2023-06-04 RX ADMIN — TAMSULOSIN HYDROCHLORIDE 0.4 MILLIGRAM(S): 0.4 CAPSULE ORAL at 21:17

## 2023-06-04 NOTE — PHYSICAL THERAPY INITIAL EVALUATION ADULT - PERTINENT HX OF CURRENT PROBLEM, REHAB EVAL
This is the hx of MARIVEL a 76 y/o male patient who [place] [facility] of [place]F, UTI affecting medical condition and with subsequent affection on functional mobility. CT Angio chest 6/2/23: Chronic B Pulmonary Embolism. US Duplex 6/2/23: (-) DVT.

## 2023-06-04 NOTE — PHYSICAL THERAPY INITIAL EVALUATION ADULT - ADDITIONAL COMMENTS
AS per pt thru , pt lives in a house with 7 steps to enter with b/l HR's going down, no other steps. He was independent in all functional mobility using no AD, PTA.

## 2023-06-04 NOTE — PHYSICAL THERAPY INITIAL EVALUATION ADULT - PLANNED THERAPY INTERVENTIONS, PT EVAL
To be able to perform stair negotiation with no device and 1 hand rails Independently to improve access and exiting from home and access to bedrooms, basement and bathrooms by 2 weeks/balance training/bed mobility training/gait training/strengthening/transfer training

## 2023-06-04 NOTE — PHYSICAL THERAPY INITIAL EVALUATION ADULT - DID THE PATIENT HAVE SURGERY?
This is the hx of MARIVEL a 78 y/o male patient who was admitted to [facility] due to complications of CHF, UTI affecting medical condition and with subsequent affection on functional mobility./n/a

## 2023-06-04 NOTE — CONSULT NOTE ADULT - SUBJECTIVE AND OBJECTIVE BOX
CHIEF COMPLAINT:  Patient is a 77y old  Male who presents with a chief complaint of SOB  , CHF (2023 13:59)      HPI:  Patient 77m with PMH HTN, CHF, prostate cancer, PE/DVT on coumadin presents to the ED for multiple reasons.  Patient interviewed in Creole.  Patient was at home, complaining to visiting RN about joseph cath discomfort, has indwelling catheter.  Denies any abdominal pain, reports suprapubic discomfort.  No blood noted in joseph bag.  Patient also found to be hypoxic at home in the 80's.  Has known h/o CHF, denies chest pain, fever, cough.  Patient was just admitted for similar episode and discharged on . He is on coumadin for prior PE/DVT.   (2023 05:58)    ALLERGIES:  No Known Allergies      Home Medications:  senna leaf extract oral tablet: 2 tab(s) orally once a day (at bedtime) (2023 01:23)    PAST MEDICAL & SURGICAL HISTORY:  HTN (hypertension)      Prostate cancer      Chronic combined systolic and diastolic heart failure      History of pulmonary embolism      No significant past surgical history      FAMILY HISTORY:      SOCIAL HISTORY:      REVIEW OF SYSTEMS:  General:  No wt loss, fevers, chills, night sweats  Eyes:  Good vision, no reported pain  ENT:  No sore throat, pain, runny nose, dysphagia  CV:  No pain, palpitations, hypo/hypertension  Resp:  No dyspnea, cough, tachypnea, wheezing  GI:  No pain, nausea, vomiting, diarrhea, constipation  :  No pain, bleeding, incontinence, nocturia  Muscle:  No pain, weakness  Breast:  No pain, abscess, mass, discharge  Neuro:  No weakness, tingling, memory problems  Psych:  No fatigue, insomnia, mood problems, depression  Endocrine:  No polyuria, polydipsia, cold/heat intolerance  Heme:  No petechiae, ecchymosis, easy bruisability  Skin:  No rash, edema    PHYSICAL EXAM:  Vital Signs:  Vital Signs Last 24 Hrs  T(C): 37.1 (2023 04:42), Max: 37.2 (2023 10:31)  T(F): 98.7 (2023 04:42), Max: 99 (2023 10:31)  HR: 85 (2023 04:42) (82 - 88)  BP: 104/71 (2023 04:42) (101/65 - 111/72)  RR: 18 (2023 04:42) (18 - 18)  SpO2: 95% (2023 04:42) (92% - 95%)      I&O's Summary    2023 07:01  -  2023 07:00  --------------------------------------------------------  IN: 240 mL / OUT: 2500 mL / NET: -2260 mL      I&O's Detail    2023 07:01  -  2023 07:00  --------------------------------------------------------  IN:    Oral Fluid: 240 mL  Total IN: 240 mL    OUT:    Indwelling Catheter - Urethral (mL): 2500 mL  Total OUT: 2500 mL    Total NET: -2260 mL      Tele:     Constitutional: well developed, normal appearance, well groomed, well nourished, no deformities and no acute distress.   Eyes: the conjunctiva exhibited no abnormalities and the eyelids demonstrated no xanthelasmas.   HEENT: normal oral mucosa, no oral pallor and no oral cyanosis.   Neck: normal jugular venous A waves present, normal jugular venous V waves present and no jugular venous sandoval A waves.   Pulmonary: no respiratory distress, normal respiratory rhythm and effort, no accessory muscle use and lungs were clear to auscultation bilaterally.   Cardiovascular: heart rate and rhythm were normal, normal S1 and S2 and no murmur, gallop, rub, heave or thrill are present.   Abdomen: soft, non-tender, no hepato-splenomegaly and no abdominal mass palpated.   Musculoskeletal: the gait could not be assessed..   Extremities: no clubbing of the fingernails, no localized cyanosis, no petechial hemorrhages and no ischemic changes.   Skin: normal skin color and pigmentation, no rash, no venous stasis, no skin lesions, no skin ulcer and no xanthoma was observed.   Psychiatric: oriented to person, place, and time, the affect was normal, the mood was normal and not feeling anxious.      LABORATORY:                          11.0   5.51  )-----------( 171      ( 2023 21:22 )             36.2     06-04    139  |  105  |  21  ----------------------------<  116<H>  3.4<L>   |  29  |  1.37<H>    Ca    8.2<L>      2023 05:50  Phos  2.8     06-  Mg     2.2     06-    TPro  7.3  /  Alb  3.1<L>  /  TBili  0.9  /  DBili  x   /  AST  24  /  ALT  26  /  AlkPhos  98  -      LIVER FUNCTIONS - ( 2023 21:22 )  Alb: 3.1 g/dL / Pro: 7.3 gm/dL / ALK PHOS: 98 U/L / ALT: 26 U/L / AST: 24 U/L / GGT: x           PT/INR - ( 2023 10:20 )   PT: 18.0 sec;   INR: 1.49 ratio         PTT - ( 2023 21:22 )  PTT:35.1 sec  Urinalysis Basic - ( 2023 21:58 )    Color: Yellow / Appearance: very cloudy / S.015 / pH: x  Gluc: x / Ketone: Negative  / Bili: Negative / Urobili: 8 mg/dL   Blood: x / Protein: 100 mg/dL / Nitrite: Negative   Leuk Esterase: Moderate / RBC: 6-10 /HPF / WBC 11-25   Sq Epi: x / Non Sq Epi: x / Bacteria: Moderate      IMAGING:  < from: 12 Lead ECG (23 @ 18:58) >  Sinus rhythm with premature supraventricular complexes  Possible Left atrial enlargement  Nonspecific T wave abnormality  Abnormal ECG  When compared with ECG of 06-MAY-2023 02:34,  premature ventricular complexes are no longer present  premature supraventricular complexes are now present    < end of copied text >    < from: CT Angio Chest PE Protocol w/ IV Cont (23 @ 23:06) >    IMPRESSION:  No significant change compared to previous studies.    Chronic bilateral pulmonary emboli, clot volume not appreciably changed.    CHF with cardiomegaly, mild interstitial pulmonary edema, tracepleural   effusions, small amount of ascites, hepatomegaly, and anasarca.    Enlarged prostate. Urinary bladder decompressed with Joseph catheter.    < end of copied text >      < from: Xray Chest 1 View- PORTABLE-Urgent (Xray Chest 1 View- PORTABLE-Urgent .) (23 @ 22:35) >  IMPRESSION: Small right pleural effusion    < end of copied text >      < from: TTE Echo Complete w/o Contrast w/ Doppler (23 @ 08:44) >   1. Left ventricular ejection fraction, by visual estimation, is 45 to   50%.   2. Increased relative wall thickness with normal mass index consistent   with left ventricular concentric remodeling.   3. Spectral Doppler shows impaired relaxation pattern of left   ventricular myocardial filling (Grade I diastolic dysfunction).   4. Structurally normal mitral valve, with normal leaflet excursion.   5. Normal trileaflet aortic valve with normal opening.   6. Severely enlarged right atrium.   7. Severely enlarged right ventricle.   8. Moderately reduced RV systolic function.   9. Right ventricular volume and pressure overload.  10. Moderate-severe tricuspid regurgitation.  11. Structurally normal pulmonic valve, with normal leaflet excursion.  12. Mild pulmonic valve regurgitation.  13. Estimated pulmonary artery systolic pressure is 77.5 mmHg assuming a   right atrial pressure of 8 mmHg, which is consistent with severe     < end of copied text >      ASSESSMENT:  Patient 77m with PMH HTN, CHF, prostate cancer, PE/DVT on coumadin presents to the ED for multiple reasons.  Patient interviewed in Creole.  Patient was at home, complaining to visiting RN about joseph cath discomfort, has indwelling catheter.  Denies any abdominal pain, reports suprapubic discomfort.  No blood noted in joseph bag.  Patient also found to be hypoxic at home in the 80's.  Has known h/o CHF, denies chest pain, fever, cough.  Patient was just admitted for similar episode and discharged on . He is on coumadin for prior PE/DVT.   (2023 05:58)      PLAN:       atorvastatin 40 milliGRAM(s) Oral at bedtime  enoxaparin Injectable 80 milliGRAM(s) SubCutaneous every 12 hours  finasteride 5 milliGRAM(s) Oral daily  furosemide   Injectable 40 milliGRAM(s) IV Push two times a day  metoprolol succinate ER 50 milliGRAM(s) Oral daily  pantoprazole    Tablet 40 milliGRAM(s) Oral before breakfast  potassium chloride   Powder 40 milliEquivalent(s) Oral once  tamsulosin 0.4 milliGRAM(s) Oral at bedtime      Jose Baltazar MD, FACC, FASHEENA, FASNC, FACP  Director, Heart Failure Services  [facility]  , Department of Cardiology  [facility]     CHIEF COMPLAINT:  Patient is a 77y old  Male who presents with a chief complaint of SOB  , CHF (2023 13:59)      HPI:  77-year-old with hypertension, prostate cancer history, DVT and PE history on treatment therapy with Coumadin, history of heart failure, admitted with shortness of breath and hypoxia.  Seen resting comfortably in chair in no acute distress.  Denies chest symptoms.    ALLERGIES:  No Known Allergies      Home Medications:  senna leaf extract oral tablet: 2 tab(s) orally once a day (at bedtime) (2023 01:23)    PAST MEDICAL & SURGICAL HISTORY:  HTN (hypertension)      Prostate cancer      Chronic combined systolic and diastolic heart failure      History of pulmonary embolism      No significant past surgical history      FAMILY HISTORY:  n/a    SOCIAL HISTORY:  denied tobacco      REVIEW OF SYSTEMS:  General:  No wt loss, fevers, chills, night sweats  Eyes:  Good vision, no reported pain  ENT:  No sore throat, pain, runny nose, dysphagia  CV:  No pain, palpitations, hypo/hypertension  Resp:  No dyspnea, cough, tachypnea, wheezing  GI:  No pain, nausea, vomiting, diarrhea, constipation  :  No pain, bleeding, incontinence, nocturia  Muscle:  No pain, weakness  Breast:  No pain, abscess, mass, discharge  Neuro:  No weakness, tingling, memory problems  Psych:  No fatigue, insomnia, mood problems, depression  Endocrine:  No polyuria, polydipsia, cold/heat intolerance  Heme:  No petechiae, ecchymosis, easy bruisability  Skin:  No rash, edema    PHYSICAL EXAM:  Vital Signs:  Vital Signs Last 24 Hrs  T(C): 37.1 (2023 04:42), Max: 37.2 (2023 10:31)  T(F): 98.7 (2023 04:42), Max: 99 (2023 10:31)  HR: 85 (2023 04:42) (82 - 88)  BP: 104/71 (2023 04:42) (101/65 - 111/72)  RR: 18 (2023 04:42) (18 - 18)  SpO2: 95% (2023 04:42) (92% - 95%)      I&O's Summary    2023 07:01  -  2023 07:00  --------------------------------------------------------  IN: 240 mL / OUT: 2500 mL / NET: -2260 mL      I&O's Detail    2023 07:01  -  2023 07:00  --------------------------------------------------------  IN:    Oral Fluid: 240 mL  Total IN: 240 mL    OUT:    Indwelling Catheter - Urethral (mL): 2500 mL  Total OUT: 2500 mL    Total NET: -2260 mL      Tele:     Constitutional: well developed, normal appearance, well groomed, well nourished, no deformities and no acute distress.   Eyes: the conjunctiva exhibited no abnormalities and the eyelids demonstrated no xanthelasmas.   HEENT: normal oral mucosa, no oral pallor and no oral cyanosis.   Neck: normal jugular venous A waves present, normal jugular venous V waves present and no jugular venous sandoval A waves.   Pulmonary: no respiratory distress, normal respiratory rhythm and effort, no accessory muscle use and lungs were clear to auscultation bilaterally.   Cardiovascular: heart rate and rhythm were normal, normal S1 and S2 and no murmur, gallop, rub, heave or thrill are present.   Abdomen: soft, non-tender, no hepato-splenomegaly and no abdominal mass palpated.   Musculoskeletal: the gait could not be assessed..   Extremities: no clubbing of the fingernails, no localized cyanosis, no petechial hemorrhages and no ischemic changes.   Skin: normal skin color and pigmentation, no rash, no venous stasis, no skin lesions, no skin ulcer and no xanthoma was observed.       LABORATORY:                          11.0   5.51  )-----------( 171      ( 2023 21:22 )             36.2     06-04    139  |  105  |  21  ----------------------------<  116<H>  3.4<L>   |  29  |  1.37<H>    Ca    8.2<L>      2023 05:50  Phos  2.8     06-  Mg     2.2     06-04    TPro  7.3  /  Alb  3.1<L>  /  TBili  0.9  /  DBili  x   /  AST  24  /  ALT  26  /  AlkPhos  98  06-02      LIVER FUNCTIONS - ( 2023 21:22 )  Alb: 3.1 g/dL / Pro: 7.3 gm/dL / ALK PHOS: 98 U/L / ALT: 26 U/L / AST: 24 U/L / GGT: x           PT/INR - ( 2023 10:20 )   PT: 18.0 sec;   INR: 1.49 ratio         PTT - ( 2023 21:22 )  PTT:35.1 sec  Urinalysis Basic - ( 2023 21:58 )    Color: Yellow / Appearance: very cloudy / S.015 / pH: x  Gluc: x / Ketone: Negative  / Bili: Negative / Urobili: 8 mg/dL   Blood: x / Protein: 100 mg/dL / Nitrite: Negative   Leuk Esterase: Moderate / RBC: 6-10 /HPF / WBC 11-25   Sq Epi: x / Non Sq Epi: x / Bacteria: Moderate      IMAGING:  < from: 12 Lead ECG (23 @ 18:58) >  Sinus rhythm with premature supraventricular complexes  Possible Left atrial enlargement  Nonspecific T wave abnormality  Abnormal ECG  When compared with ECG of 06-MAY-2023 02:34,  premature ventricular complexes are no longer present  premature supraventricular complexes are now present    < end of copied text >    < from: CT Angio Chest PE Protocol w/ IV Cont (23 @ 23:06) >    IMPRESSION:  No significant change compared to previous studies.    Chronic bilateral pulmonary emboli, clot volume not appreciably changed.    CHF with cardiomegaly, mild interstitial pulmonary edema, tracepleural   effusions, small amount of ascites, hepatomegaly, and anasarca.    Enlarged prostate. Urinary bladder decompressed with Yan catheter.    < end of copied text >      < from: Xray Chest 1 View- PORTABLE-Urgent (Xray Chest 1 View- PORTABLE-Urgent .) (23 @ 22:35) >  IMPRESSION: Small right pleural effusion    < end of copied text >      < from: TTE Echo Complete w/o Contrast w/ Doppler (23 @ 08:44) >   1. Left ventricular ejection fraction, by visual estimation, is 45 to   50%.   2. Increased relative wall thickness with normal mass index consistent   with left ventricular concentric remodeling.   3. Spectral Doppler shows impaired relaxation pattern of left   ventricular myocardial filling (Grade I diastolic dysfunction).   4. Structurally normal mitral valve, with normal leaflet excursion.   5. Normal trileaflet aortic valve with normal opening.   6. Severely enlarged right atrium.   7. Severely enlarged right ventricle.   8. Moderately reduced RV systolic function.   9. Right ventricular volume and pressure overload.  10. Moderate-severe tricuspid regurgitation.  11. Structurally normal pulmonic valve, with normal leaflet excursion.  12. Mild pulmonic valve regurgitation.  13. Estimated pulmonary artery systolic pressure is 77.5 mmHg assuming a   right atrial pressure of 8 mmHg, which is consistent with severe     < end of copied text >      ASSESSMENT:  77-year-old with hypertension, prostate cancer history, DVT and PE history on treatment therapy with Coumadin, history of heart failure, admitted with shortness of breath and hypoxia.  Seen resting comfortably in chair in no acute distress.  Denies chest symptoms      PLAN:     Continue diuresis with Lasix 40 mg IV twice daily following labs and continues telemetry monitoring.  Potassium supplementation as needed to help maintain proper K level.  Agree with metoprolol ER 50 mg daily for heart rate and blood pressure management, full dose Lovenox therapy for DVT PE management.  Continue atorvastatin for lipid lowering.Yan care prostate medications to continue. fluid and salt restriction emphasized to nursing.      Jose Baltazar MD, FACC, AMADOR ROBERT, FACP  Director, Heart Failure Services  [facility]  , Department of Cardiology  [facility]     CHIEF COMPLAINT:  Patient is a 77y old  Male who presents with a chief complaint of SOB  , CHF (2023 13:59)      HPI:  77-year-old with hypertension, prostate cancer history, DVT and PE history on treatment therapy with Coumadin, history of heart failure, admitted with shortness of breath and hypoxia.  Seen resting comfortably in chair in no acute distress.  Denies chest symptoms.    ALLERGIES:  No Known Allergies      Home Medications:  senna leaf extract oral tablet: 2 tab(s) orally once a day (at bedtime) (2023 01:23)    PAST MEDICAL & SURGICAL HISTORY:  HTN (hypertension)      Prostate cancer      Chronic combined systolic and diastolic heart failure      History of pulmonary embolism      No significant past surgical history      FAMILY HISTORY:  n/a    SOCIAL HISTORY:  denied tobacco      REVIEW OF SYSTEMS:  General:  No wt loss, fevers, chills, night sweats  Eyes:  Good vision, no reported pain  ENT:  No sore throat, pain, runny nose, dysphagia  CV:  No pain, palpitations, hypo/hypertension  Resp:  No dyspnea, cough, tachypnea, wheezing  GI:  No pain, nausea, vomiting, diarrhea, constipation  :  No pain, bleeding, incontinence, nocturia  Muscle:  No pain, weakness  Breast:  No pain, abscess, mass, discharge  Neuro:  No weakness, tingling, memory problems  Psych:  No fatigue, insomnia, mood problems, depression  Endocrine:  No polyuria, polydipsia, cold/heat intolerance  Heme:  No petechiae, ecchymosis, easy bruisability  Skin:  No rash, edema    PHYSICAL EXAM:  Vital Signs:  Vital Signs Last 24 Hrs  T(C): 37.1 (2023 04:42), Max: 37.2 (2023 10:31)  T(F): 98.7 (2023 04:42), Max: 99 (2023 10:31)  HR: 85 (2023 04:42) (82 - 88)  BP: 104/71 (2023 04:42) (101/65 - 111/72)  RR: 18 (2023 04:42) (18 - 18)  SpO2: 95% (2023 04:42) (92% - 95%)      I&O's Summary    2023 07:01  -  2023 07:00  --------------------------------------------------------  IN: 240 mL / OUT: 2500 mL / NET: -2260 mL      I&O's Detail    2023 07:01  -  2023 07:00  --------------------------------------------------------  IN:    Oral Fluid: 240 mL  Total IN: 240 mL    OUT:    Indwelling Catheter - Urethral (mL): 2500 mL  Total OUT: 2500 mL    Total NET: -2260 mL      Tele: SR    Constitutional: well developed, normal appearance, well groomed, well nourished, no deformities and no acute distress.   Eyes: the conjunctiva exhibited no abnormalities and the eyelids demonstrated no xanthelasmas.   HEENT: normal oral mucosa, no oral pallor and no oral cyanosis.   Neck: normal jugular venous A waves present, normal jugular venous V waves present and no jugular venous sandoval A waves.   Pulmonary: no respiratory distress, normal respiratory rhythm and effort, no accessory muscle use and lungs were clear to auscultation bilaterally.   Cardiovascular: heart rate and rhythm were normal, normal S1 and S2 and no murmur.  Abdomen: soft, non-tender.  Musculoskeletal: the gait could not be assessed.   Extremities: no clubbing of the fingernails, no localized cyanosis, no petechial hemorrhages and no ischemic changes.   Skin: normal skin color and pigmentation, no rash, no venous stasis, no skin lesions, no skin ulcer and no xanthoma was observed.       LABORATORY:                          11.0   5.51  )-----------( 171      ( 2023 21:22 )             36.2     06-04    139  |  105  |  21  ----------------------------<  116<H>  3.4<L>   |  29  |  1.37<H>    Ca    8.2<L>      2023 05:50  Phos  2.8     06-  Mg     2.2     06-04    TPro  7.3  /  Alb  3.1<L>  /  TBili  0.9  /  DBili  x   /  AST  24  /  ALT  26  /  AlkPhos  98  -      LIVER FUNCTIONS - ( 2023 21:22 )  Alb: 3.1 g/dL / Pro: 7.3 gm/dL / ALK PHOS: 98 U/L / ALT: 26 U/L / AST: 24 U/L / GGT: x           PT/INR - ( 2023 10:20 )   PT: 18.0 sec;   INR: 1.49 ratio         PTT - ( 2023 21:22 )  PTT:35.1 sec  Urinalysis Basic - ( 2023 21:58 )    Color: Yellow / Appearance: very cloudy / S.015 / pH: x  Gluc: x / Ketone: Negative  / Bili: Negative / Urobili: 8 mg/dL   Blood: x / Protein: 100 mg/dL / Nitrite: Negative   Leuk Esterase: Moderate / RBC: 6-10 /HPF / WBC 11-25   Sq Epi: x / Non Sq Epi: x / Bacteria: Moderate      IMAGING:  < from: 12 Lead ECG (23 @ 18:58) >  Sinus rhythm with premature supraventricular complexes  Possible Left atrial enlargement  Nonspecific T wave abnormality  Abnormal ECG  When compared with ECG of 06-MAY-2023 02:34,  premature ventricular complexes are no longer present  premature supraventricular complexes are now present    < end of copied text >    < from: CT Angio Chest PE Protocol w/ IV Cont (23 @ 23:06) >    IMPRESSION:  No significant change compared to previous studies.    Chronic bilateral pulmonary emboli, clot volume not appreciably changed.    CHF with cardiomegaly, mild interstitial pulmonary edema, tracepleural   effusions, small amount of ascites, hepatomegaly, and anasarca.    Enlarged prostate. Urinary bladder decompressed with Yan catheter.    < end of copied text >      < from: Xray Chest 1 View- PORTABLE-Urgent (Xray Chest 1 View- PORTABLE-Urgent .) (23 @ 22:35) >  IMPRESSION: Small right pleural effusion    < end of copied text >      < from: TTE Echo Complete w/o Contrast w/ Doppler (23 @ 08:44) >   1. Left ventricular ejection fraction, by visual estimation, is 45 to   50%.   2. Increased relative wall thickness with normal mass index consistent   with left ventricular concentric remodeling.   3. Spectral Doppler shows impaired relaxation pattern of left   ventricular myocardial filling (Grade I diastolic dysfunction).   4. Structurally normal mitral valve, with normal leaflet excursion.   5. Normal trileaflet aortic valve with normal opening.   6. Severely enlarged right atrium.   7. Severely enlarged right ventricle.   8. Moderately reduced RV systolic function.   9. Right ventricular volume and pressure overload.  10. Moderate-severe tricuspid regurgitation.  11. Structurally normal pulmonic valve, with normal leaflet excursion.  12. Mild pulmonic valve regurgitation.  13. Estimated pulmonary artery systolic pressure is 77.5 mmHg assuming a   right atrial pressure of 8 mmHg, which is consistent with severe     < end of copied text >      ASSESSMENT:  77-year-old with hypertension, prostate cancer history, DVT and PE history on treatment therapy with Coumadin, history of heart failure, admitted with shortness of breath and hypoxia.  Seen resting comfortably in chair in no acute distress.  Denies chest symptoms      PLAN:     Continue diuresis with Lasix 40 mg IV twice daily following labs and continues telemetry monitoring.  Potassium supplementation as needed to help maintain proper K level.  Agree with metoprolol ER 50 mg daily for heart rate and blood pressure management, full dose Lovenox therapy for DVT PE management.  Continue atorvastatin for lipid lowering. Yan care prostate medications to continue. Fluid and salt restriction emphasized to nursing. Signing off.  Please call back if needed.      Jose Baltazar MD, FACC, YESICA, AMADOR, FACP  Director, Heart Failure Services  [facility]  , Department of Cardiology  [facility]     CHIEF COMPLAINT:  Patient is a 77y old  Male who presents with a chief complaint of SOB  , CHF (2023 13:59)      HPI:  77-year-old with hypertension, prostate cancer history, DVT and PE history on treatment therapy with Coumadin, history of heart failure, admitted with shortness of breath and hypoxia.  Seen resting comfortably in chair in no acute distress.  Denies chest symptoms.    ALLERGIES:  No Known Allergies      Home Medications:  senna leaf extract oral tablet: 2 tab(s) orally once a day (at bedtime) (2023 01:23)    PAST MEDICAL & SURGICAL HISTORY:  HTN (hypertension)      Prostate cancer      Chronic combined systolic and diastolic heart failure      History of pulmonary embolism      No significant past surgical history      FAMILY HISTORY:  n/a    SOCIAL HISTORY:  denied tobacco      REVIEW OF SYSTEMS:  General:  No wt loss, fevers, chills, night sweats  Eyes:  Good vision, no reported pain  ENT:  No sore throat, pain, runny nose, dysphagia  CV:  No pain, palpitations, hypo/hypertension  Resp:  No dyspnea, cough, tachypnea, wheezing  GI:  No pain, nausea, vomiting, diarrhea, constipation  :  No pain, bleeding, incontinence, nocturia  Muscle:  No pain, weakness  Breast:  No pain, abscess, mass, discharge  Neuro:  No weakness, tingling, memory problems  Psych:  No fatigue, insomnia, mood problems, depression  Endocrine:  No polyuria, polydipsia, cold/heat intolerance  Heme:  No petechiae, ecchymosis, easy bruisability  Skin:  No rash, edema    PHYSICAL EXAM:  Vital Signs:  Vital Signs Last 24 Hrs  T(C): 37.1 (2023 04:42), Max: 37.2 (2023 10:31)  T(F): 98.7 (2023 04:42), Max: 99 (2023 10:31)  HR: 85 (2023 04:42) (82 - 88)  BP: 104/71 (2023 04:42) (101/65 - 111/72)  RR: 18 (2023 04:42) (18 - 18)  SpO2: 95% (2023 04:42) (92% - 95%)      I&O's Summary    2023 07:01  -  2023 07:00  --------------------------------------------------------  IN: 240 mL / OUT: 2500 mL / NET: -2260 mL      I&O's Detail    2023 07:01  -  2023 07:00  --------------------------------------------------------  IN:    Oral Fluid: 240 mL  Total IN: 240 mL    OUT:    Indwelling Catheter - Urethral (mL): 2500 mL  Total OUT: 2500 mL    Total NET: -2260 mL      Tele: SR    Constitutional: well developed, normal appearance, well groomed, well nourished, no deformities and no acute distress.   Eyes: the conjunctiva exhibited no abnormalities and the eyelids demonstrated no xanthelasmas.   HEENT: normal oral mucosa, no oral pallor and no oral cyanosis.   Neck: normal jugular venous A waves present, normal jugular venous V waves present and no jugular venous sandoval A waves.   Pulmonary: no respiratory distress, normal respiratory rhythm and effort, no accessory muscle use and lungs were clear to auscultation bilaterally.   Cardiovascular: heart rate and rhythm were normal, normal S1 and S2 and no murmur.  Abdomen: soft, non-tender.  Musculoskeletal: the gait could not be assessed.   Extremities: no clubbing of the fingernails, no localized cyanosis, no petechial hemorrhages and no ischemic changes.   Skin: normal skin color and pigmentation, no rash, no venous stasis, no skin lesions, no skin ulcer and no xanthoma was observed.       LABORATORY:                          11.0   5.51  )-----------( 171      ( 2023 21:22 )             36.2     06-04    139  |  105  |  21  ----------------------------<  116<H>  3.4<L>   |  29  |  1.37<H>    Ca    8.2<L>      2023 05:50  Phos  2.8     06-  Mg     2.2     06-04    TPro  7.3  /  Alb  3.1<L>  /  TBili  0.9  /  DBili  x   /  AST  24  /  ALT  26  /  AlkPhos  98  -      LIVER FUNCTIONS - ( 2023 21:22 )  Alb: 3.1 g/dL / Pro: 7.3 gm/dL / ALK PHOS: 98 U/L / ALT: 26 U/L / AST: 24 U/L / GGT: x           PT/INR - ( 2023 10:20 )   PT: 18.0 sec;   INR: 1.49 ratio         PTT - ( 2023 21:22 )  PTT:35.1 sec  Urinalysis Basic - ( 2023 21:58 )    Color: Yellow / Appearance: very cloudy / S.015 / pH: x  Gluc: x / Ketone: Negative  / Bili: Negative / Urobili: 8 mg/dL   Blood: x / Protein: 100 mg/dL / Nitrite: Negative   Leuk Esterase: Moderate / RBC: 6-10 /HPF / WBC 11-25   Sq Epi: x / Non Sq Epi: x / Bacteria: Moderate      IMAGING:  < from: 12 Lead ECG (23 @ 18:58) >  Sinus rhythm with premature supraventricular complexes  Possible Left atrial enlargement  Nonspecific T wave abnormality  Abnormal ECG  When compared with ECG of 06-MAY-2023 02:34,  premature ventricular complexes are no longer present  premature supraventricular complexes are now present    < end of copied text >    < from: CT Angio Chest PE Protocol w/ IV Cont (23 @ 23:06) >    IMPRESSION:  No significant change compared to previous studies.    Chronic bilateral pulmonary emboli, clot volume not appreciably changed.    CHF with cardiomegaly, mild interstitial pulmonary edema, tracepleural   effusions, small amount of ascites, hepatomegaly, and anasarca.    Enlarged prostate. Urinary bladder decompressed with Yan catheter.    < end of copied text >      < from: Xray Chest 1 View- PORTABLE-Urgent (Xray Chest 1 View- PORTABLE-Urgent .) (23 @ 22:35) >  IMPRESSION: Small right pleural effusion    < end of copied text >      < from: TTE Echo Complete w/o Contrast w/ Doppler (23 @ 08:44) >   1. Left ventricular ejection fraction, by visual estimation, is 45 to   50%.   2. Increased relative wall thickness with normal mass index consistent   with left ventricular concentric remodeling.   3. Spectral Doppler shows impaired relaxation pattern of left   ventricular myocardial filling (Grade I diastolic dysfunction).   4. Structurally normal mitral valve, with normal leaflet excursion.   5. Normal trileaflet aortic valve with normal opening.   6. Severely enlarged right atrium.   7. Severely enlarged right ventricle.   8. Moderately reduced RV systolic function.   9. Right ventricular volume and pressure overload.  10. Moderate-severe tricuspid regurgitation.  11. Structurally normal pulmonic valve, with normal leaflet excursion.  12. Mild pulmonic valve regurgitation.  13. Estimated pulmonary artery systolic pressure is 77.5 mmHg assuming a   right atrial pressure of 8 mmHg, which is consistent with severe     < end of copied text >      ASSESSMENT:  77-year-old with hypertension, prostate cancer history, DVT and PE history on treatment therapy with Coumadin, history of heart failure, admitted with shortness of breath and hypoxia.  Seen resting comfortably in chair in no acute distress.  Denies chest symptoms      PLAN:     Continue diuresis with Lasix 40 mg IV twice daily following labs and continues telemetry monitoring.  Potassium supplementation as needed to help maintain proper K level.  Agree with metoprolol ER 50 mg daily for heart rate and blood pressure management, full dose Lovenox therapy for DVT PE management.  Continue atorvastatin for lipid lowering. Yan care prostate medications to continue. Fluid and salt restriction emphasized to nursing.      Jose Baltazar MD, FACC, AMADOR ROBERT, FACP  Director, Heart Failure Services  [facility]  , Department of Cardiology  [facility]

## 2023-06-04 NOTE — PHYSICAL THERAPY INITIAL EVALUATION ADULT - BALANCE TRAINING, PT EVAL
Pt will improve static/dynamic standing balance to WFL to perform all functional mobility without LOB and increase safety, by ### weeks

## 2023-06-04 NOTE — PHYSICAL THERAPY INITIAL EVALUATION ADULT - GENERAL OBSERVATIONS, REHAB EVAL
Pt was seen in supine c joseph catheter +, alert, + cardiac monitor and AxOx4.  Video  was used throughout P[facility]T. intervention. [id number].

## 2023-06-04 NOTE — PROGRESS NOTE ADULT - ASSESSMENT
78 yo haitain creole speaking male w/ pmhx ofHTN, CHF, prostate cancer, PE/DVT on coumadin admitted to TELE for acute hypoxemic respiratory failure 2/2 chf exacerbation. 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 noted EF 45-50% diastolic dysfunction and severe pulm HTN   - unclear etiology of decompensation     HEME/ONC  # PE/DVT  # subtherapuetic INR  - INR 1.44  - unclear compliance with medication  - on weight based lovenox    UROLOGY  # Prostate cancer.   - joseph   - finesteride  - flomax.    PLAN  - c/w TELE  - diuresis  - INR, then adjust coumadin  - follow up with CARDIOLOGY

## 2023-06-05 LAB
ALBUMIN SERPL ELPH-MCNC: 2.9 G/DL — LOW (ref 3.3–5)
ALP SERPL-CCNC: 96 U/L — SIGNIFICANT CHANGE UP (ref 40–120)
ALT FLD-CCNC: 21 U/L — SIGNIFICANT CHANGE UP (ref 12–78)
ANION GAP SERPL CALC-SCNC: 3 MMOL/L — LOW (ref 5–17)
AST SERPL-CCNC: 15 U/L — SIGNIFICANT CHANGE UP (ref 15–37)
BASOPHILS # BLD AUTO: 0.03 K/UL — SIGNIFICANT CHANGE UP (ref 0–0.2)
BASOPHILS NFR BLD AUTO: 0.7 % — SIGNIFICANT CHANGE UP (ref 0–2)
BILIRUB SERPL-MCNC: 0.6 MG/DL — SIGNIFICANT CHANGE UP (ref 0.2–1.2)
BUN SERPL-MCNC: 18 MG/DL — SIGNIFICANT CHANGE UP (ref 7–23)
CALCIUM SERPL-MCNC: 8.2 MG/DL — LOW (ref 8.5–10.1)
CHLORIDE SERPL-SCNC: 104 MMOL/L — SIGNIFICANT CHANGE UP (ref 96–108)
CO2 SERPL-SCNC: 32 MMOL/L — HIGH (ref 22–31)
CREAT SERPL-MCNC: 1.19 MG/DL — SIGNIFICANT CHANGE UP (ref 0.5–1.3)
EGFR: 63 ML/MIN/1.73M2 — SIGNIFICANT CHANGE UP
EOSINOPHIL # BLD AUTO: 0.25 K/UL — SIGNIFICANT CHANGE UP (ref 0–0.5)
EOSINOPHIL NFR BLD AUTO: 5.8 % — SIGNIFICANT CHANGE UP (ref 0–6)
GLUCOSE SERPL-MCNC: 105 MG/DL — HIGH (ref 70–99)
HCT VFR BLD CALC: 37.5 % — LOW (ref 39–50)
HGB BLD-MCNC: 11.5 G/DL — LOW (ref 13–17)
IMM GRANULOCYTES NFR BLD AUTO: 0.2 % — SIGNIFICANT CHANGE UP (ref 0–0.9)
INR BLD: 1.31 RATIO — HIGH (ref 0.88–1.16)
LYMPHOCYTES # BLD AUTO: 1.25 K/UL — SIGNIFICANT CHANGE UP (ref 1–3.3)
LYMPHOCYTES # BLD AUTO: 28.8 % — SIGNIFICANT CHANGE UP (ref 13–44)
MAGNESIUM SERPL-MCNC: 2.2 MG/DL — SIGNIFICANT CHANGE UP (ref 1.6–2.6)
MCHC RBC-ENTMCNC: 23.6 PG — LOW (ref 27–34)
MCHC RBC-ENTMCNC: 30.7 G/DL — LOW (ref 32–36)
MCV RBC AUTO: 77 FL — LOW (ref 80–100)
MONOCYTES # BLD AUTO: 0.62 K/UL — SIGNIFICANT CHANGE UP (ref 0–0.9)
MONOCYTES NFR BLD AUTO: 14.3 % — HIGH (ref 2–14)
NEUTROPHILS # BLD AUTO: 2.18 K/UL — SIGNIFICANT CHANGE UP (ref 1.8–7.4)
NEUTROPHILS NFR BLD AUTO: 50.2 % — SIGNIFICANT CHANGE UP (ref 43–77)
NRBC # BLD: 0 /100 WBCS — SIGNIFICANT CHANGE UP (ref 0–0)
NT-PROBNP SERPL-SCNC: 2529 PG/ML — HIGH (ref 0–450)
PHOSPHATE SERPL-MCNC: 3 MG/DL — SIGNIFICANT CHANGE UP (ref 2.5–4.5)
PLATELET # BLD AUTO: 184 K/UL — SIGNIFICANT CHANGE UP (ref 150–400)
POTASSIUM SERPL-MCNC: 3.2 MMOL/L — LOW (ref 3.5–5.3)
POTASSIUM SERPL-SCNC: 3.2 MMOL/L — LOW (ref 3.5–5.3)
PROT SERPL-MCNC: 7.1 GM/DL — SIGNIFICANT CHANGE UP (ref 6–8.3)
PROTHROM AB SERPL-ACNC: 15.8 SEC — HIGH (ref 10.5–13.4)
RBC # BLD: 4.87 M/UL — SIGNIFICANT CHANGE UP (ref 4.2–5.8)
RBC # FLD: 15.9 % — HIGH (ref 10.3–14.5)
SODIUM SERPL-SCNC: 139 MMOL/L — SIGNIFICANT CHANGE UP (ref 135–145)
WBC # BLD: 4.34 K/UL — SIGNIFICANT CHANGE UP (ref 3.8–10.5)
WBC # FLD AUTO: 4.34 K/UL — SIGNIFICANT CHANGE UP (ref 3.8–10.5)

## 2023-06-05 PROCEDURE — 99233 SBSQ HOSP IP/OBS HIGH 50: CPT

## 2023-06-05 PROCEDURE — 99232 SBSQ HOSP IP/OBS MODERATE 35: CPT

## 2023-06-05 RX ORDER — CEFTRIAXONE 500 MG/1
1000 INJECTION, POWDER, FOR SOLUTION INTRAMUSCULAR; INTRAVENOUS EVERY 24 HOURS
Refills: 0 | Status: DISCONTINUED | OUTPATIENT
Start: 2023-06-06 | End: 2023-06-08

## 2023-06-05 RX ORDER — WARFARIN SODIUM 2.5 MG/1
5 TABLET ORAL ONCE
Refills: 0 | Status: COMPLETED | OUTPATIENT
Start: 2023-06-05 | End: 2023-06-05

## 2023-06-05 RX ORDER — POTASSIUM CHLORIDE 20 MEQ
40 PACKET (EA) ORAL EVERY 4 HOURS
Refills: 0 | Status: COMPLETED | OUTPATIENT
Start: 2023-06-05 | End: 2023-06-05

## 2023-06-05 RX ORDER — CEFTRIAXONE 500 MG/1
1000 INJECTION, POWDER, FOR SOLUTION INTRAMUSCULAR; INTRAVENOUS ONCE
Refills: 0 | Status: COMPLETED | OUTPATIENT
Start: 2023-06-05 | End: 2023-06-05

## 2023-06-05 RX ORDER — CEFTRIAXONE 500 MG/1
INJECTION, POWDER, FOR SOLUTION INTRAMUSCULAR; INTRAVENOUS
Refills: 0 | Status: DISCONTINUED | OUTPATIENT
Start: 2023-06-05 | End: 2023-06-08

## 2023-06-05 RX ADMIN — Medication 40 MILLIGRAM(S): at 13:26

## 2023-06-05 RX ADMIN — FINASTERIDE 5 MILLIGRAM(S): 5 TABLET, FILM COATED ORAL at 13:26

## 2023-06-05 RX ADMIN — ENOXAPARIN SODIUM 80 MILLIGRAM(S): 100 INJECTION SUBCUTANEOUS at 05:21

## 2023-06-05 RX ADMIN — ATORVASTATIN CALCIUM 40 MILLIGRAM(S): 80 TABLET, FILM COATED ORAL at 21:52

## 2023-06-05 RX ADMIN — PANTOPRAZOLE SODIUM 40 MILLIGRAM(S): 20 TABLET, DELAYED RELEASE ORAL at 06:02

## 2023-06-05 RX ADMIN — ENOXAPARIN SODIUM 80 MILLIGRAM(S): 100 INJECTION SUBCUTANEOUS at 17:13

## 2023-06-05 RX ADMIN — Medication 40 MILLIEQUIVALENT(S): at 13:26

## 2023-06-05 RX ADMIN — Medication 3 MILLIGRAM(S): at 22:01

## 2023-06-05 RX ADMIN — Medication 40 MILLIEQUIVALENT(S): at 10:23

## 2023-06-05 RX ADMIN — CEFTRIAXONE 1000 MILLIGRAM(S): 500 INJECTION, POWDER, FOR SOLUTION INTRAMUSCULAR; INTRAVENOUS at 02:38

## 2023-06-05 RX ADMIN — WARFARIN SODIUM 5 MILLIGRAM(S): 2.5 TABLET ORAL at 21:53

## 2023-06-05 RX ADMIN — TAMSULOSIN HYDROCHLORIDE 0.4 MILLIGRAM(S): 0.4 CAPSULE ORAL at 21:53

## 2023-06-05 RX ADMIN — Medication 40 MILLIGRAM(S): at 05:21

## 2023-06-05 RX ADMIN — Medication 50 MILLIGRAM(S): at 05:21

## 2023-06-05 NOTE — DIETITIAN NUTRITION RISK NOTIFICATION - TREATMENT: THE FOLLOWING DIET HAS BEEN RECOMMENDED
Diet, Consistent Carbohydrate w/Evening Snack:   1500mL Fluid Restriction ([id number])  Low Sodium  Supplement Feeding Modality:  Oral  Glucerna Shake Cans or Servings Per Day:  1       Frequency:  Daily (23 @ 10:21) [Pending Verification By Attending]  Diet, DASH/TLC:   Sodium & Cholesterol Restricted  1500mL Fluid Restriction ([id number])     Special Instructions for Nursin milliLiter(s) to 2000 milliLiter(s) fluid restriction (23 @ 01:27) [Active]

## 2023-06-05 NOTE — DIETITIAN INITIAL EVALUATION ADULT - ORAL INTAKE PTA/DIET HISTORY
Pt is [place] Creole speaking, this writer spoke to pt via [employer]  video service,  Dania, ID# [id number].  Pt reports depressed appetite, unable to provide wt. hx as he does not weigh himself.  Per pt., his sickness run into his body and he feels weak, was drinking vitamin water as he felt depleted of vitamins.  Pt's daughter did the shopping and son in law did the cooking.  Pt denied food insecurity .  Diet PTA: unrestricted.  Pt denied chewing/swallowing difficulty, is a slow eater, chew and swallows slowly.

## 2023-06-05 NOTE — DIETITIAN INITIAL EVALUATION ADULT - OTHER INFO
Pt educated on heart failure nutrition therapy, fluid restrictions and portion plate method ensure adequate intake from all food groups and consistent CHO intake.  Advised to discuss all over the counter medication/vitamin use c MD.

## 2023-06-05 NOTE — DIETITIAN INITIAL EVALUATION ADULT - PERTINENT LABORATORY DATA
06-05    139  |  104  |  18  ----------------------------<  105<H>  3.2<L>   |  32<H>  |  1.19    Ca    8.2<L>      05 Jun 2023 08:11  Phos  3.0     06-05  Mg     2.2     06-05    TPro  7.1  /  Alb  2.9<L>  /  TBili  0.6  /  DBili  x   /  AST  15  /  ALT  21  /  AlkPhos  96  06-05  A1C with Estimated Average Glucose Result: 6.5 % <H-DM dx range> (03-29-23 @ 06:13)

## 2023-06-05 NOTE — PROGRESS NOTE ADULT - ASSESSMENT
77-year-old with hypertension,  elevated PSA, DVT and PE history on treatment therapy with Coumadin, history of heart failure, admitted with shortness of breath and hypoxia.   Seen resting comfortably in chair in no acute distress. mild sob at times    PLAN:     Continue diuresis with Lasix 40 mg IV twice daily, can be switched to once a day in am  monitor renal function and electrolytes   Potassium supplementation as needed to help maintain proper K level.  Fluid and salt restriction emphasized, Nutrition eval   Cont with metoprolol ER 50 mg daily for blood pressure management and cardiomyopathy  Now Bridging to coumadin with full dose Lovenox therapy   77-year-old with hypertension,  elevated PSA, DVT and PE history on treatment therapy with Coumadin, history of heart failure, admitted with shortness of breath and hypoxia.   Seen resting comfortably in chair in no acute distress. mild sob at times    PLAN:     Continue to monitor renal function and electrolytes   Potassium supplementation as needed to help maintain proper K level.  Fluid and salt restriction emphasized, Nutrition eval   Cont with metoprolol ER 50 mg daily for blood pressure management and cardiomyopathy  Now Bridging to coumadin with full dose Lovenox therapy

## 2023-06-05 NOTE — DIETITIAN INITIAL EVALUATION ADULT - NS FNS DIET ORDER
Diet, DASH/TLC:   Sodium & Cholesterol Restricted  1500mL Fluid Restriction ([id number])     Special Instructions for Nursin milliLiter(s) to 2000 milliLiter(s) fluid restriction (23 @ 01:27)

## 2023-06-05 NOTE — PROGRESS NOTE ADULT - NS ATTEND AMEND GEN_ALL_CORE FT
Heart failure resolving, change to oral diuretics in AM.  OOB to chair.  Goal is INR of 2 to 3; can bridge as out patient if this can be arranged.

## 2023-06-05 NOTE — DIETITIAN INITIAL EVALUATION ADULT - PERTINENT MEDS FT
MEDICATIONS  (STANDING):  atorvastatin 40 milliGRAM(s) Oral at bedtime  cefTRIAXone   IVPB      enoxaparin Injectable 80 milliGRAM(s) SubCutaneous every 12 hours  finasteride 5 milliGRAM(s) Oral daily  furosemide   Injectable 40 milliGRAM(s) IV Push two times a day  metoprolol succinate ER 50 milliGRAM(s) Oral daily  pantoprazole    Tablet 40 milliGRAM(s) Oral before breakfast  potassium chloride   Powder 40 milliEquivalent(s) Oral every 4 hours  tamsulosin 0.4 milliGRAM(s) Oral at bedtime  warfarin 5 milliGRAM(s) Oral once    MEDICATIONS  (PRN):  acetaminophen     Tablet .. 650 milliGRAM(s) Oral every 6 hours PRN Temp greater or equal to 38C (100.4F), Mild Pain (1 - 3)  aluminum hydroxide/magnesium hydroxide/simethicone Suspension 30 milliLiter(s) Oral every 4 hours PRN Dyspepsia  melatonin 3 milliGRAM(s) Oral at bedtime PRN Insomnia  ondansetron Injectable 4 milliGRAM(s) IV Push every 8 hours PRN Nausea and/or Vomiting

## 2023-06-05 NOTE — DIETITIAN INITIAL EVALUATION ADULT - NS FNS WEIGHT CHANGE REASON
As per current adm wt. of 71.8 kg and previous recent adm RD note 05/08/23, wt. of 73.6 kg, wt. loss of 1.8 kg/4 LBS noted.

## 2023-06-05 NOTE — PROGRESS NOTE ADULT - ASSESSMENT
78 yo haitain creole speaking male w/ pmhx ofHTN, CHF, prostate cancer, PE/DVT on coumadin admitted to TELE for acute hypoxemic respiratory failure 2/2 chf exacerbation. found to have subtherapeutic INR    # Acute on chronic combined HF w/ moderately reduced EF  - fluid restriction   - IV lasix 40 BID.  Cardiology following.  TTE EF 45-50% diastolic dysfunction and severe pulm HTN     # PE/DVT # subtherapuetic INR - Continue Lovenox.  Redose Coumadin.  - INR 1.44 - Daughter states pt had high INR as outpatient and Coumadin was held.  Not on DOAC due to lack of insurance.    # Prostate cancer - Continue Fley.  Finasteride.  Flomax.    # Inpatient DVT Proph - on anticoagulation     I d/w daughter Makenzie [phone]

## 2023-06-06 LAB
-  AMIKACIN: SIGNIFICANT CHANGE UP
-  AMOXICILLIN/CLAVULANIC ACID: SIGNIFICANT CHANGE UP
-  AMPICILLIN/SULBACTAM: SIGNIFICANT CHANGE UP
-  AMPICILLIN: SIGNIFICANT CHANGE UP
-  AZTREONAM: SIGNIFICANT CHANGE UP
-  CEFAZOLIN: SIGNIFICANT CHANGE UP
-  CEFEPIME: SIGNIFICANT CHANGE UP
-  CEFOXITIN: SIGNIFICANT CHANGE UP
-  CEFTRIAXONE: SIGNIFICANT CHANGE UP
-  CEFUROXIME: SIGNIFICANT CHANGE UP
-  CIPROFLOXACIN: SIGNIFICANT CHANGE UP
-  ERTAPENEM: SIGNIFICANT CHANGE UP
-  GENTAMICIN: SIGNIFICANT CHANGE UP
-  IMIPENEM: SIGNIFICANT CHANGE UP
-  LEVOFLOXACIN: SIGNIFICANT CHANGE UP
-  MEROPENEM: SIGNIFICANT CHANGE UP
-  NITROFURANTOIN: SIGNIFICANT CHANGE UP
-  PIPERACILLIN/TAZOBACTAM: SIGNIFICANT CHANGE UP
-  TOBRAMYCIN: SIGNIFICANT CHANGE UP
-  TRIMETHOPRIM/SULFAMETHOXAZOLE: SIGNIFICANT CHANGE UP
ANION GAP SERPL CALC-SCNC: 4 MMOL/L — LOW (ref 5–17)
BUN SERPL-MCNC: 19 MG/DL — SIGNIFICANT CHANGE UP (ref 7–23)
CALCIUM SERPL-MCNC: 9.2 MG/DL — SIGNIFICANT CHANGE UP (ref 8.5–10.1)
CHLORIDE SERPL-SCNC: 105 MMOL/L — SIGNIFICANT CHANGE UP (ref 96–108)
CO2 SERPL-SCNC: 32 MMOL/L — HIGH (ref 22–31)
CREAT SERPL-MCNC: 1.25 MG/DL — SIGNIFICANT CHANGE UP (ref 0.5–1.3)
CULTURE RESULTS: SIGNIFICANT CHANGE UP
EGFR: 59 ML/MIN/1.73M2 — LOW
GLUCOSE SERPL-MCNC: 114 MG/DL — HIGH (ref 70–99)
HCT VFR BLD CALC: 39 % — SIGNIFICANT CHANGE UP (ref 39–50)
HGB BLD-MCNC: 11.9 G/DL — LOW (ref 13–17)
INR BLD: 1.26 RATIO — HIGH (ref 0.88–1.16)
MAGNESIUM SERPL-MCNC: 2.2 MG/DL — SIGNIFICANT CHANGE UP (ref 1.6–2.6)
MCHC RBC-ENTMCNC: 23.6 PG — LOW (ref 27–34)
MCHC RBC-ENTMCNC: 30.5 G/DL — LOW (ref 32–36)
MCV RBC AUTO: 77.4 FL — LOW (ref 80–100)
METHOD TYPE: SIGNIFICANT CHANGE UP
NRBC # BLD: 0 /100 WBCS — SIGNIFICANT CHANGE UP (ref 0–0)
ORGANISM # SPEC MICROSCOPIC CNT: SIGNIFICANT CHANGE UP
ORGANISM # SPEC MICROSCOPIC CNT: SIGNIFICANT CHANGE UP
PHOSPHATE SERPL-MCNC: 3.8 MG/DL — SIGNIFICANT CHANGE UP (ref 2.5–4.5)
PLATELET # BLD AUTO: 205 K/UL — SIGNIFICANT CHANGE UP (ref 150–400)
POTASSIUM SERPL-MCNC: 3.9 MMOL/L — SIGNIFICANT CHANGE UP (ref 3.5–5.3)
POTASSIUM SERPL-SCNC: 3.9 MMOL/L — SIGNIFICANT CHANGE UP (ref 3.5–5.3)
PROTHROM AB SERPL-ACNC: 15 SEC — HIGH (ref 10.5–13.4)
RBC # BLD: 5.04 M/UL — SIGNIFICANT CHANGE UP (ref 4.2–5.8)
RBC # FLD: 15.9 % — HIGH (ref 10.3–14.5)
SODIUM SERPL-SCNC: 141 MMOL/L — SIGNIFICANT CHANGE UP (ref 135–145)
SPECIMEN SOURCE: SIGNIFICANT CHANGE UP
WBC # BLD: 5.08 K/UL — SIGNIFICANT CHANGE UP (ref 3.8–10.5)
WBC # FLD AUTO: 5.08 K/UL — SIGNIFICANT CHANGE UP (ref 3.8–10.5)

## 2023-06-06 PROCEDURE — 99232 SBSQ HOSP IP/OBS MODERATE 35: CPT

## 2023-06-06 PROCEDURE — 99233 SBSQ HOSP IP/OBS HIGH 50: CPT

## 2023-06-06 RX ORDER — WARFARIN SODIUM 2.5 MG/1
6 TABLET ORAL ONCE
Refills: 0 | Status: COMPLETED | OUTPATIENT
Start: 2023-06-06 | End: 2023-06-06

## 2023-06-06 RX ADMIN — CEFTRIAXONE 100 MILLIGRAM(S): 500 INJECTION, POWDER, FOR SOLUTION INTRAMUSCULAR; INTRAVENOUS at 01:25

## 2023-06-06 RX ADMIN — FINASTERIDE 5 MILLIGRAM(S): 5 TABLET, FILM COATED ORAL at 12:44

## 2023-06-06 RX ADMIN — ENOXAPARIN SODIUM 80 MILLIGRAM(S): 100 INJECTION SUBCUTANEOUS at 05:30

## 2023-06-06 RX ADMIN — Medication 40 MILLIGRAM(S): at 05:30

## 2023-06-06 RX ADMIN — Medication 40 MILLIGRAM(S): at 17:53

## 2023-06-06 RX ADMIN — TAMSULOSIN HYDROCHLORIDE 0.4 MILLIGRAM(S): 0.4 CAPSULE ORAL at 21:29

## 2023-06-06 RX ADMIN — WARFARIN SODIUM 6 MILLIGRAM(S): 2.5 TABLET ORAL at 21:28

## 2023-06-06 RX ADMIN — PANTOPRAZOLE SODIUM 40 MILLIGRAM(S): 20 TABLET, DELAYED RELEASE ORAL at 08:39

## 2023-06-06 RX ADMIN — ENOXAPARIN SODIUM 80 MILLIGRAM(S): 100 INJECTION SUBCUTANEOUS at 17:54

## 2023-06-06 RX ADMIN — ATORVASTATIN CALCIUM 40 MILLIGRAM(S): 80 TABLET, FILM COATED ORAL at 21:29

## 2023-06-06 RX ADMIN — Medication 50 MILLIGRAM(S): at 05:30

## 2023-06-06 NOTE — PROGRESS NOTE ADULT - ASSESSMENT
78 yo Creole speaking male w/ pmhx ofHTN, CHF, prostate cancer, PE/DVT on coumadin admitted to [facility] for acute hypoxemic respiratory failure 2/2 chf exacerbation. found to have subtherapeutic INR.  Daughter states pt had high INR as outpatient and Coumadin was held.  Not on DOAC due to lack of insurance.      # Acute on chronic combined HF w/ moderately reduced EF.  Fluid restriction.  IV lasix 40 BID.  Cardiology following.  TTE EF 45-50% diastolic dysfunction and severe pulm HTN.  # PE/DVT # subtherapuetic INR - Continue Lovenox.  Redosed Coumadin.    # Prostate cancer - Continue Fley.  Finasteride.  Flomax.    # Inpatient DVT Proph - on anticoagulation     I d/w daughter Makenzie [phone] on 6/5.

## 2023-06-06 NOTE — PROGRESS NOTE ADULT - ASSESSMENT
77-year-old with hypertension,  elevated PSA, DVT and PE history on treatment therapy with Coumadin, pHTN, heart failure, admitted with shortness of breath and hypoxia.   Seen resting comfortably, in no acute distress. mild sob at times    PLAN:     currently on IV Lasix 40 bid, can be decreased to daily dosing and transition to oral soon  Continue to monitor renal function and electrolytes   Potassium supplementation as needed to help maintain proper K level.  Fluid and salt restriction emphasized, Nutrition eval appreciated  Cont with metoprolol ER 50 mg daily for blood pressure management and cardiomyopathy  Now Bridging to coumadin with full dose Lovenox therapy  outpatient follow up at [facility] cardiology and pulmonary clinic

## 2023-06-07 LAB
ANION GAP SERPL CALC-SCNC: 5 MMOL/L — SIGNIFICANT CHANGE UP (ref 5–17)
BUN SERPL-MCNC: 18 MG/DL — SIGNIFICANT CHANGE UP (ref 7–23)
CALCIUM SERPL-MCNC: 8.6 MG/DL — SIGNIFICANT CHANGE UP (ref 8.5–10.1)
CHLORIDE SERPL-SCNC: 101 MMOL/L — SIGNIFICANT CHANGE UP (ref 96–108)
CO2 SERPL-SCNC: 31 MMOL/L — SIGNIFICANT CHANGE UP (ref 22–31)
CREAT SERPL-MCNC: 1.22 MG/DL — SIGNIFICANT CHANGE UP (ref 0.5–1.3)
EGFR: 61 ML/MIN/1.73M2 — SIGNIFICANT CHANGE UP
GLUCOSE SERPL-MCNC: 106 MG/DL — HIGH (ref 70–99)
INR BLD: 1.35 RATIO — HIGH (ref 0.88–1.16)
MAGNESIUM SERPL-MCNC: 2.2 MG/DL — SIGNIFICANT CHANGE UP (ref 1.6–2.6)
POTASSIUM SERPL-MCNC: 3 MMOL/L — LOW (ref 3.5–5.3)
POTASSIUM SERPL-SCNC: 3 MMOL/L — LOW (ref 3.5–5.3)
PROTHROM AB SERPL-ACNC: 16.2 SEC — HIGH (ref 10.5–13.4)
SODIUM SERPL-SCNC: 137 MMOL/L — SIGNIFICANT CHANGE UP (ref 135–145)

## 2023-06-07 PROCEDURE — 99233 SBSQ HOSP IP/OBS HIGH 50: CPT

## 2023-06-07 PROCEDURE — 99232 SBSQ HOSP IP/OBS MODERATE 35: CPT

## 2023-06-07 RX ORDER — POTASSIUM CHLORIDE 20 MEQ
40 PACKET (EA) ORAL EVERY 4 HOURS
Refills: 0 | Status: COMPLETED | OUTPATIENT
Start: 2023-06-07 | End: 2023-06-07

## 2023-06-07 RX ORDER — POTASSIUM CHLORIDE 20 MEQ
10 PACKET (EA) ORAL
Refills: 0 | Status: COMPLETED | OUTPATIENT
Start: 2023-06-07 | End: 2023-06-07

## 2023-06-07 RX ORDER — POTASSIUM CHLORIDE 20 MEQ
40 PACKET (EA) ORAL ONCE
Refills: 0 | Status: DISCONTINUED | OUTPATIENT
Start: 2023-06-07 | End: 2023-06-07

## 2023-06-07 RX ORDER — FUROSEMIDE 40 MG
40 TABLET ORAL DAILY
Refills: 0 | Status: DISCONTINUED | OUTPATIENT
Start: 2023-06-08 | End: 2023-06-08

## 2023-06-07 RX ORDER — WARFARIN SODIUM 2.5 MG/1
7.5 TABLET ORAL ONCE
Refills: 0 | Status: COMPLETED | OUTPATIENT
Start: 2023-06-07 | End: 2023-06-07

## 2023-06-07 RX ADMIN — ATORVASTATIN CALCIUM 40 MILLIGRAM(S): 80 TABLET, FILM COATED ORAL at 21:13

## 2023-06-07 RX ADMIN — Medication 3 MILLIGRAM(S): at 21:15

## 2023-06-07 RX ADMIN — Medication 100 MILLIEQUIVALENT(S): at 13:46

## 2023-06-07 RX ADMIN — FINASTERIDE 5 MILLIGRAM(S): 5 TABLET, FILM COATED ORAL at 11:37

## 2023-06-07 RX ADMIN — ENOXAPARIN SODIUM 80 MILLIGRAM(S): 100 INJECTION SUBCUTANEOUS at 05:26

## 2023-06-07 RX ADMIN — WARFARIN SODIUM 7.5 MILLIGRAM(S): 2.5 TABLET ORAL at 21:13

## 2023-06-07 RX ADMIN — Medication 40 MILLIGRAM(S): at 05:26

## 2023-06-07 RX ADMIN — Medication 50 MILLIGRAM(S): at 05:25

## 2023-06-07 RX ADMIN — Medication 100 MILLIEQUIVALENT(S): at 10:10

## 2023-06-07 RX ADMIN — Medication 40 MILLIEQUIVALENT(S): at 10:10

## 2023-06-07 RX ADMIN — TAMSULOSIN HYDROCHLORIDE 0.4 MILLIGRAM(S): 0.4 CAPSULE ORAL at 21:13

## 2023-06-07 RX ADMIN — Medication 40 MILLIEQUIVALENT(S): at 13:50

## 2023-06-07 RX ADMIN — CEFTRIAXONE 100 MILLIGRAM(S): 500 INJECTION, POWDER, FOR SOLUTION INTRAMUSCULAR; INTRAVENOUS at 02:12

## 2023-06-07 RX ADMIN — Medication 100 MILLIEQUIVALENT(S): at 10:58

## 2023-06-07 RX ADMIN — PANTOPRAZOLE SODIUM 40 MILLIGRAM(S): 20 TABLET, DELAYED RELEASE ORAL at 05:25

## 2023-06-07 RX ADMIN — ENOXAPARIN SODIUM 80 MILLIGRAM(S): 100 INJECTION SUBCUTANEOUS at 17:23

## 2023-06-07 NOTE — PROGRESS NOTE ADULT - ASSESSMENT
76 yo Creole speaking male w/ pmhx ofHTN, CHF, prostate cancer, PE/DVT on coumadin admitted to [facility] for acute hypoxemic respiratory failure 2/2 chf exacerbation. found to have subtherapeutic INR.  Daughter states pt had high INR as outpatient and Coumadin was held.  Not on DOAC due to lack of insurance.      # Acute on chronic combined HF w/ moderately reduced EF, Severe Pulmonary Hypertension.  Fluid restriction.  Cardiology following.  TTE EF 45-50% diastolic dysfunction and severe pulm HTN.  Clinically improving.  Change to Lasix once daily.  If BP will tolerate, will consider ACEI / ARB  # PE/DVT # subtherapuetic INR - Continue Lovenox.  Redosed Coumadin.    # Hypokalemia - supplement lytes.    # Prostate cancer, Chronic Urinary Retentio - Continue Yan.  Finasteride.  Flomax.    # Inpatient DVT Proph - on anticoagulation     I d/w daughter Makenzie [phone] on 6/5.

## 2023-06-07 NOTE — PROGRESS NOTE ADULT - ASSESSMENT
77-year-old with hypertension,  elevated PSA, DVT and PE history on treatment therapy with Coumadin, pHTN, heart failure, admitted with shortness of breath and hypoxia.   Seen resting comfortably, in no acute distress.  LE edema now much improved, breathing comfortably    PLAN:     IV Lasix 40 can be switched to po in am if pt remains clinically stable   Continue to monitor renal function and electrolytes   Potassium supplementation as needed to help maintain K >4, keep mg > 2  Fluid and salt restriction emphasized, Nutrition eval appreciated  Cont with metoprolol ER 50 mg daily for blood pressure management and cardiomyopathy  would consider adding an ARB when BP can tolerate   Now Bridging to coumadin with full dose Lovenox therapy  outpatient follow up at [facility] cardiology and pulmonary clinic   increase activity as tolerated

## 2023-06-08 LAB
ANION GAP SERPL CALC-SCNC: 5 MMOL/L — SIGNIFICANT CHANGE UP (ref 5–17)
BUN SERPL-MCNC: 27 MG/DL — HIGH (ref 7–23)
CALCIUM SERPL-MCNC: 9.1 MG/DL — SIGNIFICANT CHANGE UP (ref 8.5–10.1)
CHLORIDE SERPL-SCNC: 102 MMOL/L — SIGNIFICANT CHANGE UP (ref 96–108)
CO2 SERPL-SCNC: 30 MMOL/L — SIGNIFICANT CHANGE UP (ref 22–31)
CREAT SERPL-MCNC: 1.19 MG/DL — SIGNIFICANT CHANGE UP (ref 0.5–1.3)
EGFR: 63 ML/MIN/1.73M2 — SIGNIFICANT CHANGE UP
GLUCOSE SERPL-MCNC: 133 MG/DL — HIGH (ref 70–99)
INR BLD: 1.35 RATIO — HIGH (ref 0.88–1.16)
MAGNESIUM SERPL-MCNC: 2.3 MG/DL — SIGNIFICANT CHANGE UP (ref 1.6–2.6)
POTASSIUM SERPL-MCNC: 4.3 MMOL/L — SIGNIFICANT CHANGE UP (ref 3.5–5.3)
POTASSIUM SERPL-SCNC: 4.3 MMOL/L — SIGNIFICANT CHANGE UP (ref 3.5–5.3)
PROTHROM AB SERPL-ACNC: 16.2 SEC — HIGH (ref 10.5–13.4)
SODIUM SERPL-SCNC: 137 MMOL/L — SIGNIFICANT CHANGE UP (ref 135–145)

## 2023-06-08 PROCEDURE — 99232 SBSQ HOSP IP/OBS MODERATE 35: CPT

## 2023-06-08 PROCEDURE — 99233 SBSQ HOSP IP/OBS HIGH 50: CPT

## 2023-06-08 RX ORDER — FUROSEMIDE 40 MG
40 TABLET ORAL DAILY
Refills: 0 | Status: DISCONTINUED | OUTPATIENT
Start: 2023-06-09 | End: 2023-06-10

## 2023-06-08 RX ORDER — WARFARIN SODIUM 2.5 MG/1
7.5 TABLET ORAL ONCE
Refills: 0 | Status: COMPLETED | OUTPATIENT
Start: 2023-06-08 | End: 2023-06-08

## 2023-06-08 RX ADMIN — Medication 3 MILLIGRAM(S): at 21:31

## 2023-06-08 RX ADMIN — Medication 50 MILLIGRAM(S): at 05:13

## 2023-06-08 RX ADMIN — ENOXAPARIN SODIUM 80 MILLIGRAM(S): 100 INJECTION SUBCUTANEOUS at 05:14

## 2023-06-08 RX ADMIN — TAMSULOSIN HYDROCHLORIDE 0.4 MILLIGRAM(S): 0.4 CAPSULE ORAL at 21:20

## 2023-06-08 RX ADMIN — CEFTRIAXONE 100 MILLIGRAM(S): 500 INJECTION, POWDER, FOR SOLUTION INTRAMUSCULAR; INTRAVENOUS at 00:19

## 2023-06-08 RX ADMIN — ENOXAPARIN SODIUM 80 MILLIGRAM(S): 100 INJECTION SUBCUTANEOUS at 17:13

## 2023-06-08 RX ADMIN — WARFARIN SODIUM 7.5 MILLIGRAM(S): 2.5 TABLET ORAL at 21:20

## 2023-06-08 RX ADMIN — ATORVASTATIN CALCIUM 40 MILLIGRAM(S): 80 TABLET, FILM COATED ORAL at 21:20

## 2023-06-08 RX ADMIN — Medication 40 MILLIGRAM(S): at 05:13

## 2023-06-08 RX ADMIN — PANTOPRAZOLE SODIUM 40 MILLIGRAM(S): 20 TABLET, DELAYED RELEASE ORAL at 05:14

## 2023-06-08 RX ADMIN — FINASTERIDE 5 MILLIGRAM(S): 5 TABLET, FILM COATED ORAL at 11:08

## 2023-06-08 NOTE — PROGRESS NOTE ADULT - ASSESSMENT
77-year-old with hypertension,  elevated PSA, DVT and PE history on treatment therapy with Coumadin, pHTN, heart failure, admitted with shortness of breath and hypoxia.   Seen resting comfortably, in no acute distress.  LE edema now much improved, breathing comfortably    PLAN:     IV Lasix 40 can be switched to po now as hypervolemia improved, pt no dyspnea   Continue to monitor renal function and electrolytes   Potassium supplementation as needed to help maintain K >4, keep mg > 2  Fluid and salt restriction emphasized, Nutrition eval appreciated  Cont with metoprolol ER 50 mg daily for blood pressure management and cardiomyopathy  would consider adding an ARB when BP can tolerate   Now Bridging to coumadin with full dose Lovenox therapy  outpatient follow up at [facility] cardiology and pulmonary clinic for further cardiac/pulmonary w/u and management   increase activity as tolerated   signing off now, please reconsult as needed

## 2023-06-08 NOTE — PROGRESS NOTE ADULT - ASSESSMENT
76 yo Creole speaking male w/ pmhx ofHTN, CHF, prostate cancer, PE/DVT on coumadin admitted to [facility] for acute hypoxemic respiratory failure 2/2 chf exacerbation. found to have subtherapeutic INR.  Daughter states pt had high INR as outpatient and Coumadin was held.  Not on DOAC due to lack of insurance.      # Acute on chronic combined HF w/ moderately reduced EF, Severe Pulmonary Hypertension.  Fluid restriction.  Cardiology following.  TTE EF 45-50% diastolic dysfunction and severe pulm HTN.  Clinically improving.  Change to po Lasix  # PE/DVT # subtherapuetic INR - Continue Lovenox bridging to Coumadin.    # Hypokalemia - supplement lytes.    # Prostate cancer, Chronic Urinary Retention - Continue Yan.  Finasteride.  Flomax.    # Inpatient DVT Proph - on anticoagulation     I d/w daughter Makenzie [phone] on 6/5.

## 2023-06-09 LAB
ANION GAP SERPL CALC-SCNC: 5 MMOL/L — SIGNIFICANT CHANGE UP (ref 5–17)
BUN SERPL-MCNC: 27 MG/DL — HIGH (ref 7–23)
CALCIUM SERPL-MCNC: 9 MG/DL — SIGNIFICANT CHANGE UP (ref 8.5–10.1)
CHLORIDE SERPL-SCNC: 106 MMOL/L — SIGNIFICANT CHANGE UP (ref 96–108)
CO2 SERPL-SCNC: 28 MMOL/L — SIGNIFICANT CHANGE UP (ref 22–31)
CREAT SERPL-MCNC: 1.2 MG/DL — SIGNIFICANT CHANGE UP (ref 0.5–1.3)
EGFR: 62 ML/MIN/1.73M2 — SIGNIFICANT CHANGE UP
GLUCOSE SERPL-MCNC: 101 MG/DL — HIGH (ref 70–99)
HCT VFR BLD CALC: 39.5 % — SIGNIFICANT CHANGE UP (ref 39–50)
HGB BLD-MCNC: 12 G/DL — LOW (ref 13–17)
INR BLD: 1.68 RATIO — HIGH (ref 0.88–1.16)
MAGNESIUM SERPL-MCNC: 2.4 MG/DL — SIGNIFICANT CHANGE UP (ref 1.6–2.6)
MCHC RBC-ENTMCNC: 23.7 PG — LOW (ref 27–34)
MCHC RBC-ENTMCNC: 30.4 G/DL — LOW (ref 32–36)
MCV RBC AUTO: 78.1 FL — LOW (ref 80–100)
NRBC # BLD: 0 /100 WBCS — SIGNIFICANT CHANGE UP (ref 0–0)
PLATELET # BLD AUTO: 197 K/UL — SIGNIFICANT CHANGE UP (ref 150–400)
POTASSIUM SERPL-MCNC: 3.6 MMOL/L — SIGNIFICANT CHANGE UP (ref 3.5–5.3)
POTASSIUM SERPL-SCNC: 3.6 MMOL/L — SIGNIFICANT CHANGE UP (ref 3.5–5.3)
PROTHROM AB SERPL-ACNC: 20.3 SEC — HIGH (ref 10.5–13.4)
RBC # BLD: 5.06 M/UL — SIGNIFICANT CHANGE UP (ref 4.2–5.8)
RBC # FLD: 16.2 % — HIGH (ref 10.3–14.5)
SODIUM SERPL-SCNC: 139 MMOL/L — SIGNIFICANT CHANGE UP (ref 135–145)
WBC # BLD: 4.62 K/UL — SIGNIFICANT CHANGE UP (ref 3.8–10.5)
WBC # FLD AUTO: 4.62 K/UL — SIGNIFICANT CHANGE UP (ref 3.8–10.5)

## 2023-06-09 PROCEDURE — 99232 SBSQ HOSP IP/OBS MODERATE 35: CPT

## 2023-06-09 RX ORDER — WARFARIN SODIUM 2.5 MG/1
7.5 TABLET ORAL ONCE
Refills: 0 | Status: COMPLETED | OUTPATIENT
Start: 2023-06-09 | End: 2023-06-09

## 2023-06-09 RX ORDER — POTASSIUM CHLORIDE 20 MEQ
40 PACKET (EA) ORAL ONCE
Refills: 0 | Status: COMPLETED | OUTPATIENT
Start: 2023-06-09 | End: 2023-06-09

## 2023-06-09 RX ADMIN — Medication 40 MILLIGRAM(S): at 05:31

## 2023-06-09 RX ADMIN — Medication 3 MILLIGRAM(S): at 21:08

## 2023-06-09 RX ADMIN — Medication 50 MILLIGRAM(S): at 05:30

## 2023-06-09 RX ADMIN — FINASTERIDE 5 MILLIGRAM(S): 5 TABLET, FILM COATED ORAL at 11:20

## 2023-06-09 RX ADMIN — Medication 40 MILLIEQUIVALENT(S): at 10:10

## 2023-06-09 RX ADMIN — WARFARIN SODIUM 7.5 MILLIGRAM(S): 2.5 TABLET ORAL at 21:08

## 2023-06-09 RX ADMIN — PANTOPRAZOLE SODIUM 40 MILLIGRAM(S): 20 TABLET, DELAYED RELEASE ORAL at 06:07

## 2023-06-09 RX ADMIN — TAMSULOSIN HYDROCHLORIDE 0.4 MILLIGRAM(S): 0.4 CAPSULE ORAL at 21:08

## 2023-06-09 RX ADMIN — ENOXAPARIN SODIUM 80 MILLIGRAM(S): 100 INJECTION SUBCUTANEOUS at 05:30

## 2023-06-09 RX ADMIN — ENOXAPARIN SODIUM 80 MILLIGRAM(S): 100 INJECTION SUBCUTANEOUS at 17:16

## 2023-06-09 RX ADMIN — ATORVASTATIN CALCIUM 40 MILLIGRAM(S): 80 TABLET, FILM COATED ORAL at 21:08

## 2023-06-09 NOTE — PROGRESS NOTE ADULT - ASSESSMENT
76 yo Creole speaking male w/ pmhx ofHTN, CHF, prostate cancer, PE/DVT on coumadin admitted to [facility] for acute hypoxemic respiratory failure 2/2 chf exacerbation. found to have subtherapeutic INR.  Daughter states pt had high INR as outpatient and Coumadin was held.  Not on DOAC due to lack of insurance.      # Acute on chronic combined HF w/ moderately reduced EF, Severe Pulmonary Hypertension.  Fluid restriction.  Cardiology following.  TTE EF 45-50% diastolic dysfunction and severe pulm HTN.  Clinically improving.  Change to po Lasix  # PE/DVT # subtherapuetic INR - Continue Lovenox bridging to Coumadin.    # Hypokalemia - supplement lytes.    # Prostate cancer, Chronic Urinary Retention - Continue Yan.  Finasteride.  Flomax.    # Inpatient DVT Proph - on anticoagulation     I d/w daughter Makenzie [phone] on 6/5.    Probable d/c in 24-48h pending INR .

## 2023-06-09 NOTE — PHARMACOTHERAPY INTERVENTION NOTE - COMMENTS
Recommended to discontinue ceftriaxone 1 g IV once a day for the treatment of a UTI as patient was not complaining of UTI symptoms.

## 2023-06-10 LAB
ANION GAP SERPL CALC-SCNC: 5 MMOL/L — SIGNIFICANT CHANGE UP (ref 5–17)
BUN SERPL-MCNC: 27 MG/DL — HIGH (ref 7–23)
CALCIUM SERPL-MCNC: 9.2 MG/DL — SIGNIFICANT CHANGE UP (ref 8.5–10.1)
CHLORIDE SERPL-SCNC: 105 MMOL/L — SIGNIFICANT CHANGE UP (ref 96–108)
CO2 SERPL-SCNC: 28 MMOL/L — SIGNIFICANT CHANGE UP (ref 22–31)
CREAT SERPL-MCNC: 1.13 MG/DL — SIGNIFICANT CHANGE UP (ref 0.5–1.3)
EGFR: 67 ML/MIN/1.73M2 — SIGNIFICANT CHANGE UP
GLUCOSE SERPL-MCNC: 95 MG/DL — SIGNIFICANT CHANGE UP (ref 70–99)
INR BLD: 1.84 RATIO — HIGH (ref 0.88–1.16)
MAGNESIUM SERPL-MCNC: 2.3 MG/DL — SIGNIFICANT CHANGE UP (ref 1.6–2.6)
POTASSIUM SERPL-MCNC: 4.3 MMOL/L — SIGNIFICANT CHANGE UP (ref 3.5–5.3)
POTASSIUM SERPL-SCNC: 4.3 MMOL/L — SIGNIFICANT CHANGE UP (ref 3.5–5.3)
PROTHROM AB SERPL-ACNC: 22 SEC — HIGH (ref 10.5–13.4)
SODIUM SERPL-SCNC: 138 MMOL/L — SIGNIFICANT CHANGE UP (ref 135–145)

## 2023-06-10 PROCEDURE — 99232 SBSQ HOSP IP/OBS MODERATE 35: CPT

## 2023-06-10 RX ORDER — WARFARIN SODIUM 2.5 MG/1
10 TABLET ORAL ONCE
Refills: 0 | Status: COMPLETED | OUTPATIENT
Start: 2023-06-10 | End: 2023-06-10

## 2023-06-10 RX ADMIN — Medication 40 MILLIGRAM(S): at 05:34

## 2023-06-10 RX ADMIN — TAMSULOSIN HYDROCHLORIDE 0.4 MILLIGRAM(S): 0.4 CAPSULE ORAL at 21:23

## 2023-06-10 RX ADMIN — ENOXAPARIN SODIUM 80 MILLIGRAM(S): 100 INJECTION SUBCUTANEOUS at 17:12

## 2023-06-10 RX ADMIN — ENOXAPARIN SODIUM 80 MILLIGRAM(S): 100 INJECTION SUBCUTANEOUS at 05:34

## 2023-06-10 RX ADMIN — ATORVASTATIN CALCIUM 40 MILLIGRAM(S): 80 TABLET, FILM COATED ORAL at 21:23

## 2023-06-10 RX ADMIN — Medication 3 MILLIGRAM(S): at 21:23

## 2023-06-10 RX ADMIN — Medication 50 MILLIGRAM(S): at 05:34

## 2023-06-10 RX ADMIN — PANTOPRAZOLE SODIUM 40 MILLIGRAM(S): 20 TABLET, DELAYED RELEASE ORAL at 05:41

## 2023-06-10 RX ADMIN — FINASTERIDE 5 MILLIGRAM(S): 5 TABLET, FILM COATED ORAL at 12:18

## 2023-06-10 RX ADMIN — WARFARIN SODIUM 10 MILLIGRAM(S): 2.5 TABLET ORAL at 21:23

## 2023-06-10 NOTE — PROGRESS NOTE ADULT - ASSESSMENT
78 yo Creole speaking male w/ pmhx ofHTN, CHF, prostate cancer, PE/DVT on coumadin admitted to [facility] for acute hypoxemic respiratory failure 2/2 chf exacerbation. found to have subtherapeutic INR.  Daughter states pt had high INR as outpatient and Coumadin was held.  Not on DOAC due to lack of insurance.      # Acute on chronic combined HF w/ moderately reduced EF, Severe Pulmonary Hypertension.  Fluid restriction.  Seen by Cardiology.  TTE EF 45-50% diastolic dysfunction and severe pulm HTN.  Clinically improving.  Changed to po Lasix  # PE/DVT # subtherapuetic INR - Continue Lovenox bridging to Coumadin.    # Hypokalemia - supplement lytes.    # Prostate cancer, Chronic Urinary Retention - Continue Yan - chronic per pt.  Finasteride.  Flomax.    # Inpatient DVT Proph - on anticoagulation     I d/w daughter Makenzie [phone] on 6/5.    Probable d/c in 24-48h pending INR .

## 2023-06-11 VITALS
TEMPERATURE: 97 F | HEART RATE: 93 BPM | RESPIRATION RATE: 18 BRPM | SYSTOLIC BLOOD PRESSURE: 100 MMHG | OXYGEN SATURATION: 92 % | DIASTOLIC BLOOD PRESSURE: 64 MMHG

## 2023-06-11 LAB
ANION GAP SERPL CALC-SCNC: 5 MMOL/L — SIGNIFICANT CHANGE UP (ref 5–17)
BUN SERPL-MCNC: 24 MG/DL — HIGH (ref 7–23)
CALCIUM SERPL-MCNC: 8.6 MG/DL — SIGNIFICANT CHANGE UP (ref 8.5–10.1)
CHLORIDE SERPL-SCNC: 104 MMOL/L — SIGNIFICANT CHANGE UP (ref 96–108)
CO2 SERPL-SCNC: 29 MMOL/L — SIGNIFICANT CHANGE UP (ref 22–31)
CREAT SERPL-MCNC: 1.13 MG/DL — SIGNIFICANT CHANGE UP (ref 0.5–1.3)
EGFR: 67 ML/MIN/1.73M2 — SIGNIFICANT CHANGE UP
GLUCOSE SERPL-MCNC: 114 MG/DL — HIGH (ref 70–99)
INR BLD: 2.4 RATIO — HIGH (ref 0.88–1.16)
POTASSIUM SERPL-MCNC: 3.8 MMOL/L — SIGNIFICANT CHANGE UP (ref 3.5–5.3)
POTASSIUM SERPL-SCNC: 3.8 MMOL/L — SIGNIFICANT CHANGE UP (ref 3.5–5.3)
PROTHROM AB SERPL-ACNC: 29.1 SEC — HIGH (ref 10.5–13.4)
SODIUM SERPL-SCNC: 138 MMOL/L — SIGNIFICANT CHANGE UP (ref 135–145)

## 2023-06-11 PROCEDURE — 99239 HOSP IP/OBS DSCHRG MGMT >30: CPT

## 2023-06-11 RX ORDER — WARFARIN SODIUM 2.5 MG/1
1 TABLET ORAL
Qty: 7 | Refills: 0
Start: 2023-06-11

## 2023-06-11 RX ORDER — METOPROLOL TARTRATE 50 MG
1 TABLET ORAL
Qty: 30 | Refills: 0
Start: 2023-06-11 | End: 2023-07-10

## 2023-06-11 RX ORDER — FUROSEMIDE 40 MG
1 TABLET ORAL
Qty: 30 | Refills: 0
Start: 2023-06-11

## 2023-06-11 RX ORDER — ATORVASTATIN CALCIUM 80 MG/1
1 TABLET, FILM COATED ORAL
Qty: 30 | Refills: 0
Start: 2023-06-11 | End: 2023-07-10

## 2023-06-11 RX ADMIN — FINASTERIDE 5 MILLIGRAM(S): 5 TABLET, FILM COATED ORAL at 12:18

## 2023-06-11 RX ADMIN — TAMSULOSIN HYDROCHLORIDE 0.4 MILLIGRAM(S): 0.4 CAPSULE ORAL at 21:41

## 2023-06-11 RX ADMIN — Medication 50 MILLIGRAM(S): at 06:05

## 2023-06-11 RX ADMIN — ENOXAPARIN SODIUM 80 MILLIGRAM(S): 100 INJECTION SUBCUTANEOUS at 06:05

## 2023-06-11 RX ADMIN — PANTOPRAZOLE SODIUM 40 MILLIGRAM(S): 20 TABLET, DELAYED RELEASE ORAL at 06:05

## 2023-06-11 RX ADMIN — ATORVASTATIN CALCIUM 40 MILLIGRAM(S): 80 TABLET, FILM COATED ORAL at 21:41

## 2023-06-11 NOTE — PROGRESS NOTE ADULT - REASON FOR ADMISSION
SOB  , CHF

## 2023-06-11 NOTE — DISCHARGE NOTE PROVIDER - NSDCMRMEDTOKEN_GEN_ALL_CORE_FT
aspirin 81 mg oral tablet, chewable: 1 tab(s) orally once a day  atorvastatin 40 mg oral tablet: 1 tab(s) orally once a day (at bedtime)  finasteride 5 mg oral tablet: 1 tab(s) orally once a day  Lasix 20 mg oral tablet: 1 tab(s) orally once a day Start on 6/12/23  metoprolol succinate 50 mg oral tablet, extended release: 1 tab(s) orally once a day  pantoprazole 40 mg oral delayed release tablet: 1 tab(s) orally once a day (before a meal)  tamsulosin 0.4 mg oral capsule: 1 cap(s) orally once a day (at bedtime)  warfarin 6 mg oral tablet: 1 tab(s) orally once a day (at bedtime)

## 2023-06-11 NOTE — DISCHARGE NOTE PROVIDER - HOSPITAL COURSE
78 yo Creole speaking male w/ pmhx ofHTN, CHF, prostate cancer, PE/DVT on coumadin admitted to TELE for acute hypoxemic respiratory failure 2/2 chf exacerbation. found to have subtherapeutic INR.  Daughter states pt had high INR as outpatient and Coumadin was held.  Not on DOAC due to lack of insurance.      # Acute on chronic combined HF w/ moderately reduced EF, Severe Pulmonary Hypertension.  Fluid restriction.  Seen by Cardiology.  TTE EF 45-50% diastolic dysfunction and severe pulm HTN.  Clinically improving.  Changed to po Lasix - held due to hypotension.  Resume on 20mg daily.    # PE/DVT # subtherapuetic INR - INR now therapeutic.  D/c Lovenox.  Continue Coumadin at 6mg.    # Hypokalemia - supplement lytes.    # Prostate cancer, Chronic Urinary Retention - Continue Yan - chronic per pt.  Finasteride.  Flomax.    # Inpatient DVT Proph - on anticoagulation     I d/w daughter Makenzie [phone] via phone at bedside.  Stable for d/c home.     Disposition: Stable for discharge.  Outpatient followup discussed.  Total time spent on discharge is  45 minutes.

## 2023-06-11 NOTE — PROGRESS NOTE ADULT - PROVIDER SPECIALTY LIST ADULT
Cardiology
Hospitalist
Hospitalist
Cardiology
Internal Medicine
Internal Medicine
Cardiology
Hospitalist
Hospitalist
Internal Medicine
Internal Medicine
Cardiology
Hospitalist

## 2023-06-11 NOTE — PROGRESS NOTE ADULT - NUTRITIONAL ASSESSMENT
This patient has been assessed with a concern for Malnutrition and has been determined to have a diagnosis/diagnoses of Moderate protein-calorie malnutrition.    This patient is being managed with:   Diet Consistent Carbohydrate w/Evening Snack-  1500mL Fluid Restriction ([id number])  Low Sodium  Supplement Feeding Modality:  Oral  Glucerna Shake Cans or Servings Per Day:  1       Frequency:  Daily  Entered: Jun 5 2023 10:21AM  
This patient has been assessed with a concern for Malnutrition and has been determined to have a diagnosis/diagnoses of Moderate protein-calorie malnutrition.    This patient is being managed with:   Diet Consistent Carbohydrate w/Evening Snack-  1500mL Fluid Restriction ([id number])  Low Sodium  Supplement Feeding Modality:  Oral  Glucerna Shake Cans or Servings Per Day:  1       Frequency:  Daily  Entered: Jun 5 2023 10:21AM  
This patient has been assessed with a concern for Malnutrition and has been determined to have a diagnosis/diagnoses of Moderate protein-calorie malnutrition.    This patient is being managed with:   Diet Consistent Carbohydrate w/Evening Snack-  1500mL Fluid Restriction ([id number])  Low Sodium  Supplement Feeding Modality:  Oral  Glucerna Shake Cans or Servings Per Day:  1       Frequency:  Daily  Entered: Jun 5 2023 10:21AM  
This patient has been assessed with a concern for Malnutrition and has been determined to have a diagnosis/diagnoses of Moderate protein-calorie malnutrition.    This patient is being managed with:   Diet Consistent Carbohydrate w/Evening Snack-  1500mL Fluid Restriction ([id number])  Low Sodium  Supplement Feeding Modality:  Oral  Glucerna Shake Cans or Servings Per Day:  1       Frequency:  Daily  Entered: Jun 5 2023 10:21AM  
This patient has been assessed with a concern for Malnutrition and has been determined to have a diagnosis/diagnoses of Moderate protein-calorie malnutrition.    This patient is being managed with:   Diet Consistent Carbohydrate w/Evening Snack-  1500mL Fluid Restriction ([id number])  Low Sodium  Supplement Feeding Modality:  Oral  Glucerna Shake Cans or Servings Per Day:  1       Frequency:  Daily  Entered: Jun 5 2023 10:21AM  
This patient has been assessed with a concern for Malnutrition and has been determined to have a diagnosis/diagnoses of Moderate protein-calorie malnutrition.    This patient is being managed with:   Diet Consistent Carbohydrate w/Evening Snack-  1500mL Fluid Restriction ([id number])  Low Sodium  Supplement Feeding Modality:  Oral  Glucerna Shake Cans or Servings Per Day:  1       Frequency:  Daily  Entered: Jun 5 2023 10:21AM  
This patient has been assessed with a concern for Malnutrition and has been determined to have a diagnosis/diagnoses of Moderate protein-calorie malnutrition.    This patient is being managed with:   Diet Consistent Carbohydrate w/Evening Snack-  1500mL Fluid Restriction ([id number])  Low Sodium  Supplement Feeding Modality:  Oral  Glucerna Shake Cans or Servings Per Day:  1       Frequency:  Daily  Entered: Jun 5 2023 10:21AM  
This patient has been assessed with a concern for Malnutrition and has been determined to have a diagnosis/diagnoses of Moderate protein-calorie malnutrition.    This patient is being managed with:   Diet Consistent Carbohydrate w/Evening Snack-  1500mL Fluid Restriction ([id number])  Low Sodium  Supplement Feeding Modality:  Oral  Glucerna Shake Cans or Servings Per Day:  1       Frequency:  Daily  Entered: Jun 5 2023 10:21AM  
This patient has been assessed with a concern for Malnutrition and has been determined to have a diagnosis/diagnoses of Moderate protein-calorie malnutrition.    This patient is being managed with:   Diet Consistent Carbohydrate w/Evening Snack-  1500mL Fluid Restriction ([id number])  Low Sodium  Supplement Feeding Modality:  Oral  Glucerna Shake Cans or Servings Per Day:  1       Frequency:  Daily  Entered: Jun 5 2023 10:21AM  
This patient has been assessed with a concern for Malnutrition and has been determined to have a diagnosis/diagnoses of Moderate protein-calorie malnutrition.    This patient is being managed with:   Diet Consistent Carbohydrate w/Evening Snack-  1500mL Fluid Restriction ([id number])  Low Sodium  Supplement Feeding Modality:  Oral  Glucerna Shake Cans or Servings Per Day:  1       Frequency:  Daily  Entered: Jun 5 2023 10:21AM  
This patient has been assessed with a concern for Malnutrition and has been determined to have a diagnosis/diagnoses of Moderate protein-calorie malnutrition.    This patient is being managed with:   Diet Consistent Carbohydrate w/Evening Snack-  1500mL Fluid Restriction ([id number])  Low Sodium  Supplement Feeding Modality:  Oral  Glucerna Shake Cans or Servings Per Day:  1       Frequency:  Daily  Entered: Jun 5 2023 10:21AM

## 2023-06-11 NOTE — DISCHARGE NOTE PROVIDER - NSDCFUADDINST_GEN_ALL_CORE_FT
Restrict your fluid intake to approximately 1.5L daily.   Have labwork to check your INR within 3-5 days.  It is important to see your primary physician as well as any specialty physicians within the next week to perform a comprehensive medical review.  Call their offices for an appointment as soon as you leave the hospital.  You will also need to see them for renewal of your medications.  If have any difficulty following with a physician, contact the [facility] [phone] or via [email or web address].   To obtain your results, you can access the [email or web address] Patient Portal at [email or web address].  Your medical issues appear to be stable at this time, but if your symptoms recur or worsen, contact your physicians and/or return to the hospital if necessary.  If you encounter any issues or questions with your medication, call your physicians before stopping the medication.  Do not drive.  Limit your diet to 2 grams of sodium daily.

## 2023-06-11 NOTE — DISCHARGE NOTE PROVIDER - NSDCCPCAREPLAN_GEN_ALL_CORE_FT
PRINCIPAL DISCHARGE DIAGNOSIS  Diagnosis: Acute on chronic combined systolic and diastolic congestive heart failure  Assessment and Plan of Treatment:       SECONDARY DISCHARGE DIAGNOSES  Diagnosis: UTI (urinary tract infection)  Assessment and Plan of Treatment:     Diagnosis: Severe pulmonary hypertension  Assessment and Plan of Treatment:     Diagnosis: Pulmonary embolism  Assessment and Plan of Treatment:     Diagnosis: DVT, lower extremity  Assessment and Plan of Treatment:     Diagnosis: Prostate cancer  Assessment and Plan of Treatment:     Diagnosis: Acute on chronic urinary retention  Assessment and Plan of Treatment:

## 2023-06-11 NOTE — DISCHARGE NOTE PROVIDER - NSDCACTIVITY_GEN_ALL_CORE
Vaishalir notified by nursing today that per Dr. Langston, pt would be ready for discharge tomorrow.  Writer did call and spoke with Valorie manager at [facility] and she stated that they are ready for pt's return to them when physician is ready.  Per Valorie, they would need all meds faxed to [facility] ([phone]) if pt is to be discharged to them over the weekend as their pharmacy is closed on the weekend.  Writer did call and message was left for Dr. Langston to inform him of need for med orders today for [facility] if planning to discharge over the weekend.  Spoke with pt and she was very agreeable to discharge tomorrow.  Discussed transportation and pt is planning on her daughter Lu transporting her via private auto.  Writer did call and message was left via voice mail for her daughter to inquire if she is able to transport pt at 11:30a.m.  Informed valorie at [facility] that we are hoping for discharge at 11:30a.m., pending daughter is available at that time and she stated that would be fine.  Pt already has her own O2 at [facility] and portable O2 tank for transport.  Daughter would need to bring her portable O2 tank if she is planning on transporting pt.  Awaiting return call from daughter Lu.  Also awaiting return call from Dr. Langston regarding medication listing to fax too [facility] today.  [facility] following.      [facility] nursing services have been arranged for you through [facility] (formerly [facility] Your [facility] RN will contact you prior to your first home visit. For reference, the main [facility] number is [phone].     Addendum:  Vaishalir did speak with pt's daughter Lu and she is able to transport pt home via private auto tomorrow.  [facility] ready for discharge tomorrow.       No restrictions

## 2023-06-11 NOTE — PROGRESS NOTE ADULT - ASSESSMENT
76 yo Creole speaking male w/ pmhx ofHTN, CHF, prostate cancer, PE/DVT on coumadin admitted to TELE for acute hypoxemic respiratory failure 2/2 chf exacerbation. found to have subtherapeutic INR.  Daughter states pt had high INR as outpatient and Coumadin was held.  Not on DOAC due to lack of insurance.      # Acute on chronic combined HF w/ moderately reduced EF, Severe Pulmonary Hypertension.  Fluid restriction.  Seen by Cardiology.  TTE EF 45-50% diastolic dysfunction and severe pulm HTN.  Clinically improving.  Changed to po Lasix - held due to hypotension.  Resume on 20mg daily.    # PE/DVT # subtherapuetic INR - INR now therapeutic.  D/c Lovenox.  Continue Coumadin at 6mg.    # Hypokalemia - supplement lytes.    # Prostate cancer, Chronic Urinary Retention - Continue Yan - chronic per pt.  Finasteride.  Flomax.    # Inpatient DVT Proph - on anticoagulation     I d/w daughter Makenzie [phone] via phone at bedside.  Stable for d/c home.

## 2023-06-11 NOTE — PROGRESS NOTE ADULT - SUBJECTIVE AND OBJECTIVE BOX
phone katharine drew [id number]    INTERVAL HPI/OVERNIGHT EVENTS: no acute events  SUBJECTVE: no fever/chills/nausea/vomiting/dysPatient is a 77y old  Male who presents with a chief complaint of SOB  , CHF (2023 05:58)    MEDICATIONS  (STANDING):  atorvastatin 40 milliGRAM(s) Oral at bedtime  enoxaparin Injectable 80 milliGRAM(s) SubCutaneous every 12 hours  finasteride 5 milliGRAM(s) Oral daily  furosemide   Injectable 40 milliGRAM(s) IV Push two times a day  metoprolol succinate ER 50 milliGRAM(s) Oral daily  pantoprazole    Tablet 40 milliGRAM(s) Oral before breakfast  potassium chloride    Tablet ER 40 milliEquivalent(s) Oral once  tamsulosin 0.4 milliGRAM(s) Oral at bedtime    MEDICATIONS  (PRN):  acetaminophen     Tablet .. 650 milliGRAM(s) Oral every 6 hours PRN Temp greater or equal to 38C (100.4F), Mild Pain (1 - 3)  aluminum hydroxide/magnesium hydroxide/simethicone Suspension 30 milliLiter(s) Oral every 4 hours PRN Dyspepsia  melatonin 3 milliGRAM(s) Oral at bedtime PRN Insomnia  ondansetron Injectable 4 milliGRAM(s) IV Push every 8 hours PRN Nausea and/or Vomiting    Allergies    No Known Allergies    Intolerances      REVIEW OF SYSTEMS:  All other systems reviewed and are negative    Vital Signs Last 24 Hrs  T(C): 37.2 (2023 10:31), Max: 37.2 (2023 10:31)  T(F): 99 (2023 10:31), Max: 99 (2023 10:31)  HR: 84 (2023 10:31) (81 - 91)  BP: 111/72 (2023 10:31) (101/66 - 133/86)  BP(mean): --  RR: 18 (2023 10:31) (17 - 19)  SpO2: 94% (2023 10:31) (94% - 96%)    Parameters below as of 2023 04:38  Patient On (Oxygen Delivery Method): nasal cannula  O2 Flow (L/min): 3    Daily Height in cm: 167.64 (2023 18:10)    Daily   I&O's Summary    2023 07:01  -  2023 07:00  --------------------------------------------------------  IN: 250 mL / OUT: 1500 mL / NET: -1250 mL      CAPILLARY BLOOD GLUCOSE        PHYSICAL EXAM:  GENERAL: NAD, well-groomed, well-developed. pleasant  HEAD:  Atraumatic, Normocephalic  EYES: EOMI, PERRLA, conjunctiva and sclera clear  ENMT: No tonsillar erythema, exudates, or enlargement; Moist mucous membranes, Good dentition, No lesions  NECK: Supple, No JVD, Normal thyroid  CHEST/LUNG: Clear to percussion bilaterally; No rales, rhonchi, wheezing, or rubs  HEART: Regular rate and rhythm; No murmurs, rubs, or gallops  ABDOMEN: Soft, Nontender, Nondistended; Bowel sounds present  EXTREMITIES:  2+ EDEMA    Labs                          11.0   5.51  )-----------( 171      ( 2023 21:22 )             36.2     06-03    144  |  107  |  20  ----------------------------<  119<H>  3.2<L>   |  30  |  1.24    Ca    9.0      2023 10:20  Mg     2.2     06-03    TPro  7.3  /  Alb  3.1<L>  /  TBili  0.9  /  DBili  x   /  AST  24  /  ALT  26  /  AlkPhos  98  06-02    PT/INR - ( 2023 10:20 )   PT: 18.0 sec;   INR: 1.49 ratio         PTT - ( 2023 21:22 )  PTT:35.1 sec      Urinalysis Basic - ( 2023 21:58 )    Color: Yellow / Appearance: very cloudy / S.015 / pH: x  Gluc: x / Ketone: Negative  / Bili: Negative / Urobili: 8 mg/dL   Blood: x / Protein: 100 mg/dL / Nitrite: Negative   Leuk Esterase: Moderate / RBC: 6-10 /HPF / WBC 11-25   Sq Epi: x / Non Sq Epi: x / Bacteria: Moderate              
Patient is a 77y old  Male who presents with a chief complaint of SOB     PAST MEDICAL & SURGICAL HISTORY:  HTN (hypertension)    elevated PSA    PE    DVT     CHF    pHTN    INTERVAL HISTORY: in no distress  	  MEDICATIONS:  MEDICATIONS  (STANDING):  atorvastatin 40 milliGRAM(s) Oral at bedtime  cefTRIAXone   IVPB 1000 milliGRAM(s) IV Intermittent every 24 hours  enoxaparin Injectable 80 milliGRAM(s) SubCutaneous every 12 hours  finasteride 5 milliGRAM(s) Oral daily  furosemide   Injectable 40 milliGRAM(s) IV Push two times a day  metoprolol succinate ER 50 milliGRAM(s) Oral daily  pantoprazole    Tablet 40 milliGRAM(s) Oral before breakfast  tamsulosin 0.4 milliGRAM(s) Oral at bedtime  warfarin 6 milliGRAM(s) Oral once    MEDICATIONS  (PRN):  acetaminophen     Tablet .. 650 milliGRAM(s) Oral every 6 hours PRN Temp greater or equal to 38C (100.4F), Mild Pain (1 - 3)  aluminum hydroxide/magnesium hydroxide/simethicone Suspension 30 milliLiter(s) Oral every 4 hours PRN Dyspepsia  melatonin 3 milliGRAM(s) Oral at bedtime PRN Insomnia  ondansetron Injectable 4 milliGRAM(s) IV Push every 8 hours PRN Nausea and/or Vomiting    Vitals:  T(F): 98 (06-06-23 @ 10:57), Max: 98.8 (06-05-23 @ 23:41)  HR: 86 (06-06-23 @ 10:57) (83 - 93)  BP: 106/69 (06-06-23 @ 10:57) (103/68 - 130/88)  RR: 18 (06-06-23 @ 10:57) (17 - 18)  SpO2: 98% (06-06-23 @ 10:57) (95% - 98%)    06-05 @ 07:01  -  06-06 @ 07:00  --------------------------------------------------------  IN:    Oral Fluid: 300 mL  Total IN: 300 mL    OUT:    Indwelling Catheter - Urethral (mL): 4400 mL  Total OUT: 4400 mL    Total NET: -4100 mL    06-06 @ 07:01  -  06-06 @ 14:16  --------------------------------------------------------  IN:  Total IN: 0 mL    OUT:    Indwelling Catheter - Urethral (mL): 1100 mL  Total OUT: 1100 mL    Total NET: -1100 mL    PHYSICAL EXAM:  Neuro: Awake, responsive  CV: S1 S2 RRR + SM  Lungs: diminished to bases   GI: Soft, BS +, ND, NT  Extremities: + LE edema    TELEMETRY: sinus    RADIOLOGY: < from: CT Angio Chest PE Protocol w/ IV Cont (06.02.23 @ 23:06) >  No significant change compared to previous studies.    Chronic bilateral pulmonary emboli, clot volume not appreciably changed.    CHF with cardiomegaly, mild interstitial pulmonary edema, tracepleural   effusions, small amount of ascites, hepatomegaly, and anasarca.    Enlarged prostate. Urinary bladder decompressed with Yan catheter.    < end of copied text >    DIAGNOSTIC TESTING:    [x ] Echocardiogram: < from: TTE Echo Complete w/o Contrast w/ Doppler (05.07.23 @ 08:44) >  Left Ventricle: Increased relative wall thickness with normal mass index   consistent with left ventricular concentric remodeling.  Left ventricular ejection fraction, by visual estimation, is 45 to 50%.   The interventricular septum is flattened in systole and diastole,   consistent with right ventricular pressure and volume overload. Spectral   Doppler shows impaired relaxation pattern of left ventricular myocardial   filling (Grade I diastolic dysfunction).  Right Ventricle: The right ventricular size is severely enlarged. RV   systolic function is moderately reduced.  Left Atrium: The left atrium is normal in size.  Right Atrium: Severely enlarged right atrium.  Pericardium: Trivial pericardial effusion is present. There is no   evidence of cardiac tamponade.  Mitral Valve: Structurally normal mitral valve, with normal leaflet   excursion.  Tricuspid Valve: Structurally normal tricuspid valve, with normal leaflet   excursion. Moderate-severe tricuspid regurgitation is visualized.   Estimated pulmonary artery systolic pressureis 77.5 mmHg assuming a   right atrial pressure of 8 mmHg, which is consistent with severe   pulmonary hypertension.  Aortic Valve: Normal trileaflet aortic valve with normal opening.  Pulmonic Valve: Structurally normal pulmonic valve, with normal leaflet   excursion. Mild pulmonic valve regurgitation.  Aorta: The aortic root is normal in size and structure.  Venous: The inferior vena cava was dilated, with respiratory size   variation less than 50%.      Summary:   1. Left ventricular ejection fraction, by visual estimation, is 45 to   50%.   2. Increased relative wall thickness with normal mass index consistent   with left ventricular concentric remodeling.   3. Spectral Doppler shows impaired relaxation pattern of left   ventricular myocardial filling (Grade I diastolic dysfunction).   4. Structurally normal mitral valve, with normal leaflet excursion.   5. Normal trileaflet aortic valve with normal opening.   6. Severely enlarged right atrium.   7. Severely enlarged right ventricle.   8. Moderately reduced RV systolic function.   9. Right ventricular volume and pressure overload.  10. Moderate-severe tricuspid regurgitation.  11. Structurally normal pulmonic valve, with normal leaflet excursion.  12. Mild pulmonic valve regurgitation.  13. Estimated pulmonary artery systolic pressure is 77.5 mmHg assuming a   right atrial pressure of 8 mmHg, which is consistent with severe   pulmonary hypertension.    < end of copied text >    LABS:	 	    06 Jun 2023 07:08    141    |  105    |  19     ----------------------------<  114    3.9     |  32     |  1.25   05 Jun 2023 08:11    139    |  104    |  18     ----------------------------<  105    3.2     |  32     |  1.19   04 Jun 2023 05:50    139    |  105    |  21     ----------------------------<  116    3.4     |  29     |  1.37     Ca    9.2        06 Jun 2023 07:08  Phos  3.8       06 Jun 2023 07:08  Mg     2.2       06 Jun 2023 07:08    TPro  7.1    /  Alb  2.9    /  TBili  0.6    /  DBili  x      /  AST  15     /  ALT  21     /  AlkPhos  96     05 Jun 2023 08:11                        11.9   5.08  )-----------( 205      ( 06 Jun 2023 07:08 )             39.0 ,                       11.5   4.34  )-----------( 184      ( 05 Jun 2023 08:11 )             37.5     PT/PTT- ( 06 Jun 2023 07:08 )   PT: 15.0 sec;  PTT: x        INR: 1.26 ratio (06-06 @ 07:08)  INR: 1.31 ratio (06-05 @ 08:11)  INR: 1.41 ratio (06-04 @ 11:22)          
                          Patient: MARVA BURDICK [id number] 77y Male                            Hospitalist Attending Note    No complaints.     ____________________PHYSICAL EXAM:  GENERAL:  NAD Alert, interactive.    HEENT: NCAT  CARDIOVASCULAR:  S1, S2  LUNGS: coarse BS b/l.    ABDOMEN:  soft, (-) tenderness, (-) distension, (+) bowel sounds, (-) guarding, (-) rebound (-) rigidity  EXTREMITIES:  no cyanosis / clubbing.  + edema.   ____________________    VITALS:  Vital Signs Last 24 Hrs  T(C): 37 (2023 10:11), Max: 37.1 (2023 16:15)  T(F): 98.6 (2023 10:11), Max: 98.7 (2023 16:15)  HR: 80 (2023 10:11) (78 - 107)  BP: 101/60 (2023 10:11) (101/60 - 104/66)  BP(mean): --  RR: 18 (2023 10:11) (17 - 18)  SpO2: 100% (2023 10:11) (93% - 100%)    Parameters below as of 2023 10:11  Patient On (Oxygen Delivery Method): nasal cannula     Daily     Daily Weight in k.6 (2023 04:51)  CAPILLARY BLOOD GLUCOSE        I&O's Summary    2023 07:01  -  2023 07:00  --------------------------------------------------------  IN: 240 mL / OUT: 2955 mL / NET: -2715 mL        LABS:                        11.9   5.08  )-----------( 205      ( 2023 07:08 )             39.0     06-07    137  |  101  |  18  ----------------------------<  106<H>  3.0<L>   |  31  |  1.22    Ca    8.6      2023 07:10  Phos  3.8     06-06  Mg     2.2     06-07      PT/INR - ( 2023 07:10 )   PT: 16.2 sec;   INR: 1.35 ratio                       MEDICATIONS:  acetaminophen     Tablet .. 650 milliGRAM(s) Oral every 6 hours PRN  aluminum hydroxide/magnesium hydroxide/simethicone Suspension 30 milliLiter(s) Oral every 4 hours PRN  atorvastatin 40 milliGRAM(s) Oral at bedtime  cefTRIAXone   IVPB      cefTRIAXone   IVPB 1000 milliGRAM(s) IV Intermittent every 24 hours  enoxaparin Injectable 80 milliGRAM(s) SubCutaneous every 12 hours  finasteride 5 milliGRAM(s) Oral daily  furosemide   Injectable 40 milliGRAM(s) IV Push daily  melatonin 3 milliGRAM(s) Oral at bedtime PRN  metoprolol succinate ER 50 milliGRAM(s) Oral daily  ondansetron Injectable 4 milliGRAM(s) IV Push every 8 hours PRN  pantoprazole    Tablet 40 milliGRAM(s) Oral before breakfast  tamsulosin 0.4 milliGRAM(s) Oral at bedtime  warfarin 7.5 milliGRAM(s) Oral once    
                          Patient: MARVA BURDICK [id number] 77y Male                            Hospitalist Attending Note    No complaints.   Seen with  [id number]    ____________________PHYSICAL EXAM:  GENERAL:  NAD Alert, interactive.    HEENT: NCAT  CARDIOVASCULAR:  S1, S2  LUNGS: CTAB    ABDOMEN:  soft, (-) tenderness, (-) distension, (+) bowel sounds, (-) guarding, (-) rebound (-) rigidity  EXTREMITIES:  no cyanosis / clubbing / edema.   ____________________      VITALS:  Vital Signs Last 24 Hrs  T(C): 36.8 (2023 15:56), Max: 36.9 (2023 23:34)  T(F): 98.3 (2023 15:56), Max: 98.5 (2023 23:34)  HR: 87 (2023 15:56) (80 - 88)  BP: 109/70 (2023 15:56) (100/64 - 109/70)  BP(mean): --  RR: 18 (2023 15:56) (16 - 18)  SpO2: 92% (2023 15:56) (92% - 96%)    Parameters below as of 2023 12:00  Patient On (Oxygen Delivery Method): room air     Daily     Daily Weight in k.8 (2023 04:44)  CAPILLARY BLOOD GLUCOSE        I&O's Summary    2023 07:01  -  2023 07:00  --------------------------------------------------------  IN: 1530 mL / OUT: 1350 mL / NET: 180 mL    2023 07:01  -  2023 17:04  --------------------------------------------------------  IN: 740 mL / OUT: 875 mL / NET: -135 mL        LABS:                        12.0   4.62  )-----------( 197      ( 2023 06:55 )             39.5         139  |  106  |  27<H>  ----------------------------<  101<H>  3.6   |  28  |  1.20    Ca    9.0      2023 06:55  Mg     2.4           PT/INR - ( 2023 06:55 )   PT: 20.3 sec;   INR: 1.68 ratio                       MEDICATIONS:  acetaminophen     Tablet .. 650 milliGRAM(s) Oral every 6 hours PRN  aluminum hydroxide/magnesium hydroxide/simethicone Suspension 30 milliLiter(s) Oral every 4 hours PRN  atorvastatin 40 milliGRAM(s) Oral at bedtime  enoxaparin Injectable 80 milliGRAM(s) SubCutaneous every 12 hours  finasteride 5 milliGRAM(s) Oral daily  furosemide    Tablet 40 milliGRAM(s) Oral daily  melatonin 3 milliGRAM(s) Oral at bedtime PRN  metoprolol succinate ER 50 milliGRAM(s) Oral daily  ondansetron Injectable 4 milliGRAM(s) IV Push every 8 hours PRN  pantoprazole    Tablet 40 milliGRAM(s) Oral before breakfast  tamsulosin 0.4 milliGRAM(s) Oral at bedtime  warfarin 7.5 milliGRAM(s) Oral once    
                          Patient: MARVA BURDICK [id number] 77y Male                            Hospitalist Attending Note    No complaints.   Denies SOB.     ____________________PHYSICAL EXAM:  GENERAL:  NAD Alert, interactive.    HEENT: NCAT  CARDIOVASCULAR:  S1, S2  LUNGS: CTAB    ABDOMEN:  soft, (-) tenderness, (-) distension, (+) bowel sounds, (-) guarding, (-) rebound (-) rigidity  EXTREMITIES:  no cyanosis / clubbing / edema.   ____________________      VITALS:  Vital Signs Last 24 Hrs  T(C): 36.8 (10 Zen 2023 11:10), Max: 37.3 (2023 23:33)  T(F): 98.2 (10 Zen 2023 11:10), Max: 99.1 (2023 23:33)  HR: 86 (10 Zen 2023 11:10) (86 - 93)  BP: 98/62 (10 Zen 2023 11:10) (98/62 - 109/70)  BP(mean): --  RR: 18 (10 Zen 2023 11:10) (18 - 18)  SpO2: 97% (10 Zen 2023 11:10) (91% - 97%)    Parameters below as of 10 Zen 2023 11:10  Patient On (Oxygen Delivery Method): nasal cannula     Daily     Daily Weight in k.8 (10 Zen 2023 04:39)  CAPILLARY BLOOD GLUCOSE        I&O's Summary    2023 07:  -  10 Zen 2023 07:00  --------------------------------------------------------  IN: 1020 mL / OUT: 875 mL / NET: 145 mL    10 Zen 2023 07:01  -  10 Zen 2023 12:51  --------------------------------------------------------  IN: 240 mL / OUT: 750 mL / NET: -510 mL        LABS:                        12.0   4.62  )-----------( 197      ( 2023 06:55 )             39.5     06-10    138  |  105  |  27<H>  ----------------------------<  95  4.3   |  28  |  1.13    Ca    9.2      10 Zen 2023 07:05  Mg     2.3     06-10      PT/INR - ( 10 Zen 2023 07:10 )   PT: 22.0 sec;   INR: 1.84 ratio                       MEDICATIONS:  acetaminophen     Tablet .. 650 milliGRAM(s) Oral every 6 hours PRN  aluminum hydroxide/magnesium hydroxide/simethicone Suspension 30 milliLiter(s) Oral every 4 hours PRN  atorvastatin 40 milliGRAM(s) Oral at bedtime  enoxaparin Injectable 80 milliGRAM(s) SubCutaneous every 12 hours  finasteride 5 milliGRAM(s) Oral daily  furosemide    Tablet 40 milliGRAM(s) Oral daily  melatonin 3 milliGRAM(s) Oral at bedtime PRN  metoprolol succinate ER 50 milliGRAM(s) Oral daily  ondansetron Injectable 4 milliGRAM(s) IV Push every 8 hours PRN  pantoprazole    Tablet 40 milliGRAM(s) Oral before breakfast  tamsulosin 0.4 milliGRAM(s) Oral at bedtime  warfarin 10 milliGRAM(s) Oral once    
Patient is a 77y old  Male who presents with a chief complaint of SOB     PAST MEDICAL & SURGICAL HISTORY:  HTN (hypertension)    elevated PSA    PE    DVT     CHF    pHTN    INTERVAL HISTORY: in no acute distress  	  MEDICATIONS:  MEDICATIONS  (STANDING):  atorvastatin 40 milliGRAM(s) Oral at bedtime  enoxaparin Injectable 80 milliGRAM(s) SubCutaneous every 12 hours  finasteride 5 milliGRAM(s) Oral daily  furosemide   Injectable 40 milliGRAM(s) IV Push daily  metoprolol succinate ER 50 milliGRAM(s) Oral daily  pantoprazole    Tablet 40 milliGRAM(s) Oral before breakfast  tamsulosin 0.4 milliGRAM(s) Oral at bedtime  warfarin 7.5 milliGRAM(s) Oral once    MEDICATIONS  (PRN):  acetaminophen     Tablet .. 650 milliGRAM(s) Oral every 6 hours PRN Temp greater or equal to 38C (100.4F), Mild Pain (1 - 3)  aluminum hydroxide/magnesium hydroxide/simethicone Suspension 30 milliLiter(s) Oral every 4 hours PRN Dyspepsia  melatonin 3 milliGRAM(s) Oral at bedtime PRN Insomnia  ondansetron Injectable 4 milliGRAM(s) IV Push every 8 hours PRN Nausea and/or Vomiting    Vitals:  T(F): 98.7 (06-08-23 @ 10:45), Max: 98.7 (06-08-23 @ 10:45)  HR: 83 (06-08-23 @ 10:45) (80 - 90)  BP: 105/66 (06-08-23 @ 10:45) (99/66 - 110/73)  RR: 18 (06-08-23 @ 10:45) (16 - 18)  SpO2: 92% (06-08-23 @ 10:45) (92% - 95%)    06-07 @ 07:01  -  06-08 @ 07:00  --------------------------------------------------------  IN:    Oral Fluid: 340 mL  Total IN: 340 mL    OUT:    Indwelling Catheter - Urethral (mL): 1525 mL  Total OUT: 1525 mL    Total NET: -1185 mL    06-08 @ 07:01  -  06-08 @ 11:02  --------------------------------------------------------  IN:    Oral Fluid: 360 mL  Total IN: 360 mL    OUT:  Total OUT: 0 mL    Total NET: 360 mL    PHYSICAL EXAM:  Neuro: Awake, responsive  CV: S1 S2 RRR + SM  Lungs: diminished to bases  GI: Soft, BS +, ND, NT  Extremities: Trace LE edema    TELEMETRY: sinus    RADIOLOGY:   < from: CT Angio Chest PE Protocol w/ IV Cont (06.02.23 @ 23:06) >  No significant change compared to previous studies.    Chronic bilateral pulmonary emboli, clot volume not appreciably changed.    CHF with cardiomegaly, mild interstitial pulmonary edema, tracepleural   effusions, small amount of ascites, hepatomegaly, and anasarca.    Enlarged prostate. Urinary bladder decompressed with Yan catheter.    < end of copied text >    DIAGNOSTIC TESTING:    [x ] Echocardiogram:   < from: TTE Echo Complete w/o Contrast w/ Doppler (05.07.23 @ 08:44) >  Left Ventricle: Increased relative wall thickness with normal mass index   consistent with left ventricular concentric remodeling.  Left ventricular ejection fraction, by visual estimation, is 45 to 50%.   The interventricular septum is flattened in systole and diastole,   consistent with right ventricular pressure and volume overload. Spectral   Doppler shows impaired relaxation pattern of left ventricular myocardial   filling (Grade I diastolic dysfunction).  Right Ventricle: The right ventricular size is severely enlarged. RV   systolic function is moderately reduced.  Left Atrium: The left atrium is normal in size.  Right Atrium: Severely enlarged right atrium.  Pericardium: Trivial pericardial effusion is present. There is no   evidence of cardiac tamponade.  Mitral Valve: Structurally normal mitral valve, with normal leaflet   excursion.  Tricuspid Valve: Structurally normal tricuspid valve, with normal leaflet   excursion. Moderate-severe tricuspid regurgitation is visualized.   Estimated pulmonary artery systolic pressureis 77.5 mmHg assuming a   right atrial pressure of 8 mmHg, which is consistent with severe   pulmonary hypertension.  Aortic Valve: Normal trileaflet aortic valve with normal opening.  Pulmonic Valve: Structurally normal pulmonic valve, with normal leaflet   excursion. Mild pulmonic valve regurgitation.  Aorta: The aortic root is normal in size and structure.  Venous: The inferior vena cava was dilated, with respiratory size   variation less than 50%.      Summary:   1. Left ventricular ejection fraction, by visual estimation, is 45 to   50%.   2. Increased relative wall thickness with normal mass index consistent   with left ventricular concentric remodeling.   3. Spectral Doppler shows impaired relaxation pattern of left   ventricular myocardial filling (Grade I diastolic dysfunction).   4. Structurally normal mitral valve, with normal leaflet excursion.   5. Normal trileaflet aortic valve with normal opening.   6. Severely enlarged right atrium.   7. Severely enlarged right ventricle.   8. Moderately reduced RV systolic function.   9. Right ventricular volume and pressure overload.  10. Moderate-severe tricuspid regurgitation.  11. Structurally normal pulmonic valve, with normal leaflet excursion.  12. Mild pulmonic valve regurgitation.  13. Estimated pulmonary artery systolic pressure is 77.5 mmHg assuming a   right atrial pressure of 8 mmHg, which is consistent with severe   pulmonary hypertension.    < end of copied text >    LABS:	 	    08 Jun 2023 06:10    137    |  102    |  27     ----------------------------<  133    4.3     |  30     |  1.19   07 Jun 2023 07:10    137    |  101    |  18     ----------------------------<  106    3.0     |  31     |  1.22   06 Jun 2023 07:08    141    |  105    |  19     ----------------------------<  114    3.9     |  32     |  1.25     Ca    9.1        08 Jun 2023 06:10  Mg     2.3       08 Jun 2023 06:10                       11.9   5.08  )-----------( 205      ( 06 Jun 2023 07:08 )             39.0     PT/PTT- ( 08 Jun 2023 06:10 )   PT: 16.2 sec;  PTT: x        INR: 1.35 ratio (06-08 @ 06:10)  INR: 1.35 ratio (06-07 @ 07:10)  INR: 1.26 ratio (06-06 @ 07:08)          
Patient is a 77y old  Male who presents with a chief complaint of SOB     PAST MEDICAL & SURGICAL HISTORY:  HTN (hypertension)    elevated PSA    PE    DVT     CHF    pHTN    INTERVAL HISTORY: in no distress, denies any chest pain or sob   	  MEDICATIONS:  MEDICATIONS  (STANDING):  atorvastatin 40 milliGRAM(s) Oral at bedtime  cefTRIAXone   IVPB 1000 milliGRAM(s) IV Intermittent every 24 hours  enoxaparin Injectable 80 milliGRAM(s) SubCutaneous every 12 hours  finasteride 5 milliGRAM(s) Oral daily  furosemide   Injectable 40 milliGRAM(s) IV Push daily  metoprolol succinate ER 50 milliGRAM(s) Oral daily  pantoprazole    Tablet 40 milliGRAM(s) Oral before breakfast  potassium chloride    Tablet ER 40 milliEquivalent(s) Oral every 4 hours  potassium chloride  10 mEq/100 mL IVPB 10 milliEquivalent(s) IV Intermittent every 1 hour  tamsulosin 0.4 milliGRAM(s) Oral at bedtime  warfarin 7.5 milliGRAM(s) Oral once    MEDICATIONS  (PRN):  acetaminophen     Tablet .. 650 milliGRAM(s) Oral every 6 hours PRN Temp greater or equal to 38C (100.4F), Mild Pain (1 - 3)  aluminum hydroxide/magnesium hydroxide/simethicone Suspension 30 milliLiter(s) Oral every 4 hours PRN Dyspepsia  melatonin 3 milliGRAM(s) Oral at bedtime PRN Insomnia  ondansetron Injectable 4 milliGRAM(s) IV Push every 8 hours PRN Nausea and/or Vomiting    Vitals:  T(F): 98.6 (06-07-23 @ 10:11), Max: 98.7 (06-06-23 @ 16:15)  HR: 80 (06-07-23 @ 10:11) (78 - 107)  BP: 101/60 (06-07-23 @ 10:11) (101/60 - 104/66)  RR: 18 (06-07-23 @ 10:11) (17 - 18)  SpO2: 100% (06-07-23 @ 10:11) (93% - 100%)    06-06 @ 07:01  -  06-07 @ 07:00  --------------------------------------------------------  IN:    Oral Fluid: 240 mL  Total IN: 240 mL    OUT:    Indwelling Catheter - Urethral (mL): 2955 mL  Total OUT: 2955 mL    Total NET: -2715 mL    PHYSICAL EXAM:  Neuro: Awake, responsive  CV: S1 S2 RRR + SM  Lungs: diminished to bases  GI: Soft, BS +, ND, NT  Extremities: Trace LE edema    TELEMETRY: sinus, short runs of NSVT    RADIOLOGY: < from: CT Angio Chest PE Protocol w/ IV Cont (06.02.23 @ 23:06) >  No significant change compared to previous studies.    Chronic bilateral pulmonary emboli, clot volume not appreciably changed.    CHF with cardiomegaly, mild interstitial pulmonary edema, tracepleural   effusions, small amount of ascites, hepatomegaly, and anasarca.    Enlarged prostate. Urinary bladder decompressed with Yan catheter.    < end of copied text >    DIAGNOSTIC TESTING:    [x ] Echocardiogram:   < from: TTE Echo Complete w/o Contrast w/ Doppler (05.07.23 @ 08:44) >  Left Ventricle: Increased relative wall thickness with normal mass index   consistent with left ventricular concentric remodeling.  Left ventricular ejection fraction, by visual estimation, is 45 to 50%.   The interventricular septum is flattened in systole and diastole,   consistent with right ventricular pressure and volume overload. Spectral   Doppler shows impaired relaxation pattern of left ventricular myocardial   filling (Grade I diastolic dysfunction).  Right Ventricle: The right ventricular size is severely enlarged. RV   systolic function is moderately reduced.  Left Atrium: The left atrium is normal in size.  Right Atrium: Severely enlarged right atrium.  Pericardium: Trivial pericardial effusion is present. There is no   evidence of cardiac tamponade.  Mitral Valve: Structurally normal mitral valve, with normal leaflet   excursion.  Tricuspid Valve: Structurally normal tricuspid valve, with normal leaflet   excursion. Moderate-severe tricuspid regurgitation is visualized.   Estimated pulmonary artery systolic pressureis 77.5 mmHg assuming a   right atrial pressure of 8 mmHg, which is consistent with severe   pulmonary hypertension.  Aortic Valve: Normal trileaflet aortic valve with normal opening.  Pulmonic Valve: Structurally normal pulmonic valve, with normal leaflet   excursion. Mild pulmonic valve regurgitation.  Aorta: The aortic root is normal in size and structure.  Venous: The inferior vena cava was dilated, with respiratory size   variation less than 50%.      Summary:   1. Left ventricular ejection fraction, by visual estimation, is 45 to   50%.   2. Increased relative wall thickness with normal mass index consistent   with left ventricular concentric remodeling.   3. Spectral Doppler shows impaired relaxation pattern of left   ventricular myocardial filling (Grade I diastolic dysfunction).   4. Structurally normal mitral valve, with normal leaflet excursion.   5. Normal trileaflet aortic valve with normal opening.   6. Severely enlarged right atrium.   7. Severely enlarged right ventricle.   8. Moderately reduced RV systolic function.   9. Right ventricular volume and pressure overload.  10. Moderate-severe tricuspid regurgitation.  11. Structurally normal pulmonic valve, with normal leaflet excursion.  12. Mild pulmonic valve regurgitation.  13. Estimated pulmonary artery systolic pressure is 77.5 mmHg assuming a   right atrial pressure of 8 mmHg, which is consistent with severe   pulmonary hypertension.    < end of copied text >    LABS:	 	    07 Jun 2023 07:10    137    |  101    |  18     ----------------------------<  106    3.0     |  31     |  1.22   06 Jun 2023 07:08    141    |  105    |  19     ----------------------------<  114    3.9     |  32     |  1.25   05 Jun 2023 08:11    139    |  104    |  18     ----------------------------<  105    3.2     |  32     |  1.19     Ca    8.6        07 Jun 2023 07:10  Phos  3.8       06 Jun 2023 07:08  Mg     2.2       07 Jun 2023 07:10                        11.9   5.08  )-----------( 205      ( 06 Jun 2023 07:08 )             39.0 ,                       11.5   4.34  )-----------( 184      ( 05 Jun 2023 08:11 )             37.5     PT/PTT- ( 07 Jun 2023 07:10 )   PT: 16.2 sec;  PTT: x        INR: 1.35 ratio (06-07 @ 07:10)  INR: 1.26 ratio (06-06 @ 07:08)  INR: 1.31 ratio (06-05 @ 08:11)          
Patient is a 77y old  Male who presents with a chief complaint of SOB  , CHF (2023 10:12)     katharine drew [id number]  INTERVAL HPI/OVERNIGHT EVENTS: no acute events  SUBJECTIVE: he is upset that no one has explained all his labs to him. "they keep taking blood"    MEDICATIONS  (STANDING):  atorvastatin 40 milliGRAM(s) Oral at bedtime  enoxaparin Injectable 80 milliGRAM(s) SubCutaneous every 12 hours  finasteride 5 milliGRAM(s) Oral daily  furosemide   Injectable 40 milliGRAM(s) IV Push two times a day  metoprolol succinate ER 50 milliGRAM(s) Oral daily  pantoprazole    Tablet 40 milliGRAM(s) Oral before breakfast  potassium chloride   Powder 40 milliEquivalent(s) Oral once  tamsulosin 0.4 milliGRAM(s) Oral at bedtime    MEDICATIONS  (PRN):  acetaminophen     Tablet .. 650 milliGRAM(s) Oral every 6 hours PRN Temp greater or equal to 38C (100.4F), Mild Pain (1 - 3)  aluminum hydroxide/magnesium hydroxide/simethicone Suspension 30 milliLiter(s) Oral every 4 hours PRN Dyspepsia  melatonin 3 milliGRAM(s) Oral at bedtime PRN Insomnia  ondansetron Injectable 4 milliGRAM(s) IV Push every 8 hours PRN Nausea and/or Vomiting    Allergies    No Known Allergies    Intolerances      REVIEW OF SYSTEMS:  All other systems reviewed and are negative    Vital Signs Last 24 Hrs  T(C): 36.4 (2023 10:52), Max: 37.1 (2023 16:13)  T(F): 97.5 (2023 10:52), Max: 98.8 (2023 16:13)  HR: 80 (2023 10:52) (80 - 88)  BP: 104/66 (2023 10:52) (101/65 - 107/73)  BP(mean): --  RR: 18 (2023 10:52) (18 - 18)  SpO2: 95% (2023 10:52) (92% - 95%)      Daily     Daily Weight in k (2023 04:42)  I&O's Summary    2023 07:01  -  2023 07:00  --------------------------------------------------------  IN: 240 mL / OUT: 2500 mL / NET: -2260 mL      CAPILLARY BLOOD GLUCOSE        PHYSICAL EXAM:  GENERAL: NAD, well-groomed, well-developed  HEAD:  Atraumatic, Normocephalic  EYES: EOMI, PERRLA, conjunctiva and sclera clear  ENMT: No tonsillar erythema, exudates, or enlargement; Moist mucous membranes, Good dentition, No lesions  NECK: Supple, No JVD, Normal thyroid  NERVOUS SYSTEM:  Alert & Oriented X3, Good concentration; Motor Strength 5/5 B/L upper and lower extremities; DTRs 2+ intact and symmetric  CHEST/LUNG: Clear to percussion bilaterally; No rales, rhonchi, wheezing, or rubs  HEART: Regular rate and rhythm; No murmurs, rubs, or gallops  ABDOMEN: Soft, Nontender, Nondistended; Bowel sounds present  EXTREMITIES: 1+edema    Labs                          11.0   5.51  )-----------( 171      ( 2023 21:22 )             36.2     06-04    139  |  105  |  21  ----------------------------<  116<H>  3.4<L>   |  29  |  1.37<H>    Ca    8.2<L>      2023 05:50  Phos  2.8     06-04  Mg     2.2     06-04    TPro  7.3  /  Alb  3.1<L>  /  TBili  0.9  /  DBili  x   /  AST  24  /  ALT  26  /  AlkPhos  98  06-02    PT/INR - ( 2023 11:22 )   PT: 16.8 sec;   INR: 1.41 ratio         PTT - ( 2023 21:22 )  PTT:35.1 sec      Urinalysis Basic - ( 2023 21:58 )    Color: Yellow / Appearance: very cloudy / S.015 / pH: x  Gluc: x / Ketone: Negative  / Bili: Negative / Urobili: 8 mg/dL   Blood: x / Protein: 100 mg/dL / Nitrite: Negative   Leuk Esterase: Moderate / RBC: 6-10 /HPF / WBC 11-25   Sq Epi: x / Non Sq Epi: x / Bacteria: Moderate                
                          Patient: MARVA BURDICK [id number] 77y Male                            Hospitalist Attending Note    No complaints.   Denies SOB.     ____________________PHYSICAL EXAM:  GENERAL:  NAD Alert, interactive.    HEENT: NCAT  CARDIOVASCULAR:  S1, S2  LUNGS: CTAB    ABDOMEN:  soft, (-) tenderness, (-) distension, (+) bowel sounds, (-) guarding, (-) rebound (-) rigidity  EXTREMITIES:  no cyanosis / clubbing / edema.   ____________________    VITALS:  Vital Signs Last 24 Hrs  T(C): 36.9 (2023 09:51), Max: 37.3 (10 Zen 2023 16:54)  T(F): 98.5 (2023 09:51), Max: 99.1 (10 Zen 2023 16:54)  HR: 81 (2023 09:51) (81 - 91)  BP: 93/60 (2023 09:51) (93/60 - 111/73)  BP(mean): --  RR: 18 (2023 09:51) (18 - 18)  SpO2: 95% (2023 09:51) (95% - 98%)    Parameters below as of 2023 09:51  Patient On (Oxygen Delivery Method): room air     Daily     Daily Weight in k.5 (2023 04:42)  CAPILLARY BLOOD GLUCOSE        I&O's Summary    10 Zen 2023 07:01  -  2023 07:00  --------------------------------------------------------  IN: 954 mL / OUT: 1650 mL / NET: -696 mL        LABS:        138  |  104  |  24<H>  ----------------------------<  114<H>  3.8   |  29  |  1.13    Ca    8.6      2023 07:33  Mg     2.3     06-10      PT/INR - ( 2023 07:33 )   PT: 29.1 sec;   INR: 2.40 ratio                       MEDICATIONS:  acetaminophen     Tablet .. 650 milliGRAM(s) Oral every 6 hours PRN  aluminum hydroxide/magnesium hydroxide/simethicone Suspension 30 milliLiter(s) Oral every 4 hours PRN  atorvastatin 40 milliGRAM(s) Oral at bedtime  finasteride 5 milliGRAM(s) Oral daily  melatonin 3 milliGRAM(s) Oral at bedtime PRN  metoprolol succinate ER 50 milliGRAM(s) Oral daily  ondansetron Injectable 4 milliGRAM(s) IV Push every 8 hours PRN  pantoprazole    Tablet 40 milliGRAM(s) Oral before breakfast  tamsulosin 0.4 milliGRAM(s) Oral at bedtime    
                          Patient: MARVA BURDICK [id number] 77y Male                            Hospitalist Attending Note    No complaints.   Seen with interpeter [id number]    ____________________PHYSICAL EXAM:  GENERAL:  NAD Alert, interactive.    HEENT: NCAT  CARDIOVASCULAR:  S1, S2  LUNGS: coarse BS b/l.    ABDOMEN:  soft, (-) tenderness, (-) distension, (+) bowel sounds, (-) guarding, (-) rebound (-) rigidity  EXTREMITIES:  no cyanosis / clubbing.  + edema.   ____________________    VITALS:  Vital Signs Last 24 Hrs  T(C): 37.1 (2023 16:15), Max: 37.1 (2023 23:41)  T(F): 98.7 (2023 16:15), Max: 98.8 (2023 23:41)  HR: 78 (2023 16:15) (78 - 93)  BP: 101/64 (2023 16:15) (101/64 - 130/88)  BP(mean): --  RR: 17 (2023 16:15) (17 - 18)  SpO2: 98% (2023 16:15) (95% - 98%)    Parameters below as of 2023 04:29  Patient On (Oxygen Delivery Method): nasal cannula  O2 Flow (L/min): 2   Daily     Daily Weight in k (2023 04:29)  CAPILLARY BLOOD GLUCOSE        I&O's Summary    2023 07:01  -  2023 07:00  --------------------------------------------------------  IN: 300 mL / OUT: 4400 mL / NET: -4100 mL    2023 07:01  -  2023 17:07  --------------------------------------------------------  IN: 0 mL / OUT: 1100 mL / NET: -1100 mL        LABS:                        11.9   5.08  )-----------( 205      ( 2023 07:08 )             39.0     06-06    141  |  105  |  19  ----------------------------<  114<H>  3.9   |  32<H>  |  1.25    Ca    9.2      2023 07:08  Phos  3.8     06-06  Mg     2.2     06-06    TPro  7.1  /  Alb  2.9<L>  /  TBili  0.6  /  DBili  x   /  AST  15  /  ALT  21  /  AlkPhos  96  06-05    PT/INR - ( 2023 07:08 )   PT: 15.0 sec;   INR: 1.26 ratio           LIVER FUNCTIONS - ( 2023 08:11 )  Alb: 2.9 g/dL / Pro: 7.1 gm/dL / ALK PHOS: 96 U/L / ALT: 21 U/L / AST: 15 U/L / GGT: x                     MEDICATIONS:  acetaminophen     Tablet .. 650 milliGRAM(s) Oral every 6 hours PRN  aluminum hydroxide/magnesium hydroxide/simethicone Suspension 30 milliLiter(s) Oral every 4 hours PRN  atorvastatin 40 milliGRAM(s) Oral at bedtime  cefTRIAXone   IVPB      cefTRIAXone   IVPB 1000 milliGRAM(s) IV Intermittent every 24 hours  enoxaparin Injectable 80 milliGRAM(s) SubCutaneous every 12 hours  finasteride 5 milliGRAM(s) Oral daily  furosemide   Injectable 40 milliGRAM(s) IV Push two times a day  melatonin 3 milliGRAM(s) Oral at bedtime PRN  metoprolol succinate ER 50 milliGRAM(s) Oral daily  ondansetron Injectable 4 milliGRAM(s) IV Push every 8 hours PRN  pantoprazole    Tablet 40 milliGRAM(s) Oral before breakfast  tamsulosin 0.4 milliGRAM(s) Oral at bedtime  warfarin 6 milliGRAM(s) Oral once    
Patient is a 77y old  Male who presents with a chief complaint of sob    PAST MEDICAL & SURGICAL HISTORY:  HTN (hypertension)    elevated PSA    PE    DVT     CHF    pHTN    INTERVAL HISTORY: in no acute distress, mild SOB at times, LE edema improving   	  MEDICATIONS:  MEDICATIONS  (STANDING):  atorvastatin 40 milliGRAM(s) Oral at bedtime  enoxaparin Injectable 80 milliGRAM(s) SubCutaneous every 12 hours  finasteride 5 milliGRAM(s) Oral daily  furosemide   Injectable 40 milliGRAM(s) IV Push two times a day  metoprolol succinate ER 50 milliGRAM(s) Oral daily  pantoprazole    Tablet 40 milliGRAM(s) Oral before breakfast  tamsulosin 0.4 milliGRAM(s) Oral at bedtime  warfarin 5 milliGRAM(s) Oral once    MEDICATIONS  (PRN):  acetaminophen     Tablet .. 650 milliGRAM(s) Oral every 6 hours PRN Temp greater or equal to 38C (100.4F), Mild Pain (1 - 3)  aluminum hydroxide/magnesium hydroxide/simethicone Suspension 30 milliLiter(s) Oral every 4 hours PRN Dyspepsia  melatonin 3 milliGRAM(s) Oral at bedtime PRN Insomnia  ondansetron Injectable 4 milliGRAM(s) IV Push every 8 hours PRN Nausea and/or Vomiting    Vitals:  T(F): 98.2 (06-05-23 @ 11:10), Max: 98.2 (06-05-23 @ 11:10)  HR: 78 (06-05-23 @ 11:10) (78 - 94)  BP: 111/64 (06-05-23 @ 11:10) (100/61 - 111/64)  RR: 18 (06-05-23 @ 11:10) (18 - 18)  SpO2: 97% (06-05-23 @ 11:10) (94% - 97%)    06-04 @ 07:01  -  06-05 @ 07:00  --------------------------------------------------------  IN:    Oral Fluid: 280 mL  Total IN: 280 mL    OUT:    Indwelling Catheter - Urethral (mL): 3400 mL  Total OUT: 3400 mL    Total NET: -3120 mL    Weight (kg): 71.8 (06-03 @ 04:38)  BMI (kg/m2): 25.6 (06-03 @ 04:38)    PHYSICAL EXAM:  Neuro: Awake, responsive  CV: S1 S2 RRR + SM  Lungs: diminished to bases  GI: Soft, BS +, ND, NT  Extremities: + LE edema    TELEMETRY: sinus    RADIOLOGY: < from: CT Abdomen and Pelvis w/ IV Cont (06.02.23 @ 23:07) >  No significant change compared to previous studies.    Chronic bilateral pulmonary emboli, clot volume not appreciably changed.    CHF with cardiomegaly, mild interstitial pulmonary edema, tracepleural   effusions, small amount of ascites, hepatomegaly, and anasarca.    Enlarged prostate. Urinary bladder decompressed with Yan catheter.    < end of copied text >    DIAGNOSTIC TESTING:    [x ] Echocardiogram:   < from: TTE Echo Complete w/o Contrast w/ Doppler (05.07.23 @ 08:44) >  Left Ventricle: Increased relative wall thickness with normal mass index   consistent with left ventricular concentric remodeling.  Left ventricular ejection fraction, by visual estimation, is 45 to 50%.   The interventricular septum is flattened in systole and diastole,   consistent with right ventricular pressure and volume overload. Spectral   Doppler shows impaired relaxation pattern of left ventricular myocardial   filling (Grade I diastolic dysfunction).  Right Ventricle: The right ventricular size is severely enlarged. RV   systolic function is moderately reduced.  Left Atrium: The left atrium is normal in size.  Right Atrium: Severely enlarged right atrium.  Pericardium: Trivial pericardial effusion is present. There is no   evidence of cardiac tamponade.  Mitral Valve: Structurally normal mitral valve, with normal leaflet   excursion.  Tricuspid Valve: Structurally normal tricuspid valve, with normal leaflet   excursion. Moderate-severe tricuspid regurgitation is visualized.   Estimated pulmonary artery systolic pressureis 77.5 mmHg assuming a   right atrial pressure of 8 mmHg, which is consistent with severe   pulmonary hypertension.  Aortic Valve: Normal trileaflet aortic valve with normal opening.  Pulmonic Valve: Structurally normal pulmonic valve, with normal leaflet   excursion. Mild pulmonic valve regurgitation.  Aorta: The aortic root is normal in size and structure.  Venous: The inferior vena cava was dilated, with respiratory size   variation less than 50%.      Summary:   1. Left ventricular ejection fraction, by visual estimation, is 45 to   50%.   2. Increased relative wall thickness with normal mass index consistent   with left ventricular concentric remodeling.   3. Spectral Doppler shows impaired relaxation pattern of left   ventricular myocardial filling (Grade I diastolic dysfunction).   4. Structurally normal mitral valve, with normal leaflet excursion.   5. Normal trileaflet aortic valve with normal opening.   6. Severely enlarged right atrium.   7. Severely enlarged right ventricle.   8. Moderately reduced RV systolic function.   9. Right ventricular volume and pressure overload.  10. Moderate-severe tricuspid regurgitation.  11. Structurally normal pulmonic valve, with normal leaflet excursion.  12. Mild pulmonic valve regurgitation.  13. Estimated pulmonary artery systolic pressure is 77.5 mmHg assuming a   right atrial pressure of 8 mmHg, which is consistent with severe   pulmonary hypertension.    < end of copied text >    LABS:	 	    CARDIAC MARKERS:  Troponin I, High Sensitivity Result: 37.5 ng/L (06-02 @ 21:22)    05 Jun 2023 08:11    139    |  104    |  18     ----------------------------<  105    3.2     |  32     |  1.19   04 Jun 2023 05:50    139    |  105    |  21     ----------------------------<  116    3.4     |  29     |  1.37   03 Jun 2023 10:20    144    |  107    |  20     ----------------------------<  119    3.2     |  30     |  1.24     Ca    8.2        05 Jun 2023 08:11  Phos  3.0       05 Jun 2023 08:11  Mg     2.2       05 Jun 2023 08:11    TPro  7.1    /  Alb  2.9    /  TBili  0.6    /  DBili  x      /  AST  15     /  ALT  21     /  AlkPhos  96     05 Jun 2023 08:11                        11.5   4.34  )-----------( 184      ( 05 Jun 2023 08:11 )             37.5 ,                       11.0   5.51  )-----------( 171      ( 02 Jun 2023 21:22 )             36.2     PT/PTT- ( 05 Jun 2023 08:11 )   PT: 15.8 sec;  PTT: x        INR: 1.31 ratio (06-05 @ 08:11)  INR: 1.41 ratio (06-04 @ 11:22)  INR: 1.49 ratio (06-03 @ 10:20)  INR: 1.44 ratio (06-02 @ 21:22)          
                          Patient: MARVA BURDICK [id number] 77y Male                            Hospitalist Attending Note    No complaints.     ____________________PHYSICAL EXAM:  GENERAL:  NAD alert.   HEENT: NCAT  CARDIOVASCULAR:  S1, S2  LUNGS: coarse BS b/l.    ABDOMEN:  soft, (-) tenderness, (-) distension, (+) bowel sounds, (-) guarding, (-) rebound (-) rigidity  EXTREMITIES:  no cyanosis / clubbing.  + edema.   ____________________     VITALS:  Vital Signs Last 24 Hrs  T(C): 36.8 (05 Jun 2023 15:50), Max: 36.8 (05 Jun 2023 11:10)  T(F): 98.3 (05 Jun 2023 15:50), Max: 98.3 (05 Jun 2023 15:50)  HR: 84 (05 Jun 2023 15:50) (78 - 94)  BP: 106/68 (05 Jun 2023 15:50) (100/61 - 111/64)  BP(mean): --  RR: 18 (05 Jun 2023 15:50) (18 - 18)  SpO2: 96% (05 Jun 2023 15:50) (94% - 97%)    Parameters below as of 05 Jun 2023 05:03  Patient On (Oxygen Delivery Method): nasal cannula  O2 Flow (L/min): 2   Daily     Daily   CAPILLARY BLOOD GLUCOSE        I&O's Summary    04 Jun 2023 07:01  -  05 Jun 2023 07:00  --------------------------------------------------------  IN: 280 mL / OUT: 3400 mL / NET: -3120 mL    05 Jun 2023 07:01  -  05 Jun 2023 16:21  --------------------------------------------------------  IN: 0 mL / OUT: 1000 mL / NET: -1000 mL        HISTORY:  PAST MEDICAL & SURGICAL HISTORY:  HTN (hypertension)      Prostate cancer      Chronic combined systolic and diastolic heart failure      History of pulmonary embolism      No significant past surgical history      Allergies    No Known Allergies    Intolerances       LABS:                        11.5   4.34  )-----------( 184      ( 05 Jun 2023 08:11 )             37.5       Culture - Urine (collected 02 Jun 2023 21:58)  Source: Clean Catch Clean Catch (Midstream)  Preliminary Report (04 Jun 2023 23:33):    >100,000 CFU/ml Gram Negative Rods    06-05    139  |  104  |  18  ----------------------------<  105<H>  3.2<L>   |  32<H>  |  1.19    Ca    8.2<L>      05 Jun 2023 08:11  Phos  3.0     06-05  Mg     2.2     06-05    TPro  7.1  /  Alb  2.9<L>  /  TBili  0.6  /  DBili  x   /  AST  15  /  ALT  21  /  AlkPhos  96  06-05    PT/INR - ( 05 Jun 2023 08:11 )   PT: 15.8 sec;   INR: 1.31 ratio           LIVER FUNCTIONS - ( 05 Jun 2023 08:11 )  Alb: 2.9 g/dL / Pro: 7.1 gm/dL / ALK PHOS: 96 U/L / ALT: 21 U/L / AST: 15 U/L / GGT: x                 Culture - Urine (collected 02 Jun 2023 21:58)  Source: Clean Catch Clean Catch (Midstream)  Preliminary Report (04 Jun 2023 23:33):    >100,000 CFU/ml Gram Negative Rods          MEDICATIONS:  MEDICATIONS  (STANDING):  atorvastatin 40 milliGRAM(s) Oral at bedtime  cefTRIAXone   IVPB      enoxaparin Injectable 80 milliGRAM(s) SubCutaneous every 12 hours  finasteride 5 milliGRAM(s) Oral daily  furosemide   Injectable 40 milliGRAM(s) IV Push two times a day  metoprolol succinate ER 50 milliGRAM(s) Oral daily  pantoprazole    Tablet 40 milliGRAM(s) Oral before breakfast  tamsulosin 0.4 milliGRAM(s) Oral at bedtime  warfarin 5 milliGRAM(s) Oral once    MEDICATIONS  (PRN):  acetaminophen     Tablet .. 650 milliGRAM(s) Oral every 6 hours PRN Temp greater or equal to 38C (100.4F), Mild Pain (1 - 3)  aluminum hydroxide/magnesium hydroxide/simethicone Suspension 30 milliLiter(s) Oral every 4 hours PRN Dyspepsia  melatonin 3 milliGRAM(s) Oral at bedtime PRN Insomnia  ondansetron Injectable 4 milliGRAM(s) IV Push every 8 hours PRN Nausea and/or Vomiting  
                          Patient: MARVA BURDICK [id number] 77y Male                            Hospitalist Attending Note    No complaints.   Seen with  [id number]      ____________________PHYSICAL EXAM:  GENERAL:  NAD Alert, interactive.    HEENT: NCAT  CARDIOVASCULAR:  S1, S2  LUNGS: CTAB    ABDOMEN:  soft, (-) tenderness, (-) distension, (+) bowel sounds, (-) guarding, (-) rebound (-) rigidity  EXTREMITIES:  no cyanosis / clubbing / edema.   ____________________      VITALS:  Vital Signs Last 24 Hrs  T(C): 36.5 (2023 16:08), Max: 37.1 (2023 10:45)  T(F): 97.7 (2023 16:08), Max: 98.7 (2023 10:45)  HR: 82 (2023 16:08) (82 - 90)  BP: 105/67 (2023 16:08) (103/66 - 110/73)  BP(mean): --  RR: 18 (2023 16:08) (16 - 18)  SpO2: 96% (2023 16:08) (92% - 96%)    Parameters below as of 2023 10:45  Patient On (Oxygen Delivery Method): room air     Daily     Daily Weight in k.2 (2023 04:46)  CAPILLARY BLOOD GLUCOSE        I&O's Summary    2023 07:01  -  2023 07:00  --------------------------------------------------------  IN: 340 mL / OUT: 1525 mL / NET: -1185 mL    2023 07:01  -  2023 21:05  --------------------------------------------------------  IN: 1180 mL / OUT: 900 mL / NET: 280 mL        LABS:        137  |  102  |  27<H>  ----------------------------<  133<H>  4.3   |  30  |  1.19    Ca    9.1      2023 06:10  Mg     2.3           PT/INR - ( 2023 06:10 )   PT: 16.2 sec;   INR: 1.35 ratio                       MEDICATIONS:  acetaminophen     Tablet .. 650 milliGRAM(s) Oral every 6 hours PRN  aluminum hydroxide/magnesium hydroxide/simethicone Suspension 30 milliLiter(s) Oral every 4 hours PRN  atorvastatin 40 milliGRAM(s) Oral at bedtime  enoxaparin Injectable 80 milliGRAM(s) SubCutaneous every 12 hours  finasteride 5 milliGRAM(s) Oral daily  melatonin 3 milliGRAM(s) Oral at bedtime PRN  metoprolol succinate ER 50 milliGRAM(s) Oral daily  ondansetron Injectable 4 milliGRAM(s) IV Push every 8 hours PRN  pantoprazole    Tablet 40 milliGRAM(s) Oral before breakfast  tamsulosin 0.4 milliGRAM(s) Oral at bedtime  warfarin 7.5 milliGRAM(s) Oral once

## 2023-06-11 NOTE — DISCHARGE NOTE PROVIDER - CARE PROVIDER_API CALL
Jose Baltazar  Cardiovascular Disease  [address] [place] [address]  Phone: [phone]  Fax: [phone]  Follow Up Time:

## 2023-06-11 NOTE — DISCHARGE NOTE NURSING/CASE MANAGEMENT/SOCIAL WORK - PATIENT PORTAL LINK FT
You can access the FollowMyHealth Patient Portal offered by [facility] by registering at the following website: [email or web address]. By joining [employer]’s FollowMyHealth portal, you will also be able to view your health information using other applications (apps) compatible with our system.

## 2023-06-11 NOTE — DISCHARGE NOTE PROVIDER - DETAILS OF MALNUTRITION DIAGNOSIS/DIAGNOSES
This patient has been assessed with a concern for Malnutrition and was treated during this hospitalization for the following Nutrition diagnosis/diagnoses:     -  06/05/2023: Moderate protein-calorie malnutrition

## 2023-06-12 RX ORDER — ATORVASTATIN CALCIUM 80 MG/1
1 TABLET, FILM COATED ORAL
Qty: 30 | Refills: 0
Start: 2023-06-12 | End: 2023-07-11

## 2023-06-12 RX ORDER — FUROSEMIDE 40 MG
1 TABLET ORAL
Qty: 30 | Refills: 0
Start: 2023-06-12

## 2023-06-12 RX ORDER — TAMSULOSIN HYDROCHLORIDE 0.4 MG/1
1 CAPSULE ORAL
Qty: 30 | Refills: 0
Start: 2023-06-12 | End: 2023-07-11

## 2023-06-12 RX ORDER — FINASTERIDE 5 MG/1
1 TABLET, FILM COATED ORAL
Qty: 30 | Refills: 0
Start: 2023-06-12 | End: 2023-07-11

## 2023-06-12 RX ORDER — METOPROLOL TARTRATE 50 MG
1 TABLET ORAL
Qty: 30 | Refills: 0
Start: 2023-06-12 | End: 2023-07-11

## 2023-06-12 RX ORDER — PANTOPRAZOLE SODIUM 20 MG/1
1 TABLET, DELAYED RELEASE ORAL
Qty: 30 | Refills: 0
Start: 2023-06-12 | End: 2023-07-11

## 2023-06-12 RX ORDER — WARFARIN SODIUM 2.5 MG/1
1 TABLET ORAL
Qty: 7 | Refills: 0
Start: 2023-06-12

## 2023-06-15 DIAGNOSIS — E44.0 MODERATE PROTEIN-CALORIE MALNUTRITION: ICD-10-CM

## 2023-06-15 DIAGNOSIS — J96.01 ACUTE RESPIRATORY FAILURE WITH HYPOXIA: ICD-10-CM

## 2023-06-15 DIAGNOSIS — R33.9 RETENTION OF URINE, UNSPECIFIED: ICD-10-CM

## 2023-06-15 DIAGNOSIS — I95.9 HYPOTENSION, UNSPECIFIED: ICD-10-CM

## 2023-06-15 DIAGNOSIS — Z79.82 LONG TERM (CURRENT) USE OF ASPIRIN: ICD-10-CM

## 2023-06-15 DIAGNOSIS — Z59.7 INSUFFICIENT SOCIAL INSURANCE AND WELFARE SUPPORT: ICD-10-CM

## 2023-06-15 DIAGNOSIS — C61 MALIGNANT NEOPLASM OF PROSTATE: ICD-10-CM

## 2023-06-15 DIAGNOSIS — I11.0 HYPERTENSIVE HEART DISEASE WITH HEART FAILURE: ICD-10-CM

## 2023-06-15 DIAGNOSIS — Z79.01 LONG TERM (CURRENT) USE OF ANTICOAGULANTS: ICD-10-CM

## 2023-06-15 DIAGNOSIS — Z86.718 PERSONAL HISTORY OF OTHER VENOUS THROMBOSIS AND EMBOLISM: ICD-10-CM

## 2023-06-15 DIAGNOSIS — E87.6 HYPOKALEMIA: ICD-10-CM

## 2023-06-15 DIAGNOSIS — R79.1 ABNORMAL COAGULATION PROFILE: ICD-10-CM

## 2023-06-15 DIAGNOSIS — I42.9 CARDIOMYOPATHY, UNSPECIFIED: ICD-10-CM

## 2023-06-15 DIAGNOSIS — I27.20 PULMONARY HYPERTENSION, UNSPECIFIED: ICD-10-CM

## 2023-06-15 DIAGNOSIS — Z86.711 PERSONAL HISTORY OF PULMONARY EMBOLISM: ICD-10-CM

## 2023-06-15 DIAGNOSIS — I50.43 ACUTE ON CHRONIC COMBINED SYSTOLIC (CONGESTIVE) AND DIASTOLIC (CONGESTIVE) HEART FAILURE: ICD-10-CM

## 2023-06-15 SDOH — ECONOMIC STABILITY - INCOME SECURITY: INSUFFICIENT SOCIAL INSURANCE AND WELFARE SUPPORT: Z59.7

## 2023-12-22 NOTE — PATIENT PROFILE ADULT - PATIENT'S GENDER IDENTITY
SURGERY PROGRESS NOTE    SUBJECTIVE / 24H EVENTS:  Patient seen and examined on morning rounds. No acute events overnight. Patient reports coughing fit overnight during which hernia popped back out       OBJECTIVE:  VITAL SIGNS:  T(C): 37.1 (23 @ 13:44), Max: 37.3 (23 @ 22:05)  HR: 90 (23 @ 13:44) (83 - 98)  BP: 136/73 (23 @ 13:44) (105/60 - 136/73)  RR: 18 (23 @ 13:44) (17 - 20)  SpO2: 100% (23 @ 13:44) (99% - 100%)  Daily     Daily Weight in k (22 Dec 2023 05:00)  POCT Blood Glucose.: 143 mg/dL (23 @ 20:48)      PHYSICAL EXAM:  Gen: NAD  LS: Respirations unlabored on RA  GI: Soft. Nontender. Nondistended.   Groin: Large palpable RIH, reduced at bedside no skin changes         LAB VALUES:      138  |  102  |  19  ----------------------------<  127<H>  4.1   |  25  |  1.02    Ca    8.2<L>      22 Dec 2023 05:52  Phos  3.4       Mg     1.90                                      11.5   6.18  )-----------( 264      ( 22 Dec 2023 05:52 )             34.9               Urinalysis Basic - ( 22 Dec 2023 05:52 )    Color: x / Appearance: x / SG: x / pH: x  Gluc: 127 mg/dL / Ketone: x  / Bili: x / Urobili: x   Blood: x / Protein: x / Nitrite: x   Leuk Esterase: x / RBC: x / WBC x   Sq Epi: x / Non Sq Epi: x / Bacteria: x        MICROBIOLOGY:    Culture - Urine (collected 20 Dec 2023 18:00)  Source: Clean Catch Clean Catch (Midstream)  Final Report (21 Dec 2023 21:49):    <10,000 CFU/mL Normal Urogenital Missy    Culture - Blood (collected 20 Dec 2023 12:25)  Source: .Blood Blood-Peripheral  Preliminary Report (22 Dec 2023 15:02):    No growth at 48 Hours    Culture - Blood (collected 20 Dec 2023 12:10)  Source: .Blood Blood-Peripheral  Preliminary Report (22 Dec 2023 15:02):    No growth at 48 Hours      MEDICATIONS  (STANDING):  benzonatate 100 milliGRAM(s) Oral every 8 hours  buMETAnide 1 milliGRAM(s) Oral daily  enoxaparin Injectable 40 milliGRAM(s) SubCutaneous every 24 hours  gabapentin 100 milliGRAM(s) Oral at bedtime  lidocaine   4% Patch 1 Patch Transdermal every 24 hours  pantoprazole    Tablet 40 milliGRAM(s) Oral before breakfast  simvastatin 20 milliGRAM(s) Oral at bedtime  spironolactone 25 milliGRAM(s) Oral daily  Vyndamax 61mg tablet 1 Tablet(s)   Oral daily    MEDICATIONS  (PRN):  acetaminophen     Tablet .. 650 milliGRAM(s) Oral every 6 hours PRN Temp greater or equal to 38C (100.4F), Mild Pain (1 - 3)  guaiFENesin Oral Liquid (Sugar-Free) 200 milliGRAM(s) Oral every 6 hours PRN Cough        Male

## 2025-03-03 NOTE — ED ADULT TRIAGE NOTE - CHIEF COMPLAINT QUOTE
urinary retention x 1week + abd distention, b/l leg swelling, seen by pcp and was told he has "issue" with heart. as per ems constipation Home

## 2025-07-29 NOTE — DIETITIAN INITIAL EVALUATION ADULT - ETIOLOGY
- Continue Vyvanse 30 mg daily     Pediatric Weight Management Nurse Care Coordinator - [facility]   Balbina Fontaine RN - [phone]     Inadequate protein-energy intake in setting of acute CHF, MARVIN